# Patient Record
Sex: FEMALE | Race: WHITE | NOT HISPANIC OR LATINO | Employment: FULL TIME | URBAN - METROPOLITAN AREA
[De-identification: names, ages, dates, MRNs, and addresses within clinical notes are randomized per-mention and may not be internally consistent; named-entity substitution may affect disease eponyms.]

---

## 2017-08-10 DIAGNOSIS — Z12.31 VISIT FOR SCREENING MAMMOGRAM: Primary | ICD-10-CM

## 2017-08-16 ENCOUNTER — LAB VISIT (OUTPATIENT)
Dept: LAB | Facility: HOSPITAL | Age: 57
End: 2017-08-16
Attending: FAMILY MEDICINE
Payer: COMMERCIAL

## 2017-08-16 DIAGNOSIS — Z12.11 SPECIAL SCREENING FOR MALIGNANT NEOPLASMS, COLON: ICD-10-CM

## 2017-08-16 DIAGNOSIS — Z79.899 ENCOUNTER FOR LONG-TERM (CURRENT) USE OF OTHER MEDICATIONS: ICD-10-CM

## 2017-08-16 DIAGNOSIS — G47.00 PERSISTENT DISORDER OF INITIATING OR MAINTAINING SLEEP: Primary | ICD-10-CM

## 2017-08-16 LAB
ALBUMIN SERPL BCP-MCNC: 3.3 G/DL
ALP SERPL-CCNC: 64 U/L
ALT SERPL W/O P-5'-P-CCNC: 16 U/L
ANION GAP SERPL CALC-SCNC: 10 MMOL/L
AST SERPL-CCNC: 11 U/L
BASOPHILS # BLD AUTO: 0 K/UL
BASOPHILS NFR BLD: 0.2 %
BILIRUB SERPL-MCNC: 0.4 MG/DL
BILIRUB UR QL STRIP: NEGATIVE
BUN SERPL-MCNC: 15 MG/DL
CALCIUM SERPL-MCNC: 9.4 MG/DL
CHLORIDE SERPL-SCNC: 109 MMOL/L
CHOLEST/HDLC SERPL: 4.3 {RATIO}
CLARITY UR: CLEAR
CO2 SERPL-SCNC: 23 MMOL/L
COLOR UR: YELLOW
CREAT SERPL-MCNC: 0.8 MG/DL
DIFFERENTIAL METHOD: NORMAL
EOSINOPHIL # BLD AUTO: 0.1 K/UL
EOSINOPHIL NFR BLD: 1.6 %
ERYTHROCYTE [DISTWIDTH] IN BLOOD BY AUTOMATED COUNT: 14.5 %
EST. GFR  (AFRICAN AMERICAN): >60 ML/MIN/1.73 M^2
EST. GFR  (NON AFRICAN AMERICAN): >60 ML/MIN/1.73 M^2
GLUCOSE SERPL-MCNC: 105 MG/DL
GLUCOSE UR QL STRIP: NEGATIVE
HCT VFR BLD AUTO: 40.5 %
HDL/CHOLESTEROL RATIO: 23.1 %
HDLC SERPL-MCNC: 195 MG/DL
HDLC SERPL-MCNC: 45 MG/DL
HGB BLD-MCNC: 13.7 G/DL
HGB UR QL STRIP: NEGATIVE
KETONES UR QL STRIP: NEGATIVE
LDLC SERPL CALC-MCNC: 125.6 MG/DL
LEUKOCYTE ESTERASE UR QL STRIP: NEGATIVE
LYMPHOCYTES # BLD AUTO: 1.7 K/UL
LYMPHOCYTES NFR BLD: 29.6 %
MCH RBC QN AUTO: 29.3 PG
MCHC RBC AUTO-ENTMCNC: 33.8 G/DL
MCV RBC AUTO: 87 FL
MONOCYTES # BLD AUTO: 0.4 K/UL
MONOCYTES NFR BLD: 7.5 %
NEUTROPHILS # BLD AUTO: 3.4 K/UL
NEUTROPHILS NFR BLD: 61.1 %
NITRITE UR QL STRIP: NEGATIVE
NONHDLC SERPL-MCNC: 150 MG/DL
PH UR STRIP: 6 [PH] (ref 5–8)
PLATELET # BLD AUTO: 254 K/UL
PMV BLD AUTO: 9.2 FL
POTASSIUM SERPL-SCNC: 4 MMOL/L
PROT SERPL-MCNC: 6.6 G/DL
PROT UR QL STRIP: NEGATIVE
RBC # BLD AUTO: 4.67 M/UL
SODIUM SERPL-SCNC: 142 MMOL/L
SP GR UR STRIP: 1.02 (ref 1–1.03)
TRIGL SERPL-MCNC: 122 MG/DL
TSH SERPL DL<=0.005 MIU/L-ACNC: 1.19 UIU/ML
URN SPEC COLLECT METH UR: NORMAL
UROBILINOGEN UR STRIP-ACNC: NEGATIVE EU/DL
WBC # BLD AUTO: 5.6 K/UL

## 2017-08-16 PROCEDURE — 36415 COLL VENOUS BLD VENIPUNCTURE: CPT

## 2017-08-16 PROCEDURE — 87086 URINE CULTURE/COLONY COUNT: CPT

## 2017-08-16 PROCEDURE — 81003 URINALYSIS AUTO W/O SCOPE: CPT

## 2017-08-16 PROCEDURE — 80061 LIPID PANEL: CPT

## 2017-08-16 PROCEDURE — 80053 COMPREHEN METABOLIC PANEL: CPT

## 2017-08-16 PROCEDURE — 85025 COMPLETE CBC W/AUTO DIFF WBC: CPT

## 2017-08-16 PROCEDURE — 84443 ASSAY THYROID STIM HORMONE: CPT

## 2017-08-17 LAB
BACTERIA UR CULT: NORMAL
BACTERIA UR CULT: NORMAL

## 2017-08-21 ENCOUNTER — HOSPITAL ENCOUNTER (OUTPATIENT)
Dept: RADIOLOGY | Facility: HOSPITAL | Age: 57
Discharge: HOME | End: 2017-08-21
Attending: SPECIALIST
Payer: COMMERCIAL

## 2017-08-21 VITALS — WEIGHT: 293 LBS | HEIGHT: 72 IN | BODY MASS INDEX: 39.68 KG/M2

## 2017-08-21 DIAGNOSIS — Z12.31 VISIT FOR SCREENING MAMMOGRAM: ICD-10-CM

## 2017-08-21 PROCEDURE — 77063 BREAST TOMOSYNTHESIS BI: CPT | Mod: 26,,, | Performed by: RADIOLOGY

## 2017-08-21 PROCEDURE — 77067 SCR MAMMO BI INCL CAD: CPT | Mod: TC

## 2017-08-21 PROCEDURE — 77067 SCR MAMMO BI INCL CAD: CPT | Mod: 26,,, | Performed by: RADIOLOGY

## 2018-08-10 DIAGNOSIS — Z12.31 ENCOUNTER FOR SCREENING MAMMOGRAM FOR BREAST CANCER: Primary | ICD-10-CM

## 2018-08-16 ENCOUNTER — OFFICE VISIT (OUTPATIENT)
Dept: PODIATRY | Facility: CLINIC | Age: 58
End: 2018-08-16
Payer: COMMERCIAL

## 2018-08-16 ENCOUNTER — OFFICE VISIT (OUTPATIENT)
Dept: OPTOMETRY | Facility: CLINIC | Age: 58
End: 2018-08-16
Payer: COMMERCIAL

## 2018-08-16 VITALS — BODY MASS INDEX: 39.68 KG/M2 | WEIGHT: 293 LBS | HEIGHT: 72 IN

## 2018-08-16 DIAGNOSIS — Z01.00 EXAMINATION OF EYES AND VISION: Primary | ICD-10-CM

## 2018-08-16 DIAGNOSIS — M79.675 PAIN IN TOES OF BOTH FEET: ICD-10-CM

## 2018-08-16 DIAGNOSIS — H25.13 NUCLEAR SCLEROSIS, BILATERAL: ICD-10-CM

## 2018-08-16 DIAGNOSIS — B35.1 ONYCHOMYCOSIS DUE TO DERMATOPHYTE: Primary | ICD-10-CM

## 2018-08-16 DIAGNOSIS — H52.4 ASTIGMATISM WITH PRESBYOPIA, BILATERAL: ICD-10-CM

## 2018-08-16 DIAGNOSIS — H52.203 ASTIGMATISM WITH PRESBYOPIA, BILATERAL: ICD-10-CM

## 2018-08-16 DIAGNOSIS — M79.674 PAIN IN TOES OF BOTH FEET: ICD-10-CM

## 2018-08-16 PROCEDURE — 11720 DEBRIDE NAIL 1-5: CPT | Mod: S$GLB,,, | Performed by: PODIATRIST

## 2018-08-16 PROCEDURE — 3008F BODY MASS INDEX DOCD: CPT | Mod: CPTII,S$GLB,, | Performed by: PODIATRIST

## 2018-08-16 PROCEDURE — 92014 COMPRE OPH EXAM EST PT 1/>: CPT | Mod: S$GLB,,, | Performed by: OPTOMETRIST

## 2018-08-16 PROCEDURE — 92015 DETERMINE REFRACTIVE STATE: CPT | Mod: S$GLB,,, | Performed by: OPTOMETRIST

## 2018-08-16 PROCEDURE — 99999 PR PBB SHADOW E&M-EST. PATIENT-LVL III: CPT | Mod: PBBFAC,,, | Performed by: OPTOMETRIST

## 2018-08-16 PROCEDURE — 99203 OFFICE O/P NEW LOW 30 MIN: CPT | Mod: 25,S$GLB,, | Performed by: PODIATRIST

## 2018-08-16 PROCEDURE — 99999 PR PBB SHADOW E&M-EST. PATIENT-LVL III: CPT | Mod: PBBFAC,,, | Performed by: PODIATRIST

## 2018-08-16 NOTE — PROGRESS NOTES
HPI     Routine eye exam--dle-2016 [facility]    Pt complains of blurred vision at distance and near-wanting 2 different rx   for distance driving and reading. Has tried bifocals in the past. Denies   any eye pain. No flashes, some floaters.         Last edited by Maude Jackman on 8/16/2018 11:07 AM. (History)        ROS     Positive for: Eyes    Negative for: Constitutional, Gastrointestinal, Neurological, Skin,   Genitourinary, Musculoskeletal, HENT, Endocrine, Cardiovascular,   Respiratory, Psychiatric, Allergic/Imm, Heme/Lymph    Last edited by KATHERINE Islas, OD on 8/16/2018 11:19 AM. (History)        Assessment /Plan     For exam results, see Encounter Report.    Examination of eyes and vision    Astigmatism with presbyopia, bilateral    Nuclear sclerosis, bilateral      1. Ocular health exam OU  2. Updated specs rx for sep pairs dist/ near, gave copy  3. Very early, not vis sig for consult    Message if issues with near power for computer    Discussed and educated patient on current findings /plan.  RTC 1 year, prn if any changes / issues

## 2018-08-18 ENCOUNTER — LAB VISIT (OUTPATIENT)
Dept: LAB | Facility: HOSPITAL | Age: 58
End: 2018-08-18
Attending: NURSE PRACTITIONER
Payer: COMMERCIAL

## 2018-08-18 DIAGNOSIS — Z79.899 NEED FOR PROPHYLACTIC CHEMOTHERAPY: Primary | ICD-10-CM

## 2018-08-18 LAB
ALBUMIN SERPL BCP-MCNC: 3.6 G/DL
ALP SERPL-CCNC: 64 U/L
ALT SERPL W/O P-5'-P-CCNC: 17 U/L
ANION GAP SERPL CALC-SCNC: 8 MMOL/L
AST SERPL-CCNC: 14 U/L
BASOPHILS # BLD AUTO: 0 K/UL
BASOPHILS NFR BLD: 0.3 %
BILIRUB SERPL-MCNC: 0.7 MG/DL
BILIRUB UR QL STRIP: NEGATIVE
BUN SERPL-MCNC: 13 MG/DL
CALCIUM SERPL-MCNC: 9.6 MG/DL
CHLORIDE SERPL-SCNC: 104 MMOL/L
CHOLEST SERPL-MCNC: 201 MG/DL
CHOLEST/HDLC SERPL: 3.9 {RATIO}
CLARITY UR: CLEAR
CO2 SERPL-SCNC: 27 MMOL/L
COLOR UR: YELLOW
CREAT SERPL-MCNC: 0.8 MG/DL
DIFFERENTIAL METHOD: ABNORMAL
EOSINOPHIL # BLD AUTO: 0.1 K/UL
EOSINOPHIL NFR BLD: 1.5 %
ERYTHROCYTE [DISTWIDTH] IN BLOOD BY AUTOMATED COUNT: 14.4 %
EST. GFR  (AFRICAN AMERICAN): >60 ML/MIN/1.73 M^2
EST. GFR  (NON AFRICAN AMERICAN): >60 ML/MIN/1.73 M^2
GLUCOSE SERPL-MCNC: 106 MG/DL
GLUCOSE UR QL STRIP: NEGATIVE
HCT VFR BLD AUTO: 40.6 %
HDLC SERPL-MCNC: 51 MG/DL
HDLC SERPL: 25.4 %
HGB BLD-MCNC: 13.4 G/DL
HGB UR QL STRIP: NEGATIVE
KETONES UR QL STRIP: NEGATIVE
LDLC SERPL CALC-MCNC: 126.6 MG/DL
LEUKOCYTE ESTERASE UR QL STRIP: NEGATIVE
LYMPHOCYTES # BLD AUTO: 1.4 K/UL
LYMPHOCYTES NFR BLD: 24.9 %
MCH RBC QN AUTO: 28.6 PG
MCHC RBC AUTO-ENTMCNC: 33.1 G/DL
MCV RBC AUTO: 87 FL
MONOCYTES # BLD AUTO: 0.4 K/UL
MONOCYTES NFR BLD: 7.3 %
NEUTROPHILS # BLD AUTO: 3.8 K/UL
NEUTROPHILS NFR BLD: 66 %
NITRITE UR QL STRIP: NEGATIVE
NONHDLC SERPL-MCNC: 150 MG/DL
PH UR STRIP: 8 [PH] (ref 5–8)
PLATELET # BLD AUTO: 299 K/UL
PMV BLD AUTO: 9 FL
POTASSIUM SERPL-SCNC: 4.3 MMOL/L
PROT SERPL-MCNC: 6.7 G/DL
PROT UR QL STRIP: NEGATIVE
RBC # BLD AUTO: 4.7 M/UL
SODIUM SERPL-SCNC: 139 MMOL/L
SP GR UR STRIP: 1.02 (ref 1–1.03)
TRIGL SERPL-MCNC: 117 MG/DL
TSH SERPL DL<=0.005 MIU/L-ACNC: 0.94 UIU/ML
URN SPEC COLLECT METH UR: NORMAL
UROBILINOGEN UR STRIP-ACNC: 1 EU/DL
WBC # BLD AUTO: 5.8 K/UL

## 2018-08-18 PROCEDURE — 81003 URINALYSIS AUTO W/O SCOPE: CPT

## 2018-08-18 PROCEDURE — 36415 COLL VENOUS BLD VENIPUNCTURE: CPT

## 2018-08-18 PROCEDURE — 85025 COMPLETE CBC W/AUTO DIFF WBC: CPT

## 2018-08-18 PROCEDURE — 84443 ASSAY THYROID STIM HORMONE: CPT

## 2018-08-18 PROCEDURE — 80061 LIPID PANEL: CPT

## 2018-08-18 PROCEDURE — 80053 COMPREHEN METABOLIC PANEL: CPT

## 2018-08-23 ENCOUNTER — HOSPITAL ENCOUNTER (OUTPATIENT)
Dept: RADIOLOGY | Facility: HOSPITAL | Age: 58
Discharge: HOME | End: 2018-08-23
Attending: SPECIALIST
Payer: COMMERCIAL

## 2018-08-23 DIAGNOSIS — Z12.31 ENCOUNTER FOR SCREENING MAMMOGRAM FOR BREAST CANCER: ICD-10-CM

## 2018-08-23 PROCEDURE — 77067 SCR MAMMO BI INCL CAD: CPT | Mod: TC

## 2018-08-23 PROCEDURE — 77067 SCR MAMMO BI INCL CAD: CPT | Mod: 26,,, | Performed by: RADIOLOGY

## 2018-08-23 PROCEDURE — 77063 BREAST TOMOSYNTHESIS BI: CPT | Mod: 26,,, | Performed by: RADIOLOGY

## 2018-09-06 ENCOUNTER — PATIENT MESSAGE (OUTPATIENT)
Dept: OPTOMETRY | Facility: CLINIC | Age: 58
End: 2018-09-06

## 2018-09-15 ENCOUNTER — PATIENT MESSAGE (OUTPATIENT)
Dept: OPTOMETRY | Facility: CLINIC | Age: 58
End: 2018-09-15

## 2018-09-17 NOTE — PROGRESS NOTES
Subjective:      Patient ID: Domonique Carmona is a 57 y.o. female.    Chief Complaint: Nail Problem (Tani great - discolored, raising from base - right worse than left) and Other Misc (PCP:  Dr Mackay - Aug 2017)    Domonique is a 57 y.o. female who presents to the clinic complaining of thick and discolored toenails on both feet. Domonique is inquiring about treatment options.  Patient reports pain with pressure, especially when wearing shoes, reaching 7/10 on the pain scale and describes the pain as dull and achy.  She denies any known trauma to toes or any previous treatment.  Patient denies any other pedal complaints at this time.      Review of Systems   Constitution: Negative for chills, diaphoresis and fever.   Cardiovascular: Negative for claudication, cyanosis and leg swelling.   Respiratory: Negative for cough, shortness of breath and wheezing.    Endocrine: Negative for cold intolerance, heat intolerance, polydipsia, polyphagia and polyuria.   Hematologic/Lymphatic: Negative for bleeding problem. Does not bruise/bleed easily.   Skin: Positive for color change and nail changes. Negative for dry skin, itching, poor wound healing, rash, skin cancer, suspicious lesions and unusual hair distribution.   Musculoskeletal: Negative for arthritis, back pain, falls, gout, joint pain, joint swelling, muscle cramps, muscle weakness, myalgias and stiffness.   Gastrointestinal: Negative for change in bowel habit, constipation, diarrhea, nausea and vomiting.   Neurological: Positive for paresthesias. Negative for disturbances in coordination, dizziness, focal weakness, loss of balance, numbness and sensory change.           Objective:      Bilateral pedal exam was performed today.  Physical Exam   Constitutional: She is oriented to person, place, and time. She appears well-developed and well-nourished. No distress.   HENT:   Head: Normocephalic and atraumatic.   Eyes: Conjunctivae and EOM are normal. Pupils are equal,  round, and reactive to light.   Neck: Normal range of motion.   Cardiovascular:   Pulses:       Dorsalis pedis pulses are 2+ on the right side, and 2+ on the left side.        Posterior tibial pulses are 2+ on the right side, and 2+ on the left side.   Pedal pulses palpable 2/4 DP & PT Bilateral LE.  CFT is < 3 seconds to Bilateral hallux.  Skin temperature is warm to warm proximal tibia to distal toes Bilateral LE without localized increase in calor noted.  No erythema, edema, or ecchymosis noted Bilateral foot or ankle.  Hair growth present distally Bilateral LE.     Pulmonary/Chest: Effort normal and breath sounds normal. No respiratory distress. She has no wheezes.   Musculoskeletal: She exhibits tenderness. She exhibits no edema or deformity.        Right foot: There is decreased range of motion. There is no deformity.        Left foot: There is decreased range of motion. There is no deformity.   Bilateral ankle joint ROM is decreased.  Bilateral subtalar joint ROM is decreased.  Bilateral midtarsal joint ROM is within normal limits.  Bilateral 1st ray ROM is within normal limits.  Bilateral 1st  MTPJ ROM is decreased.   Bilateral ankle joint dorsiflexion is restricted with the knee extended and flexed per Silfverskiold exam.    Muscle strength is 5/5 for all bilateral muscle groups tested.   Feet:   Right Foot:   Protective Sensation: 10 sites tested. 10 sites sensed.   Skin Integrity: Negative for ulcer, blister, skin breakdown, erythema, warmth, callus or dry skin.   Left Foot:   Protective Sensation: 10 sites tested. 10 sites sensed.   Skin Integrity: Negative for ulcer, blister, skin breakdown, erythema, warmth, callus or dry skin.   Neurological: She is alert and oriented to person, place, and time. She has normal strength. She displays no atrophy and normal reflexes. No sensory deficit. She exhibits normal muscle tone. Coordination and gait normal. She displays no Babinski's sign on the right side. She  displays no Babinski's sign on the left side.   Reflex Scores:       Achilles reflexes are 2+ on the right side and 2+ on the left side.  Protective sensation is intact to 10/10 sites on the right foot and to 10/10 sites on the left foot via [place] monofilament.    Proprioception is Intact to the right foot and Intact to the left foot.  Vibratory sensation is Intact to the right foot and Intact to the left foot.   Light touch sensation is Intact to the right foot and Intact to the left foot.   Achilles DTR and Chaddock STR are Intact to bilateral lower extremities.   Negative Babinski sign bilateral lower extremities.  Negative clonus sign bilateral lower extremities.  Moderate tenderness to palpation of B/L hallux toenail.     Skin: Skin is warm, dry and intact. Capillary refill takes less than 2 seconds. No abrasion, no bruising, no burn, no ecchymosis, no laceration, no lesion, no petechiae and no rash noted. She is not diaphoretic. No cyanosis or erythema. Nails show no clubbing.   B/L hallux toenail is thickened, dystrophic, brittle, discolored, and mycotic with subungal debris present.  Toenails 2-5 B/L are WNL in length and thickness.  Webspaces 1-4 B/L are clean, dry, and intact.  Skin turgor is supple.  No dry, flaky skin noted to B/L LE.  No open wounds or suspicious pigmented lesions appreciable B/L foot or ankle.     Psychiatric: She has a normal mood and affect. Her behavior is normal. Judgment and thought content normal.   Nursing note and vitals reviewed.        Assessment:       Encounter Diagnoses   Name Primary?    Onychomycosis due to dermatophyte Yes    Pain in toes of both feet          Plan:       Domonique was seen today for nail problem and other misc.    Diagnoses and all orders for this visit:    Onychomycosis due to dermatophyte    Pain in toes of both feet      I counseled the patient on her conditions, their implications and medical management.    - Discussed with patient  conservative treatment options including prescription oral and topical antifungal therapies, Vicks, vinegar soaks, and tea tree oil as well as surgical intervention via total nail avulsion along with pertinent risks and benefits and likely complications - patient choose nonprescription topical antifungal therapy and nail debridement.    - Patient was given the option of definitive diagnosis via fungal nail culture, which she deferred today.    - With the patient's permission, B/L hallux toenails were debrided in length and thickness via nail nippers and electric  to patient's tolerance without incident.      Patient was given the following recommendations and instructions:  Patient Instructions   - Apply white vinegar soaked cotton balls to affected toenails for 5 minutes daily or perform 25% white vinegar and 75% lukewarm water foot soaks for 10 minutes daily to reduce fungal debris under toenails.  Apply thin layer of Vicks vaporub on top of and under affected nails to create environment that doesn't support fungal growth.  Don't perform any of these antifungal therapies if open wounds are present.    - Notify clinic if condition worsens or fails to improve.    - Follow up in 6 weeks for nail care.        Sherry Welch DPM

## 2018-09-17 NOTE — PATIENT INSTRUCTIONS
- Apply white vinegar soaked cotton balls to affected toenails for 5 minutes daily or perform 25% white vinegar and 75% lukewarm water foot soaks for 10 minutes daily to reduce fungal debris under toenails.  Apply thin layer of Vicks vaporub on top of and under affected nails to create environment that doesn't support fungal growth.  Don't perform any of these antifungal therapies if open wounds are present.    - Notify clinic if condition worsens or fails to improve.    - Follow up in 6 weeks for nail care.

## 2018-09-27 ENCOUNTER — OFFICE VISIT (OUTPATIENT)
Dept: PODIATRY | Facility: CLINIC | Age: 58
End: 2018-09-27
Payer: COMMERCIAL

## 2018-09-27 ENCOUNTER — HOSPITAL ENCOUNTER (OUTPATIENT)
Dept: RADIOLOGY | Facility: HOSPITAL | Age: 58
Discharge: HOME | End: 2018-09-27
Attending: PODIATRIST
Payer: COMMERCIAL

## 2018-09-27 VITALS
HEIGHT: 72 IN | WEIGHT: 293 LBS | BODY MASS INDEX: 39.68 KG/M2 | HEART RATE: 81 BPM | SYSTOLIC BLOOD PRESSURE: 144 MMHG | DIASTOLIC BLOOD PRESSURE: 97 MMHG

## 2018-09-27 DIAGNOSIS — B35.1 ONYCHOMYCOSIS DUE TO DERMATOPHYTE: ICD-10-CM

## 2018-09-27 DIAGNOSIS — M72.2 PLANTAR FASCIITIS: ICD-10-CM

## 2018-09-27 DIAGNOSIS — M79.671 PAIN IN RIGHT FOOT: ICD-10-CM

## 2018-09-27 DIAGNOSIS — M76.61 ACHILLES TENDINITIS OF RIGHT LOWER EXTREMITY: ICD-10-CM

## 2018-09-27 DIAGNOSIS — M76.61 ACHILLES TENDINITIS OF RIGHT LOWER EXTREMITY: Primary | ICD-10-CM

## 2018-09-27 PROCEDURE — 73630 X-RAY EXAM OF FOOT: CPT | Mod: TC,PO,RT

## 2018-09-27 PROCEDURE — 73630 X-RAY EXAM OF FOOT: CPT | Mod: 26,RT,, | Performed by: RADIOLOGY

## 2018-09-27 PROCEDURE — 99999 PR PBB SHADOW E&M-EST. PATIENT-LVL III: CPT | Mod: PBBFAC,,, | Performed by: PODIATRIST

## 2018-09-27 PROCEDURE — 99213 OFFICE O/P EST LOW 20 MIN: CPT | Mod: S$GLB,,, | Performed by: PODIATRIST

## 2018-09-27 PROCEDURE — 3008F BODY MASS INDEX DOCD: CPT | Mod: CPTII,S$GLB,, | Performed by: PODIATRIST

## 2018-10-15 PROBLEM — B35.1 ONYCHOMYCOSIS DUE TO DERMATOPHYTE: Status: ACTIVE | Noted: 2018-10-15

## 2018-10-15 PROBLEM — M72.2 PLANTAR FASCIITIS: Status: ACTIVE | Noted: 2018-10-15

## 2018-10-15 PROBLEM — M79.671 PAIN IN RIGHT FOOT: Status: ACTIVE | Noted: 2018-10-15

## 2018-10-15 NOTE — PATIENT INSTRUCTIONS
- Apply white vinegar soaked cotton balls to affected toenails for 5 minutes daily or perform 25% white vinegar and 75% lukewarm water foot soaks for 10 minutes daily to reduce fungal debris under toenails.  Apply thin layer of Vicks vaporub on top of and under affected nails to create environment that doesn't support fungal growth.  Don't perform any of these antifungal therapies if open wounds are present.    - Wear supportive shoes such as sneakers with arch supports.    - Look for Superfeet or Powerstep arch supports.    - Rest/reduce ambulation to necessary activities of daily living.    - Ice heel for no more than 20 minutes/per hour.    - Elevate foot as much as possible throughout the day.    - Perform Achilles/plantar fascia stretches as much as possible throughout the day.    - Apply OTC topical arnica to affected area for pain relief and to reduce bruising/swelling.    - Keep scheduled appointment for xrays.    - Notify clinic if pain worsens or fails to improve.    - Follow up in 1 month for heel pain.

## 2018-10-15 NOTE — PROGRESS NOTES
Subjective:      Patient ID: Domonique Carmona is a 58 y.o. female.    Chief Complaint: Nail Problem (Tani fungus   Ziggy Mackay  8/2018)    Domonique is a 58 y.o. female who presents to the clinic complaining of thick and discolored toenails on both feet.  She reports decreased pain since last visit after nails were thinned out.  She relates occasionally performing home remedies to reduce fungal burden on toenails.  Patient is also complaining of right heel pain at both the back and bottom, which has been ongoing for a couple of weeks.  She reports it is occasionally sharp, especially with 1st step in the morning.  She denies trying to treat her heel pain other than with rest.  She rates her pain today as 2/10 on pain scale for all complaints.  Patient denies any other pedal complaints at this time.      Review of Systems   Cardiovascular: Negative for claudication, cyanosis and leg swelling.   Skin: Positive for color change and nail changes. Negative for dry skin, itching, poor wound healing, rash, skin cancer, suspicious lesions and unusual hair distribution.   Musculoskeletal: Positive for myalgias and stiffness. Negative for arthritis, back pain, falls, gout, joint pain, joint swelling, muscle cramps and muscle weakness.   Neurological: Positive for paresthesias. Negative for disturbances in coordination, dizziness, focal weakness, loss of balance, numbness and sensory change.           Objective:      Bilateral pedal exam was performed today.  Physical Exam   Constitutional: She is oriented to person, place, and time. She appears well-developed and well-nourished. No distress.   Cardiovascular:   Pulses:       Dorsalis pedis pulses are 2+ on the right side, and 2+ on the left side.        Posterior tibial pulses are 2+ on the right side, and 2+ on the left side.   Pedal pulses palpable 2/4 DP & PT Bilateral LE.  CFT is < 3 seconds to Bilateral hallux.  Skin temperature is warm to warm proximal tibia to  distal toes Bilateral LE without localized increase in calor noted.  Mild, nonpitting edema noted to the posterior and plantar aspect of the right heel at the insertion of the Achilles tendon and the plantar fascia on the calcaneus, respectively.  No erythema or ecchymosis noted Bilateral foot or ankle.  Hair growth present distally Bilateral LE.     Musculoskeletal: She exhibits edema and tenderness.        Right foot: There is decreased range of motion and deformity.        Left foot: There is decreased range of motion. There is no deformity.   Bilateral ankle joint ROM is decreased.  Bilateral subtalar joint ROM is decreased.  Bilateral midtarsal joint ROM is within normal limits.  Bilateral 1st ray ROM is within normal limits.  Bilateral 1st  MTPJ ROM is decreased.   Bilateral ankle joint dorsiflexion is restricted with the knee extended and flexed per Silfverskiold exam.    Muscle strength is 5/5 for all bilateral muscle groups tested.   Feet:   Right Foot:   Protective Sensation: 10 sites tested. 10 sites sensed.   Skin Integrity: Negative for ulcer, blister, skin breakdown, erythema, warmth, callus or dry skin.   Left Foot:   Protective Sensation: 10 sites tested. 10 sites sensed.   Skin Integrity: Negative for ulcer, blister, skin breakdown, erythema, warmth, callus or dry skin.   Neurological: She is alert and oriented to person, place, and time. She has normal strength. She displays no atrophy and normal reflexes. No sensory deficit. She exhibits normal muscle tone. Coordination and gait normal. She displays no Babinski's sign on the right side. She displays no Babinski's sign on the left side.   Reflex Scores:       Achilles reflexes are 2+ on the right side and 2+ on the left side.  Epicritic sensation is grossly intact to B/L LE distally.   Achilles DTR and Chaddock STR are Intact to bilateral lower extremities.   Mild tenderness to palpation of B/L hallux toenail.  Mild tenderness to palpation of the  posterior and plantar aspect of the right heel at the insertion of the Achilles tendon and the plantar fascia on the calcaneus, respectively.   Skin: Skin is warm, dry and intact. Capillary refill takes less than 2 seconds. No abrasion, no bruising, no burn, no ecchymosis, no laceration, no lesion, no petechiae and no rash noted. She is not diaphoretic. No cyanosis or erythema. Nails show no clubbing.   B/L hallux toenail is thickened, dystrophic, brittle, discolored, and mycotic with subungal debris present.  Toenails 2-5 B/L are WNL in length and thickness.  Webspaces 1-4 B/L are clean, dry, and intact.  Skin turgor is supple.  No dry, flaky skin noted to B/L LE.  No open wounds or suspicious pigmented lesions appreciable B/L foot or ankle.     Psychiatric: She has a normal mood and affect. Her behavior is normal. Judgment and thought content normal.   Nursing note and vitals reviewed.        Assessment:       Encounter Diagnoses   Name Primary?    Achilles tendinitis of right lower extremity - Right Foot Yes    Plantar fasciitis - Right Foot     Pain in right foot - Right Foot     Onychomycosis due to dermatophyte          Plan:       Domonique was seen today for nail problem.    Diagnoses and all orders for this visit:    Achilles tendinitis of right lower extremity - Right Foot  -     X-Ray Foot Complete Right; Future    Plantar fasciitis - Right Foot  -     X-Ray Foot Complete Right; Future    Pain in right foot - Right Foot  -     X-Ray Foot Complete Right; Future    Onychomycosis due to dermatophyte      I counseled the patient on her conditions, their implications and medical management.    - Reviewed with patient antifungal treatment options - she opted to continue with home remedies at this time.    - Discussed with patient PRICE therapy and Achilles tendon stretching techniques.  Patient was given stretching instruction handout.     - Advised patient on proper, accommodative/supportive shoe  gear.    - Ordered and scheduled x-rays for patient. Will notify patient of results.    Patient was given the following recommendations and instructions:  Patient Instructions   - Apply white vinegar soaked cotton balls to affected toenails for 5 minutes daily or perform 25% white vinegar and 75% lukewarm water foot soaks for 10 minutes daily to reduce fungal debris under toenails.  Apply thin layer of Vicks vaporub on top of and under affected nails to create environment that doesn't support fungal growth.  Don't perform any of these antifungal therapies if open wounds are present.    - Wear supportive shoes such as sneakers with arch supports.    - Look for Superfeet or Powerstep arch supports.    - Rest/reduce ambulation to necessary activities of daily living.    - Ice heel for no more than 20 minutes/per hour.    - Elevate foot as much as possible throughout the day.    - Perform Achilles/plantar fascia stretches as much as possible throughout the day.    - Apply OTC topical arnica to affected area for pain relief and to reduce bruising/swelling.    - Keep scheduled appointment for xrays.    - Notify clinic if pain worsens or fails to improve.    - Follow up in 1 month for heel pain.          Sherry Welch DPM

## 2018-12-20 ENCOUNTER — OFFICE VISIT (OUTPATIENT)
Dept: PODIATRY | Facility: CLINIC | Age: 58
End: 2018-12-20
Payer: COMMERCIAL

## 2018-12-20 VITALS
HEIGHT: 72 IN | BODY MASS INDEX: 39.68 KG/M2 | WEIGHT: 293 LBS | DIASTOLIC BLOOD PRESSURE: 87 MMHG | HEART RATE: 77 BPM | SYSTOLIC BLOOD PRESSURE: 136 MMHG

## 2018-12-20 DIAGNOSIS — I87.2 VENOUS STASIS DERMATITIS OF LEFT LOWER EXTREMITY: ICD-10-CM

## 2018-12-20 DIAGNOSIS — M79.675 PAIN IN TOES OF BOTH FEET: ICD-10-CM

## 2018-12-20 DIAGNOSIS — M76.61 ACHILLES TENDINITIS OF RIGHT LOWER EXTREMITY: ICD-10-CM

## 2018-12-20 DIAGNOSIS — M79.662 PAIN IN LEFT LOWER LEG: ICD-10-CM

## 2018-12-20 DIAGNOSIS — M79.674 PAIN IN TOES OF BOTH FEET: ICD-10-CM

## 2018-12-20 DIAGNOSIS — L08.9 SOFT TISSUE INFECTION OF FOOT: Primary | ICD-10-CM

## 2018-12-20 DIAGNOSIS — B35.1 ONYCHOMYCOSIS DUE TO DERMATOPHYTE: ICD-10-CM

## 2018-12-20 PROCEDURE — 99214 OFFICE O/P EST MOD 30 MIN: CPT | Mod: S$GLB,,, | Performed by: PODIATRIST

## 2018-12-20 PROCEDURE — 3008F BODY MASS INDEX DOCD: CPT | Mod: CPTII,S$GLB,, | Performed by: PODIATRIST

## 2018-12-20 PROCEDURE — 99999 PR PBB SHADOW E&M-EST. PATIENT-LVL III: CPT | Mod: PBBFAC,,, | Performed by: PODIATRIST

## 2018-12-20 RX ORDER — MUPIROCIN CALCIUM 20 MG/G
CREAM TOPICAL 3 TIMES DAILY
Qty: 30 G | Refills: 2 | Status: SHIPPED | OUTPATIENT
Start: 2018-12-20 | End: 2019-01-19

## 2018-12-20 RX ORDER — CLOTRIMAZOLE 1 %
CREAM (GRAM) TOPICAL 2 TIMES DAILY
Qty: 60 G | Refills: 2 | Status: SHIPPED | OUTPATIENT
Start: 2018-12-20 | End: 2019-09-13

## 2018-12-20 RX ORDER — METHYLPREDNISOLONE 4 MG/1
TABLET ORAL
Qty: 2 PACKAGE | Refills: 0 | Status: SHIPPED | OUTPATIENT
Start: 2018-12-20 | End: 2019-01-10 | Stop reason: ALTCHOICE

## 2018-12-20 NOTE — PATIENT INSTRUCTIONS
- Apply white vinegar soaked cotton balls to affected toenails for 5 minutes daily or perform 25% white vinegar and 75% lukewarm water foot soaks for 10 minutes daily to reduce fungal debris under toenails.  Apply thin layer of Vicks vaporub on top of and under affected nails to create environment that doesn't support fungal growth.  Don't perform any of these antifungal therapies if open wounds are present.    - Apply compound antifungal to affected toenails twice daily.    - Wear supportive shoes such as sneakers with arch supports.    - Look for Superfeet or Powerstep arch supports.    - Rest/reduce ambulation to necessary activities of daily living.    - Ice heel for no more than 20 minutes/per hour.    - Elevate foot as much as possible throughout the day.    - Perform Achilles/plantar fascia stretches as much as possible throughout the day.    - Apply OTC topical arnica to affected area for pain relief and to reduce bruising/swelling.    - Apply antibiotic and antifungal creams to ingrown toenails and cover with bandaids daily.    - Apply warm compress with [place]'s 1-2x daily to left leg rash.    - Notify clinic if any new or worsening condition arises.    - Follow up in 2 weeks for dermatitis and heel pain.

## 2018-12-21 ENCOUNTER — PATIENT MESSAGE (OUTPATIENT)
Dept: PODIATRY | Facility: CLINIC | Age: 58
End: 2018-12-21

## 2018-12-21 NOTE — TELEPHONE ENCOUNTER
Called patient and pharmacy they messed up the rx for the medrol pack they only gave patient one does they are now putting in the other rx for the 2nd for patient to . Let patient know about this as well. Patient voiced all understanding .

## 2018-12-31 PROBLEM — M79.674 PAIN IN TOES OF BOTH FEET: Status: ACTIVE | Noted: 2018-12-31

## 2018-12-31 PROBLEM — L08.9 SOFT TISSUE INFECTION OF FOOT: Status: ACTIVE | Noted: 2018-12-31

## 2018-12-31 PROBLEM — M79.662 PAIN IN LEFT LOWER LEG: Status: ACTIVE | Noted: 2018-12-31

## 2018-12-31 PROBLEM — M79.675 PAIN IN TOES OF BOTH FEET: Status: ACTIVE | Noted: 2018-12-31

## 2018-12-31 PROBLEM — I87.2 VENOUS STASIS DERMATITIS OF LEFT LOWER EXTREMITY: Status: ACTIVE | Noted: 2018-12-31

## 2018-12-31 NOTE — PROGRESS NOTES
Subjective:      Patient ID: Domonique Carmona is a 58 y.o. female.    Chief Complaint: Foot Pain (left ); Nail Problem (f/u - 3 mo - fungus); and Other Misc (PCP:  Dr Mackay  8/2018)    Domonique is a 58 y.o. female who presents to the clinic complaining of thick and discolored toenails on both feet, left heel pain, and the left lower leg rash.  She reports decreased pain since last visit after nails were thinned out but pain is still present, and she would like to review antifungal treatment options today.  She relates occasionally performing home remedies to reduce fungal burden on toenails.  Patient states right heel pain has resolved is now occurring in her left heel, which she rates as 6/10 on pain scale.  She reports it is occasionally stabbing pain and is irritated by shoe gear.  She informs of new onset of rash on her left leg, which has been present for about a week.  She states she has been applying Aquaphor to rash with mild temporary relief.  Patient denies any other pedal complaints at this time.    Review of Systems   Cardiovascular: Positive for leg swelling. Negative for claudication and cyanosis.   Skin: Positive for color change, nail changes, poor wound healing, rash and unusual hair distribution. Negative for dry skin, itching, skin cancer and suspicious lesions.   Musculoskeletal: Positive for myalgias and stiffness. Negative for arthritis, back pain, falls, gout, joint pain, joint swelling, muscle cramps and muscle weakness.   Neurological: Negative for disturbances in coordination, dizziness, focal weakness, loss of balance, numbness, paresthesias and sensory change.         Objective:      Bilateral pedal exam was performed today.  Physical Exam   Constitutional: She is oriented to person, place, and time. She appears well-developed and well-nourished. No distress.   Cardiovascular: Intact distal pulses.   Pulses:       Dorsalis pedis pulses are 2+ on the right side, and 2+ on the left side.         Posterior tibial pulses are 2+ on the right side, and 2+ on the left side.   Pedal pulses palpable 2/4 DP & PT Bilateral LE.  CFT is = 3 seconds to Bilateral hallux.  Skin temperature is warm to warm proximal tibia to distal toes Bilateral LE with localized increase in calor and erythema noted to left anterior shin.  +2/4 pitting edema noted to B/L LE.  Mild, nonpitting edema noted to the posterior spect of the right heel at the insertion of the Achilles tendon and the plantar fascia on the calcaneus, respectively.  No ecchymosis noted Bilateral foot or ankle.  Hair growth present distally Bilateral LE.  Telangectasias and reticular veins present B/L LE.   Musculoskeletal: She exhibits edema, tenderness and deformity.        Right foot: There is decreased range of motion and deformity.        Left foot: There is decreased range of motion and deformity.   Bilateral ankle joint ROM is decreased.  Bilateral subtalar joint ROM is decreased.  Bilateral midtarsal joint ROM is within normal limits.  Bilateral 1st ray ROM is within normal limits.  Bilateral 1st  MTPJ ROM is decreased.   Bilateral ankle joint dorsiflexion is restricted with the knee extended and flexed per Silfverskiold exam.    Muscle strength is 5/5 for all bilateral LE muscle groups tested.   Feet:   Right Foot:   Protective Sensation: 10 sites tested. 10 sites sensed.   Skin Integrity: Negative for ulcer, blister, skin breakdown, erythema, warmth, callus or dry skin.   Left Foot:   Protective Sensation: 10 sites tested. 10 sites sensed.   Skin Integrity: Positive for skin breakdown, erythema and warmth. Negative for ulcer, blister, callus or dry skin.   Neurological: She is alert and oriented to person, place, and time. She has normal strength. She displays no atrophy and normal reflexes. No sensory deficit. She exhibits normal muscle tone. Coordination and gait normal. She displays no Babinski's sign on the right side. She displays no Babinski's  sign on the left side.   Reflex Scores:       Achilles reflexes are 2+ on the right side and 2+ on the left side.  Epicritic sensation is grossly intact to B/L LE distally.   Oppenheim STR is negative bilateral lower extremities.   Mild tenderness to palpation of B/L hallux toenail medial and lateral borders.  Moderate tenderness to palpation of the posterior and plantar aspect of the right heel at the insertion of the Achilles tendon and the plantar fascia on the calcaneus, respectively.  Mild pain upon palpation to left anterior shin dermatitis.   Skin: Skin is warm. Capillary refill takes 2 to 3 seconds. Lesion and rash noted. No abrasion, no bruising, no burn, no ecchymosis, no laceration and no petechiae noted. She is not diaphoretic. There is erythema. No cyanosis. Nails show no clubbing.   B/L hallux toenail is thickened, dystrophic, brittle, discolored, and mycotic with subungal debris present with mild incurvation without wound and.  Toenails 2-5 B/L are WNL in length and thickness.  Webspaces 1-4 B/L are clean, dry, and intact.  Skin turgor is increased skin texture is shiny and atrophic.  Erythematous and edematous patch noted to the left anterior shin with superficial skin breakdown and serous weeping without purulence, malodor, proximal streaking, or other dylan sign of infection appreciable.     Psychiatric: She has a normal mood and affect. Her behavior is normal. Judgment and thought content normal.   Nursing note and vitals reviewed.        Assessment:       Encounter Diagnoses   Name Primary?    Soft tissue infection of foot Yes    Venous stasis dermatitis of left lower extremity     Pain in left lower leg     Achilles tendinitis of right lower extremity - Right Foot     Onychomycosis due to dermatophyte     Pain in toes of both feet          Plan:       Domonique was seen today for foot pain, nail problem and other misc.    Diagnoses and all orders for this visit:    Soft tissue infection of  foot  -     mupirocin calcium 2% (BACTROBAN) 2 % cream; Apply topically 3 (three) times daily.  -     clotrimazole (LOTRIMIN) 1 % cream; Apply topically 2 (two) times daily.  -     methylPREDNISolone (MEDROL DOSEPACK) 4 mg tablet; use as directed    Venous stasis dermatitis of left lower extremity  -     mupirocin calcium 2% (BACTROBAN) 2 % cream; Apply topically 3 (three) times daily.  -     clotrimazole (LOTRIMIN) 1 % cream; Apply topically 2 (two) times daily.  -     methylPREDNISolone (MEDROL DOSEPACK) 4 mg tablet; use as directed    Pain in left lower leg  -     mupirocin calcium 2% (BACTROBAN) 2 % cream; Apply topically 3 (three) times daily.  -     clotrimazole (LOTRIMIN) 1 % cream; Apply topically 2 (two) times daily.  -     methylPREDNISolone (MEDROL DOSEPACK) 4 mg tablet; use as directed    Achilles tendinitis of right lower extremity - Right Foot    Onychomycosis due to dermatophyte    Pain in toes of both feet      I counseled the patient on her conditions, their implications and medical management.    - Reviewed with patient antifungal treatment options.  Prescribed topical compound antifungal nail solution through [facility].    - Revieweded with patient accommodative/supportive shoe gear, PRICE therapy, and Achilles tendon stretching techniques.    - Advised patient on wound care for venous stasis dermatitis with Domeboro's compresses and applying antibiotic and antifungal creams to wound twice daily.    - Recommended the patient apply antibiotic and antifungal creams to ingrown toenails and cover with Band-Aids daily and allow the nails to grow out to avoid nail procedures.  Patient opted for this route of conservative therapy at this time.    Patient was given the following recommendations and instructions:  Patient Instructions   - Apply white vinegar soaked cotton balls to affected toenails for 5 minutes daily or perform 25% white vinegar and 75% lukewarm water foot soaks for 10  minutes daily to reduce fungal debris under toenails.  Apply thin layer of Vicks vaporub on top of and under affected nails to create environment that doesn't support fungal growth.  Don't perform any of these antifungal therapies if open wounds are present.    - Apply compound antifungal to affected toenails twice daily.    - Wear supportive shoes such as sneakers with arch supports.    - Look for Superfeet or Powerstep arch supports.    - Rest/reduce ambulation to necessary activities of daily living.    - Ice heel for no more than 20 minutes/per hour.    - Elevate foot as much as possible throughout the day.    - Perform Achilles/plantar fascia stretches as much as possible throughout the day.    - Apply OTC topical arnica to affected area for pain relief and to reduce bruising/swelling.    - Apply antibiotic and antifungal creams to ingrown toenails and cover with bandaids daily.    - Apply warm compress with Domeboro's 1-2x daily to left leg rash.    - Notify clinic if any new or worsening condition arises.    - Follow up in 2 weeks for dermatitis and heel pain.          Sherry Welch DPM

## 2019-01-10 ENCOUNTER — OFFICE VISIT (OUTPATIENT)
Dept: PODIATRY | Facility: CLINIC | Age: 59
End: 2019-01-10
Payer: COMMERCIAL

## 2019-01-10 VITALS
HEART RATE: 81 BPM | HEIGHT: 72 IN | SYSTOLIC BLOOD PRESSURE: 128 MMHG | WEIGHT: 293 LBS | BODY MASS INDEX: 39.68 KG/M2 | DIASTOLIC BLOOD PRESSURE: 88 MMHG

## 2019-01-10 DIAGNOSIS — I87.2 VENOUS STASIS DERMATITIS OF LEFT LOWER EXTREMITY: Primary | ICD-10-CM

## 2019-01-10 DIAGNOSIS — L60.0 ONYCHOCRYPTOSIS: ICD-10-CM

## 2019-01-10 DIAGNOSIS — M76.62 ACHILLES TENDINITIS OF BOTH LOWER EXTREMITIES: ICD-10-CM

## 2019-01-10 DIAGNOSIS — M72.2 PLANTAR FASCIITIS: ICD-10-CM

## 2019-01-10 DIAGNOSIS — M79.605 PAIN IN BOTH LOWER EXTREMITIES: ICD-10-CM

## 2019-01-10 DIAGNOSIS — M79.604 PAIN IN BOTH LOWER EXTREMITIES: ICD-10-CM

## 2019-01-10 DIAGNOSIS — M76.61 ACHILLES TENDINITIS OF BOTH LOWER EXTREMITIES: ICD-10-CM

## 2019-01-10 PROCEDURE — 99999 PR PBB SHADOW E&M-EST. PATIENT-LVL III: CPT | Mod: PBBFAC,,, | Performed by: PODIATRIST

## 2019-01-10 PROCEDURE — 3008F PR BODY MASS INDEX (BMI) DOCUMENTED: ICD-10-PCS | Mod: CPTII,S$GLB,, | Performed by: PODIATRIST

## 2019-01-10 PROCEDURE — 99212 PR OFFICE/OUTPT VISIT, EST, LEVL II, 10-19 MIN: ICD-10-PCS | Mod: S$GLB,,, | Performed by: PODIATRIST

## 2019-01-10 PROCEDURE — 99999 PR PBB SHADOW E&M-EST. PATIENT-LVL III: ICD-10-PCS | Mod: PBBFAC,,, | Performed by: PODIATRIST

## 2019-01-10 PROCEDURE — 99212 OFFICE O/P EST SF 10 MIN: CPT | Mod: S$GLB,,, | Performed by: PODIATRIST

## 2019-01-10 PROCEDURE — 3008F BODY MASS INDEX DOCD: CPT | Mod: CPTII,S$GLB,, | Performed by: PODIATRIST

## 2019-01-10 NOTE — PATIENT INSTRUCTIONS
- Apply white vinegar soaked cotton balls to affected toenails for 5 minutes daily or perform 25% white vinegar and 75% lukewarm water foot soaks for 10 minutes daily to reduce fungal debris under toenails.  Apply thin layer of Vicks vaporub on top of and under affected nails to create environment that doesn't support fungal growth.  Don't perform any of these antifungal therapies if open wounds are present.    - Apply compound antifungal to affected toenails twice daily.    - Wear supportive shoes such as sneakers with arch supports.    - Look for Superfeet or Powerstep arch supports.    - Rest/reduce ambulation to necessary activities of daily living.    - Ice heel for no more than 20 minutes/per hour.    - Elevate foot as much as possible throughout the day.    - Perform Achilles/plantar fascia stretches as much as possible throughout the day.    - Apply OTC topical arnica to affected area for pain relief and to reduce bruising/swelling.    - Apply antibiotic and antifungal creams to ingrown toenails and left leg redness and cover ingrowns with bandaids daily.    - Apply warm compress with Domeboro's 1-2x daily to left leg rash.    - Notify clinic if any new or worsening condition arises.    - Follow up in 4 weeks or sooner as needed for dermatitis, ingrown toenails, and heel pain.

## 2019-01-11 PROBLEM — L60.0 ONYCHOCRYPTOSIS: Status: ACTIVE | Noted: 2019-01-11

## 2019-01-21 NOTE — PROGRESS NOTES
Subjective:      Patient ID: Domonique Carmona is a 58 y.o. female.    Chief Complaint: Follow-up (2 wk re-ck left foot) and Other Misc (PCP:  Dr Mackay Aug 2018)    Domonique is a 58 y.o. female who presents to the clinic complaining of thick and discolored toenails on both feet, left heel pain, and the left lower leg rash.  She reports decreased pain since last visit after nails were thinned out but pain is still present, and she would like to review antifungal treatment options today.  She relates occasionally performing home remedies to reduce fungal burden on toenails.  Patient states right heel pain has resolved is now occurring in her left heel, which she rates as 6/10 on pain scale.  She reports it is occasionally stabbing pain and is irritated by shoe gear.  She informs of new onset of rash on her left leg, which has been present for about a week.  She states she has been applying Aquaphor to rash with mild temporary relief.  Patient denies any other pedal complaints at this time.    Review of Systems   Cardiovascular: Positive for leg swelling. Negative for claudication and cyanosis.   Skin: Positive for color change, nail changes, poor wound healing, rash and unusual hair distribution. Negative for dry skin, itching, skin cancer and suspicious lesions.   Musculoskeletal: Positive for myalgias and stiffness. Negative for arthritis, back pain, falls, gout, joint pain, joint swelling, muscle cramps and muscle weakness.   Neurological: Negative for disturbances in coordination, dizziness, focal weakness, loss of balance, numbness, paresthesias and sensory change.         Objective:      Bilateral pedal exam was performed today.  Physical Exam   Constitutional: She is oriented to person, place, and time. She appears well-developed and well-nourished. No distress.   Cardiovascular: Intact distal pulses.   Pulses:       Dorsalis pedis pulses are 2+ on the right side, and 2+ on the left side.        Posterior  tibial pulses are 2+ on the right side, and 2+ on the left side.   Pedal pulses palpable 2/4 DP & PT Bilateral LE.  CFT is = 3 seconds to Bilateral hallux.  Skin temperature is warm to warm proximal tibia to distal toes Bilateral LE with localized increase in calor and erythema noted to left anterior shin.  +2/4 pitting edema noted to B/L LE.  Mild, nonpitting edema noted to the posterior spect of the right heel at the insertion of the Achilles tendon and the plantar fascia on the calcaneus, respectively.  No ecchymosis noted Bilateral foot or ankle.  Hair growth present distally Bilateral LE.  Telangectasias and reticular veins present B/L LE.   Musculoskeletal: She exhibits edema, tenderness and deformity.        Right foot: There is decreased range of motion and deformity.        Left foot: There is decreased range of motion and deformity.   Bilateral ankle joint ROM is decreased.  Bilateral subtalar joint ROM is decreased.  Bilateral midtarsal joint ROM is within normal limits.  Bilateral 1st ray ROM is within normal limits.  Bilateral 1st  MTPJ ROM is decreased.   Bilateral ankle joint dorsiflexion is restricted with the knee extended and flexed per Silfverskiold exam.    Muscle strength is 5/5 for all bilateral LE muscle groups tested.   Feet:   Right Foot:   Protective Sensation: 10 sites tested. 10 sites sensed.   Skin Integrity: Negative for ulcer, blister, skin breakdown, erythema, warmth, callus or dry skin.   Left Foot:   Protective Sensation: 10 sites tested. 10 sites sensed.   Skin Integrity: Positive for skin breakdown, erythema and warmth. Negative for ulcer, blister, callus or dry skin.   Neurological: She is alert and oriented to person, place, and time. She has normal strength. She displays no atrophy and normal reflexes. No sensory deficit. She exhibits normal muscle tone. Coordination and gait normal. She displays no Babinski's sign on the right side. She displays no Babinski's sign on the left  side.   Reflex Scores:       Achilles reflexes are 2+ on the right side and 2+ on the left side.  Epicritic sensation is grossly intact to B/L LE distally.   Oppenheim STR is negative bilateral lower extremities.   Mild tenderness to palpation of B/L hallux toenail medial and lateral borders.  Moderate tenderness to palpation of the posterior and plantar aspect of the right heel at the insertion of the Achilles tendon and the plantar fascia on the calcaneus, respectively.  Mild pain upon palpation to left anterior shin dermatitis.   Skin: Skin is warm. Capillary refill takes 2 to 3 seconds. Lesion and rash noted. No abrasion, no bruising, no burn, no ecchymosis, no laceration and no petechiae noted. She is not diaphoretic. There is erythema. No cyanosis. Nails show no clubbing.   B/L hallux toenail is thickened, dystrophic, brittle, discolored, and mycotic with subungal debris present with mild incurvation without wound and.  Toenails 2-5 B/L are WNL in length and thickness.  Webspaces 1-4 B/L are clean, dry, and intact.  Skin turgor is increased skin texture is shiny and atrophic.  Erythematous and edematous patch noted to the left anterior shin with superficial skin breakdown and serous weeping without purulence, malodor, proximal streaking, or other dylan sign of infection appreciable.     Psychiatric: She has a normal mood and affect. Her behavior is normal. Judgment and thought content normal.   Nursing note and vitals reviewed.        Assessment:       Encounter Diagnoses   Name Primary?    Venous stasis dermatitis of left lower extremity Yes    Onychocryptosis     Achilles tendinitis of both lower extremities     Plantar fasciitis     Pain in both lower extremities          Plan:       Domonique was seen today for follow-up and other misc.    Diagnoses and all orders for this visit:    Venous stasis dermatitis of left lower extremity    Onychocryptosis    Achilles tendinitis of both lower  extremities    Plantar fasciitis    Pain in both lower extremities      I counseled the patient on her conditions, their implications and medical management.    - Revieweded with patient accommodative/supportive shoe gear, PRICE therapy, and Achilles tendon stretching techniques.    - Advised patient on wound care for venous stasis dermatitis with Domeboro's compresses and applying antibiotic and antifungal creams to wound twice daily.    - Recommended the patient apply antibiotic and antifungal creams to ingrown toenails and cover with Band-Aids daily and allow the nails to grow out to avoid nail procedures.  Patient opted for this route of conservative therapy at this time.    Patient was given the following recommendations and instructions:  Patient Instructions   - Apply white vinegar soaked cotton balls to affected toenails for 5 minutes daily or perform 25% white vinegar and 75% lukewarm water foot soaks for 10 minutes daily to reduce fungal debris under toenails.  Apply thin layer of Vicks vaporub on top of and under affected nails to create environment that doesn't support fungal growth.  Don't perform any of these antifungal therapies if open wounds are present.    - Apply compound antifungal to affected toenails twice daily.    - Wear supportive shoes such as sneakers with arch supports.    - Look for Superfeet or Powerstep arch supports.    - Rest/reduce ambulation to necessary activities of daily living.    - Ice heel for no more than 20 minutes/per hour.    - Elevate foot as much as possible throughout the day.    - Perform Achilles/plantar fascia stretches as much as possible throughout the day.    - Apply OTC topical arnica to affected area for pain relief and to reduce bruising/swelling.    - Apply antibiotic and antifungal creams to ingrown toenails and left leg redness and cover ingrowns with bandaids daily.    - Apply warm compress with Domeboro's 1-2x daily to left leg rash.    - Notify clinic if  any new or worsening condition arises.    - Follow up in 4 weeks or sooner as needed for dermatitis, ingrown toenails, and heel pain.          Sherry Welch DPM

## 2019-08-14 DIAGNOSIS — R10.31 ABDOMINAL PAIN, RIGHT LOWER QUADRANT: ICD-10-CM

## 2019-08-14 DIAGNOSIS — Z12.39 SCREENING BREAST EXAMINATION: Primary | ICD-10-CM

## 2019-08-21 ENCOUNTER — HOSPITAL ENCOUNTER (OUTPATIENT)
Dept: RADIOLOGY | Facility: HOSPITAL | Age: 59
Discharge: HOME | End: 2019-08-21
Attending: SPECIALIST
Payer: COMMERCIAL

## 2019-08-21 DIAGNOSIS — R10.31 ABDOMINAL PAIN, RIGHT LOWER QUADRANT: ICD-10-CM

## 2019-08-21 PROCEDURE — 76830 TRANSVAGINAL US NON-OB: CPT | Mod: 26,,, | Performed by: RADIOLOGY

## 2019-08-21 PROCEDURE — 76856 US EXAM PELVIC COMPLETE: CPT | Mod: 26,,, | Performed by: RADIOLOGY

## 2019-08-21 PROCEDURE — 76856 US PELVIS COMP WITH TRANSVAG NON-OB (XPD): ICD-10-PCS | Mod: 26,,, | Performed by: RADIOLOGY

## 2019-08-21 PROCEDURE — 76830 TRANSVAGINAL US NON-OB: CPT | Mod: TC

## 2019-08-21 PROCEDURE — 76830 US PELVIS COMP WITH TRANSVAG NON-OB (XPD): ICD-10-PCS | Mod: 26,,, | Performed by: RADIOLOGY

## 2019-08-22 DIAGNOSIS — M79.89 NODULE OF SOFT TISSUE: ICD-10-CM

## 2019-08-22 DIAGNOSIS — M79.662 PAIN IN LEFT LOWER LEG: Primary | ICD-10-CM

## 2019-08-22 DIAGNOSIS — R60.9 SWELLING: ICD-10-CM

## 2019-08-22 DIAGNOSIS — M79.89 OTHER SPECIFIED SOFT TISSUE DISORDERS: ICD-10-CM

## 2019-08-23 ENCOUNTER — HOSPITAL ENCOUNTER (OUTPATIENT)
Dept: RADIOLOGY | Facility: HOSPITAL | Age: 59
Discharge: HOME | End: 2019-08-23
Attending: NURSE PRACTITIONER
Payer: COMMERCIAL

## 2019-08-23 DIAGNOSIS — M79.89 NODULE OF SOFT TISSUE: ICD-10-CM

## 2019-08-23 DIAGNOSIS — R60.9 SWELLING: ICD-10-CM

## 2019-08-23 DIAGNOSIS — M79.662 PAIN IN LEFT LOWER LEG: ICD-10-CM

## 2019-08-23 DIAGNOSIS — M79.89 OTHER SPECIFIED SOFT TISSUE DISORDERS: ICD-10-CM

## 2019-08-23 PROCEDURE — 93971 EXTREMITY STUDY: CPT | Mod: TC,LT

## 2019-08-23 PROCEDURE — 93971 US LOWER EXTREMITY VEINS LEFT: ICD-10-PCS | Mod: 26,LT,, | Performed by: RADIOLOGY

## 2019-08-23 PROCEDURE — 93971 EXTREMITY STUDY: CPT | Mod: 26,LT,, | Performed by: RADIOLOGY

## 2019-09-09 ENCOUNTER — HOSPITAL ENCOUNTER (OUTPATIENT)
Dept: RADIOLOGY | Facility: HOSPITAL | Age: 59
Discharge: HOME | End: 2019-09-09
Attending: SPECIALIST
Payer: COMMERCIAL

## 2019-09-09 DIAGNOSIS — Z12.39 SCREENING BREAST EXAMINATION: ICD-10-CM

## 2019-09-09 PROCEDURE — 77067 MAMMO DIGITAL SCREENING BILAT WITH TOMOSYNTHESIS_CAD: ICD-10-PCS | Mod: 26,,, | Performed by: RADIOLOGY

## 2019-09-09 PROCEDURE — 77067 SCR MAMMO BI INCL CAD: CPT | Mod: TC

## 2019-09-09 PROCEDURE — 77063 BREAST TOMOSYNTHESIS BI: CPT | Mod: 26,,, | Performed by: RADIOLOGY

## 2019-09-09 PROCEDURE — 77067 SCR MAMMO BI INCL CAD: CPT | Mod: 26,,, | Performed by: RADIOLOGY

## 2019-09-09 PROCEDURE — 77063 MAMMO DIGITAL SCREENING BILAT WITH TOMOSYNTHESIS_CAD: ICD-10-PCS | Mod: 26,,, | Performed by: RADIOLOGY

## 2019-09-12 ENCOUNTER — HOSPITAL ENCOUNTER (OUTPATIENT)
Dept: RADIOLOGY | Facility: HOSPITAL | Age: 59
Discharge: HOME | End: 2019-09-12
Attending: SPECIALIST
Payer: COMMERCIAL

## 2019-09-12 DIAGNOSIS — N63.10 BREAST MASS, RIGHT: ICD-10-CM

## 2019-09-12 PROCEDURE — 77065 DX MAMMO INCL CAD UNI: CPT | Mod: 26,,, | Performed by: RADIOLOGY

## 2019-09-12 PROCEDURE — G0279 MAMMO DIGITAL DIAGNOSTIC RIGHT WITH TOMOSYNTHESIS_CAD: ICD-10-PCS | Mod: 26,,, | Performed by: RADIOLOGY

## 2019-09-12 PROCEDURE — 77065 DX MAMMO INCL CAD UNI: CPT | Mod: TC

## 2019-09-12 PROCEDURE — G0279 TOMOSYNTHESIS, MAMMO: HCPCS | Mod: 26,,, | Performed by: RADIOLOGY

## 2019-09-12 PROCEDURE — 77061 BREAST TOMOSYNTHESIS UNI: CPT | Mod: TC

## 2019-09-12 PROCEDURE — 77065 MAMMO DIGITAL DIAGNOSTIC RIGHT WITH TOMOSYNTHESIS_CAD: ICD-10-PCS | Mod: 26,,, | Performed by: RADIOLOGY

## 2019-09-13 ENCOUNTER — OFFICE VISIT (OUTPATIENT)
Dept: DERMATOLOGY | Facility: CLINIC | Age: 59
End: 2019-09-13
Payer: COMMERCIAL

## 2019-09-13 VITALS — HEIGHT: 72 IN | WEIGHT: 293 LBS | BODY MASS INDEX: 39.68 KG/M2

## 2019-09-13 DIAGNOSIS — I87.2 STASIS DERMATITIS OF BOTH LEGS: ICD-10-CM

## 2019-09-13 DIAGNOSIS — M79.3 LIPODERMATOSCLEROSIS: Primary | ICD-10-CM

## 2019-09-13 PROCEDURE — 3008F BODY MASS INDEX DOCD: CPT | Mod: CPTII,S$GLB,, | Performed by: DERMATOLOGY

## 2019-09-13 PROCEDURE — 99201 PR OFFICE/OUTPT VISIT,NEW,LEVL I: CPT | Mod: S$GLB,,, | Performed by: DERMATOLOGY

## 2019-09-13 PROCEDURE — 99201 PR OFFICE/OUTPT VISIT,NEW,LEVL I: ICD-10-PCS | Mod: S$GLB,,, | Performed by: DERMATOLOGY

## 2019-09-13 PROCEDURE — 99999 PR PBB SHADOW E&M-EST. PATIENT-LVL III: CPT | Mod: PBBFAC,,, | Performed by: DERMATOLOGY

## 2019-09-13 PROCEDURE — 99999 PR PBB SHADOW E&M-EST. PATIENT-LVL III: ICD-10-PCS | Mod: PBBFAC,,, | Performed by: DERMATOLOGY

## 2019-09-13 PROCEDURE — 3008F PR BODY MASS INDEX (BMI) DOCUMENTED: ICD-10-PCS | Mod: CPTII,S$GLB,, | Performed by: DERMATOLOGY

## 2019-09-13 RX ORDER — TRIAMCINOLONE ACETONIDE 1 MG/G
CREAM TOPICAL
Qty: 80 G | Refills: 3 | Status: SHIPPED | OUTPATIENT
Start: 2019-09-13 | End: 2020-08-25

## 2019-09-13 RX ORDER — TIZANIDINE 4 MG/1
TABLET ORAL
Refills: 0 | COMMUNITY
Start: 2019-08-22 | End: 2020-08-25 | Stop reason: SDUPTHER

## 2019-09-13 RX ORDER — PENTOXIFYLLINE 400 MG/1
TABLET, EXTENDED RELEASE ORAL
Qty: 90 TABLET | Refills: 2 | Status: SHIPPED | OUTPATIENT
Start: 2019-09-13 | End: 2019-12-05 | Stop reason: SDUPTHER

## 2019-09-13 NOTE — PROGRESS NOTES
Subjective:       Patient ID:  Domonique Carmona is a 58 y.o. female who presents for   Chief Complaint   Patient presents with    Spot     L holland, x 1 yr, no tx     New Pt.    No Phx or Fhx of NMSC, no prior biospy    Pt is here today for spot on her lower L leg, that has been present about a year. Pt stated that it did appear on her R shin and then it went away. She did not treat it. It is painful to touch but not warm or spreading. She would like her exposed areas of skin checked.       Review of Systems   Constitutional: Negative for fever, chills and fatigue.   Skin: Positive for activity-related sunscreen use. Negative for daily sunscreen use and wears hat.        Objective:    Physical Exam   Constitutional: She appears well-developed and well-nourished.   Neurological: She is alert and oriented to person, place, and time.   Psychiatric: She has a normal mood and affect.   Skin:   Areas Examined (abnormalities noted in diagram):   RLE Inspected  LLE Inspection Performed              Diagram Legend     Erythematous scaling macule/papule c/w actinic keratosis       Vascular papule c/w angioma      Pigmented verrucoid papule/plaque c/w seborrheic keratosis      Yellow umbilicated papule c/w sebaceous hyperplasia      Irregularly shaped tan macule c/w lentigo     1-2 mm smooth white papules consistent with Milia      Movable subcutaneous cyst with punctum c/w epidermal inclusion cyst      Subcutaneous movable cyst c/w pilar cyst      Firm pink to brown papule c/w dermatofibroma      Pedunculated fleshy papule(s) c/w skin tag(s)      Evenly pigmented macule c/w junctional nevus     Mildly variegated pigmented, slightly irregular-bordered macule c/w mildly atypical nevus      Flesh colored to evenly pigmented papule c/w intradermal nevus       Pink pearly papule/plaque c/w basal cell carcinoma      Erythematous hyperkeratotic cursted plaque c/w SCC      Surgical scar with no sign of skin cancer recurrence       Open and closed comedones      Inflammatory papules and pustules      Verrucoid papule consistent consistent with wart     Erythematous eczematous patches and plaques     Dystrophic onycholytic nail with subungual debris c/w onychomycosis     Umbilicated papule    Erythematous-base heme-crusted tan verrucoid plaque consistent with inflamed seborrheic keratosis     Erythematous Silvery Scaling Plaque c/w Psoriasis     See annotation          Assessment / Plan:        Lipodermatosclerosis  -     pentoxifylline (TRENTAL) 400 mg TbSR; 1 po tid  Dispense: 90 tablet; Refill: 2  -     triamcinolone acetonide 0.1% (KENALOG) 0.1 % cream; AAA left lower bid PRN  Dispense: 80 g; Refill: 3    Stasis dermatitis of both legs  -     pentoxifylline (TRENTAL) 400 mg TbSR; 1 po tid  Dispense: 90 tablet; Refill: 2  -     triamcinolone acetonide 0.1% (KENALOG) 0.1 % cream; AAA left lower bid PRN  Dispense: 80 g; Refill: 3    no h/o DM2  + lymphedema, discussed compression and [facility] lymphedema clinic referral for compression device fitting           No follow-ups on file.

## 2019-09-18 ENCOUNTER — HOSPITAL ENCOUNTER (OUTPATIENT)
Dept: RADIOLOGY | Facility: HOSPITAL | Age: 59
Discharge: HOME | End: 2019-09-18
Attending: SPECIALIST
Payer: COMMERCIAL

## 2019-09-18 DIAGNOSIS — R92.8 ABNORMAL MAMMOGRAM: ICD-10-CM

## 2019-09-18 PROCEDURE — 88305 TISSUE SPECIMEN TO PATHOLOGY, RADIOLOGY: ICD-10-PCS | Mod: 26,,, | Performed by: PATHOLOGY

## 2019-09-18 PROCEDURE — 19081 BX BREAST 1ST LESION STRTCTC: CPT | Mod: RT

## 2019-09-18 PROCEDURE — 76098 X-RAY EXAM SURGICAL SPECIMEN: CPT | Mod: TC

## 2019-09-18 PROCEDURE — 19081 MAMMO BREAST STEREOTACTIC BREAST BIOPSY RIGHT: ICD-10-PCS | Mod: RT,,, | Performed by: SURGERY

## 2019-09-18 PROCEDURE — 19081 BX BREAST 1ST LESION STRTCTC: CPT | Mod: RT,,, | Performed by: SURGERY

## 2019-09-18 PROCEDURE — 88305 TISSUE EXAM BY PATHOLOGIST: CPT | Performed by: PATHOLOGY

## 2019-09-18 PROCEDURE — 25000003 PHARM REV CODE 250: Performed by: SURGERY

## 2019-09-18 RX ADMIN — LIDOCAINE HYDROCHLORIDE 30 ML: 10; .005 INJECTION, SOLUTION EPIDURAL; INFILTRATION; INTRACAUDAL; PERINEURAL at 01:09

## 2019-09-20 DIAGNOSIS — I89.0 LYMPHEDEMA: Primary | ICD-10-CM

## 2019-10-21 ENCOUNTER — CLINICAL SUPPORT (OUTPATIENT)
Dept: REHABILITATION | Facility: HOSPITAL | Age: 59
End: 2019-10-21
Payer: COMMERCIAL

## 2019-10-21 DIAGNOSIS — I89.0 LYMPHEDEMA OF BOTH LOWER EXTREMITIES: Primary | ICD-10-CM

## 2019-10-21 PROCEDURE — 97530 THERAPEUTIC ACTIVITIES: CPT

## 2019-10-21 PROCEDURE — 97165 OT EVAL LOW COMPLEX 30 MIN: CPT

## 2019-10-21 NOTE — PLAN OF CARE
[facility] Outpatient Occupational Therapy  Initial Evaluation for Lymphedema     Name: Domonique Carmona  Clinic Number: [id number]    Therapy Diagnosis:   Encounter Diagnosis   Name Primary?    Lymphedema of both lower extremities Yes     Physician: Joy Padilla MD    Physician Orders: Eval and treat  Medical Diagnosis: Lymphedema not elsewhere specified  Evaluation Date: 10/21/2019  Insurance Authorization period Expiration: 09/20/2019-09/19/2020  Plan of Care Certification Period: 10/21/2019-01/20/2020    Visit # / Visits Authortized: 1/30  Time In:04:00  Time Out: 04:40  Total Billable Time:40 minutes    Precautions: Standard    Subjective     Domonique rates pain at a 3/10 where 0 = no pain and 10 = maximal pain. Worst 8/10, best 0/10  Stabbing pain in the heel and taut pain.  Patient's goals are as follows: Better circulation. The area on the left leg will fade.    History of Present Illness: Patient reports having swelling in the lower extremities began.  She reports that there is an area of discoloration on the left lower extremity that is painful.  She reports pain in the lower legs due to the presence of chronic swelling.  She is employed full time at a desk job.  She reports that she tries to elevate her legs as much as possible at work and at home.  She has not had any wounds or weeping from the legs.  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  Her duties are mainly performed on the computer.  She sits for the majority of the day.    Domonique Carmona  has a past medical history of Abnormal Pap smear, Anxiety, Arthritis, and Venous stasis dermatitis of left lower extremity.    Domonique Carmona  has a past surgical history that includes ulner nerve transposition; Breast surgery; Dilation and  curettage of uterus; Gynecologic cryosurgery (); and Breast biopsy.    Domonique has a current medication list which includes the following prescription(s): alprazolam, ibuprofen, pentoxifylline, tizanidine, and triamcinolone acetonide 0.1%.    Review of patient's allergies indicates:   Allergen Reactions    Demerol [meperidine] Hives          Objective     Amount of Swelling: Moderate  Location of Swelling: Both lower extremities  Skin Integrity: Visible skin intact, Dry and fungal infection around the ankles.  There is an adhered and discolored area on the surface of the left lower leg.  Palpation/Texture: normal, rubbery and hard  Circulation: warm, well perfused  Posture: Fair    Range of Motion: WNL's  Strength:WNL's    Lower extremity Girth Measurements (in centimeters)  LANDMARK RLE 10/21/2019 LLE 10/21/2019   SBP +10 68.5 69.5   Knee- SPB 57 55   40 cm  53 53   30 cm 52.5 53.5   20 cm  43 42   10 cm  35 38   Malleolus 36.5 38   Ankle- heel to dorsum of foot 37.5 37   Arch 26 25.5   Total Girth Measurement 409 411.5   Total Girth Difference 2.5    Total Girth Reduction     TGD- Total Girth Difference  GGD - Greatest Girth Difference SBP- Superior Border of Patella  Bilateral Limb Involvement  Moderate- 10-cm-15 cm TGD or < 5 cm GGD  Moderate/Severe- 15.1-20 cm TGD or 5.1 cm -10 cm GGD  Severe => 20 cm TGD or >10 cm GGD    Sensation: WFL's    Treatment     Treatment Time In (separate from total time) : 04:20  Treatment Time Out (separate from total time : 04:40  Domonique participated in dynamic functional therapeutic activities to improve functional performance for 20 minutes, including:  - -MULTILAYERED BANDAGINcm, 8cm,10 cm rosidal rolls - bandaged from toes to the popliteal fold. Compression bandages were applied as follows: stockinette, cotton cast padding, short stretch compression bandages in a figure 8 pattern.  To leave intact 12 -24 hours, dc with any problems,  Return rolled bandages next  session.     Patient/Family Education: role of OT, goals for OT, scheduling/cancellations - pt verbalized understanding. Discussed insurance limitations with patient. Pt was educated in lymphedema etiology and management plans.  Pt was provided with written  risk reductions and precautions for managing lymphedema.  She was educated on compression bandage wearing schedule and to remove if she has ill effects.  She verbalized understanding of the above education.    Assessment     This patient presents s/p chronic swelling in the lower legs resulting in: lymphedema of the LE's, increased pain, increased stiffness in the ankles, as well as difficulty performing lower body reach, and placing the pt at higher risk of infection. Following medical record review it is determined that pt will benefit from occupational therapy services in order to maximize pain free and/or functional use of bilateral LE's. The following goals were discussed with the patient and patient is in agreement with them as to be addressed in the treatment plan. The patient's rehab potential is Good.     Anticipated barriers to occupational therapy: works a full time job which requires her to sit most of the day.  Pt has no cultural, educational or language barriers to learning provided.    Profile and History Assessment of Occupational Performance Level of Clinical Decision Making Complexity Score   Occupational Profile:   Domonique Carmona is a 59 y.o. female who lives with their spouse and is currently employed as nurse. Domonique Carmona has difficulty with  dressing  housework/household chores  affecting his/her daily functional abilities. His/her main goal for therapy is to reduce pain and increase mobility.     Comorbidities:   obesity    Medical and Therapy History Review:   Brief               Performance Deficits    Physical:  Edema    Cognitive:  No Deficits    Psychosocial:    No Deficits     Clinical Decision Making:  high    Assessment  Process:  Problem-Focused Assessments    Modification/Need for Assistance:  Not Necessary    Intervention Selection:  Several Treatment Options       low  Based on PMHX, co morbidities , data from assessments and functional level of assistance required with task and clinical presentation directly impacting function.         Short Term Goals: (3 weeks)  Pt to be I and compliant with HEP.  Decrease girth in BLE's by 8 cm for improved circulation and mobility.  Patient will demonstrate 100% knowledge of lymphedema precautions and signs of infection.   Patient will perform self-bandaging techniques.  Patient will perform self lymph drainage techniques.  Patient will tolerate daily activities with multilayered bandaging or compression garments.    Long Term Goals: (6 weeks)  Decrease girth in BLE's by 12 cm for improved circulation and mobility of the LE's.  Patient will show reduction in girth to mild or less with improved contour of limb.  Patient to sindy/doff compression garment with daily compliance.         Plan     Patient to be seen 2 x per week for 6 weeks for the medically necessary treatments to include: decongestive massage- Manual lymphatic drainage, kinesio taping, multi layered bandaging, education in lymphedema precautions, self massage, self bandaging, and assistance in obtaining appropriate compression garment.  Therex, postural correction, and progression of HEP.

## 2019-10-23 ENCOUNTER — HOSPITAL ENCOUNTER (OUTPATIENT)
Dept: RADIOLOGY | Facility: HOSPITAL | Age: 59
Discharge: HOME | End: 2019-10-23
Attending: SURGERY
Payer: COMMERCIAL

## 2019-10-23 ENCOUNTER — HOSPITAL ENCOUNTER (OUTPATIENT)
Dept: RADIOLOGY | Facility: HOSPITAL | Age: 59
Discharge: HOME | End: 2019-10-23
Attending: FAMILY MEDICINE
Payer: COMMERCIAL

## 2019-10-23 DIAGNOSIS — R92.8 ABNORMAL MAMMOGRAM OF RIGHT BREAST: ICD-10-CM

## 2019-10-23 PROCEDURE — 77061 BREAST TOMOSYNTHESIS UNI: CPT | Mod: TC

## 2019-10-23 PROCEDURE — 77065 MAMMO DIGITAL DIAGNOSTIC RIGHT WITH TOMOSYNTHESIS_CAD: ICD-10-PCS | Mod: 26,,, | Performed by: RADIOLOGY

## 2019-10-23 PROCEDURE — G0279 TOMOSYNTHESIS, MAMMO: HCPCS | Mod: 26,,, | Performed by: RADIOLOGY

## 2019-10-23 PROCEDURE — 77065 DX MAMMO INCL CAD UNI: CPT | Mod: TC

## 2019-10-23 PROCEDURE — G0279 MAMMO DIGITAL DIAGNOSTIC RIGHT WITH TOMOSYNTHESIS_CAD: ICD-10-PCS | Mod: 26,,, | Performed by: RADIOLOGY

## 2019-10-23 PROCEDURE — 77065 DX MAMMO INCL CAD UNI: CPT | Mod: 26,,, | Performed by: RADIOLOGY

## 2019-11-18 ENCOUNTER — PATIENT MESSAGE (OUTPATIENT)
Dept: FAMILY MEDICINE | Facility: CLINIC | Age: 59
End: 2019-11-18

## 2019-12-05 DIAGNOSIS — I87.2 STASIS DERMATITIS OF BOTH LEGS: ICD-10-CM

## 2019-12-05 DIAGNOSIS — M79.3 LIPODERMATOSCLEROSIS: ICD-10-CM

## 2019-12-05 RX ORDER — PENTOXIFYLLINE 400 MG/1
TABLET, EXTENDED RELEASE ORAL
Qty: 270 TABLET | Refills: 0 | Status: SHIPPED | OUTPATIENT
Start: 2019-12-05 | End: 2020-08-25

## 2020-01-18 PROCEDURE — A4648 IMPLANTABLE TISSUE MARKER: HCPCS

## 2020-01-18 PROCEDURE — 27201044 HC MAMMOTOME PROBE

## 2020-04-09 ENCOUNTER — DOCUMENTATION ONLY (OUTPATIENT)
Dept: REHABILITATION | Facility: HOSPITAL | Age: 60
End: 2020-04-09

## 2020-04-09 PROBLEM — I89.0 LYMPHEDEMA OF BOTH LOWER EXTREMITIES: Status: RESOLVED | Noted: 2019-10-21 | Resolved: 2020-04-09

## 2020-04-09 NOTE — PROGRESS NOTES
Patient was seen for an evaluation only.  She did not return for treatment.  She is being discharged from Lymphedema therapy with goals not met.

## 2020-04-21 DIAGNOSIS — Z01.84 ANTIBODY RESPONSE EXAMINATION: ICD-10-CM

## 2020-05-21 DIAGNOSIS — Z01.84 ANTIBODY RESPONSE EXAMINATION: ICD-10-CM

## 2020-05-30 ENCOUNTER — OFFICE VISIT (OUTPATIENT)
Dept: PRIMARY CARE CLINIC | Facility: CLINIC | Age: 60
End: 2020-05-30
Payer: COMMERCIAL

## 2020-05-30 VITALS
SYSTOLIC BLOOD PRESSURE: 179 MMHG | HEART RATE: 83 BPM | RESPIRATION RATE: 16 BRPM | OXYGEN SATURATION: 100 % | TEMPERATURE: 98 F | DIASTOLIC BLOOD PRESSURE: 90 MMHG

## 2020-05-30 DIAGNOSIS — U07.1 COVID-19: Primary | ICD-10-CM

## 2020-05-30 DIAGNOSIS — I10 HYPERTENSION, UNSPECIFIED TYPE: ICD-10-CM

## 2020-05-30 PROCEDURE — 99203 OFFICE O/P NEW LOW 30 MIN: CPT | Mod: S$GLB,,, | Performed by: EMERGENCY MEDICINE

## 2020-05-30 PROCEDURE — 3077F SYST BP >= 140 MM HG: CPT | Mod: CPTII,S$GLB,, | Performed by: EMERGENCY MEDICINE

## 2020-05-30 PROCEDURE — 99203 PR OFFICE/OUTPT VISIT, NEW, LEVL III, 30-44 MIN: ICD-10-PCS | Mod: S$GLB,,, | Performed by: EMERGENCY MEDICINE

## 2020-05-30 PROCEDURE — 3080F DIAST BP >= 90 MM HG: CPT | Mod: CPTII,S$GLB,, | Performed by: EMERGENCY MEDICINE

## 2020-05-30 PROCEDURE — 3080F PR MOST RECENT DIASTOLIC BLOOD PRESSURE >= 90 MM HG: ICD-10-PCS | Mod: CPTII,S$GLB,, | Performed by: EMERGENCY MEDICINE

## 2020-05-30 PROCEDURE — U0003 INFECTIOUS AGENT DETECTION BY NUCLEIC ACID (DNA OR RNA); SEVERE ACUTE RESPIRATORY SYNDROME CORONAVIRUS 2 (SARS-COV-2) (CORONAVIRUS DISEASE [COVID-19]), AMPLIFIED PROBE TECHNIQUE, MAKING USE OF HIGH THROUGHPUT TECHNOLOGIES AS DESCRIBED BY CMS-2020-01-R: HCPCS

## 2020-05-30 PROCEDURE — 3077F PR MOST RECENT SYSTOLIC BLOOD PRESSURE >= 140 MM HG: ICD-10-PCS | Mod: CPTII,S$GLB,, | Performed by: EMERGENCY MEDICINE

## 2020-05-30 NOTE — PROGRESS NOTES
Subjective:        Time seen by provider: 11:56 PM on 05/30/2020    Domonique Carmona is a 59 y.o. female who presents for an evaluation of possible COVID-19. The patient reports possible passive exposure to COVID-19 patient. Her son was exposed to a coworker, 2 days ago, who tested positive for COVID-19 yesterday. The coworker was experiencing abdominal pain prior to testing; however, her son has been asymptomatic. The patient also currently denies any symptoms at this time. No pertinent PMHx or PSHx. She is a former smoker.    Review of Systems   Constitutional: Negative for activity change, appetite change, fatigue and fever.   HENT: Negative for congestion, rhinorrhea and sore throat.    Respiratory: Negative for cough, chest tightness, shortness of breath and wheezing.    Cardiovascular: Negative for chest pain and palpitations.   Gastrointestinal: Negative for diarrhea, nausea and vomiting.   Musculoskeletal: Negative for arthralgias and myalgias.   Skin: Negative for rash.   Neurological: Negative for weakness, light-headedness, numbness and headaches.       Objective:      Physical Exam   Constitutional: She is oriented to person, place, and time. She appears well-developed and well-nourished. No distress.   HENT:   Head: Normocephalic and atraumatic.   Nose: Nose normal.   Mouth/Throat: Oropharynx is clear and moist.   Eyes: Conjunctivae are normal.   Neck: Normal range of motion.   Cardiovascular: Normal rate, regular rhythm, normal heart sounds and intact distal pulses.   No murmur heard.  Pulmonary/Chest: Breath sounds normal. No respiratory distress. She has no wheezes.   Musculoskeletal: Normal range of motion.   Neurological: She is alert and oriented to person, place, and time.   Skin: Skin is warm and dry. She is not diaphoretic.   Nursing note and vitals reviewed.      Assessment and Plan:      Diagnoses and all orders for this visit:    COVID-19  -     COVID-19 Routine Screening  - Discharge home  and await results.   - Return to clinic or ED for new or worsening symptoms.   - Follow-up with PCP as needed.     Scribe Attestation:   I, Mya Delatorre, am scribing for, and in the presence of, Helen Barker PA-C. I performed the above scribed service and the documentation accurately describes the services I performed. I attest to the accuracy of the note.    I, Helen Barker PA-C, personally performed the services described in this documentation. All medical record entries made by the scribe were at my direction and in my presence.  I have reviewed the chart and agree that the record reflects my personal performance and is accurate and complete. Helen Barker PA-C.  4:33 PM 05/30/2020

## 2020-05-30 NOTE — PATIENT INSTRUCTIONS
Instructions for Patients with Confirmed or Suspected COVID-19    If you are awaiting your test result, you will either be called or it will be released to the patient portal.  If you have any questions about your test, please visit [email or web address] or call our COVID-19 information line at [phone].       Stay home and stay away from family members and friends.  You can leave home after these three things have happened: 1) You have had no fever for at least 72 hours (that is three full days of no fever without the use of medicine that reduces fevers) 2) AND other symptoms have improved (for example, when your cough or shortness of breath have improved) 3) AND at least 10 days have passed since your symptoms first appeared.   Separate yourself from other people and animals in your home.   Call ahead before visiting your doctor.   Wear a facemask.   Cover your coughs and sneezes.   Wash your hands often with soap and water; hand  can be used, too.   Avoid sharing personal household items.   Wipe down surfaces used daily.   Monitor your symptoms. Seek prompt medical attention if your illness is worsening (e.g., difficulty breathing).    Before seeking care, call your healthcare provider.   If you have a medical emergency and need to call 911, notify the dispatch personnel that you have, or are being evaluated for COVID-19. If possible, put on a facemask before emergency medical services arrive.        Recommended precautions for household members, intimate partners, and caregivers in a home setting of a patient with symptomatic laboratory-confirmed COVID-19 or a patient under investigation.  Household members, intimate partners, and caregivers in the home setting awaiting tests results have close contact with a person with symptomatic, laboratory-confirmed COVID-19 or a person under investigation. Close contacts should monitor their health; they should call their provider right away  if they develop symptoms suggestive of COVID-19 (e.g., fever, cough, shortness of breath).    Close contacts should also follow these recommendations:   Make sure that you understand and can help the patient follow their provider's instructions for medication(s) and care. You should help the patient with basic needs in the home and provide support for getting groceries, prescriptions, and other personal needs.   Monitor the patient's symptoms. If the patient is getting sicker, call his or her healthcare provider and tell them that the patient has laboratory-confirmed COVID-19. If the patient has a medical emergency and you need to call 911, notify the dispatch personnel that the patient has, or is being evaluated for COVID-19.   Household members should stay in another room or be  from the patient. Household members should use a separate bedroom and bathroom, if available.   Prohibit visitors.   Household members should care for any pets in the home.   Make sure that shared spaces in the home have good air flow, such as by an air conditioner or an opened window, weather permitting.   Perform hand hygiene frequently. Wash your hands often with soap and water for at least 20 seconds or use an alcohol-based hand  (that contains > 60% alcohol) covering all surfaces of your hands and rubbing them together until they feel dry. Soap and water should be used preferentially.   Avoid touching your eyes, nose, and mouth.   The patient should wear a facemask. If the patient is not able to wear a facemask (for example, because it causes trouble breathing), caregivers should wear a mask when they are in the same room as the patient.   Wear a disposable facemask and gloves when you touch or have contact with the patient's blood, stool, or body fluids, such as saliva, sputum, nasal mucus, vomit, urine.  o Throw out disposable facemasks and gloves after using them. Do not reuse.  o When removing personal  protective equipment, first remove and dispose of gloves. Then, immediately clean your hands with soap and water or alcohol-based hand . Next, remove and dispose of facemask, and immediately clean your hands again with soap and water or alcohol-based hand .   You should not share dishes, drinking glasses, cups, eating utensils, towels, bedding, or other items with the patient. After the patient uses these items, you should wash them thoroughly (see below Wash laundry thoroughly).   Clean all high-touch surfaces, such as counters, tabletops, doorknobs, bathroom fixtures, toilets, phones, keyboards, tablets, and bedside tables, every day. Also, clean any surfaces that may have blood, stool, or body fluids on them.   Use a household cleaning spray or wipe, according to the label instructions. Labels contain instructions for safe and effective use of the cleaning product including precautions you should take when applying the product, such as wearing gloves and making sure you have good ventilation during use of the product.   Wash laundry thoroughly.  o Immediately remove and wash clothes or bedding that have blood, stool, or body fluids on them.  o Wear disposable gloves while handling soiled items and keep soiled items away from your body. Clean your hands (with soap and water or an alcohol-based hand ) immediately after removing your gloves.  o Read and follow directions on labels of laundry or clothing items and detergent. In general, using a normal laundry detergent according to washing machine instructions and dry thoroughly using the warmest temperatures recommended on the clothing label.   Place all used disposable gloves, facemasks, and other contaminated items in a lined container before disposing of them with other household waste. Clean your hands (with soap and water or an alcohol-based hand ) immediately after handling these items. Soap and water should be used  preferentially if hands are visibly dirty.   Discuss any additional questions with your state or local health department or healthcare provider. Check available hours when contacting your local health department.    For more information see CDC link below.      https://www.cdc.gov/coronavirus/2019-ncov/hcp/guidance-prevent-spread.html#precautions        Sources:  [facility], [place] [facility] and [facility]

## 2020-05-31 LAB — SARS-COV-2 RNA RESP QL NAA+PROBE: NOT DETECTED

## 2020-06-15 ENCOUNTER — TELEPHONE (OUTPATIENT)
Dept: BARIATRICS | Facility: CLINIC | Age: 60
End: 2020-06-15

## 2020-06-15 NOTE — TELEPHONE ENCOUNTER
----- Message from Pham Raymond sent at 6/15/2020  1:48 PM CDT -----  Contact: pt  Pt calling wanting to setup an appointment with the Dr [phone] best after or tomorrow

## 2020-06-20 DIAGNOSIS — Z01.84 ANTIBODY RESPONSE EXAMINATION: ICD-10-CM

## 2020-07-01 ENCOUNTER — OFFICE VISIT (OUTPATIENT)
Dept: BARIATRICS | Facility: CLINIC | Age: 60
End: 2020-07-01
Payer: COMMERCIAL

## 2020-07-01 VITALS
DIASTOLIC BLOOD PRESSURE: 102 MMHG | WEIGHT: 293 LBS | BODY MASS INDEX: 39.68 KG/M2 | SYSTOLIC BLOOD PRESSURE: 153 MMHG | HEART RATE: 96 BPM | HEIGHT: 72 IN | RESPIRATION RATE: 16 BRPM | TEMPERATURE: 98 F

## 2020-07-01 DIAGNOSIS — E66.01 MORBID OBESITY: Primary | ICD-10-CM

## 2020-07-01 DIAGNOSIS — E66.01 MORBID OBESITY WITH BMI OF 50.0-59.9, ADULT: ICD-10-CM

## 2020-07-01 DIAGNOSIS — M19.90 ARTHRITIS: ICD-10-CM

## 2020-07-01 PROCEDURE — 3008F PR BODY MASS INDEX (BMI) DOCUMENTED: ICD-10-PCS | Mod: CPTII,S$GLB,, | Performed by: SURGERY

## 2020-07-01 PROCEDURE — 3080F DIAST BP >= 90 MM HG: CPT | Mod: CPTII,S$GLB,, | Performed by: SURGERY

## 2020-07-01 PROCEDURE — 3008F BODY MASS INDEX DOCD: CPT | Mod: CPTII,S$GLB,, | Performed by: SURGERY

## 2020-07-01 PROCEDURE — 99205 PR OFFICE/OUTPT VISIT, NEW, LEVL V, 60-74 MIN: ICD-10-PCS | Mod: S$GLB,,, | Performed by: SURGERY

## 2020-07-01 PROCEDURE — 3080F PR MOST RECENT DIASTOLIC BLOOD PRESSURE >= 90 MM HG: ICD-10-PCS | Mod: CPTII,S$GLB,, | Performed by: SURGERY

## 2020-07-01 PROCEDURE — 3077F SYST BP >= 140 MM HG: CPT | Mod: CPTII,S$GLB,, | Performed by: SURGERY

## 2020-07-01 PROCEDURE — 3077F PR MOST RECENT SYSTOLIC BLOOD PRESSURE >= 140 MM HG: ICD-10-PCS | Mod: CPTII,S$GLB,, | Performed by: SURGERY

## 2020-07-01 PROCEDURE — 99999 PR PBB SHADOW E&M-EST. PATIENT-LVL V: ICD-10-PCS | Mod: PBBFAC,,, | Performed by: SURGERY

## 2020-07-01 PROCEDURE — 99999 PR PBB SHADOW E&M-EST. PATIENT-LVL V: CPT | Mod: PBBFAC,,, | Performed by: SURGERY

## 2020-07-01 PROCEDURE — 99205 OFFICE O/P NEW HI 60 MIN: CPT | Mod: S$GLB,,, | Performed by: SURGERY

## 2020-07-01 NOTE — PROGRESS NOTES
Initial Consult    Chief Complaint   Patient presents with    Obesity       History of Present Illness:  Patient is a 59 y.o. female who is referred for evaluation of surgical treatment of morbid obesity. Her Body mass index is 58.38 kg/m². She has known comorbidities of osteoarthritis. She has attended the bariatric seminar and is most interested in gastric sleeve surgery.      Past attempts at weight loss include: Unsupervised: calorie counting, gym membership, high protein, low carbohydrate;  Supervised:  none;  Diet pills: dexatrim, fen-phen;  Exercise attempts: elliptical     Weight history:   At current weight:  6 years  Obese for 25 years.  More than 35 pounds overweight for 10 years.  More than 100 pounds overweight for 10 years.  Started dieting at 18 years old.  Maximum weight reached: around 400  Most weight lost was down to size 16 through diet and exercise for 3 years.  She describes Her eating habits as volume eater, emotional eater.    SHAKEEL screening: snores, no witnessed apnea, daytime fatigue, wakes frequently at night    Reflux screening: controlled with rolaids    Review of patient's allergies indicates:   Allergen Reactions    Demerol [meperidine] Hives       Current Outpatient Medications   Medication Sig Dispense Refill    alprazolam (XANAX) 0.25 MG tablet Take 1 tablet (0.25 mg total) by mouth daily as needed for Anxiety. 15 tablet 0    ibuprofen (ADVIL,MOTRIN) 800 MG tablet Take 1 tablet (800 mg total) by mouth 3 (three) times daily. 60 tablet 6    pentoxifylline (TRENTAL) 400 mg TbSR TAKE 1 TABLET BY MOUTH THREE TIMES A DAY (Patient not taking: Reported on 7/1/2020) 270 tablet 0    tiZANidine (ZANAFLEX) 4 MG tablet TAKE 1 TABLET BY MOUTH THREE TIMES A DAY AS NEEDED FOR 10 DAYS  0    triamcinolone acetonide 0.1% (KENALOG) 0.1 % cream AAA left lower bid PRN (Patient not taking: Reported on 7/1/2020) 80 g 3     No current facility-administered medications for this visit.        Past  Medical History:   Diagnosis Date    Abnormal Pap smear 1986    Cryosurgery    Anxiety     Arthritis     Venous stasis dermatitis of left lower extremity 12/31/2018     Past Surgical History:   Procedure Laterality Date    BREAST BIOPSY      BREAST SURGERY      biopsy right side    DILATION AND CURETTAGE OF UTERUS      GYNECOLOGIC CRYOSURGERY  1986    ulner nerve transposition      left     Family History   Problem Relation Age of Onset    Ovarian cancer Mother     Breast cancer Neg Hx     Colon cancer Neg Hx     Collagen disease Neg Hx     Glaucoma Neg Hx     Melanoma Neg Hx     Psoriasis Neg Hx     Lupus Neg Hx     Eczema Neg Hx      Social History     Tobacco Use    Smoking status: Former Smoker     Packs/day: 0.50     Years: 20.00     Pack years: 10.00     Quit date: 6/18/2005     Years since quitting: 15.0    Smokeless tobacco: Never Used   Substance Use Topics    Alcohol use: No    Drug use: No        Chart review:    08/23/2019:  Dr. Mackay:  PCP:  Treated for morbid obesity, osteoarthritis, long-term medication usage    Lab review:    Lab Results   Component Value Date    TSH 0.940 08/18/2018     No results found for: LABA1C, HGBA1C  Lab Results   Component Value Date    CHOL 201 (H) 08/18/2018    CHOL 195 08/16/2017    CHOL 199 04/26/2016     Lab Results   Component Value Date    HDL 51 08/18/2018    HDL 45 08/16/2017    HDL 42 04/26/2016     Lab Results   Component Value Date    LDLCALC 126.6 08/18/2018    LDLCALC 125.6 08/16/2017    LDLCALC 127.8 04/26/2016     Lab Results   Component Value Date    TRIG 117 08/18/2018    TRIG 122 08/16/2017    TRIG 146 04/26/2016     Lab Results   Component Value Date    CHOLHDL 25.4 08/18/2018    CHOLHDL 23.1 08/16/2017    CHOLHDL 21.1 04/26/2016         Radiology review:    None relevant    Review of Systems:  Review of Systems   Constitutional: Negative for chills, diaphoresis and fever.   HENT: Negative for congestion, sinus pressure, sneezing,  "sore throat, tinnitus and voice change.    Eyes: Negative for pain, redness and itching.   Respiratory: Positive for shortness of breath. Negative for apnea, cough, choking, chest tightness, wheezing and stridor.    Cardiovascular: Negative for chest pain, palpitations and leg swelling.   Gastrointestinal: Negative for abdominal pain, anal bleeding, constipation, diarrhea, nausea and vomiting.   Endocrine: Negative for cold intolerance and heat intolerance.   Genitourinary: Negative for difficulty urinating and dysuria.   Musculoskeletal: Positive for arthralgias. Negative for back pain and gait problem.   Skin: Negative for rash and wound.   Allergic/Immunologic: Negative for environmental allergies and food allergies.   Neurological: Negative for dizziness, light-headedness and headaches.   Hematological: Negative for adenopathy. Does not bruise/bleed easily.   Psychiatric/Behavioral: Positive for sleep disturbance. Negative for agitation and confusion. The patient is nervous/anxious.        Physical:     Vital Signs (Most Recent)  Temp: 97.8 °F (36.6 °C) (07/01/20 1414)  Pulse: 96 (07/01/20 1414)  Resp: 16 (07/01/20 1414)  BP: (!) 153/102 (07/01/20 1414)  5' 11.5" (1.816 m)  (!) 192.5 kg (424 lb 7.9 oz)   Neck 18.5 in     Body comp:  Fat Percent:  54.6 %  Fat Mass:  229.8 lb  FFM:  191 lb  TBW: 145 lb  TBW %:  34.5 %  BMR: 2866 kcal      Physical Exam  Vitals signs and nursing note reviewed.   Constitutional:       General: She is not in acute distress.     Appearance: She is well-developed. She is not diaphoretic.   HENT:      Head: Normocephalic and atraumatic.   Eyes:      General: No scleral icterus.     Extraocular Movements: Extraocular movements intact.      Conjunctiva/sclera: Conjunctivae normal.   Neck:      Musculoskeletal: Normal range of motion and neck supple. No muscular tenderness.      Thyroid: No thyromegaly.      Vascular: No JVD.      Trachea: No tracheal deviation.   Cardiovascular:      Rate " and Rhythm: Normal rate and regular rhythm.      Heart sounds: No murmur. No friction rub. No gallop.    Pulmonary:      Effort: Pulmonary effort is normal. No respiratory distress.      Breath sounds: Normal breath sounds. No stridor. No wheezing or rales.   Chest:      Chest wall: No tenderness.   Abdominal:      General: Bowel sounds are normal. There is no distension.      Palpations: Abdomen is soft. There is no mass.      Tenderness: There is no abdominal tenderness. There is no guarding or rebound.   Musculoskeletal: Normal range of motion.         General: Swelling present. No tenderness.   Lymphadenopathy:      Cervical: No cervical adenopathy.   Skin:     General: Skin is warm and dry.      Findings: No erythema or rash.   Neurological:      Mental Status: She is alert and oriented to person, place, and time.      Cranial Nerves: No cranial nerve deficit.   Psychiatric:         Behavior: Behavior normal.         ASSESSMENT/PLAN:        1. Morbid obesity  EKG 12-lead    Ambulatory referral/consult to Nutrition Services    Ambulatory referral/consult to Psychiatry    [place] Upper GI    Ambulatory referral/consult to Cardiology   2. Morbid obesity with BMI of 50.0-59.9, adult     3. Arthritis         Plan:  Domonique Carmona has morbid obesity as their Body mass index is 58.38 kg/m². She would benefit from weight loss surgery and has chosen gastric sleeve surgery as the preferred procedure. She understands that this is a tool and lifestyle change will be necessary to keep weight off. I went over possible complications of all surgeries with the patient and she is agreeable to surgery.    She will need:    JADA   MSD 0/6    Labs- pending-   EKG  UGI   dietary consult  psych consult   Seminar    I will obtain the following clearances prior to surgery: Cardiology -         Diet plan: high protein low carb- mainly meats and vegetables  Exercise plan: Cardiovascular exercise, get HR over 100 for 20 minutes 3 times per  week.  Start multivitamin

## 2020-07-07 ENCOUNTER — CLINICAL SUPPORT (OUTPATIENT)
Dept: BARIATRICS | Facility: CLINIC | Age: 60
End: 2020-07-07
Payer: COMMERCIAL

## 2020-07-07 DIAGNOSIS — E66.01 MORBID OBESITY: ICD-10-CM

## 2020-07-07 DIAGNOSIS — Z71.3 DIETARY COUNSELING AND SURVEILLANCE: Primary | ICD-10-CM

## 2020-07-07 DIAGNOSIS — E66.01 MORBID OBESITY WITH BMI OF 50.0-59.9, ADULT: ICD-10-CM

## 2020-07-07 PROCEDURE — 99999 PR PBB SHADOW E&M-EST. PATIENT-LVL III: CPT | Mod: PBBFAC,,,

## 2020-07-07 PROCEDURE — 97802 PR MED NUTR THER, 1ST, INDIV, EA 15 MIN: ICD-10-PCS | Mod: S$GLB,,, | Performed by: SURGERY

## 2020-07-07 PROCEDURE — 97802 MEDICAL NUTRITION INDIV IN: CPT | Mod: S$GLB,,, | Performed by: SURGERY

## 2020-07-07 PROCEDURE — 99999 PR PBB SHADOW E&M-EST. PATIENT-LVL III: ICD-10-PCS | Mod: PBBFAC,,,

## 2020-07-08 NOTE — PROGRESS NOTES
"NUTRITIONAL CONSULT    Referring Physician: Dr. Victor  Reason for MNT Referral: Initial assessment for sleeve gastrectomy work-up    PAST MEDICAL HISTORY:   59 y.o. female  BMI 58.4    Past attempts at weight loss include: Unsupervised: calorie counting, gym membership, high protein, low carbohydrate;  Supervised:  none;  Diet pills: dexatrim, fen-phen;  Exercise attempts: elliptical      Weight history:   At current weight:  6 years  Obese for 25 years.  More than 35 pounds overweight for 10 years.  More than 100 pounds overweight for 10 years.  Started dieting at 18 years old.  Maximum weight reached: around 400  Most weight lost was down to size 16 through diet and exercise for 3 years.  She describes Her eating habits as volume eater, emotional eater.       Past Medical History:   Diagnosis Date    Abnormal Pap smear 1986    Cryosurgery    Anxiety     Arthritis     Venous stasis dermatitis of left lower extremity 12/31/2018       CLINICAL DATA:  59 y.o.-year-old White female.  Height: 5'11  Weight: 418 lbs  IBW: 159 lbs  BMI: 58.4  The patient's goal weight (50% EBW): 289 lbs  Personal goal weight: the weight "to get my life back"    Goal for Bariatric Surgery: to improve health, to improve quality of life and to lose weight    NUTRITIONAL NEEDS:  2080 Calories for weight loss (using [place]. Jeor Equation)  60-80 Grams Protein ASMBS guidelines    NUTRITION & HEALTH HISTORY:  Greatest challenge: starchy CHO like chips, crackers, and donuts people bring to work.    Current diet recall: Breakfast: sausage patties from hospital cafeteria and cheese sticks  Lunch: chicken salad from hospital cafeteria  Dinner: protein & vegetable  Snack: ham slices & swiss, 1/4 cup cottage cheese, beef jerkey, 12 pecans, 12 walnuts    Fluids: 6 Diet Cokes a day either bottles or cans, 1 bottle of water.  Dislikes: coffee, tea, and creamy drinks    Current Diet:  Meal pattern:   Protein supplements: 0.  She has yet to find a " shake she enjoys.  Snackin-2 / day   Vegetables: Likes a variety. Eats daily.  Fruits: Likes a variety. Eats rarely.  Beverages: water and diet soda. Pt does not drink coffee or tea.  Goal to find something calorie free and not carbonated.  Dining out: breakfast and lunch at the hospital cafeteria.  Once a week at a restaurant.  Cooking at home: Daily. Mostly baked, grilled and smothered meat, fish and vegetables.    Exercise:  Past exercise: None    Current exercise: None  Restrictions to exercise: pain.    Vitamins / Minerals / Herbs:   M/V and 2 Rolaids: 1000 mg calcium carbonate      Labs:   reviewed    Food Allergies:   NKFA    Social:  Works regular daytime shifts.  Lives with son  Grocery shopping and food prep done by patient.  Patient believes the household will be supportive after surgery.  Alcohol: rarely.  Smoking: None.    ASSESSMENT:  · Patient reports attempts at weight loss, only to regain lost weight.  · Patient demonstrated knowledge of healthy eating behaviors and exercise patterns; admits to not eating healthy and not exercising at this point.  · Patient demonstrates willingness to change lifestyle and make behavior modifications as evidenced by weight loss, daily food logs, increased vegetables, more food preparation at [employer], healthier cooking at home, healthier snacking at work and healthier snacking at home.        Barriers to Education: none    Stage of change: action    NUTRITION DIAGNOSIS:     Morbid Obesity related to Food and nutrition related knowledge deficit and Physical inactivity as evidence by BMI.    BARIATRIC DIET DISCUSSION/PLAN:  Discussed diet after surgery and related to patient's food record.  Reviewed nutrition guidelines for before and after surgery.  Answered all questions.  Begin trying various protein supplements to determine preference.  Reduce frequency of dining out.  More grocery shopping and meal preparation at home.  Return to  clinic.    RECOMMENDATIONS:  Patient is a good candidate for bariatric surgery.    Needs additional visit(s) with RD.    Patient verbalized understanding.    Expect good  compliance after surgery at this time.    Communicated nutrition plan with bariatric team.    SESSION TIME:  60 minutes

## 2020-07-20 DIAGNOSIS — Z01.84 ANTIBODY RESPONSE EXAMINATION: ICD-10-CM

## 2020-07-30 NOTE — PROGRESS NOTES
NUTRITION NOTE  MSD 1  Referring Physician: Dr. Victor  Reason for MNT Referral: Medically Supervised Diet pending Gastric Sleeve    PAST MEDICAL HISTORY:    Patient presents for 2nd visit for MSD with weight loss over the past month; total weight loss by making numerous dietary and lifestyle changes.    Past Medical History:   Diagnosis Date    Abnormal Pap smear 1986    Cryosurgery    Anxiety     Arthritis     Venous stasis dermatitis of left lower extremity 12/31/2018       CLINICAL DATA:  59 y.o. female.  Current Weight: 416 lbs  Weight Change Since Initial Visit: -8  lbs  Ideal Body Weight: 159 lbs  BMI: 58.02    CURRENT DIET:  Reduced-calorie diet.  Diet Recall: Food records are present.  Breakfast: Eggs and sausage david microwaved at work. She skips if she is not working and eats one combined meal of leftovers, cold cups rolled up with cheese and vegetables.  Lunch: at work salads, cucumber and tomato salads all with vinaigrettes, leftovers from dinner the evening before.  Dinner: lean protein & vegetables  Snacks at work: beef jerkey, nuts, sharp cheese, Babybel cheese, sliced carrots.  Pt packs individual portion sizes.  Fluids: 1 bottle of water, diet tea.  Pt dislikes coffee and creamy drinks    Diet Includes:   Meal Pattern: Improved.  Protein Supplements: 0 per day.  Snacking: Adequate. Snacks include healthy choices.    Vegetables: Likes a variety. Eats daily.  Fruits: Likes a variety. Eats once a week.  Beverages: water, diet soda and diet tea  Dining Out: Dining out: Reduced lately. Mostly was hospital cafeteria food..  Cooking at home: Daily. Mostly baked and grilled meat, fish and vegetables.    CURRENT EXERCISE:  None; restricted by pain    Vitamins / Minerals / Herbs:   Multivitamin, Rolaids (calcium carbonate)    Food Allergies: KNFA    Social:  Works regular daytime shifts.  Alcohol: rarely.  Smoking: None.    ASSESSMENT:  Patient demonstrates all willingness to change lifestyle habits as  "evidenced by weight loss, reduced dining out, more food preparation at [employer], healthier cooking at home and healthier snacking at work.    Doing well with working on greatest challenges of starchy carbohydrates, snacks and junk food, and eating out often.  Pt reported she "cheated" twice by having starchy carbohydrates in the last month so she has made significant improvement with her starchy carbohydrate intake.  She has swapped to healthier snacks, and prepares food at home.  She reports she is overwhelmed by the amount of planning that is involved with eating healthy.    Adequate calorie intake.  Adequate protein intake.    PLAN:    Diet: Maintain diet plan.    Exercise: Increase as tolerated.    Behavior Modification: Continue to document food.    Weight loss prior to surgery (5-10% TBW): -8 lbs, 1.9% decrease.    Return to clinic in one month.    SESSION TIME:  30 minutes    "

## 2020-08-03 ENCOUNTER — CLINICAL SUPPORT (OUTPATIENT)
Dept: BARIATRICS | Facility: CLINIC | Age: 60
End: 2020-08-03
Payer: COMMERCIAL

## 2020-08-03 VITALS — WEIGHT: 293 LBS | HEIGHT: 71 IN | BODY MASS INDEX: 41.02 KG/M2

## 2020-08-03 DIAGNOSIS — E66.01 MORBID OBESITY WITH BMI OF 50.0-59.9, ADULT: ICD-10-CM

## 2020-08-03 DIAGNOSIS — Z71.3 DIETARY COUNSELING AND SURVEILLANCE: Primary | ICD-10-CM

## 2020-08-03 PROCEDURE — 97803 MED NUTRITION INDIV SUBSEQ: CPT | Mod: S$GLB,,, | Performed by: DIETITIAN, REGISTERED

## 2020-08-03 PROCEDURE — 97803 PR MED NUTR THER, SUBSQ, INDIV, EA 15 MIN: ICD-10-PCS | Mod: S$GLB,,, | Performed by: DIETITIAN, REGISTERED

## 2020-08-03 PROCEDURE — 99999 PR PBB SHADOW E&M-EST. PATIENT-LVL II: ICD-10-PCS | Mod: PBBFAC,,, | Performed by: DIETITIAN, REGISTERED

## 2020-08-03 PROCEDURE — 99999 PR PBB SHADOW E&M-EST. PATIENT-LVL II: CPT | Mod: PBBFAC,,, | Performed by: DIETITIAN, REGISTERED

## 2020-08-13 ENCOUNTER — TELEPHONE (OUTPATIENT)
Dept: BARIATRICS | Facility: CLINIC | Age: 60
End: 2020-08-13

## 2020-08-13 NOTE — TELEPHONE ENCOUNTER
Incoming phone call to discuss bill with RD.  RD explained she does not know anything about billing, but confirmed she does in fact have cash pay amount.  RD recommended Pt call [facility] billing office to determine if these are all separate bills or just bills for the dietitian appointments.  RD had previously provided CPT codes and Dx codes and confirmed this is the same way she has coded since she began her job at [facility] [employer].  Pt to call billing department to determine more information about bills.

## 2020-08-14 PROBLEM — I87.2 VENOUS STASIS DERMATITIS OF LEFT LOWER EXTREMITY: Status: RESOLVED | Noted: 2018-12-31 | Resolved: 2020-08-14

## 2020-08-14 NOTE — PROGRESS NOTES
Subjective:    Patient ID:  Domonique Carmona is a 59 y.o. female who presents for evaluation of Consult (Ref by Dr. Victor; gastric sleeve ), Shortness of Breath, Edema (bilat ankle/feet edema and also edema on left side of neck.), and Numbness (bilat hands )      Problem List Items Addressed This Visit     None      Visit Diagnoses     Pre-operative cardiovascular examination    -  Primary    Morbid obesity              HPI    Referred by Dr. Victor for preoperative cardiovascular assessment prior to gastric sleeve surgery.    The patient states that she is here for pre-op evaluation and for some of her own concerns including elevated BP, LE edema, and left sided neck pain.    States that she feels tired all the time in general.    Noncardiac chest pain.  Shortness of breath with activity  Occasional left sided arm numbness  Not very active secondary to her shortness of breath    METs - Unable to be assessed    Home BP - Does not routinely take, but has been elevated at recent clinic visits    Personal history of heart attack or stroke - None that she is aware of  Family history of heart disease - None that she is aware    Past Medical History:   Diagnosis Date    Abnormal Pap smear 1986    Cryosurgery    Anxiety     Arthritis     Venous stasis dermatitis of left lower extremity 12/31/2018       Past Surgical History:   Procedure Laterality Date    BREAST BIOPSY      BREAST SURGERY      biopsy right side    DILATION AND CURETTAGE OF UTERUS      GYNECOLOGIC CRYOSURGERY  1986    ulner nerve transposition      left       Family History   Problem Relation Age of Onset    Ovarian cancer Mother     Breast cancer Neg Hx     Colon cancer Neg Hx     Collagen disease Neg Hx     Glaucoma Neg Hx     Melanoma Neg Hx     Psoriasis Neg Hx     Lupus Neg Hx     Eczema Neg Hx        Social History     Socioeconomic History    Marital status: Single     Spouse name: Not on file    Number of children: Not on  file    Years of education: Not on file    Highest education level: Not on file   Occupational History    Not on file   Social Needs    Financial resource strain: Not on file    Food insecurity     Worry: Not on file     Inability: Not on file    Transportation needs     Medical: Not on file     Non-medical: Not on file   Tobacco Use    Smoking status: Former Smoker     Packs/day: 0.50     Years: 20.00     Pack years: 10.00     Quit date: 6/18/2005     Years since quitting: 15.1    Smokeless tobacco: Never Used   Substance and Sexual Activity    Alcohol use: No    Drug use: No    Sexual activity: Not on file   Lifestyle    Physical activity     Days per week: Not on file     Minutes per session: Not on file    Stress: Not on file   Relationships    Social connections     Talks on phone: Not on file     Gets together: Not on file     Attends Yarsani service: Not on file     Active member of club or organization: Not on file     Attends meetings of clubs or organizations: Not on file     Relationship status: Not on file   Other Topics Concern    Are you pregnant or think you may be? 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   Eyes: Negative for blurred vision.   Cardiovascular: Negative for chest pain, dyspnea on exertion, irregular heartbeat and leg swelling.   Respiratory: Negative for cough, hemoptysis, shortness of breath and wheezing.    Endocrine: Negative for cold intolerance and heat intolerance.   Hematologic/Lymphatic: Negative for bleeding problem.   Musculoskeletal: Negative for muscle weakness and myalgias.   Gastrointestinal: Negative for abdominal pain, constipation and diarrhea.   Genitourinary:  "Negative for bladder incontinence.   Neurological: Negative for dizziness and weakness.   Psychiatric/Behavioral: Negative for depression.        Objective:     Vitals:    08/17/20 1303   BP: (!) 147/91   BP Location: Left arm   Patient Position: Sitting   BP Method: Large (Automatic)   Pulse: 94   SpO2: 96%   Weight: (!) 187.4 kg (413 lb 2.3 oz)   Height: 5' 11" (1.803 m)        Physical Exam   Constitutional: She is oriented to person, place, and time. She appears well-developed and well-nourished. No distress.   HENT:   Head: Normocephalic and atraumatic.   Neck: Neck supple. No JVD present.   Cardiovascular: Normal rate, regular rhythm and normal heart sounds. Exam reveals no gallop and no friction rub.   No murmur heard.  Pulmonary/Chest: Effort normal and breath sounds normal. No respiratory distress. She has no wheezes. She has no rales.   Abdominal: Soft. Bowel sounds are normal. There is no abdominal tenderness. There is no rebound and no guarding.   Musculoskeletal:         General: No tenderness or edema.   Neurological: She is alert and oriented to person, place, and time.   Skin: Skin is warm and dry.   Psychiatric: Her behavior is normal.   Nursing note and vitals reviewed.          Current Outpatient Medications on File Prior to Visit   Medication Sig    alprazolam (XANAX) 0.25 MG tablet Take 1 tablet (0.25 mg total) by mouth daily as needed for Anxiety.    ibuprofen (ADVIL,MOTRIN) 800 MG tablet Take 1 tablet (800 mg total) by mouth 3 (three) times daily.    tiZANidine (ZANAFLEX) 4 MG tablet TAKE 1 TABLET BY MOUTH THREE TIMES A DAY AS NEEDED FOR 10 DAYS    pentoxifylline (TRENTAL) 400 mg TbSR TAKE 1 TABLET BY MOUTH THREE TIMES A DAY (Patient not taking: Reported on 7/1/2020)    triamcinolone acetonide 0.1% (KENALOG) 0.1 % cream AAA left lower bid PRN (Patient not taking: Reported on 7/1/2020)     No current facility-administered medications on file prior to visit.        Lipid Panel:   Lab " Results   Component Value Date    CHOL 201 (H) 08/18/2018    HDL 51 08/18/2018    LDLCALC 126.6 08/18/2018    TRIG 117 08/18/2018    CHOLHDL 25.4 08/18/2018         The 10-year ASCVD risk score (Harvellibrado LEMUS JrSonu, et al., 2013) is: 3.4%    Values used to calculate the score:      Age: 59 years      Sex: Female      Is Non- : No      Diabetic: No      Tobacco smoker: No      Systolic Blood Pressure: 147 mmHg      Is BP treated: No      HDL Cholesterol: 51 mg/dL      Total Cholesterol: 201 mg/dL    All pertinent labs, imaging, and EKGs reviewed.  Patient's most recent EKG tracing was personally interpreted by this provider.    Assessment:       1. Pre-operative cardiovascular examination    2. Morbid obesity         Plan:     Symptoms of shortness of breath  BP elevated today  METs unable to be assessed    Echocardiogram   Nuclear stress test   Start amlodipine 2.5mg PO Daily - Educated on risks/benefits of medication   Home BP log    Continue other cardiac medications  Mediterranean Diet/Cardiovascular Exercise Program    As long as the above studies are without acute issues, with the above recommendations, the patient is optimized for the above mentioned procedure.    Patient queried and all questions were answered.    F/u in 3 months to reassess      Signed:    Christiano Xiao MD  8/17/2020 2:06 PM

## 2020-08-17 ENCOUNTER — OFFICE VISIT (OUTPATIENT)
Dept: CARDIOLOGY | Facility: CLINIC | Age: 60
End: 2020-08-17
Payer: COMMERCIAL

## 2020-08-17 VITALS
OXYGEN SATURATION: 96 % | HEART RATE: 94 BPM | HEIGHT: 71 IN | BODY MASS INDEX: 41.02 KG/M2 | WEIGHT: 293 LBS | DIASTOLIC BLOOD PRESSURE: 91 MMHG | SYSTOLIC BLOOD PRESSURE: 147 MMHG

## 2020-08-17 DIAGNOSIS — E66.01 MORBID OBESITY: ICD-10-CM

## 2020-08-17 DIAGNOSIS — Z01.810 PRE-OPERATIVE CARDIOVASCULAR EXAMINATION: Primary | ICD-10-CM

## 2020-08-17 DIAGNOSIS — R07.89 ATYPICAL CHEST PAIN: ICD-10-CM

## 2020-08-17 DIAGNOSIS — Z01.31 ENCOUNTER FOR EXAMINATION OF BLOOD PRESSURE WITH ABNORMAL FINDINGS: ICD-10-CM

## 2020-08-17 PROCEDURE — 99204 OFFICE O/P NEW MOD 45 MIN: CPT | Mod: S$GLB,,, | Performed by: INTERNAL MEDICINE

## 2020-08-17 PROCEDURE — 3077F SYST BP >= 140 MM HG: CPT | Mod: CPTII,S$GLB,, | Performed by: INTERNAL MEDICINE

## 2020-08-17 PROCEDURE — 99204 PR OFFICE/OUTPT VISIT, NEW, LEVL IV, 45-59 MIN: ICD-10-PCS | Mod: S$GLB,,, | Performed by: INTERNAL MEDICINE

## 2020-08-17 PROCEDURE — 93000 EKG 12-LEAD: ICD-10-PCS | Mod: S$GLB,,, | Performed by: INTERNAL MEDICINE

## 2020-08-17 PROCEDURE — 99999 PR PBB SHADOW E&M-EST. PATIENT-LVL III: CPT | Mod: PBBFAC,,, | Performed by: INTERNAL MEDICINE

## 2020-08-17 PROCEDURE — 3077F PR MOST RECENT SYSTOLIC BLOOD PRESSURE >= 140 MM HG: ICD-10-PCS | Mod: CPTII,S$GLB,, | Performed by: INTERNAL MEDICINE

## 2020-08-17 PROCEDURE — 3080F DIAST BP >= 90 MM HG: CPT | Mod: CPTII,S$GLB,, | Performed by: INTERNAL MEDICINE

## 2020-08-17 PROCEDURE — 3008F BODY MASS INDEX DOCD: CPT | Mod: CPTII,S$GLB,, | Performed by: INTERNAL MEDICINE

## 2020-08-17 PROCEDURE — 3008F PR BODY MASS INDEX (BMI) DOCUMENTED: ICD-10-PCS | Mod: CPTII,S$GLB,, | Performed by: INTERNAL MEDICINE

## 2020-08-17 PROCEDURE — 99999 PR PBB SHADOW E&M-EST. PATIENT-LVL III: ICD-10-PCS | Mod: PBBFAC,,, | Performed by: INTERNAL MEDICINE

## 2020-08-17 PROCEDURE — 93000 ELECTROCARDIOGRAM COMPLETE: CPT | Mod: S$GLB,,, | Performed by: INTERNAL MEDICINE

## 2020-08-17 PROCEDURE — 3080F PR MOST RECENT DIASTOLIC BLOOD PRESSURE >= 90 MM HG: ICD-10-PCS | Mod: CPTII,S$GLB,, | Performed by: INTERNAL MEDICINE

## 2020-08-17 RX ORDER — AMLODIPINE BESYLATE 2.5 MG/1
2.5 TABLET ORAL DAILY
Qty: 90 TABLET | Refills: 3 | Status: SHIPPED | OUTPATIENT
Start: 2020-08-17 | End: 2021-08-04

## 2020-08-17 NOTE — LETTER
August 17, 2020      Thierry Victor MD  [address]  [place] [address]           [facility]  [address] [place] [address]  Phone: [phone]          Patient: Domonique Carmona   MR Number: [id number]   YOB: 1960   Date of Visit: 8/17/2020       Dear Dr. Thierry Victor:    Thank you for referring Domonique Carmona to me for evaluation. Attached you will find relevant portions of my assessment and plan of care.    If you have questions, please do not hesitate to call me. I look forward to following Domonique Carmona along with you.    Sincerely,    Christiano Xiao MD    Enclosure  CC:  No Recipients    If you would like to receive this communication electronically, please contact [email or web address] or [phone] to request more information on [employer] Link access.    For providers and/or their staff who would like to refer a patient to [facility], please contact us through our one-stop-shop provider referral line, [facility] [place], at [phone].    If you feel you have received this communication in error or would no longer like to receive these types of communications, please e-mail [email or web address]

## 2020-08-19 DIAGNOSIS — Z01.84 ANTIBODY RESPONSE EXAMINATION: ICD-10-CM

## 2020-08-20 ENCOUNTER — TELEPHONE (OUTPATIENT)
Dept: FAMILY MEDICINE | Facility: CLINIC | Age: 60
End: 2020-08-20

## 2020-08-25 ENCOUNTER — OFFICE VISIT (OUTPATIENT)
Dept: FAMILY MEDICINE | Facility: CLINIC | Age: 60
End: 2020-08-25
Payer: COMMERCIAL

## 2020-08-25 ENCOUNTER — TELEPHONE (OUTPATIENT)
Dept: FAMILY MEDICINE | Facility: CLINIC | Age: 60
End: 2020-08-25

## 2020-08-25 VITALS
HEART RATE: 80 BPM | WEIGHT: 293 LBS | DIASTOLIC BLOOD PRESSURE: 78 MMHG | HEIGHT: 71 IN | BODY MASS INDEX: 41.02 KG/M2 | SYSTOLIC BLOOD PRESSURE: 124 MMHG | TEMPERATURE: 98 F

## 2020-08-25 DIAGNOSIS — F41.1 GAD (GENERALIZED ANXIETY DISORDER): ICD-10-CM

## 2020-08-25 DIAGNOSIS — F41.9 ANXIETY: Primary | ICD-10-CM

## 2020-08-25 DIAGNOSIS — M76.61 ACHILLES TENDINITIS OF RIGHT LOWER EXTREMITY: ICD-10-CM

## 2020-08-25 DIAGNOSIS — Z00.00 PHYSICAL EXAM: ICD-10-CM

## 2020-08-25 DIAGNOSIS — I10 HYPERTENSION, UNSPECIFIED TYPE: ICD-10-CM

## 2020-08-25 DIAGNOSIS — M17.12 PRIMARY OSTEOARTHRITIS OF LEFT KNEE: ICD-10-CM

## 2020-08-25 DIAGNOSIS — Z79.899 HIGH RISK MEDICATION USE: ICD-10-CM

## 2020-08-25 PROCEDURE — 3074F SYST BP LT 130 MM HG: CPT | Mod: S$GLB,,, | Performed by: NURSE PRACTITIONER

## 2020-08-25 PROCEDURE — 99396 PREV VISIT EST AGE 40-64: CPT | Mod: S$GLB,,, | Performed by: NURSE PRACTITIONER

## 2020-08-25 PROCEDURE — 3008F BODY MASS INDEX DOCD: CPT | Mod: S$GLB,,, | Performed by: NURSE PRACTITIONER

## 2020-08-25 PROCEDURE — 3074F PR MOST RECENT SYSTOLIC BLOOD PRESSURE < 130 MM HG: ICD-10-PCS | Mod: S$GLB,,, | Performed by: NURSE PRACTITIONER

## 2020-08-25 PROCEDURE — 99396 PR PREVENTIVE VISIT,EST,40-64: ICD-10-PCS | Mod: S$GLB,,, | Performed by: NURSE PRACTITIONER

## 2020-08-25 PROCEDURE — 3008F PR BODY MASS INDEX (BMI) DOCUMENTED: ICD-10-PCS | Mod: S$GLB,,, | Performed by: NURSE PRACTITIONER

## 2020-08-25 PROCEDURE — 3078F PR MOST RECENT DIASTOLIC BLOOD PRESSURE < 80 MM HG: ICD-10-PCS | Mod: S$GLB,,, | Performed by: NURSE PRACTITIONER

## 2020-08-25 PROCEDURE — 3078F DIAST BP <80 MM HG: CPT | Mod: S$GLB,,, | Performed by: NURSE PRACTITIONER

## 2020-08-25 RX ORDER — TIZANIDINE 4 MG/1
TABLET ORAL
Qty: 90 TABLET | Refills: 1 | Status: SHIPPED | OUTPATIENT
Start: 2020-08-25 | End: 2021-09-29 | Stop reason: SDUPTHER

## 2020-08-25 RX ORDER — IBUPROFEN 800 MG/1
800 TABLET ORAL 3 TIMES DAILY
Qty: 60 TABLET | Refills: 6 | Status: SHIPPED | OUTPATIENT
Start: 2020-08-25 | End: 2021-09-29 | Stop reason: SDUPTHER

## 2020-08-25 RX ORDER — ALPRAZOLAM 0.25 MG/1
0.25 TABLET ORAL DAILY PRN
Qty: 60 TABLET | Refills: 1 | Status: SHIPPED | OUTPATIENT
Start: 2020-08-25 | End: 2021-09-29 | Stop reason: SDUPTHER

## 2020-08-25 NOTE — TELEPHONE ENCOUNTER
----- Message from Emilee Arce sent at 8/25/2020  1:27 PM CDT -----  The patient saw Lianet today. She is asking if her visit can be an Annual phy. You can call her or send a message through her portal. Pt's # [phone]

## 2020-08-25 NOTE — PROGRESS NOTES
SUBJECTIVE:    Patient ID: Domonique Carmona is a 59 y.o. female.    Chief Complaint: Follow-up (no bottles, went over meds verbally, C-scope declined, [employer] INS does not cover// SW)     59 y.o. female presents for check up . Currently considering weight loss surgery. Recently saw DR. Victor. She has been referred since to cardiology to begin clearance process. Has started diet. 16lb weight loss. Tired of not feeling well. Feels tired all the time. Unable to move without pain. Tried multiple diets in the past with only minimal loss. Saw cardio. Started on norvasc for hypertension. Plans to have stress test.             Office Visit on 05/30/2020   Component Date Value Ref Range Status    SARS-CoV2 (COVID-19) Qualitative P* 05/30/2020 Not Detected  Not Detected Final       Past Medical History:   Diagnosis Date    Abnormal Pap smear 1986    Cryosurgery    Anxiety     Arthritis     Venous stasis dermatitis of left lower extremity 12/31/2018     Social History     Socioeconomic History    Marital status: Single     Spouse name: Not on file    Number of children: Not on file    Years of education: Not on file    Highest education level: Not on file   Occupational History    Not on file   Social Needs    Financial resource strain: Not on file    Food insecurity     Worry: Not on file     Inability: Not on file    Transportation needs     Medical: Not on file     Non-medical: Not on file   Tobacco Use    Smoking status: Former Smoker     Packs/day: 0.50     Years: 20.00     Pack years: 10.00     Quit date: 6/18/2005     Years since quitting: 15.1    Smokeless tobacco: Never Used   Substance and Sexual Activity    Alcohol use: No    Drug use: No    Sexual activity: Not on file   Lifestyle    Physical activity     Days per week: Not on file     Minutes per session: Not on file    Stress: Not on file   Relationships    Social connections     Talks on phone: Not on file     Gets together: Not on file      Attends [employer] service: Not on file     Active member of club or organization: Not on file     Attends meetings of clubs or organizations: Not on file     Relationship status: Not on file   Other Topics Concern    Are you pregnant or think you may be? 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 [employer]        Lives with youngest son -      Past Surgical History:   Procedure Laterality Date    BREAST BIOPSY      BREAST SURGERY      biopsy right side    DILATION AND CURETTAGE OF UTERUS      GYNECOLOGIC CRYOSURGERY  1986    ulner nerve transposition      left     Family History   Problem Relation Age of Onset    Ovarian cancer Mother     Breast cancer Neg Hx     Colon cancer Neg Hx     Collagen disease Neg Hx     Glaucoma Neg Hx     Melanoma Neg Hx     Psoriasis Neg Hx     Lupus Neg Hx     Eczema Neg Hx        Review of patient's allergies indicates:   Allergen Reactions    Demerol [meperidine] Hives       Current Outpatient Medications:     ALPRAZolam (XANAX) 0.25 MG tablet, Take 1 tablet (0.25 mg total) by mouth daily as needed for Anxiety., Disp: 60 tablet, Rfl: 1    amLODIPine (NORVASC) 2.5 MG tablet, Take 1 tablet (2.5 mg total) by mouth once daily., Disp: 90 tablet, Rfl: 3    ibuprofen (ADVIL,MOTRIN) 800 MG tablet, Take 1 tablet (800 mg total) by mouth 3 (three) times daily., Disp: 60 tablet, Rfl: 6    tiZANidine (ZANAFLEX) 4 MG tablet, TAKE 1 TABLET BY MOUTH THREE TIMES A DAY AS NEEDED FOR 10 DAYS, Disp: 90 tablet, Rfl: 1    Review of Systems   Constitutional: Negative for chills, fever and unexpected weight change.   HENT: Negative for ear pain, rhinorrhea and sore throat.    Eyes: Negative for pain and visual disturbance.   Respiratory: Negative for cough and shortness of breath.    Cardiovascular: Negative for chest pain, palpitations and leg swelling.   Gastrointestinal: Negative for abdominal pain, diarrhea, nausea and vomiting.  "  Genitourinary: Negative for difficulty urinating, hematuria and vaginal bleeding.   Musculoskeletal: Negative for arthralgias.   Skin: Negative for rash.   Neurological: Negative for dizziness, weakness and headaches.   Psychiatric/Behavioral: Negative for agitation and sleep disturbance. The patient is not nervous/anxious.           Objective:      Vitals:    08/25/20 1248   BP: 124/78   Pulse: 80   Temp: 97.8 °F (36.6 °C)   Weight: (!) 183.7 kg (405 lb)   Height: 5' 11" (1.803 m)     Physical Exam  Constitutional:       Appearance: She is well-developed. She is obese.   HENT:      Head: Normocephalic.      Right Ear: External ear normal.      Left Ear: External ear normal.   Eyes:      Conjunctiva/sclera: Conjunctivae normal.      Pupils: Pupils are equal, round, and reactive to light.   Neck:      Musculoskeletal: Normal range of motion and neck supple.      Vascular: No JVD.   Cardiovascular:      Rate and Rhythm: Normal rate and regular rhythm.      Heart sounds: No murmur.   Pulmonary:      Effort: Pulmonary effort is normal.      Breath sounds: Normal breath sounds.   Abdominal:      General: Bowel sounds are normal.      Palpations: Abdomen is soft.   Musculoskeletal: Normal range of motion.         General: No deformity.      Right lower leg: Edema present.      Left lower leg: Edema present.   Lymphadenopathy:      Cervical: No cervical adenopathy.   Skin:     General: Skin is warm and dry.      Findings: No rash.   Neurological:      Mental Status: She is alert and oriented to person, place, and time.      Gait: Gait normal.   Psychiatric:         Speech: Speech normal.         Behavior: Behavior normal.           Assessment:       1. Anxiety    2. Achilles tendinitis of right lower extremity    3. Primary osteoarthritis of left knee    4. ISAÍAS (generalized anxiety disorder)    5. High risk medication use    6. Hypertension, unspecified type    7. Physical exam         Plan:       Anxiety  -     " ALPRAZolam (XANAX) 0.25 MG tablet; Take 1 tablet (0.25 mg total) by mouth daily as needed for Anxiety.  Dispense: 60 tablet; Refill: 1  -     CBC auto differential; Future; Expected date: 08/25/2020  -     Comprehensive metabolic panel; Future; Expected date: 08/25/2020    Achilles tendinitis of right lower extremity  -     ibuprofen (ADVIL,MOTRIN) 800 MG tablet; Take 1 tablet (800 mg total) by mouth 3 (three) times daily.  Dispense: 60 tablet; Refill: 6  -     tiZANidine (ZANAFLEX) 4 MG tablet; TAKE 1 TABLET BY MOUTH THREE TIMES A DAY AS NEEDED FOR 10 DAYS  Dispense: 90 tablet; Refill: 1    Primary osteoarthritis of left knee  -     tiZANidine (ZANAFLEX) 4 MG tablet; TAKE 1 TABLET BY MOUTH THREE TIMES A DAY AS NEEDED FOR 10 DAYS  Dispense: 90 tablet; Refill: 1  -     CBC auto differential; Future; Expected date: 08/25/2020  -     Comprehensive metabolic panel; Future; Expected date: 08/25/2020  -     Microalbumin/creatinine urine ratio; Future; Expected date: 08/25/2020  -     Urinalysis, Reflex to Urine Culture Urine, Clean Catch; Future; Expected date: 08/25/2020  -     TSH; Future; Expected date: 08/25/2020    ISAÍAS (generalized anxiety disorder)    High risk medication use  -     CBC auto differential; Future; Expected date: 08/25/2020  -     Comprehensive metabolic panel; Future; Expected date: 08/25/2020  -     Lipid Panel; Future; Expected date: 08/25/2020  -     Microalbumin/creatinine urine ratio; Future; Expected date: 08/25/2020  -     Urinalysis, Reflex to Urine Culture Urine, Clean Catch; Future; Expected date: 08/25/2020  -     TSH; Future; Expected date: 08/25/2020    Hypertension, unspecified type  -     CBC auto differential; Future; Expected date: 08/25/2020  -     Comprehensive metabolic panel; Future; Expected date: 08/25/2020  -     Lipid Panel; Future; Expected date: 08/25/2020  -     Microalbumin/creatinine urine ratio; Future; Expected date: 08/25/2020  -     Urinalysis, Reflex to Urine  Culture Urine, Clean Catch; Future; Expected date: 08/25/2020  -     TSH; Future; Expected date: 08/25/2020    Physical exam  Comments:  agree with plan to have surgical intervention for weight loss. labs. meds refilled.       Follow up in about 6 months (around 2/25/2021) for medication management.        8/25/2020 Lianet Hansen

## 2020-09-01 ENCOUNTER — CLINICAL SUPPORT (OUTPATIENT)
Dept: BARIATRICS | Facility: CLINIC | Age: 60
End: 2020-09-01
Payer: COMMERCIAL

## 2020-09-01 DIAGNOSIS — E66.01 MORBID OBESITY WITH BMI OF 50.0-59.9, ADULT: ICD-10-CM

## 2020-09-01 DIAGNOSIS — Z71.3 DIETARY COUNSELING AND SURVEILLANCE: ICD-10-CM

## 2020-09-01 PROCEDURE — 99999 PR PBB SHADOW E&M-EST. PATIENT-LVL I: ICD-10-PCS | Mod: PBBFAC,,, | Performed by: DIETITIAN, REGISTERED

## 2020-09-01 PROCEDURE — 99999 PR PBB SHADOW E&M-EST. PATIENT-LVL I: CPT | Mod: PBBFAC,,, | Performed by: DIETITIAN, REGISTERED

## 2020-09-01 PROCEDURE — 97803 MED NUTRITION INDIV SUBSEQ: CPT | Mod: S$GLB,,, | Performed by: DIETITIAN, REGISTERED

## 2020-09-01 PROCEDURE — 97803 PR MED NUTR THER, SUBSQ, INDIV, EA 15 MIN: ICD-10-PCS | Mod: S$GLB,,, | Performed by: DIETITIAN, REGISTERED

## 2020-09-03 NOTE — PROGRESS NOTES
NUTRITION NOTE    Referring Physician: Dr. Victor  Reason for MNT Referral: Medically Supervised Diet pending Gastric Sleeve    PAST MEDICAL HISTORY:    Patient presents for 2nd visit for MSD with weight loss over the past month; total weight loss by making numerous dietary and lifestyle changes.    Past Medical History:   Diagnosis Date    Abnormal Pap smear 1986    Cryosurgery    Anxiety     Arthritis     Venous stasis dermatitis of left lower extremity 12/31/2018       CLINICAL DATA:  59 y.o. female.    There were no vitals filed for this visit.    Current Weight: 413 lbs  Weight Change Since Initial Visit: -11 lbs   Ideal Body Weight:159 lbs  BMI: 57.6    CURRENT DIET:  Reduced-calorie diet.  Diet Recall: Food records are present.  Breakfast: backberries with cottage cheese, crustless quiche, turkey chen with quiche every once in a while  Lunch: varies: 1/2 tomato and tuna salad, salad with sugar free honey mustard dressing, turkey breast and vegetables, left overs, ham and cheese roll ups  Dinner: lean protein (pork tenderloin, turkey breast, chicken) + vegetables (mostly green vegetables), hamurger soup  Fluids: 45 ounces water, one diet coke    Diet Includes:   Meal Pattern: Improved.  Protein Supplements: 0 per day. Still yet to find one she enjoys.  She does not like creamy options.  RD discussed fruity options available.  Snacking: Adequate. Snacks include healthy choices. Yogurt or pecans if hungry in between meals    Vegetables: Likes a variety. Eats daily.  Fruits: Likes a variety. Eats 2-3 times per week.  Beverages: water and diet soda  Dining Out: Dining out: Weekly. Mostly hospital cafeteria.  Cooking at home: Daily. Mostly baked and grilled meat, fish and vegetables.    CURRENT EXERCISE:  None    Vitamins / Minerals / Herbs:   M/V, Rolaids    Food Allergies:   NKFA    Social:  Works regular daytime shifts.  Alcohol: None.  Smoking: None.    ASSESSMENT:  Patient demonstrates all willingness to  "change lifestyle habits as evidenced by weight loss, dietary changes, increased vegetables, reduced dining out, more food preparation at [employer], healthier cooking at home, bringing snacks to work, healthier snacking at work and healthier snacking at home.    Doing well with working on greatest challenges dining out, healthier cooking, and snacking at work.  Pt has significantly decreased the amount of times she eats in the hospital cafeteria to ~ 1 time a week.  She has improved her quality of meals cooked at home.  She brings healthy snacks with her to work.  She admits a few "slip ups," but overall there are vast improvements .    Adequate calorie intake.  Adequate protein intake.    PLAN:    Diet: Maintain diet plan.    Exercise: Increase as tolerated.    Behavior Modification: Continue to document food & activity logs daily.    Weight loss prior to surgery (5-10% TBW): -9 lbs    Return to clinic in one month.    SESSION TIME:  60 minutes    "

## 2020-09-08 ENCOUNTER — TELEPHONE (OUTPATIENT)
Dept: CARDIOLOGY | Facility: CLINIC | Age: 60
End: 2020-09-08

## 2020-09-08 ENCOUNTER — PATIENT MESSAGE (OUTPATIENT)
Dept: CARDIOLOGY | Facility: CLINIC | Age: 60
End: 2020-09-08

## 2020-09-08 DIAGNOSIS — E66.01 MORBID OBESITY WITH BMI OF 50.0-59.9, ADULT: ICD-10-CM

## 2020-09-08 DIAGNOSIS — R07.89 ATYPICAL CHEST PAIN: Primary | ICD-10-CM

## 2020-09-08 NOTE — TELEPHONE ENCOUNTER
Patient notified that we will not be able to do her nuclear stress test that was ordered per Lynette in nuclear med due the weight limit of the table. Patient want to cancel all tests since she had to take the day off. I explained to her we will contact her once we discuss with  Dr. Alvarado- she hung up on me.

## 2020-09-09 NOTE — TELEPHONE ENCOUNTER
Please advise: She is over the weight limit per the nuclear medicine tech. The patient cancelled the echo as well, she would like to coordinate her tests together. Is there another test you want to order?

## 2020-09-10 NOTE — TELEPHONE ENCOUNTER
The actual weight limit for the table is 420 pounds, but if pt is wide they may not fit (usually if they are short)

## 2020-09-17 ENCOUNTER — PATIENT MESSAGE (OUTPATIENT)
Dept: CARDIOLOGY | Facility: CLINIC | Age: 60
End: 2020-09-17

## 2020-09-18 DIAGNOSIS — Z01.84 ANTIBODY RESPONSE EXAMINATION: ICD-10-CM

## 2020-10-07 ENCOUNTER — OFFICE VISIT (OUTPATIENT)
Dept: BARIATRICS | Facility: CLINIC | Age: 60
End: 2020-10-07

## 2020-10-07 VITALS
TEMPERATURE: 98 F | HEART RATE: 101 BPM | DIASTOLIC BLOOD PRESSURE: 82 MMHG | WEIGHT: 293 LBS | HEIGHT: 71 IN | SYSTOLIC BLOOD PRESSURE: 151 MMHG | BODY MASS INDEX: 41.02 KG/M2 | RESPIRATION RATE: 17 BRPM

## 2020-10-07 DIAGNOSIS — M19.90 ARTHRITIS: ICD-10-CM

## 2020-10-07 DIAGNOSIS — E66.01 MORBID OBESITY: Primary | ICD-10-CM

## 2020-10-07 DIAGNOSIS — M17.12 PRIMARY OSTEOARTHRITIS OF LEFT KNEE: ICD-10-CM

## 2020-10-07 PROCEDURE — 99999 PR PBB SHADOW E&M-EST. PATIENT-LVL IV: CPT | Mod: PBBFAC,,, | Performed by: SURGERY

## 2020-10-07 PROCEDURE — 99213 OFFICE O/P EST LOW 20 MIN: CPT | Mod: S$GLB,,, | Performed by: SURGERY

## 2020-10-07 PROCEDURE — 99999 PR PBB SHADOW E&M-EST. PATIENT-LVL IV: ICD-10-PCS | Mod: PBBFAC,,, | Performed by: SURGERY

## 2020-10-07 PROCEDURE — 99213 PR OFFICE/OUTPT VISIT, EST, LEVL III, 20-29 MIN: ICD-10-PCS | Mod: S$GLB,,, | Performed by: SURGERY

## 2020-10-07 NOTE — PROGRESS NOTES
Medically Supervised Weight Loss Documentation    Date of Visit: 10/07/2020    Patient: Domonique Carmona    Current Weight: 404  Current BMI: Body mass index is 56.37 kg/m².  Weight Change: -9    Last Weight: 413    Beginning Weight: 413      Vitals:   Vitals:    10/07/20 1637   BP: (!) 151/82   Pulse: 101   Resp: 17   Temp: 98.2 °F (36.8 °C)       Comorbidities:   Past Medical History:   Diagnosis Date    Abnormal Pap smear 1986    Cryosurgery    Anxiety     Arthritis     Venous stasis dermatitis of left lower extremity 12/31/2018       Medications:  Current Outpatient Medications on File Prior to Visit   Medication Sig Dispense Refill    ALPRAZolam (XANAX) 0.25 MG tablet Take 1 tablet (0.25 mg total) by mouth daily as needed for Anxiety. 60 tablet 1    amLODIPine (NORVASC) 2.5 MG tablet Take 1 tablet (2.5 mg total) by mouth once daily. 90 tablet 3    ibuprofen (ADVIL,MOTRIN) 800 MG tablet Take 1 tablet (800 mg total) by mouth 3 (three) times daily. 60 tablet 6    tiZANidine (ZANAFLEX) 4 MG tablet TAKE 1 TABLET BY MOUTH THREE TIMES A DAY AS NEEDED FOR 10 DAYS 90 tablet 1     No current facility-administered medications on file prior to visit.          Diet Education Discussed:    Doing well dietary choices.    Few cheeks and sticking mainly to meat and vegetables    Exercise/Activity Discussed:    Walking some    Behavior or Diet Goals for this patient:    Continue low carb low calorie dieting.  Continue dietary visits.  She is having an issue with the billing with the dietitian and we will have her financial people look into this.  She is otherwise doing well plan to see chosen 3 months to possibly schedule surgery.  Should continue her exercise regimen and slowly increase her intensity.    JADA   MSD 3/6     Labs- pending-   EKG  UGI   dietary consult- done  psych consult   Seminar     I will obtain the following clearances prior to surgery: Cardiology - pending    : I met with the patient for 15 minutes  and counseled her for over 50% of that time

## 2020-10-08 ENCOUNTER — PATIENT MESSAGE (OUTPATIENT)
Dept: BARIATRICS | Facility: CLINIC | Age: 60
End: 2020-10-08

## 2020-10-08 ENCOUNTER — TELEPHONE (OUTPATIENT)
Dept: CARDIOLOGY | Facility: CLINIC | Age: 60
End: 2020-10-08

## 2020-10-12 ENCOUNTER — DOCUMENTATION ONLY (OUTPATIENT)
Dept: CARDIOLOGY | Facility: HOSPITAL | Age: 60
End: 2020-10-12

## 2020-10-12 ENCOUNTER — PATIENT MESSAGE (OUTPATIENT)
Dept: CARDIOLOGY | Facility: CLINIC | Age: 60
End: 2020-10-12

## 2020-10-12 ENCOUNTER — HOSPITAL ENCOUNTER (OUTPATIENT)
Dept: CARDIOLOGY | Facility: HOSPITAL | Age: 60
Discharge: HOME | End: 2020-10-12
Attending: INTERNAL MEDICINE
Payer: COMMERCIAL

## 2020-10-12 VITALS
BODY MASS INDEX: 41.02 KG/M2 | HEIGHT: 71 IN | DIASTOLIC BLOOD PRESSURE: 82 MMHG | WEIGHT: 293 LBS | SYSTOLIC BLOOD PRESSURE: 144 MMHG | HEART RATE: 80 BPM

## 2020-10-12 DIAGNOSIS — E66.01 MORBID OBESITY WITH BMI OF 50.0-59.9, ADULT: ICD-10-CM

## 2020-10-12 DIAGNOSIS — Z01.31 ENCOUNTER FOR EXAMINATION OF BLOOD PRESSURE WITH ABNORMAL FINDINGS: ICD-10-CM

## 2020-10-12 DIAGNOSIS — R07.89 ATYPICAL CHEST PAIN: ICD-10-CM

## 2020-10-12 LAB
ASCENDING AORTA: 4.06 CM
AV INDEX (PROSTH): 0.81
AV MEAN GRADIENT: 4 MMHG
AV PEAK GRADIENT: 8 MMHG
AV VALVE AREA: 3.79 CM2
AV VELOCITY RATIO: 0.71
BSA FOR ECHO PROCEDURE: 3.03 M2
CV ECHO LV RWT: 0.29 CM
DOP CALC AO PEAK VEL: 1.44 M/S
DOP CALC AO VTI: 26.17 CM
DOP CALC LVOT AREA: 4.7 CM2
DOP CALC LVOT DIAMETER: 2.44 CM
DOP CALC LVOT PEAK VEL: 1.02 M/S
DOP CALC LVOT STROKE VOLUME: 99.17 CM3
DOP CALCLVOT PEAK VEL VTI: 21.22 CM
E WAVE DECELERATION TIME: 249.97 MSEC
E/A RATIO: 0.83
E/E' RATIO: 9.43 M/S
ECHO LV POSTERIOR WALL: 0.83 CM (ref 0.6–1.1)
FRACTIONAL SHORTENING: 31 % (ref 28–44)
INTERVENTRICULAR SEPTUM: 1 CM (ref 0.6–1.1)
IVRT: 88.49 MSEC
LA MAJOR: 5.51 CM
LA MINOR: 5.62 CM
LA WIDTH: 4.15 CM
LEFT ATRIUM SIZE: 4.69 CM
LEFT ATRIUM VOLUME INDEX: 32.4 ML/M2
LEFT ATRIUM VOLUME: 92.06 CM3
LEFT INTERNAL DIMENSION IN SYSTOLE: 3.97 CM (ref 2.1–4)
LEFT VENTRICLE DIASTOLIC VOLUME INDEX: 57.81 ML/M2
LEFT VENTRICLE DIASTOLIC VOLUME: 164.38 ML
LEFT VENTRICLE MASS INDEX: 72 G/M2
LEFT VENTRICLE SYSTOLIC VOLUME INDEX: 24.2 ML/M2
LEFT VENTRICLE SYSTOLIC VOLUME: 68.85 ML
LEFT VENTRICULAR INTERNAL DIMENSION IN DIASTOLE: 5.77 CM (ref 3.5–6)
LEFT VENTRICULAR MASS: 206 G
LV LATERAL E/E' RATIO: 8.25 M/S
LV SEPTAL E/E' RATIO: 11 M/S
MV PEAK A VEL: 0.8 M/S
MV PEAK E VEL: 0.66 M/S
MV STENOSIS PRESSURE HALF TIME: 72.49 MS
MV VALVE AREA P 1/2 METHOD: 3.03 CM2
PULM VEIN S/D RATIO: 1.34
PV PEAK D VEL: 0.38 M/S
PV PEAK S VEL: 0.51 M/S
RA MAJOR: 4.79 CM
RA PRESSURE: 3 MMHG
RA WIDTH: 3.91 CM
RIGHT VENTRICULAR END-DIASTOLIC DIMENSION: 4.61 CM
RV TISSUE DOPPLER FREE WALL SYSTOLIC VELOCITY 1 (APICAL 4 CHAMBER VIEW): 15.63 CM/S
SINUS: 3.84 CM
STJ: 3.32 CM
TDI LATERAL: 0.08 M/S
TDI SEPTAL: 0.06 M/S
TDI: 0.07 M/S
TRICUSPID ANNULAR PLANE SYSTOLIC EXCURSION: 2.54 CM

## 2020-10-12 PROCEDURE — 93306 TTE W/DOPPLER COMPLETE: CPT

## 2020-10-12 PROCEDURE — 93306 ECHO (CUPID ONLY): ICD-10-PCS | Mod: 26,,, | Performed by: INTERNAL MEDICINE

## 2020-10-12 PROCEDURE — 93306 TTE W/DOPPLER COMPLETE: CPT | Mod: 26,,, | Performed by: INTERNAL MEDICINE

## 2020-10-12 NOTE — PROGRESS NOTES
"Pt unable to be positioned into PET scanner d/t body habitus. Pt complains of pain in abdomen and states " I can't do this for an hour".    Will contact Dr Xiao's office.  "

## 2020-10-14 ENCOUNTER — PATIENT MESSAGE (OUTPATIENT)
Dept: CARDIOLOGY | Facility: CLINIC | Age: 60
End: 2020-10-14

## 2020-10-18 DIAGNOSIS — Z01.84 ANTIBODY RESPONSE EXAMINATION: ICD-10-CM

## 2020-10-29 ENCOUNTER — HOSPITAL ENCOUNTER (OUTPATIENT)
Dept: RADIOLOGY | Facility: HOSPITAL | Age: 60
Discharge: HOME | End: 2020-10-29
Attending: SPECIALIST
Payer: COMMERCIAL

## 2020-10-29 DIAGNOSIS — Z12.31 VISIT FOR SCREENING MAMMOGRAM: ICD-10-CM

## 2020-10-29 PROCEDURE — 77067 SCR MAMMO BI INCL CAD: CPT | Mod: 26,,, | Performed by: RADIOLOGY

## 2020-10-29 PROCEDURE — 77067 SCR MAMMO BI INCL CAD: CPT | Mod: TC

## 2020-10-29 PROCEDURE — 77063 BREAST TOMOSYNTHESIS BI: CPT | Mod: 26,,, | Performed by: RADIOLOGY

## 2020-10-29 PROCEDURE — 77063 MAMMO DIGITAL SCREENING BILAT WITH TOMO: ICD-10-PCS | Mod: 26,,, | Performed by: RADIOLOGY

## 2020-10-29 PROCEDURE — 77067 MAMMO DIGITAL SCREENING BILAT WITH TOMO: ICD-10-PCS | Mod: 26,,, | Performed by: RADIOLOGY

## 2020-11-02 ENCOUNTER — PATIENT MESSAGE (OUTPATIENT)
Dept: BARIATRICS | Facility: CLINIC | Age: 60
End: 2020-11-02

## 2020-11-04 ENCOUNTER — CLINICAL SUPPORT (OUTPATIENT)
Dept: BARIATRICS | Facility: CLINIC | Age: 60
End: 2020-11-04

## 2020-11-04 DIAGNOSIS — E66.01 MORBID OBESITY WITH BMI OF 50.0-59.9, ADULT: ICD-10-CM

## 2020-11-04 DIAGNOSIS — Z71.3 DIETARY COUNSELING AND SURVEILLANCE: ICD-10-CM

## 2020-11-04 PROCEDURE — 97803 MED NUTRITION INDIV SUBSEQ: CPT | Mod: S$GLB,,, | Performed by: DIETITIAN, REGISTERED

## 2020-11-04 PROCEDURE — 97803 PR MED NUTR THER, SUBSQ, INDIV, EA 15 MIN: ICD-10-PCS | Mod: S$GLB,,, | Performed by: DIETITIAN, REGISTERED

## 2020-11-04 PROCEDURE — 99999 PR PBB SHADOW E&M-EST. PATIENT-LVL I: ICD-10-PCS | Mod: PBBFAC,,, | Performed by: DIETITIAN, REGISTERED

## 2020-11-04 PROCEDURE — 99999 PR PBB SHADOW E&M-EST. PATIENT-LVL I: CPT | Mod: PBBFAC,,, | Performed by: DIETITIAN, REGISTERED

## 2020-11-05 VITALS — WEIGHT: 293 LBS | BODY MASS INDEX: 56.16 KG/M2

## 2020-11-17 DIAGNOSIS — Z01.84 ANTIBODY RESPONSE EXAMINATION: ICD-10-CM

## 2020-11-26 PROBLEM — I10 ESSENTIAL HYPERTENSION: Status: ACTIVE | Noted: 2020-11-26

## 2020-11-26 NOTE — PROGRESS NOTES
Subjective:    Patient ID:  Domonique Carmona is a 60 y.o. female who presents for follow-up of No chief complaint on file.      TELEMEDICINE VISIT      Problem List Items Addressed This Visit        Cardiac/Vascular    Essential hypertension          HPI    Patient was last seen on 08/17/2020 at which time she was evaluated prior to gastric sleeve surgery with Dr. Victor.  Echocardiogram and nuclear stress test were ordered in addition to initiating amlodipine 2.5 mg PO Daily for blood pressure control.  Home blood pressure log was recommended.  Echocardiogram showed preserved ejection fraction without acute abnormalities and nuclear stress test showed no evidence of reversible ischemia.    Patient presents for telemedicine visit.    On assessment today, the patient states that she feels OK in general.    No chest pain.    Has lost 14 lbs  Timing of surgery - Possibly February  Home BP - 120s to 130s systolic    Review of Systems   Constitution: Negative for decreased appetite, fever and malaise/fatigue.   Eyes: Negative for blurred vision.   Cardiovascular: Negative for chest pain, dyspnea on exertion, irregular heartbeat and leg swelling.   Respiratory: Negative for cough, hemoptysis, shortness of breath and wheezing.    Endocrine: Negative for cold intolerance and heat intolerance.   Hematologic/Lymphatic: Negative for bleeding problem.   Musculoskeletal: Negative for muscle weakness and myalgias.   Gastrointestinal: Negative for abdominal pain, constipation and diarrhea.   Genitourinary: Negative for bladder incontinence.   Neurological: Negative for dizziness and weakness.   Psychiatric/Behavioral: Negative for depression.        Objective:   There were no vitals filed for this visit.     Physical Exam   Constitutional: She appears well-developed and well-nourished. No distress.   HENT:   Head: Normocephalic and atraumatic.   Eyes: Conjunctivae are normal. No scleral icterus.   Neck: Normal range of motion.    Pulmonary/Chest: No stridor. No respiratory distress.   Musculoskeletal:         General: No edema.   Neurological: She is alert.   Skin: She is not diaphoretic.   Psychiatric: She has a normal mood and affect. Her behavior is normal.           Current Outpatient Medications on File Prior to Visit   Medication Sig    ALPRAZolam (XANAX) 0.25 MG tablet Take 1 tablet (0.25 mg total) by mouth daily as needed for Anxiety.    amLODIPine (NORVASC) 2.5 MG tablet Take 1 tablet (2.5 mg total) by mouth once daily.    ibuprofen (ADVIL,MOTRIN) 800 MG tablet Take 1 tablet (800 mg total) by mouth 3 (three) times daily.    tiZANidine (ZANAFLEX) 4 MG tablet TAKE 1 TABLET BY MOUTH THREE TIMES A DAY AS NEEDED FOR 10 DAYS     Current Facility-Administered Medications on File Prior to Visit   Medication    aminophylline injection 75 mg       Lipid Panel:   Lab Results   Component Value Date    CHOL 201 (H) 08/18/2018    HDL 51 08/18/2018    LDLCALC 126.6 08/18/2018    TRIG 117 08/18/2018    CHOLHDL 25.4 08/18/2018       The 10-year ASCVD risk score (Englishtownlibrado LEMUS Jr., et al., 2013) is: 4.9%    Values used to calculate the score:      Age: 60 years      Sex: Female      Is Non- : No      Diabetic: No      Tobacco smoker: No      Systolic Blood Pressure: 144 mmHg      Is BP treated: Yes      HDL Cholesterol: 51 mg/dL      Total Cholesterol: 201 mg/dL    All pertinent labs, imaging, and EKGs reviewed.  Patient's most recent EKG tracing was personally interpreted by this provider.    Assessment:       1. Essential hypertension         Plan:     Symptoms OK  BP/Pulse unable to be assessed on telemedicine visit  Reviewed echo/nuke, both OK    Continue amlodipine 2.5 mg PO Daily   Home BP log     With the above recommendations, the patient is optimized for gastric sleeve surgery.    Continue other cardiac medications  Mediterranean Diet/Cardiovascular Exercise Program    Patient queried and all questions were  answered.    F/u in 9 months    The patient location is: Home   The chief complaint leading to consultation is:  Essential hypertension  Visit type: Virtual visit with synchronous audio and video  Total time spent with patient: 15 minutes   Each patient to whom he or she provides medical services by telemedicine is:  (1) informed of the relationship between the physician and patient and the respective role of any other health care provider with respect to management of the patient; and (2) notified that he or she may decline to receive medical services by telemedicine and may withdraw from such care at any time.    Notes: See above       Signed:    Christiano Xiao MD  11/27/2020 4:31 PM

## 2020-11-27 ENCOUNTER — PATIENT MESSAGE (OUTPATIENT)
Dept: CARDIOLOGY | Facility: CLINIC | Age: 60
End: 2020-11-27

## 2020-11-27 ENCOUNTER — OFFICE VISIT (OUTPATIENT)
Dept: CARDIOLOGY | Facility: CLINIC | Age: 60
End: 2020-11-27
Payer: COMMERCIAL

## 2020-11-27 DIAGNOSIS — I10 ESSENTIAL HYPERTENSION: ICD-10-CM

## 2020-11-27 PROCEDURE — 99214 PR OFFICE/OUTPT VISIT, EST, LEVL IV, 30-39 MIN: ICD-10-PCS | Mod: 95,,, | Performed by: INTERNAL MEDICINE

## 2020-11-27 PROCEDURE — 99214 OFFICE O/P EST MOD 30 MIN: CPT | Mod: 95,,, | Performed by: INTERNAL MEDICINE

## 2020-12-01 ENCOUNTER — PATIENT MESSAGE (OUTPATIENT)
Dept: BARIATRICS | Facility: CLINIC | Age: 60
End: 2020-12-01

## 2020-12-02 ENCOUNTER — PATIENT MESSAGE (OUTPATIENT)
Dept: BARIATRICS | Facility: CLINIC | Age: 60
End: 2020-12-02

## 2020-12-02 ENCOUNTER — CLINICAL SUPPORT (OUTPATIENT)
Dept: BARIATRICS | Facility: CLINIC | Age: 60
End: 2020-12-02

## 2020-12-02 VITALS — BODY MASS INDEX: 41.02 KG/M2 | HEIGHT: 71 IN | WEIGHT: 293 LBS

## 2020-12-02 DIAGNOSIS — Z71.3 DIETARY COUNSELING AND SURVEILLANCE: ICD-10-CM

## 2020-12-02 DIAGNOSIS — E66.01 MORBID OBESITY WITH BMI OF 50.0-59.9, ADULT: ICD-10-CM

## 2020-12-02 PROCEDURE — 99999 PR PBB SHADOW E&M-EST. PATIENT-LVL II: ICD-10-PCS | Mod: PBBFAC,,, | Performed by: DIETITIAN, REGISTERED

## 2020-12-02 PROCEDURE — 99999 PR PBB SHADOW E&M-EST. PATIENT-LVL II: CPT | Mod: PBBFAC,,, | Performed by: DIETITIAN, REGISTERED

## 2020-12-02 PROCEDURE — 97803 PR MED NUTR THER, SUBSQ, INDIV, EA 15 MIN: ICD-10-PCS | Mod: S$GLB,,, | Performed by: DIETITIAN, REGISTERED

## 2020-12-02 PROCEDURE — 97803 MED NUTRITION INDIV SUBSEQ: CPT | Mod: S$GLB,,, | Performed by: DIETITIAN, REGISTERED

## 2020-12-03 ENCOUNTER — PATIENT MESSAGE (OUTPATIENT)
Dept: BARIATRICS | Facility: CLINIC | Age: 60
End: 2020-12-03

## 2020-12-03 NOTE — PROGRESS NOTES
"NUTRITION NOTE    Referring Physician: Dr. Victor  Reason for MNT Referral: Medically Supervised Diet pending Gastric Sleeve    PAST MEDICAL HISTORY:    Patient presents for 5th visit for MSD with weight gain over the past month of 1 pound.    Past Medical History:   Diagnosis Date    Abnormal Pap smear 1986    Cryosurgery    Anxiety     Arthritis     Essential hypertension 11/26/2020    Venous stasis dermatitis of left lower extremity 12/31/2018       CLINICAL DATA:  60 y.o. female.  Current Weight: 403 lbs  Weight Change Since Initial Visit: -10 lbs  Ideal Body Weight: 159 lbs  BMI: 56.3    CURRENT DIET:  Reduced-calorie diet.  Breakfast: cottage cheese and "a handful of grapes." On weekends she eats a late morning meal to count for breakfast and lunch.  Midmorning: if hungry pecans or pork rinds.  Lunch: leftovers of protein + vegetable meal.  She eats the best choice available at most once a week in the hospital cafeteria.  Dinner: protein + vegetable  Evening: rarely a greek yogurt.  Will have a handful of grapes if needed.    Meal Pattern: Same.  Protein Supplements: 0 per day. Pt still has not been able to find a protein supplement she enjoys or tolerates.  She does not like creamy options, but was not satisfied with protein water options so far.  Snacking: Adequate. Snacks include healthy choices.    Vegetables: Likes a variety. Eats daily.  Fruits: Likes a variety. Eats daily.  Beverages: water and diet soda  Dining Out: Dining out:0-1 Weekly. At hospital cafeteria.  Cooking at home: Daily. Mostly baked and grilled meat and vegetables.    CURRENT EXERCISE:  None    Vitamins / Minerals / Herbs:   None.    Food Allergies:   NKFA    Social:  Works regular daytime shifts.  Alcohol: None.  Smoking: None.    ASSESSMENT:  Patient demonstrates all willingness to change lifestyle habits as evidenced by increased fruits, increased vegetables, better choices when dining out, healthier cooking at home, healthier " snacking at work and healthier snacking at home.    Doing well with working on greatest challenges dining out, snacking at work, and eating at cafeteria at work.  Pt does not report any instances of eating at restaurants or fast food. She chooses healthy options for snacks and only eats snacks in between meals when hungry.  She at most goes to the hospital cafeteria once a week and she chooses the healthiest option available.    Adequate calorie intake. Pt had only one pound of weight gain in last month.  Adequate protein intake.    Patient has yet to find a protein supplement that is acceptable.  RD has discussed since initial assessment different brands and flavors of creamy protein shakes, protein powders both creamy and fruity flavored, unflavored protein powder, [id number], Bioprotein, & high protein broths. Today we discussed plain or flavored (without fruit added) greek yogurt & Liquacel.     PLAN:    Diet: Maintain diet plan.    Exercise: Increase.    Behavior Modification: Find a high protein supplement for post-op.    Weight loss prior to surgery (5-10% TBW): -10 lbs    Return to clinic in one month.    SESSION TIME:  30 minutes

## 2020-12-23 ENCOUNTER — IMMUNIZATION (OUTPATIENT)
Dept: PRIMARY CARE CLINIC | Facility: CLINIC | Age: 60
End: 2020-12-23
Payer: COMMERCIAL

## 2020-12-23 DIAGNOSIS — Z23 NEED FOR VACCINATION: ICD-10-CM

## 2020-12-23 PROCEDURE — 91300 COVID-19, MRNA, LNP-S, PF, 30 MCG/0.3 ML DOSE VACCINE: ICD-10-PCS | Mod: S$GLB,,, | Performed by: FAMILY MEDICINE

## 2020-12-23 PROCEDURE — 0001A COVID-19, MRNA, LNP-S, PF, 30 MCG/0.3 ML DOSE VACCINE: CPT | Mod: CV19,S$GLB,, | Performed by: FAMILY MEDICINE

## 2020-12-23 PROCEDURE — 0001A COVID-19, MRNA, LNP-S, PF, 30 MCG/0.3 ML DOSE VACCINE: ICD-10-PCS | Mod: CV19,S$GLB,, | Performed by: FAMILY MEDICINE

## 2020-12-23 PROCEDURE — 91300 COVID-19, MRNA, LNP-S, PF, 30 MCG/0.3 ML DOSE VACCINE: CPT | Mod: S$GLB,,, | Performed by: FAMILY MEDICINE

## 2021-01-04 ENCOUNTER — HOSPITAL ENCOUNTER (OUTPATIENT)
Dept: RADIOLOGY | Facility: HOSPITAL | Age: 61
Discharge: HOME | End: 2021-01-04
Attending: SURGERY
Payer: COMMERCIAL

## 2021-01-04 DIAGNOSIS — E66.01 MORBID OBESITY: ICD-10-CM

## 2021-01-04 PROCEDURE — A9698 NON-RAD CONTRAST MATERIALNOC: HCPCS | Performed by: SURGERY

## 2021-01-04 PROCEDURE — 25500020 PHARM REV CODE 255: Performed by: SURGERY

## 2021-01-04 PROCEDURE — 74240 X-RAY XM UPR GI TRC 1CNTRST: CPT | Mod: TC,FY

## 2021-01-04 PROCEDURE — 74240 X-RAY XM UPR GI TRC 1CNTRST: CPT | Mod: 26,,, | Performed by: RADIOLOGY

## 2021-01-04 PROCEDURE — 74240 FL UPPER GI: ICD-10-PCS | Mod: 26,,, | Performed by: RADIOLOGY

## 2021-01-04 RX ADMIN — BARIUM SULFATE 300 ML: 0.6 SUSPENSION ORAL at 10:01

## 2021-01-13 ENCOUNTER — OFFICE VISIT (OUTPATIENT)
Dept: BARIATRICS | Facility: CLINIC | Age: 61
End: 2021-01-13
Payer: COMMERCIAL

## 2021-01-13 VITALS
HEART RATE: 95 BPM | HEIGHT: 71 IN | TEMPERATURE: 98 F | SYSTOLIC BLOOD PRESSURE: 156 MMHG | RESPIRATION RATE: 16 BRPM | BODY MASS INDEX: 41.02 KG/M2 | WEIGHT: 293 LBS | DIASTOLIC BLOOD PRESSURE: 87 MMHG

## 2021-01-13 DIAGNOSIS — E66.01 MORBID OBESITY: ICD-10-CM

## 2021-01-13 DIAGNOSIS — M19.90 ARTHRITIS: ICD-10-CM

## 2021-01-13 DIAGNOSIS — M17.12 PRIMARY OSTEOARTHRITIS OF LEFT KNEE: ICD-10-CM

## 2021-01-13 DIAGNOSIS — E66.01 MORBID OBESITY WITH BMI OF 50.0-59.9, ADULT: Primary | ICD-10-CM

## 2021-01-13 PROCEDURE — 99213 PR OFFICE/OUTPT VISIT, EST, LEVL III, 20-29 MIN: ICD-10-PCS | Mod: S$PBB,,, | Performed by: SURGERY

## 2021-01-13 PROCEDURE — 99213 OFFICE O/P EST LOW 20 MIN: CPT | Mod: S$PBB,,, | Performed by: SURGERY

## 2021-01-13 PROCEDURE — 99214 OFFICE O/P EST MOD 30 MIN: CPT | Mod: PBBFAC,PN | Performed by: SURGERY

## 2021-01-13 PROCEDURE — 99999 PR PBB SHADOW E&M-EST. PATIENT-LVL IV: CPT | Mod: PBBFAC,,, | Performed by: SURGERY

## 2021-01-13 PROCEDURE — 99999 PR PBB SHADOW E&M-EST. PATIENT-LVL IV: ICD-10-PCS | Mod: PBBFAC,,, | Performed by: SURGERY

## 2021-01-14 ENCOUNTER — IMMUNIZATION (OUTPATIENT)
Dept: PRIMARY CARE CLINIC | Facility: CLINIC | Age: 61
End: 2021-01-14
Payer: COMMERCIAL

## 2021-01-14 DIAGNOSIS — Z23 NEED FOR VACCINATION: ICD-10-CM

## 2021-01-14 PROCEDURE — 0002A COVID-19, MRNA, LNP-S, PF, 30 MCG/0.3 ML DOSE VACCINE: ICD-10-PCS | Mod: S$GLB,,, | Performed by: FAMILY MEDICINE

## 2021-01-14 PROCEDURE — 91300 COVID-19, MRNA, LNP-S, PF, 30 MCG/0.3 ML DOSE VACCINE: CPT | Mod: S$GLB,,, | Performed by: FAMILY MEDICINE

## 2021-01-14 PROCEDURE — 0002A COVID-19, MRNA, LNP-S, PF, 30 MCG/0.3 ML DOSE VACCINE: CPT | Mod: S$GLB,,, | Performed by: FAMILY MEDICINE

## 2021-01-14 PROCEDURE — 91300 COVID-19, MRNA, LNP-S, PF, 30 MCG/0.3 ML DOSE VACCINE: ICD-10-PCS | Mod: S$GLB,,, | Performed by: FAMILY MEDICINE

## 2021-01-25 ENCOUNTER — OFFICE VISIT (OUTPATIENT)
Dept: PSYCHIATRY | Facility: CLINIC | Age: 61
End: 2021-01-25
Payer: COMMERCIAL

## 2021-01-25 VITALS
SYSTOLIC BLOOD PRESSURE: 144 MMHG | HEART RATE: 80 BPM | BODY MASS INDEX: 41.02 KG/M2 | DIASTOLIC BLOOD PRESSURE: 95 MMHG | WEIGHT: 293 LBS | HEIGHT: 71 IN

## 2021-01-25 DIAGNOSIS — E66.01 MORBID OBESITY: ICD-10-CM

## 2021-01-25 DIAGNOSIS — Z71.89 ENCOUNTER FOR PSYCHOLOGICAL ASSESSMENT PRIOR TO BARIATRIC SURGERY: Primary | ICD-10-CM

## 2021-01-25 DIAGNOSIS — F41.1 GAD (GENERALIZED ANXIETY DISORDER): ICD-10-CM

## 2021-01-25 DIAGNOSIS — E66.01 MORBID OBESITY WITH BMI OF 50.0-59.9, ADULT: ICD-10-CM

## 2021-01-25 PROCEDURE — 3008F BODY MASS INDEX DOCD: CPT | Mod: CPTII,S$GLB,, | Performed by: PSYCHIATRY & NEUROLOGY

## 2021-01-25 PROCEDURE — 90792 PR PSYCHIATRIC DIAGNOSTIC EVALUATION W/MEDICAL SERVICES: ICD-10-PCS | Mod: S$GLB,,, | Performed by: PSYCHIATRY & NEUROLOGY

## 2021-01-25 PROCEDURE — 1126F AMNT PAIN NOTED NONE PRSNT: CPT | Mod: S$GLB,,, | Performed by: PSYCHIATRY & NEUROLOGY

## 2021-01-25 PROCEDURE — 90792 PSYCH DIAG EVAL W/MED SRVCS: CPT | Mod: S$GLB,,, | Performed by: PSYCHIATRY & NEUROLOGY

## 2021-01-25 PROCEDURE — 3008F PR BODY MASS INDEX (BMI) DOCUMENTED: ICD-10-PCS | Mod: CPTII,S$GLB,, | Performed by: PSYCHIATRY & NEUROLOGY

## 2021-01-25 PROCEDURE — 99999 PR PBB SHADOW E&M-EST. PATIENT-LVL III: CPT | Mod: PBBFAC,,, | Performed by: PSYCHIATRY & NEUROLOGY

## 2021-01-25 PROCEDURE — 99999 PR PBB SHADOW E&M-EST. PATIENT-LVL III: ICD-10-PCS | Mod: PBBFAC,,, | Performed by: PSYCHIATRY & NEUROLOGY

## 2021-01-25 PROCEDURE — 1126F PR PAIN SEVERITY QUANTIFIED, NO PAIN PRESENT: ICD-10-PCS | Mod: S$GLB,,, | Performed by: PSYCHIATRY & NEUROLOGY

## 2021-01-28 ENCOUNTER — OFFICE VISIT (OUTPATIENT)
Dept: PODIATRY | Facility: CLINIC | Age: 61
End: 2021-01-28
Payer: COMMERCIAL

## 2021-01-28 VITALS
BODY MASS INDEX: 41.02 KG/M2 | HEART RATE: 95 BPM | HEIGHT: 71 IN | SYSTOLIC BLOOD PRESSURE: 142 MMHG | WEIGHT: 293 LBS | DIASTOLIC BLOOD PRESSURE: 95 MMHG

## 2021-01-28 DIAGNOSIS — M79.674 PAIN IN TOES OF BOTH FEET: ICD-10-CM

## 2021-01-28 DIAGNOSIS — M79.675 PAIN IN TOES OF BOTH FEET: ICD-10-CM

## 2021-01-28 DIAGNOSIS — L60.0 ONYCHOCRYPTOSIS: Primary | ICD-10-CM

## 2021-01-28 DIAGNOSIS — E66.01 MORBID OBESITY WITH BMI OF 50.0-59.9, ADULT: ICD-10-CM

## 2021-01-28 DIAGNOSIS — G60.9 IDIOPATHIC PERIPHERAL NEUROPATHY: ICD-10-CM

## 2021-01-28 DIAGNOSIS — B35.1 ONYCHOMYCOSIS DUE TO DERMATOPHYTE: ICD-10-CM

## 2021-01-28 DIAGNOSIS — I73.9 PERIPHERAL VASCULAR DISEASE: ICD-10-CM

## 2021-01-28 PROCEDURE — 99999 PR PBB SHADOW E&M-EST. PATIENT-LVL IV: CPT | Mod: PBBFAC,,, | Performed by: PODIATRIST

## 2021-01-28 PROCEDURE — 3008F PR BODY MASS INDEX (BMI) DOCUMENTED: ICD-10-PCS | Mod: CPTII,S$GLB,, | Performed by: PODIATRIST

## 2021-01-28 PROCEDURE — 1125F PR PAIN SEVERITY QUANTIFIED, PAIN PRESENT: ICD-10-PCS | Mod: S$GLB,,, | Performed by: PODIATRIST

## 2021-01-28 PROCEDURE — 3077F PR MOST RECENT SYSTOLIC BLOOD PRESSURE >= 140 MM HG: ICD-10-PCS | Mod: CPTII,S$GLB,, | Performed by: PODIATRIST

## 2021-01-28 PROCEDURE — 3080F PR MOST RECENT DIASTOLIC BLOOD PRESSURE >= 90 MM HG: ICD-10-PCS | Mod: CPTII,S$GLB,, | Performed by: PODIATRIST

## 2021-01-28 PROCEDURE — 99999 PR PBB SHADOW E&M-EST. PATIENT-LVL IV: ICD-10-PCS | Mod: PBBFAC,,, | Performed by: PODIATRIST

## 2021-01-28 PROCEDURE — 99213 PR OFFICE/OUTPT VISIT, EST, LEVL III, 20-29 MIN: ICD-10-PCS | Mod: 25,S$GLB,, | Performed by: PODIATRIST

## 2021-01-28 PROCEDURE — 11721 DEBRIDE NAIL 6 OR MORE: CPT | Mod: S$GLB,,, | Performed by: PODIATRIST

## 2021-01-28 PROCEDURE — 3008F BODY MASS INDEX DOCD: CPT | Mod: CPTII,S$GLB,, | Performed by: PODIATRIST

## 2021-01-28 PROCEDURE — 3080F DIAST BP >= 90 MM HG: CPT | Mod: CPTII,S$GLB,, | Performed by: PODIATRIST

## 2021-01-28 PROCEDURE — 11721 PR DEBRIDEMENT OF NAILS, 6 OR MORE: ICD-10-PCS | Mod: S$GLB,,, | Performed by: PODIATRIST

## 2021-01-28 PROCEDURE — 99213 OFFICE O/P EST LOW 20 MIN: CPT | Mod: 25,S$GLB,, | Performed by: PODIATRIST

## 2021-01-28 PROCEDURE — 1125F AMNT PAIN NOTED PAIN PRSNT: CPT | Mod: S$GLB,,, | Performed by: PODIATRIST

## 2021-01-28 PROCEDURE — 3077F SYST BP >= 140 MM HG: CPT | Mod: CPTII,S$GLB,, | Performed by: PODIATRIST

## 2021-01-29 PROBLEM — I73.9 PERIPHERAL VASCULAR DISEASE: Status: ACTIVE | Noted: 2021-01-29

## 2021-01-29 PROBLEM — G60.9 IDIOPATHIC PERIPHERAL NEUROPATHY: Status: ACTIVE | Noted: 2021-01-29

## 2021-02-02 ENCOUNTER — OFFICE VISIT (OUTPATIENT)
Dept: SURGERY | Facility: CLINIC | Age: 61
End: 2021-02-02
Payer: COMMERCIAL

## 2021-02-02 VITALS — SYSTOLIC BLOOD PRESSURE: 167 MMHG | HEART RATE: 89 BPM | DIASTOLIC BLOOD PRESSURE: 84 MMHG

## 2021-02-02 DIAGNOSIS — N60.02 BREAST CYST, LEFT: Primary | ICD-10-CM

## 2021-02-02 PROCEDURE — 1125F PR PAIN SEVERITY QUANTIFIED, PAIN PRESENT: ICD-10-PCS | Mod: S$GLB,,, | Performed by: SURGERY

## 2021-02-02 PROCEDURE — 99999 PR PBB SHADOW E&M-EST. PATIENT-LVL III: ICD-10-PCS | Mod: PBBFAC,,, | Performed by: SURGERY

## 2021-02-02 PROCEDURE — 3077F PR MOST RECENT SYSTOLIC BLOOD PRESSURE >= 140 MM HG: ICD-10-PCS | Mod: CPTII,S$GLB,, | Performed by: SURGERY

## 2021-02-02 PROCEDURE — 99213 OFFICE O/P EST LOW 20 MIN: CPT | Mod: S$GLB,,, | Performed by: SURGERY

## 2021-02-02 PROCEDURE — 99213 PR OFFICE/OUTPT VISIT, EST, LEVL III, 20-29 MIN: ICD-10-PCS | Mod: S$GLB,,, | Performed by: SURGERY

## 2021-02-02 PROCEDURE — 1125F AMNT PAIN NOTED PAIN PRSNT: CPT | Mod: S$GLB,,, | Performed by: SURGERY

## 2021-02-02 PROCEDURE — 3077F SYST BP >= 140 MM HG: CPT | Mod: CPTII,S$GLB,, | Performed by: SURGERY

## 2021-02-02 PROCEDURE — 99999 PR PBB SHADOW E&M-EST. PATIENT-LVL III: CPT | Mod: PBBFAC,,, | Performed by: SURGERY

## 2021-02-02 PROCEDURE — 3079F PR MOST RECENT DIASTOLIC BLOOD PRESSURE 80-89 MM HG: ICD-10-PCS | Mod: CPTII,S$GLB,, | Performed by: SURGERY

## 2021-02-02 PROCEDURE — 3079F DIAST BP 80-89 MM HG: CPT | Mod: CPTII,S$GLB,, | Performed by: SURGERY

## 2021-02-04 ENCOUNTER — HOSPITAL ENCOUNTER (OUTPATIENT)
Dept: RADIOLOGY | Facility: HOSPITAL | Age: 61
Discharge: HOME | End: 2021-02-04
Attending: SURGERY
Payer: COMMERCIAL

## 2021-02-04 DIAGNOSIS — N60.02 BREAST CYST, LEFT: ICD-10-CM

## 2021-02-04 DIAGNOSIS — N60.02 BREAST CYST, LEFT: Primary | ICD-10-CM

## 2021-02-04 PROCEDURE — 76642 US BREAST LEFT LIMITED: ICD-10-PCS | Mod: 26,LT,, | Performed by: RADIOLOGY

## 2021-02-04 PROCEDURE — 77061 BREAST TOMOSYNTHESIS UNI: CPT | Mod: TC,LT

## 2021-02-04 PROCEDURE — 76642 ULTRASOUND BREAST LIMITED: CPT | Mod: TC,LT

## 2021-02-04 PROCEDURE — 77065 MAMMO DIGITAL DIAGNOSTIC LEFT WITH TOMO: ICD-10-PCS | Mod: 26,LT,, | Performed by: RADIOLOGY

## 2021-02-04 PROCEDURE — 76642 ULTRASOUND BREAST LIMITED: CPT | Mod: 26,LT,, | Performed by: RADIOLOGY

## 2021-02-04 PROCEDURE — 77065 DX MAMMO INCL CAD UNI: CPT | Mod: 26,LT,, | Performed by: RADIOLOGY

## 2021-02-04 PROCEDURE — G0279 TOMOSYNTHESIS, MAMMO: HCPCS | Mod: 26,LT,, | Performed by: RADIOLOGY

## 2021-02-04 PROCEDURE — G0279 MAMMO DIGITAL DIAGNOSTIC LEFT WITH TOMO: ICD-10-PCS | Mod: 26,LT,, | Performed by: RADIOLOGY

## 2021-02-08 ENCOUNTER — PATIENT MESSAGE (OUTPATIENT)
Dept: SURGERY | Facility: CLINIC | Age: 61
End: 2021-02-08

## 2021-02-08 DIAGNOSIS — N60.02 BREAST CYST, LEFT: Primary | ICD-10-CM

## 2021-02-09 ENCOUNTER — TELEPHONE (OUTPATIENT)
Dept: FAMILY MEDICINE | Facility: CLINIC | Age: 61
End: 2021-02-09

## 2021-02-25 ENCOUNTER — OFFICE VISIT (OUTPATIENT)
Dept: BARIATRICS | Facility: CLINIC | Age: 61
End: 2021-02-25
Payer: COMMERCIAL

## 2021-02-25 VITALS
RESPIRATION RATE: 16 BRPM | SYSTOLIC BLOOD PRESSURE: 145 MMHG | TEMPERATURE: 98 F | WEIGHT: 293 LBS | DIASTOLIC BLOOD PRESSURE: 78 MMHG | BODY MASS INDEX: 41.02 KG/M2 | HEIGHT: 71 IN | HEART RATE: 76 BPM

## 2021-02-25 DIAGNOSIS — E66.01 MORBID OBESITY WITH BMI OF 50.0-59.9, ADULT: ICD-10-CM

## 2021-02-25 DIAGNOSIS — E66.01 MORBID OBESITY: Primary | ICD-10-CM

## 2021-02-25 PROCEDURE — 99999 PR PBB SHADOW E&M-EST. PATIENT-LVL IV: CPT | Mod: PBBFAC,,, | Performed by: SURGERY

## 2021-02-25 PROCEDURE — 3008F BODY MASS INDEX DOCD: CPT | Mod: CPTII,S$GLB,, | Performed by: SURGERY

## 2021-02-25 PROCEDURE — 3077F SYST BP >= 140 MM HG: CPT | Mod: CPTII,S$GLB,, | Performed by: SURGERY

## 2021-02-25 PROCEDURE — 1125F AMNT PAIN NOTED PAIN PRSNT: CPT | Mod: S$GLB,,, | Performed by: SURGERY

## 2021-02-25 PROCEDURE — 99999 PR PBB SHADOW E&M-EST. PATIENT-LVL IV: ICD-10-PCS | Mod: PBBFAC,,, | Performed by: SURGERY

## 2021-02-25 PROCEDURE — 3077F PR MOST RECENT SYSTOLIC BLOOD PRESSURE >= 140 MM HG: ICD-10-PCS | Mod: CPTII,S$GLB,, | Performed by: SURGERY

## 2021-02-25 PROCEDURE — 99213 PR OFFICE/OUTPT VISIT, EST, LEVL III, 20-29 MIN: ICD-10-PCS | Mod: S$GLB,,, | Performed by: SURGERY

## 2021-02-25 PROCEDURE — 3078F DIAST BP <80 MM HG: CPT | Mod: CPTII,S$GLB,, | Performed by: SURGERY

## 2021-02-25 PROCEDURE — 3078F PR MOST RECENT DIASTOLIC BLOOD PRESSURE < 80 MM HG: ICD-10-PCS | Mod: CPTII,S$GLB,, | Performed by: SURGERY

## 2021-02-25 PROCEDURE — 1125F PR PAIN SEVERITY QUANTIFIED, PAIN PRESENT: ICD-10-PCS | Mod: S$GLB,,, | Performed by: SURGERY

## 2021-02-25 PROCEDURE — 99213 OFFICE O/P EST LOW 20 MIN: CPT | Mod: S$GLB,,, | Performed by: SURGERY

## 2021-02-25 PROCEDURE — 3008F PR BODY MASS INDEX (BMI) DOCUMENTED: ICD-10-PCS | Mod: CPTII,S$GLB,, | Performed by: SURGERY

## 2021-03-08 ENCOUNTER — PATIENT MESSAGE (OUTPATIENT)
Dept: ADMINISTRATIVE | Facility: OTHER | Age: 61
End: 2021-03-08

## 2021-03-11 ENCOUNTER — OFFICE VISIT (OUTPATIENT)
Dept: DERMATOLOGY | Facility: CLINIC | Age: 61
End: 2021-03-11
Payer: COMMERCIAL

## 2021-03-11 DIAGNOSIS — D18.01 CHERRY ANGIOMA: Primary | ICD-10-CM

## 2021-03-11 DIAGNOSIS — L91.8 SKIN TAG: ICD-10-CM

## 2021-03-11 DIAGNOSIS — L82.1 SEBORRHEIC KERATOSIS: ICD-10-CM

## 2021-03-11 PROCEDURE — 99999 PR PBB SHADOW E&M-EST. PATIENT-LVL III: CPT | Mod: PBBFAC,,, | Performed by: STUDENT IN AN ORGANIZED HEALTH CARE EDUCATION/TRAINING PROGRAM

## 2021-03-11 PROCEDURE — 99213 PR OFFICE/OUTPT VISIT, EST, LEVL III, 20-29 MIN: ICD-10-PCS | Mod: S$GLB,,, | Performed by: STUDENT IN AN ORGANIZED HEALTH CARE EDUCATION/TRAINING PROGRAM

## 2021-03-11 PROCEDURE — 99213 OFFICE O/P EST LOW 20 MIN: CPT | Mod: S$GLB,,, | Performed by: STUDENT IN AN ORGANIZED HEALTH CARE EDUCATION/TRAINING PROGRAM

## 2021-03-11 PROCEDURE — 99999 PR PBB SHADOW E&M-EST. PATIENT-LVL III: ICD-10-PCS | Mod: PBBFAC,,, | Performed by: STUDENT IN AN ORGANIZED HEALTH CARE EDUCATION/TRAINING PROGRAM

## 2021-03-19 ENCOUNTER — LAB VISIT (OUTPATIENT)
Dept: LAB | Facility: HOSPITAL | Age: 61
End: 2021-03-19
Attending: NURSE PRACTITIONER
Payer: COMMERCIAL

## 2021-03-19 DIAGNOSIS — M17.12 PRIMARY OSTEOARTHRITIS OF LEFT KNEE: ICD-10-CM

## 2021-03-19 DIAGNOSIS — Z79.899 HIGH RISK MEDICATION USE: ICD-10-CM

## 2021-03-19 DIAGNOSIS — I10 HYPERTENSION, UNSPECIFIED TYPE: ICD-10-CM

## 2021-03-19 LAB
BILIRUB UR QL STRIP: NEGATIVE
CLARITY UR: ABNORMAL
COLOR UR: YELLOW
GLUCOSE UR QL STRIP: NEGATIVE
HGB UR QL STRIP: NEGATIVE
KETONES UR QL STRIP: NEGATIVE
LEUKOCYTE ESTERASE UR QL STRIP: NEGATIVE
NITRITE UR QL STRIP: NEGATIVE
PH UR STRIP: 8 [PH] (ref 5–8)
PROT UR QL STRIP: NEGATIVE
SP GR UR STRIP: 1.01 (ref 1–1.03)
URN SPEC COLLECT METH UR: ABNORMAL
UROBILINOGEN UR STRIP-ACNC: 1 EU/DL

## 2021-03-19 PROCEDURE — 81003 URINALYSIS AUTO W/O SCOPE: CPT | Performed by: NURSE PRACTITIONER

## 2021-03-19 PROCEDURE — 82043 UR ALBUMIN QUANTITATIVE: CPT | Performed by: NURSE PRACTITIONER

## 2021-03-19 PROCEDURE — 82570 ASSAY OF URINE CREATININE: CPT | Performed by: NURSE PRACTITIONER

## 2021-03-20 LAB
ALBUMIN/CREAT UR: 3.6 UG/MG (ref 0–30)
CREAT UR-MCNC: 83 MG/DL (ref 15–325)
MICROALBUMIN UR DL<=1MG/L-MCNC: 3 UG/ML

## 2021-03-25 ENCOUNTER — OFFICE VISIT (OUTPATIENT)
Dept: BARIATRICS | Facility: CLINIC | Age: 61
End: 2021-03-25
Payer: COMMERCIAL

## 2021-03-25 VITALS
HEART RATE: 84 BPM | TEMPERATURE: 98 F | DIASTOLIC BLOOD PRESSURE: 82 MMHG | BODY MASS INDEX: 41.02 KG/M2 | WEIGHT: 293 LBS | SYSTOLIC BLOOD PRESSURE: 139 MMHG | HEIGHT: 71 IN | RESPIRATION RATE: 16 BRPM

## 2021-03-25 DIAGNOSIS — E66.01 MORBID OBESITY WITH BMI OF 50.0-59.9, ADULT: Primary | ICD-10-CM

## 2021-03-25 DIAGNOSIS — M17.12 PRIMARY OSTEOARTHRITIS OF LEFT KNEE: ICD-10-CM

## 2021-03-25 DIAGNOSIS — M19.90 ARTHRITIS: ICD-10-CM

## 2021-03-25 DIAGNOSIS — E66.01 MORBID OBESITY: ICD-10-CM

## 2021-03-25 PROCEDURE — 3079F DIAST BP 80-89 MM HG: CPT | Mod: CPTII,S$GLB,, | Performed by: SURGERY

## 2021-03-25 PROCEDURE — 3075F SYST BP GE 130 - 139MM HG: CPT | Mod: CPTII,S$GLB,, | Performed by: SURGERY

## 2021-03-25 PROCEDURE — 99999 PR PBB SHADOW E&M-EST. PATIENT-LVL IV: CPT | Mod: PBBFAC,,, | Performed by: SURGERY

## 2021-03-25 PROCEDURE — 3075F PR MOST RECENT SYSTOLIC BLOOD PRESS GE 130-139MM HG: ICD-10-PCS | Mod: CPTII,S$GLB,, | Performed by: SURGERY

## 2021-03-25 PROCEDURE — 3079F PR MOST RECENT DIASTOLIC BLOOD PRESSURE 80-89 MM HG: ICD-10-PCS | Mod: CPTII,S$GLB,, | Performed by: SURGERY

## 2021-03-25 PROCEDURE — 3008F PR BODY MASS INDEX (BMI) DOCUMENTED: ICD-10-PCS | Mod: CPTII,S$GLB,, | Performed by: SURGERY

## 2021-03-25 PROCEDURE — 99999 PR PBB SHADOW E&M-EST. PATIENT-LVL IV: ICD-10-PCS | Mod: PBBFAC,,, | Performed by: SURGERY

## 2021-03-25 PROCEDURE — 3008F BODY MASS INDEX DOCD: CPT | Mod: CPTII,S$GLB,, | Performed by: SURGERY

## 2021-03-25 PROCEDURE — 99213 OFFICE O/P EST LOW 20 MIN: CPT | Mod: S$GLB,,, | Performed by: SURGERY

## 2021-03-25 PROCEDURE — 1125F AMNT PAIN NOTED PAIN PRSNT: CPT | Mod: S$GLB,,, | Performed by: SURGERY

## 2021-03-25 PROCEDURE — 1125F PR PAIN SEVERITY QUANTIFIED, PAIN PRESENT: ICD-10-PCS | Mod: S$GLB,,, | Performed by: SURGERY

## 2021-03-25 PROCEDURE — 99213 PR OFFICE/OUTPT VISIT, EST, LEVL III, 20-29 MIN: ICD-10-PCS | Mod: S$GLB,,, | Performed by: SURGERY

## 2021-04-07 ENCOUNTER — TELEPHONE (OUTPATIENT)
Dept: FAMILY MEDICINE | Facility: CLINIC | Age: 61
End: 2021-04-07

## 2021-04-13 ENCOUNTER — PATIENT MESSAGE (OUTPATIENT)
Dept: SURGERY | Facility: HOSPITAL | Age: 61
End: 2021-04-13

## 2021-04-19 ENCOUNTER — PATIENT MESSAGE (OUTPATIENT)
Dept: SURGERY | Facility: HOSPITAL | Age: 61
End: 2021-04-19

## 2021-04-26 ENCOUNTER — OFFICE VISIT (OUTPATIENT)
Dept: OPTOMETRY | Facility: CLINIC | Age: 61
End: 2021-04-26
Payer: COMMERCIAL

## 2021-04-26 ENCOUNTER — OFFICE VISIT (OUTPATIENT)
Dept: PODIATRY | Facility: CLINIC | Age: 61
End: 2021-04-26
Payer: COMMERCIAL

## 2021-04-26 VITALS — WEIGHT: 293 LBS | BODY MASS INDEX: 41.02 KG/M2 | HEIGHT: 71 IN

## 2021-04-26 DIAGNOSIS — M79.674 PAIN IN TOES OF BOTH FEET: ICD-10-CM

## 2021-04-26 DIAGNOSIS — H52.13 MYOPIA WITH ASTIGMATISM AND PRESBYOPIA, BILATERAL: ICD-10-CM

## 2021-04-26 DIAGNOSIS — H52.4 MYOPIA WITH ASTIGMATISM AND PRESBYOPIA, BILATERAL: ICD-10-CM

## 2021-04-26 DIAGNOSIS — B35.1 ONYCHOMYCOSIS DUE TO DERMATOPHYTE: ICD-10-CM

## 2021-04-26 DIAGNOSIS — M79.675 PAIN IN TOES OF BOTH FEET: ICD-10-CM

## 2021-04-26 DIAGNOSIS — Z01.00 EXAMINATION OF EYES AND VISION: Primary | ICD-10-CM

## 2021-04-26 DIAGNOSIS — H52.203 MYOPIA WITH ASTIGMATISM AND PRESBYOPIA, BILATERAL: ICD-10-CM

## 2021-04-26 DIAGNOSIS — G60.9 IDIOPATHIC PERIPHERAL NEUROPATHY: ICD-10-CM

## 2021-04-26 DIAGNOSIS — L60.0 ONYCHOCRYPTOSIS: Primary | ICD-10-CM

## 2021-04-26 DIAGNOSIS — I73.9 PERIPHERAL VASCULAR DISEASE: ICD-10-CM

## 2021-04-26 PROCEDURE — 99999 PR PBB SHADOW E&M-EST. PATIENT-LVL III: CPT | Mod: PBBFAC,,, | Performed by: OPTOMETRIST

## 2021-04-26 PROCEDURE — 99213 PR OFFICE/OUTPT VISIT, EST, LEVL III, 20-29 MIN: ICD-10-PCS | Mod: 25,S$GLB,, | Performed by: PODIATRIST

## 2021-04-26 PROCEDURE — 99213 OFFICE O/P EST LOW 20 MIN: CPT | Mod: 25,S$GLB,, | Performed by: PODIATRIST

## 2021-04-26 PROCEDURE — 1126F PR PAIN SEVERITY QUANTIFIED, NO PAIN PRESENT: ICD-10-PCS | Mod: S$GLB,,, | Performed by: OPTOMETRIST

## 2021-04-26 PROCEDURE — 92014 PR EYE EXAM, EST PATIENT,COMPREHESV: ICD-10-PCS | Mod: S$GLB,,, | Performed by: OPTOMETRIST

## 2021-04-26 PROCEDURE — 3008F BODY MASS INDEX DOCD: CPT | Mod: CPTII,S$GLB,, | Performed by: PODIATRIST

## 2021-04-26 PROCEDURE — 11721 DEBRIDE NAIL 6 OR MORE: CPT | Mod: S$GLB,,, | Performed by: PODIATRIST

## 2021-04-26 PROCEDURE — 99999 PR PBB SHADOW E&M-EST. PATIENT-LVL III: ICD-10-PCS | Mod: PBBFAC,,, | Performed by: PODIATRIST

## 2021-04-26 PROCEDURE — 11721 PR DEBRIDEMENT OF NAILS, 6 OR MORE: ICD-10-PCS | Mod: S$GLB,,, | Performed by: PODIATRIST

## 2021-04-26 PROCEDURE — 92014 COMPRE OPH EXAM EST PT 1/>: CPT | Mod: S$GLB,,, | Performed by: OPTOMETRIST

## 2021-04-26 PROCEDURE — 99999 PR PBB SHADOW E&M-EST. PATIENT-LVL III: ICD-10-PCS | Mod: PBBFAC,,, | Performed by: OPTOMETRIST

## 2021-04-26 PROCEDURE — 92015 DETERMINE REFRACTIVE STATE: CPT | Mod: S$GLB,,, | Performed by: OPTOMETRIST

## 2021-04-26 PROCEDURE — 3008F PR BODY MASS INDEX (BMI) DOCUMENTED: ICD-10-PCS | Mod: CPTII,S$GLB,, | Performed by: PODIATRIST

## 2021-04-26 PROCEDURE — 99999 PR PBB SHADOW E&M-EST. PATIENT-LVL III: CPT | Mod: PBBFAC,,, | Performed by: PODIATRIST

## 2021-04-26 PROCEDURE — 92015 PR REFRACTION: ICD-10-PCS | Mod: S$GLB,,, | Performed by: OPTOMETRIST

## 2021-04-26 PROCEDURE — 1126F AMNT PAIN NOTED NONE PRSNT: CPT | Mod: S$GLB,,, | Performed by: OPTOMETRIST

## 2021-05-05 ENCOUNTER — CLINICAL SUPPORT (OUTPATIENT)
Dept: BARIATRICS | Facility: CLINIC | Age: 61
End: 2021-05-05
Payer: COMMERCIAL

## 2021-05-05 VITALS — HEIGHT: 71 IN | WEIGHT: 293 LBS | BODY MASS INDEX: 41.02 KG/M2

## 2021-05-05 DIAGNOSIS — E66.01 MORBID OBESITY: Primary | ICD-10-CM

## 2021-05-05 PROCEDURE — 99999 PR PBB SHADOW E&M-EST. PATIENT-LVL II: CPT | Mod: PBBFAC,,,

## 2021-05-05 PROCEDURE — 99999 PR PBB SHADOW E&M-EST. PATIENT-LVL II: ICD-10-PCS | Mod: PBBFAC,,,

## 2021-05-06 ENCOUNTER — TELEPHONE (OUTPATIENT)
Dept: BARIATRICS | Facility: CLINIC | Age: 61
End: 2021-05-06

## 2021-05-06 ENCOUNTER — PATIENT MESSAGE (OUTPATIENT)
Dept: BARIATRICS | Facility: CLINIC | Age: 61
End: 2021-05-06

## 2021-05-07 ENCOUNTER — TELEPHONE (OUTPATIENT)
Dept: BARIATRICS | Facility: CLINIC | Age: 61
End: 2021-05-07

## 2021-05-07 ENCOUNTER — PATIENT MESSAGE (OUTPATIENT)
Dept: SURGERY | Facility: HOSPITAL | Age: 61
End: 2021-05-07

## 2021-05-08 ENCOUNTER — LAB VISIT (OUTPATIENT)
Dept: PRIMARY CARE CLINIC | Facility: CLINIC | Age: 61
End: 2021-05-08
Payer: COMMERCIAL

## 2021-05-08 DIAGNOSIS — Z01.818 PREOP TESTING: ICD-10-CM

## 2021-05-08 PROCEDURE — U0005 INFEC AGEN DETEC AMPLI PROBE: HCPCS | Performed by: SURGERY

## 2021-05-08 PROCEDURE — U0003 INFECTIOUS AGENT DETECTION BY NUCLEIC ACID (DNA OR RNA); SEVERE ACUTE RESPIRATORY SYNDROME CORONAVIRUS 2 (SARS-COV-2) (CORONAVIRUS DISEASE [COVID-19]), AMPLIFIED PROBE TECHNIQUE, MAKING USE OF HIGH THROUGHPUT TECHNOLOGIES AS DESCRIBED BY CMS-2020-01-R: HCPCS | Performed by: SURGERY

## 2021-05-09 ENCOUNTER — ANESTHESIA EVENT (OUTPATIENT)
Dept: SURGERY | Facility: HOSPITAL | Age: 61
DRG: 621 | End: 2021-05-09
Payer: COMMERCIAL

## 2021-05-09 LAB — SARS-COV-2 RNA RESP QL NAA+PROBE: NOT DETECTED

## 2021-05-10 ENCOUNTER — PATIENT MESSAGE (OUTPATIENT)
Dept: SURGERY | Facility: HOSPITAL | Age: 61
End: 2021-05-10

## 2021-05-10 ENCOUNTER — TELEPHONE (OUTPATIENT)
Dept: BARIATRICS | Facility: CLINIC | Age: 61
End: 2021-05-10

## 2021-05-11 ENCOUNTER — ANESTHESIA (OUTPATIENT)
Dept: SURGERY | Facility: HOSPITAL | Age: 61
DRG: 621 | End: 2021-05-11
Payer: COMMERCIAL

## 2021-05-11 ENCOUNTER — HOSPITAL ENCOUNTER (INPATIENT)
Facility: HOSPITAL | Age: 61
LOS: 1 days | Discharge: HOME | DRG: 621 | End: 2021-05-12
Attending: SURGERY | Admitting: SURGERY
Payer: COMMERCIAL

## 2021-05-11 DIAGNOSIS — E66.9 OBESITY: ICD-10-CM

## 2021-05-11 DIAGNOSIS — E66.01 MORBID OBESITY WITH BMI OF 50.0-59.9, ADULT: Primary | ICD-10-CM

## 2021-05-11 PROCEDURE — 25000003 PHARM REV CODE 250: Performed by: NURSE ANESTHETIST, CERTIFIED REGISTERED

## 2021-05-11 PROCEDURE — 88307 TISSUE EXAM BY PATHOLOGIST: CPT | Performed by: PATHOLOGY

## 2021-05-11 PROCEDURE — 94761 N-INVAS EAR/PLS OXIMETRY MLT: CPT

## 2021-05-11 PROCEDURE — 11000001 HC ACUTE MED/SURG PRIVATE ROOM

## 2021-05-11 PROCEDURE — 71000039 HC RECOVERY, EACH ADD'L HOUR: Performed by: SURGERY

## 2021-05-11 PROCEDURE — 43775 PR LAP, GAST RESTRICT PROC, LONGITUDINAL GASTRECTOMY: ICD-10-PCS | Mod: ,,, | Performed by: SURGERY

## 2021-05-11 PROCEDURE — 63600175 PHARM REV CODE 636 W HCPCS: Performed by: STUDENT IN AN ORGANIZED HEALTH CARE EDUCATION/TRAINING PROGRAM

## 2021-05-11 PROCEDURE — 25000003 PHARM REV CODE 250: Performed by: SURGERY

## 2021-05-11 PROCEDURE — 63600175 PHARM REV CODE 636 W HCPCS: Performed by: NURSE ANESTHETIST, CERTIFIED REGISTERED

## 2021-05-11 PROCEDURE — 88307 TISSUE EXAM BY PATHOLOGIST: CPT | Mod: 26,,, | Performed by: PATHOLOGY

## 2021-05-11 PROCEDURE — 63600175 PHARM REV CODE 636 W HCPCS: Performed by: SURGERY

## 2021-05-11 PROCEDURE — 37000009 HC ANESTHESIA EA ADD 15 MINS: Performed by: SURGERY

## 2021-05-11 PROCEDURE — C9113 INJ PANTOPRAZOLE SODIUM, VIA: HCPCS | Performed by: SURGERY

## 2021-05-11 PROCEDURE — D9220A PRA ANESTHESIA: Mod: ANES,,, | Performed by: ANESTHESIOLOGY

## 2021-05-11 PROCEDURE — 63600175 PHARM REV CODE 636 W HCPCS: Performed by: ANESTHESIOLOGY

## 2021-05-11 PROCEDURE — 71000015 HC POSTOP RECOV 1ST HR: Performed by: SURGERY

## 2021-05-11 PROCEDURE — 25000003 PHARM REV CODE 250: Performed by: STUDENT IN AN ORGANIZED HEALTH CARE EDUCATION/TRAINING PROGRAM

## 2021-05-11 PROCEDURE — 36000711: Performed by: SURGERY

## 2021-05-11 PROCEDURE — 88307 PR  SURG PATH,LEVEL V: ICD-10-PCS | Mod: 26,,, | Performed by: PATHOLOGY

## 2021-05-11 PROCEDURE — 27201423 OPTIME MED/SURG SUP & DEVICES STERILE SUPPLY: Performed by: SURGERY

## 2021-05-11 PROCEDURE — 36000710: Performed by: SURGERY

## 2021-05-11 PROCEDURE — 71000033 HC RECOVERY, INTIAL HOUR: Performed by: SURGERY

## 2021-05-11 PROCEDURE — D9220A PRA ANESTHESIA: Mod: CRNA,,, | Performed by: NURSE ANESTHETIST, CERTIFIED REGISTERED

## 2021-05-11 PROCEDURE — 37000008 HC ANESTHESIA 1ST 15 MINUTES: Performed by: SURGERY

## 2021-05-11 PROCEDURE — 63600175 PHARM REV CODE 636 W HCPCS

## 2021-05-11 PROCEDURE — 43775 LAP SLEEVE GASTRECTOMY: CPT | Mod: ,,, | Performed by: SURGERY

## 2021-05-11 PROCEDURE — D9220A PRA ANESTHESIA: ICD-10-PCS | Mod: CRNA,,, | Performed by: NURSE ANESTHETIST, CERTIFIED REGISTERED

## 2021-05-11 PROCEDURE — D9220A PRA ANESTHESIA: ICD-10-PCS | Mod: ANES,,, | Performed by: ANESTHESIOLOGY

## 2021-05-11 RX ORDER — FAMOTIDINE 40 MG/5ML
20 POWDER, FOR SUSPENSION ORAL ONCE
Status: DISCONTINUED | OUTPATIENT
Start: 2021-05-11 | End: 2021-05-11

## 2021-05-11 RX ORDER — DEXMEDETOMIDINE HYDROCHLORIDE 100 UG/ML
INJECTION, SOLUTION INTRAVENOUS
Status: DISCONTINUED | OUTPATIENT
Start: 2021-05-11 | End: 2021-05-11

## 2021-05-11 RX ORDER — HYDROMORPHONE HYDROCHLORIDE 1 MG/ML
INJECTION, SOLUTION INTRAMUSCULAR; INTRAVENOUS; SUBCUTANEOUS
Status: COMPLETED
Start: 2021-05-11 | End: 2021-05-11

## 2021-05-11 RX ORDER — LIDOCAINE HYDROCHLORIDE 20 MG/ML
INJECTION INTRAVENOUS
Status: DISCONTINUED | OUTPATIENT
Start: 2021-05-11 | End: 2021-05-11

## 2021-05-11 RX ORDER — SODIUM CHLORIDE 0.9 % (FLUSH) 0.9 %
10 SYRINGE (ML) INJECTION
Status: DISCONTINUED | OUTPATIENT
Start: 2021-05-11 | End: 2021-05-11 | Stop reason: HOSPADM

## 2021-05-11 RX ORDER — HYDROMORPHONE HYDROCHLORIDE 1 MG/ML
0.2 INJECTION, SOLUTION INTRAMUSCULAR; INTRAVENOUS; SUBCUTANEOUS EVERY 5 MIN PRN
Status: DISCONTINUED | OUTPATIENT
Start: 2021-05-11 | End: 2021-05-11 | Stop reason: HOSPADM

## 2021-05-11 RX ORDER — ONDANSETRON 2 MG/ML
8 INJECTION INTRAMUSCULAR; INTRAVENOUS EVERY 6 HOURS PRN
Status: DISCONTINUED | OUTPATIENT
Start: 2021-05-11 | End: 2021-05-12 | Stop reason: HOSPADM

## 2021-05-11 RX ORDER — DEXAMETHASONE SODIUM PHOSPHATE 4 MG/ML
INJECTION, SOLUTION INTRA-ARTICULAR; INTRALESIONAL; INTRAMUSCULAR; INTRAVENOUS; SOFT TISSUE
Status: DISCONTINUED | OUTPATIENT
Start: 2021-05-11 | End: 2021-05-11

## 2021-05-11 RX ORDER — PROPOFOL 10 MG/ML
VIAL (ML) INTRAVENOUS
Status: DISCONTINUED | OUTPATIENT
Start: 2021-05-11 | End: 2021-05-11

## 2021-05-11 RX ORDER — FENTANYL CITRATE 50 UG/ML
25 INJECTION, SOLUTION INTRAMUSCULAR; INTRAVENOUS EVERY 5 MIN PRN
Status: COMPLETED | OUTPATIENT
Start: 2021-05-11 | End: 2021-05-11

## 2021-05-11 RX ORDER — HYDROMORPHONE HYDROCHLORIDE 1 MG/ML
0.5 INJECTION, SOLUTION INTRAMUSCULAR; INTRAVENOUS; SUBCUTANEOUS
Status: DISCONTINUED | OUTPATIENT
Start: 2021-05-11 | End: 2021-05-11

## 2021-05-11 RX ORDER — ONDANSETRON 2 MG/ML
INJECTION INTRAMUSCULAR; INTRAVENOUS
Status: COMPLETED
Start: 2021-05-11 | End: 2021-05-11

## 2021-05-11 RX ORDER — PROCHLORPERAZINE EDISYLATE 5 MG/ML
5 INJECTION INTRAMUSCULAR; INTRAVENOUS EVERY 6 HOURS PRN
Status: DISCONTINUED | OUTPATIENT
Start: 2021-05-11 | End: 2021-05-12 | Stop reason: HOSPADM

## 2021-05-11 RX ORDER — ROCURONIUM BROMIDE 10 MG/ML
INJECTION, SOLUTION INTRAVENOUS
Status: DISCONTINUED | OUTPATIENT
Start: 2021-05-11 | End: 2021-05-11

## 2021-05-11 RX ORDER — ACETAMINOPHEN 10 MG/ML
INJECTION, SOLUTION INTRAVENOUS
Status: DISCONTINUED | OUTPATIENT
Start: 2021-05-11 | End: 2021-05-11

## 2021-05-11 RX ORDER — HEPARIN SODIUM 5000 [USP'U]/ML
5000 INJECTION, SOLUTION INTRAVENOUS; SUBCUTANEOUS ONCE
Status: COMPLETED | OUTPATIENT
Start: 2021-05-11 | End: 2021-05-11

## 2021-05-11 RX ORDER — AMLODIPINE BESYLATE 2.5 MG/1
2.5 TABLET ORAL NIGHTLY
Status: DISCONTINUED | OUTPATIENT
Start: 2021-05-11 | End: 2021-05-12 | Stop reason: HOSPADM

## 2021-05-11 RX ORDER — MORPHINE SULFATE 2 MG/ML
2 INJECTION, SOLUTION INTRAMUSCULAR; INTRAVENOUS
Status: DISCONTINUED | OUTPATIENT
Start: 2021-05-11 | End: 2021-05-12 | Stop reason: HOSPADM

## 2021-05-11 RX ORDER — MIDAZOLAM HYDROCHLORIDE 1 MG/ML
INJECTION INTRAMUSCULAR; INTRAVENOUS
Status: DISCONTINUED | OUTPATIENT
Start: 2021-05-11 | End: 2021-05-11

## 2021-05-11 RX ORDER — FENTANYL CITRATE 50 UG/ML
INJECTION, SOLUTION INTRAMUSCULAR; INTRAVENOUS
Status: DISCONTINUED | OUTPATIENT
Start: 2021-05-11 | End: 2021-05-11

## 2021-05-11 RX ORDER — PHENYLEPHRINE HYDROCHLORIDE 10 MG/ML
INJECTION INTRAVENOUS
Status: DISCONTINUED | OUTPATIENT
Start: 2021-05-11 | End: 2021-05-11

## 2021-05-11 RX ORDER — EPHEDRINE SULFATE 50 MG/ML
INJECTION, SOLUTION INTRAVENOUS
Status: DISCONTINUED | OUTPATIENT
Start: 2021-05-11 | End: 2021-05-11

## 2021-05-11 RX ORDER — SODIUM CHLORIDE 9 MG/ML
INJECTION, SOLUTION INTRAVENOUS CONTINUOUS
Status: DISCONTINUED | OUTPATIENT
Start: 2021-05-11 | End: 2021-05-11

## 2021-05-11 RX ORDER — SUCCINYLCHOLINE CHLORIDE 20 MG/ML
INJECTION INTRAMUSCULAR; INTRAVENOUS
Status: DISCONTINUED | OUTPATIENT
Start: 2021-05-11 | End: 2021-05-11

## 2021-05-11 RX ORDER — SODIUM CHLORIDE, SODIUM LACTATE, POTASSIUM CHLORIDE, CALCIUM CHLORIDE 600; 310; 30; 20 MG/100ML; MG/100ML; MG/100ML; MG/100ML
INJECTION, SOLUTION INTRAVENOUS CONTINUOUS
Status: DISCONTINUED | OUTPATIENT
Start: 2021-05-11 | End: 2021-05-12 | Stop reason: HOSPADM

## 2021-05-11 RX ORDER — PANTOPRAZOLE SODIUM 40 MG/10ML
40 INJECTION, POWDER, LYOPHILIZED, FOR SOLUTION INTRAVENOUS 2 TIMES DAILY
Status: DISCONTINUED | OUTPATIENT
Start: 2021-05-11 | End: 2021-05-12 | Stop reason: HOSPADM

## 2021-05-11 RX ORDER — KETAMINE HCL IN 0.9 % NACL 50 MG/5 ML
SYRINGE (ML) INTRAVENOUS
Status: DISCONTINUED | OUTPATIENT
Start: 2021-05-11 | End: 2021-05-11

## 2021-05-11 RX ORDER — PROCHLORPERAZINE EDISYLATE 5 MG/ML
INJECTION INTRAMUSCULAR; INTRAVENOUS
Status: DISCONTINUED | OUTPATIENT
Start: 2021-05-11 | End: 2021-05-11

## 2021-05-11 RX ORDER — FAMOTIDINE 10 MG/ML
INJECTION INTRAVENOUS
Status: DISCONTINUED | OUTPATIENT
Start: 2021-05-11 | End: 2021-05-11

## 2021-05-11 RX ORDER — ONDANSETRON 2 MG/ML
4 INJECTION INTRAMUSCULAR; INTRAVENOUS DAILY PRN
Status: COMPLETED | OUTPATIENT
Start: 2021-05-11 | End: 2021-05-11

## 2021-05-11 RX ORDER — BUPIVACAINE HYDROCHLORIDE 2.5 MG/ML
INJECTION, SOLUTION EPIDURAL; INFILTRATION; INTRACAUDAL
Status: DISCONTINUED | OUTPATIENT
Start: 2021-05-11 | End: 2021-05-11 | Stop reason: HOSPADM

## 2021-05-11 RX ORDER — HYDROCODONE BITARTRATE AND ACETAMINOPHEN 7.5; 325 MG/15ML; MG/15ML
15 SOLUTION ORAL EVERY 4 HOURS PRN
Status: DISCONTINUED | OUTPATIENT
Start: 2021-05-12 | End: 2021-05-12 | Stop reason: HOSPADM

## 2021-05-11 RX ORDER — ONDANSETRON 2 MG/ML
INJECTION INTRAMUSCULAR; INTRAVENOUS
Status: DISCONTINUED | OUTPATIENT
Start: 2021-05-11 | End: 2021-05-11

## 2021-05-11 RX ORDER — FAMOTIDINE 40 MG/5ML
20 POWDER, FOR SUSPENSION ORAL 2 TIMES DAILY
Status: DISCONTINUED | OUTPATIENT
Start: 2021-05-11 | End: 2021-05-11

## 2021-05-11 RX ADMIN — ONDANSETRON 4 MG: 2 INJECTION INTRAMUSCULAR; INTRAVENOUS at 09:05

## 2021-05-11 RX ADMIN — FAMOTIDINE 20 MG: 10 INJECTION, SOLUTION INTRAVENOUS at 07:05

## 2021-05-11 RX ADMIN — Medication 20 MG: at 07:05

## 2021-05-11 RX ADMIN — PANTOPRAZOLE SODIUM 40 MG: 40 INJECTION, POWDER, FOR SOLUTION INTRAVENOUS at 09:05

## 2021-05-11 RX ADMIN — PROCHLORPERAZINE EDISYLATE 2.5 MG: 5 INJECTION INTRAMUSCULAR; INTRAVENOUS at 07:05

## 2021-05-11 RX ADMIN — DEXMEDETOMIDINE HYDROCHLORIDE 8 MCG: 100 INJECTION, SOLUTION, CONCENTRATE INTRAVENOUS at 08:05

## 2021-05-11 RX ADMIN — SODIUM CHLORIDE, SODIUM LACTATE, POTASSIUM CHLORIDE, AND CALCIUM CHLORIDE: .6; .31; .03; .02 INJECTION, SOLUTION INTRAVENOUS at 09:05

## 2021-05-11 RX ADMIN — ROCURONIUM BROMIDE 50 MG: 10 INJECTION, SOLUTION INTRAVENOUS at 07:05

## 2021-05-11 RX ADMIN — HYDROMORPHONE HYDROCHLORIDE 0.2 MG: 1 INJECTION, SOLUTION INTRAMUSCULAR; INTRAVENOUS; SUBCUTANEOUS at 09:05

## 2021-05-11 RX ADMIN — CEFAZOLIN 3 ML: 1 INJECTION, POWDER, FOR SOLUTION INTRAVENOUS at 07:05

## 2021-05-11 RX ADMIN — LIDOCAINE HYDROCHLORIDE 60 MG: 20 INJECTION, SOLUTION INTRAVENOUS at 07:05

## 2021-05-11 RX ADMIN — FENTANYL CITRATE 25 MCG: 50 INJECTION INTRAMUSCULAR; INTRAVENOUS at 09:05

## 2021-05-11 RX ADMIN — EPHEDRINE SULFATE 10 MG: 50 INJECTION INTRAVENOUS at 07:05

## 2021-05-11 RX ADMIN — HEPARIN SODIUM 5000 UNITS: 5000 INJECTION INTRAVENOUS; SUBCUTANEOUS at 06:05

## 2021-05-11 RX ADMIN — PHENYLEPHRINE HYDROCHLORIDE 100 MCG: 10 INJECTION INTRAVENOUS at 07:05

## 2021-05-11 RX ADMIN — HYDROMORPHONE HYDROCHLORIDE 0.2 MG: 1 INJECTION, SOLUTION INTRAMUSCULAR; INTRAVENOUS; SUBCUTANEOUS at 10:05

## 2021-05-11 RX ADMIN — ONDANSETRON 8 MG: 2 INJECTION INTRAMUSCULAR; INTRAVENOUS at 07:05

## 2021-05-11 RX ADMIN — ACETAMINOPHEN 1000 MG: 10 INJECTION, SOLUTION INTRAVENOUS at 07:05

## 2021-05-11 RX ADMIN — DEXAMETHASONE SODIUM PHOSPHATE 4 MG: 4 INJECTION, SOLUTION INTRAMUSCULAR; INTRAVENOUS at 07:05

## 2021-05-11 RX ADMIN — MIDAZOLAM HYDROCHLORIDE 2 MG: 1 INJECTION, SOLUTION INTRAMUSCULAR; INTRAVENOUS at 07:05

## 2021-05-11 RX ADMIN — ONDANSETRON 4 MG: 2 INJECTION, SOLUTION INTRAMUSCULAR; INTRAVENOUS at 07:05

## 2021-05-11 RX ADMIN — MORPHINE SULFATE 2 MG: 2 INJECTION, SOLUTION INTRAMUSCULAR; INTRAVENOUS at 09:05

## 2021-05-11 RX ADMIN — HYDROMORPHONE HYDROCHLORIDE 0.5 MG: 1 INJECTION, SOLUTION INTRAMUSCULAR; INTRAVENOUS; SUBCUTANEOUS at 05:05

## 2021-05-11 RX ADMIN — PROPOFOL 200 MG: 10 INJECTION, EMULSION INTRAVENOUS at 07:05

## 2021-05-11 RX ADMIN — SUGAMMADEX 200 MG: 100 INJECTION, SOLUTION INTRAVENOUS at 08:05

## 2021-05-11 RX ADMIN — PHENYLEPHRINE HYDROCHLORIDE 200 MCG: 10 INJECTION INTRAVENOUS at 08:05

## 2021-05-11 RX ADMIN — HYDROMORPHONE HYDROCHLORIDE 0.5 MG: 1 INJECTION, SOLUTION INTRAMUSCULAR; INTRAVENOUS; SUBCUTANEOUS at 02:05

## 2021-05-11 RX ADMIN — SODIUM CHLORIDE: 0.9 INJECTION, SOLUTION INTRAVENOUS at 07:05

## 2021-05-11 RX ADMIN — SODIUM CHLORIDE, SODIUM GLUCONATE, SODIUM ACETATE, POTASSIUM CHLORIDE, MAGNESIUM CHLORIDE, SODIUM PHOSPHATE, DIBASIC, AND POTASSIUM PHOSPHATE: .53; .5; .37; .037; .03; .012; .00082 INJECTION, SOLUTION INTRAVENOUS at 07:05

## 2021-05-11 RX ADMIN — DEXMEDETOMIDINE HYDROCHLORIDE 4 MCG: 100 INJECTION, SOLUTION, CONCENTRATE INTRAVENOUS at 08:05

## 2021-05-11 RX ADMIN — GLYCOPYRROLATE 0.2 MG: 0.2 INJECTION, SOLUTION INTRAMUSCULAR; INTRAVITREAL at 07:05

## 2021-05-11 RX ADMIN — SUCCINYLCHOLINE CHLORIDE 200 MG: 20 INJECTION, SOLUTION INTRAMUSCULAR; INTRAVENOUS at 07:05

## 2021-05-11 RX ADMIN — FENTANYL CITRATE 50 MCG: 50 INJECTION, SOLUTION INTRAMUSCULAR; INTRAVENOUS at 07:05

## 2021-05-12 VITALS
HEART RATE: 109 BPM | WEIGHT: 293 LBS | BODY MASS INDEX: 54.36 KG/M2 | OXYGEN SATURATION: 98 % | TEMPERATURE: 96 F | RESPIRATION RATE: 18 BRPM | DIASTOLIC BLOOD PRESSURE: 75 MMHG | SYSTOLIC BLOOD PRESSURE: 116 MMHG

## 2021-05-12 LAB
ANION GAP SERPL CALC-SCNC: 9 MMOL/L (ref 8–16)
BASOPHILS # BLD AUTO: 0.01 K/UL (ref 0–0.2)
BASOPHILS NFR BLD: 0.1 % (ref 0–1.9)
BUN SERPL-MCNC: 11 MG/DL (ref 6–20)
CALCIUM SERPL-MCNC: 8.9 MG/DL (ref 8.7–10.5)
CHLORIDE SERPL-SCNC: 103 MMOL/L (ref 95–110)
CO2 SERPL-SCNC: 26 MMOL/L (ref 23–29)
CREAT SERPL-MCNC: 0.8 MG/DL (ref 0.5–1.4)
DIFFERENTIAL METHOD: ABNORMAL
EOSINOPHIL # BLD AUTO: 0 K/UL (ref 0–0.5)
EOSINOPHIL NFR BLD: 0.2 % (ref 0–8)
ERYTHROCYTE [DISTWIDTH] IN BLOOD BY AUTOMATED COUNT: 14.9 % (ref 11.5–14.5)
EST. GFR  (AFRICAN AMERICAN): >60 ML/MIN/1.73 M^2
EST. GFR  (NON AFRICAN AMERICAN): >60 ML/MIN/1.73 M^2
GLUCOSE SERPL-MCNC: 90 MG/DL (ref 70–110)
HCT VFR BLD AUTO: 40.3 % (ref 37–48.5)
HGB BLD-MCNC: 13 G/DL (ref 12–16)
IMM GRANULOCYTES # BLD AUTO: 0.04 K/UL (ref 0–0.04)
IMM GRANULOCYTES NFR BLD AUTO: 0.5 % (ref 0–0.5)
LYMPHOCYTES # BLD AUTO: 1.3 K/UL (ref 1–4.8)
LYMPHOCYTES NFR BLD: 15.5 % (ref 18–48)
MAGNESIUM SERPL-MCNC: 2.3 MG/DL (ref 1.6–2.6)
MCH RBC QN AUTO: 28.3 PG (ref 27–31)
MCHC RBC AUTO-ENTMCNC: 32.3 G/DL (ref 32–36)
MCV RBC AUTO: 88 FL (ref 82–98)
MONOCYTES # BLD AUTO: 1 K/UL (ref 0.3–1)
MONOCYTES NFR BLD: 12.6 % (ref 4–15)
NEUTROPHILS # BLD AUTO: 5.9 K/UL (ref 1.8–7.7)
NEUTROPHILS NFR BLD: 71.1 % (ref 38–73)
NRBC BLD-RTO: 0 /100 WBC
PHOSPHATE SERPL-MCNC: 3.2 MG/DL (ref 2.7–4.5)
PLATELET # BLD AUTO: 245 K/UL (ref 150–450)
PMV BLD AUTO: 11.4 FL (ref 9.2–12.9)
POTASSIUM SERPL-SCNC: 4 MMOL/L (ref 3.5–5.1)
RBC # BLD AUTO: 4.6 M/UL (ref 4–5.4)
SODIUM SERPL-SCNC: 138 MMOL/L (ref 136–145)
WBC # BLD AUTO: 8.25 K/UL (ref 3.9–12.7)

## 2021-05-12 PROCEDURE — 80048 BASIC METABOLIC PNL TOTAL CA: CPT | Performed by: SURGERY

## 2021-05-12 PROCEDURE — 85025 COMPLETE CBC W/AUTO DIFF WBC: CPT | Performed by: SURGERY

## 2021-05-12 PROCEDURE — 25000003 PHARM REV CODE 250: Performed by: SURGERY

## 2021-05-12 PROCEDURE — 63600175 PHARM REV CODE 636 W HCPCS: Performed by: STUDENT IN AN ORGANIZED HEALTH CARE EDUCATION/TRAINING PROGRAM

## 2021-05-12 PROCEDURE — C9113 INJ PANTOPRAZOLE SODIUM, VIA: HCPCS | Performed by: SURGERY

## 2021-05-12 PROCEDURE — 36415 COLL VENOUS BLD VENIPUNCTURE: CPT | Performed by: SURGERY

## 2021-05-12 PROCEDURE — 83735 ASSAY OF MAGNESIUM: CPT | Performed by: SURGERY

## 2021-05-12 PROCEDURE — 63600175 PHARM REV CODE 636 W HCPCS: Performed by: SURGERY

## 2021-05-12 PROCEDURE — 84100 ASSAY OF PHOSPHORUS: CPT | Performed by: SURGERY

## 2021-05-12 RX ORDER — ACETAMINOPHEN 650 MG/20.3ML
500 LIQUID ORAL EVERY 8 HOURS
Status: DISCONTINUED | OUTPATIENT
Start: 2021-05-12 | End: 2021-05-12 | Stop reason: HOSPADM

## 2021-05-12 RX ORDER — ONDANSETRON 8 MG/1
8 TABLET, ORALLY DISINTEGRATING ORAL EVERY 6 HOURS PRN
Qty: 30 TABLET | Refills: 0 | Status: SHIPPED | OUTPATIENT
Start: 2021-05-12 | End: 2021-06-22 | Stop reason: SDUPTHER

## 2021-05-12 RX ORDER — HYDROCODONE BITARTRATE AND ACETAMINOPHEN 7.5; 325 MG/15ML; MG/15ML
15 SOLUTION ORAL 4 TIMES DAILY PRN
Qty: 200 ML | Refills: 0 | Status: SHIPPED | OUTPATIENT
Start: 2021-05-12 | End: 2021-09-29

## 2021-05-12 RX ORDER — OMEPRAZOLE 40 MG/1
40 CAPSULE, DELAYED RELEASE ORAL EVERY MORNING
Qty: 60 CAPSULE | Refills: 0 | Status: CANCELLED | OUTPATIENT
Start: 2021-05-12 | End: 2021-07-11

## 2021-05-12 RX ORDER — DEXTROMETHORPHAN/PSEUDOEPHED 2.5-7.5/.8
40 DROPS ORAL 4 TIMES DAILY PRN
Status: DISCONTINUED | OUTPATIENT
Start: 2021-05-12 | End: 2021-05-12 | Stop reason: HOSPADM

## 2021-05-12 RX ORDER — URSODIOL 500 MG/1
TABLET, FILM COATED ORAL
Qty: 90 TABLET | Refills: 1 | Status: CANCELLED | OUTPATIENT
Start: 2021-05-12 | End: 2022-05-12

## 2021-05-12 RX ORDER — ENOXAPARIN SODIUM 100 MG/ML
40 INJECTION SUBCUTANEOUS EVERY 12 HOURS
Status: DISCONTINUED | OUTPATIENT
Start: 2021-05-12 | End: 2021-05-12 | Stop reason: HOSPADM

## 2021-05-12 RX ORDER — GABAPENTIN 250 MG/5ML
300 SOLUTION ORAL 3 TIMES DAILY
Status: DISCONTINUED | OUTPATIENT
Start: 2021-05-12 | End: 2021-05-12 | Stop reason: HOSPADM

## 2021-05-12 RX ADMIN — MORPHINE SULFATE 2 MG: 2 INJECTION, SOLUTION INTRAMUSCULAR; INTRAVENOUS at 01:05

## 2021-05-12 RX ADMIN — PANTOPRAZOLE SODIUM 40 MG: 40 INJECTION, POWDER, FOR SOLUTION INTRAVENOUS at 09:05

## 2021-05-12 RX ADMIN — SODIUM CHLORIDE, SODIUM LACTATE, POTASSIUM CHLORIDE, AND CALCIUM CHLORIDE: .6; .31; .03; .02 INJECTION, SOLUTION INTRAVENOUS at 08:05

## 2021-05-12 RX ADMIN — ONDANSETRON 8 MG: 2 INJECTION INTRAMUSCULAR; INTRAVENOUS at 08:05

## 2021-05-12 RX ADMIN — PANTOPRAZOLE SODIUM 40 MG: 40 INJECTION, POWDER, FOR SOLUTION INTRAVENOUS at 07:05

## 2021-05-12 RX ADMIN — ONDANSETRON 8 MG: 2 INJECTION INTRAMUSCULAR; INTRAVENOUS at 01:05

## 2021-05-12 RX ADMIN — HYDROCODONE BITARTRATE AND ACETAMINOPHEN 15 ML: 7.5; 325 SOLUTION ORAL at 12:05

## 2021-05-12 RX ADMIN — ENOXAPARIN SODIUM 40 MG: 40 INJECTION SUBCUTANEOUS at 11:05

## 2021-05-12 RX ADMIN — SODIUM CHLORIDE, SODIUM LACTATE, POTASSIUM CHLORIDE, AND CALCIUM CHLORIDE: .6; .31; .03; .02 INJECTION, SOLUTION INTRAVENOUS at 01:05

## 2021-05-13 ENCOUNTER — TELEPHONE (OUTPATIENT)
Dept: FAMILY MEDICINE | Facility: CLINIC | Age: 61
End: 2021-05-13

## 2021-05-14 ENCOUNTER — PATIENT MESSAGE (OUTPATIENT)
Dept: BARIATRICS | Facility: CLINIC | Age: 61
End: 2021-05-14

## 2021-05-17 LAB
FINAL PATHOLOGIC DIAGNOSIS: NORMAL
Lab: NORMAL

## 2021-05-25 ENCOUNTER — CLINICAL SUPPORT (OUTPATIENT)
Dept: BARIATRICS | Facility: CLINIC | Age: 61
End: 2021-05-25
Payer: COMMERCIAL

## 2021-05-25 ENCOUNTER — OFFICE VISIT (OUTPATIENT)
Dept: BARIATRICS | Facility: CLINIC | Age: 61
End: 2021-05-25
Payer: COMMERCIAL

## 2021-05-25 VITALS
SYSTOLIC BLOOD PRESSURE: 119 MMHG | RESPIRATION RATE: 16 BRPM | DIASTOLIC BLOOD PRESSURE: 70 MMHG | BODY MASS INDEX: 41.02 KG/M2 | HEART RATE: 77 BPM | HEIGHT: 71 IN | WEIGHT: 293 LBS | TEMPERATURE: 98 F

## 2021-05-25 DIAGNOSIS — E66.01 MORBID OBESITY WITH BMI OF 50.0-59.9, ADULT: ICD-10-CM

## 2021-05-25 DIAGNOSIS — E66.01 MORBID OBESITY: Primary | ICD-10-CM

## 2021-05-25 DIAGNOSIS — M19.90 ARTHRITIS: ICD-10-CM

## 2021-05-25 PROCEDURE — 99024 POSTOP FOLLOW-UP VISIT: CPT | Mod: S$GLB,,, | Performed by: SURGERY

## 2021-05-25 PROCEDURE — 99999 PR PBB SHADOW E&M-EST. PATIENT-LVL III: ICD-10-PCS | Mod: PBBFAC,,, | Performed by: SURGERY

## 2021-05-25 PROCEDURE — 99999 PR PBB SHADOW E&M-EST. PATIENT-LVL III: CPT | Mod: PBBFAC,,, | Performed by: SURGERY

## 2021-05-25 PROCEDURE — 3008F BODY MASS INDEX DOCD: CPT | Mod: CPTII,S$GLB,, | Performed by: SURGERY

## 2021-05-25 PROCEDURE — 99999 PR PBB SHADOW E&M-EST. PATIENT-LVL I: CPT | Mod: PBBFAC,,, | Performed by: DIETITIAN, REGISTERED

## 2021-05-25 PROCEDURE — 3008F PR BODY MASS INDEX (BMI) DOCUMENTED: ICD-10-PCS | Mod: CPTII,S$GLB,, | Performed by: SURGERY

## 2021-05-25 PROCEDURE — 99999 PR PBB SHADOW E&M-EST. PATIENT-LVL I: ICD-10-PCS | Mod: PBBFAC,,, | Performed by: DIETITIAN, REGISTERED

## 2021-05-25 PROCEDURE — 1126F PR PAIN SEVERITY QUANTIFIED, NO PAIN PRESENT: ICD-10-PCS | Mod: S$GLB,,, | Performed by: SURGERY

## 2021-05-25 PROCEDURE — 99024 PR POST-OP FOLLOW-UP VISIT: ICD-10-PCS | Mod: S$GLB,,, | Performed by: SURGERY

## 2021-05-25 PROCEDURE — 1126F AMNT PAIN NOTED NONE PRSNT: CPT | Mod: S$GLB,,, | Performed by: SURGERY

## 2021-05-26 ENCOUNTER — PATIENT MESSAGE (OUTPATIENT)
Dept: BARIATRICS | Facility: CLINIC | Age: 61
End: 2021-05-26

## 2021-05-28 ENCOUNTER — PATIENT MESSAGE (OUTPATIENT)
Dept: BARIATRICS | Facility: CLINIC | Age: 61
End: 2021-05-28

## 2021-05-29 ENCOUNTER — PATIENT MESSAGE (OUTPATIENT)
Dept: BARIATRICS | Facility: CLINIC | Age: 61
End: 2021-05-29

## 2021-05-31 ENCOUNTER — TELEPHONE (OUTPATIENT)
Dept: BARIATRICS | Facility: CLINIC | Age: 61
End: 2021-05-31

## 2021-06-22 ENCOUNTER — OFFICE VISIT (OUTPATIENT)
Dept: BARIATRICS | Facility: CLINIC | Age: 61
End: 2021-06-22
Payer: COMMERCIAL

## 2021-06-22 VITALS
BODY MASS INDEX: 41.02 KG/M2 | SYSTOLIC BLOOD PRESSURE: 134 MMHG | TEMPERATURE: 98 F | HEIGHT: 71 IN | HEART RATE: 78 BPM | WEIGHT: 293 LBS | DIASTOLIC BLOOD PRESSURE: 87 MMHG | RESPIRATION RATE: 16 BRPM

## 2021-06-22 DIAGNOSIS — M17.12 PRIMARY OSTEOARTHRITIS OF LEFT KNEE: ICD-10-CM

## 2021-06-22 DIAGNOSIS — E66.01 MORBID OBESITY WITH BMI OF 50.0-59.9, ADULT: ICD-10-CM

## 2021-06-22 DIAGNOSIS — E66.01 MORBID OBESITY: Primary | ICD-10-CM

## 2021-06-22 DIAGNOSIS — M19.90 ARTHRITIS: ICD-10-CM

## 2021-06-22 PROCEDURE — 1126F AMNT PAIN NOTED NONE PRSNT: CPT | Mod: S$GLB,,, | Performed by: SURGERY

## 2021-06-22 PROCEDURE — 3008F BODY MASS INDEX DOCD: CPT | Mod: CPTII,S$GLB,, | Performed by: SURGERY

## 2021-06-22 PROCEDURE — 99999 PR PBB SHADOW E&M-EST. PATIENT-LVL III: CPT | Mod: PBBFAC,,, | Performed by: SURGERY

## 2021-06-22 PROCEDURE — 3008F PR BODY MASS INDEX (BMI) DOCUMENTED: ICD-10-PCS | Mod: CPTII,S$GLB,, | Performed by: SURGERY

## 2021-06-22 PROCEDURE — 99999 PR PBB SHADOW E&M-EST. PATIENT-LVL III: ICD-10-PCS | Mod: PBBFAC,,, | Performed by: SURGERY

## 2021-06-22 PROCEDURE — 99024 POSTOP FOLLOW-UP VISIT: CPT | Mod: S$GLB,,, | Performed by: SURGERY

## 2021-06-22 PROCEDURE — 1126F PR PAIN SEVERITY QUANTIFIED, NO PAIN PRESENT: ICD-10-PCS | Mod: S$GLB,,, | Performed by: SURGERY

## 2021-06-22 PROCEDURE — 99024 PR POST-OP FOLLOW-UP VISIT: ICD-10-PCS | Mod: S$GLB,,, | Performed by: SURGERY

## 2021-06-22 RX ORDER — ONDANSETRON 8 MG/1
8 TABLET, ORALLY DISINTEGRATING ORAL EVERY 6 HOURS PRN
Qty: 30 TABLET | Refills: 0 | Status: SHIPPED | OUTPATIENT
Start: 2021-06-22 | End: 2021-08-03 | Stop reason: SDUPTHER

## 2021-07-12 ENCOUNTER — PATIENT MESSAGE (OUTPATIENT)
Dept: BARIATRICS | Facility: CLINIC | Age: 61
End: 2021-07-12

## 2021-07-13 ENCOUNTER — PATIENT MESSAGE (OUTPATIENT)
Dept: BARIATRICS | Facility: CLINIC | Age: 61
End: 2021-07-13

## 2021-07-13 RX ORDER — PANTOPRAZOLE SODIUM 20 MG/1
20 TABLET, DELAYED RELEASE ORAL DAILY
Qty: 90 TABLET | Refills: 0 | Status: SHIPPED | OUTPATIENT
Start: 2021-07-13 | End: 2021-10-14 | Stop reason: SDUPTHER

## 2021-07-13 RX ORDER — SUCRALFATE 1 G/10ML
1 SUSPENSION ORAL 4 TIMES DAILY
Qty: 414 ML | Refills: 0 | Status: SHIPPED | OUTPATIENT
Start: 2021-07-13 | End: 2021-08-03

## 2021-07-22 ENCOUNTER — LAB VISIT (OUTPATIENT)
Dept: LAB | Facility: HOSPITAL | Age: 61
End: 2021-07-22
Attending: SURGERY
Payer: COMMERCIAL

## 2021-07-22 DIAGNOSIS — E66.01 MORBID OBESITY: ICD-10-CM

## 2021-07-22 LAB
ALBUMIN SERPL BCP-MCNC: 3.6 G/DL (ref 3.5–5.2)
ALP SERPL-CCNC: 63 U/L (ref 55–135)
ALT SERPL W/O P-5'-P-CCNC: 19 U/L (ref 10–44)
ANION GAP SERPL CALC-SCNC: 8 MMOL/L (ref 8–16)
AST SERPL-CCNC: 16 U/L (ref 10–40)
BASOPHILS # BLD AUTO: 0.02 K/UL (ref 0–0.2)
BASOPHILS NFR BLD: 0.4 % (ref 0–1.9)
BILIRUB SERPL-MCNC: 0.7 MG/DL (ref 0.1–1)
BUN SERPL-MCNC: 13 MG/DL (ref 6–20)
CALCIUM SERPL-MCNC: 9.9 MG/DL (ref 8.7–10.5)
CHLORIDE SERPL-SCNC: 108 MMOL/L (ref 95–110)
CHOLEST SERPL-MCNC: 161 MG/DL (ref 120–199)
CHOLEST/HDLC SERPL: 4.2 {RATIO} (ref 2–5)
CO2 SERPL-SCNC: 27 MMOL/L (ref 23–29)
CREAT SERPL-MCNC: 0.7 MG/DL (ref 0.5–1.4)
DIFFERENTIAL METHOD: ABNORMAL
EOSINOPHIL # BLD AUTO: 0.1 K/UL (ref 0–0.5)
EOSINOPHIL NFR BLD: 1.7 % (ref 0–8)
ERYTHROCYTE [DISTWIDTH] IN BLOOD BY AUTOMATED COUNT: 16 % (ref 11.5–14.5)
EST. GFR  (AFRICAN AMERICAN): >60 ML/MIN/1.73 M^2
EST. GFR  (NON AFRICAN AMERICAN): >60 ML/MIN/1.73 M^2
GLUCOSE SERPL-MCNC: 87 MG/DL (ref 70–110)
HCT VFR BLD AUTO: 43.4 % (ref 37–48.5)
HDLC SERPL-MCNC: 38 MG/DL (ref 40–75)
HDLC SERPL: 23.6 % (ref 20–50)
HGB BLD-MCNC: 13.6 G/DL (ref 12–16)
IMM GRANULOCYTES # BLD AUTO: 0.03 K/UL (ref 0–0.04)
IMM GRANULOCYTES NFR BLD AUTO: 0.6 % (ref 0–0.5)
IRON SERPL-MCNC: 61 UG/DL (ref 30–160)
LDLC SERPL CALC-MCNC: 104.4 MG/DL (ref 63–159)
LYMPHOCYTES # BLD AUTO: 1.3 K/UL (ref 1–4.8)
LYMPHOCYTES NFR BLD: 25.1 % (ref 18–48)
MCH RBC QN AUTO: 28.6 PG (ref 27–31)
MCHC RBC AUTO-ENTMCNC: 31.3 G/DL (ref 32–36)
MCV RBC AUTO: 91 FL (ref 82–98)
MONOCYTES # BLD AUTO: 0.6 K/UL (ref 0.3–1)
MONOCYTES NFR BLD: 10.9 % (ref 4–15)
NEUTROPHILS # BLD AUTO: 3.2 K/UL (ref 1.8–7.7)
NEUTROPHILS NFR BLD: 61.3 % (ref 38–73)
NONHDLC SERPL-MCNC: 123 MG/DL
NRBC BLD-RTO: 0 /100 WBC
PLATELET # BLD AUTO: 251 K/UL (ref 150–450)
PMV BLD AUTO: 11.1 FL (ref 9.2–12.9)
POTASSIUM SERPL-SCNC: 4.2 MMOL/L (ref 3.5–5.1)
PROT SERPL-MCNC: 6.4 G/DL (ref 6–8.4)
RBC # BLD AUTO: 4.76 M/UL (ref 4–5.4)
SATURATED IRON: 22 % (ref 20–50)
SODIUM SERPL-SCNC: 143 MMOL/L (ref 136–145)
TOTAL IRON BINDING CAPACITY: 278 UG/DL (ref 250–450)
TRANSFERRIN SERPL-MCNC: 188 MG/DL (ref 200–375)
TRIGL SERPL-MCNC: 93 MG/DL (ref 30–150)
VIT B12 SERPL-MCNC: 883 PG/ML (ref 210–950)
WBC # BLD AUTO: 5.21 K/UL (ref 3.9–12.7)

## 2021-07-22 PROCEDURE — 80053 COMPREHEN METABOLIC PANEL: CPT | Performed by: SURGERY

## 2021-07-22 PROCEDURE — 85025 COMPLETE CBC W/AUTO DIFF WBC: CPT | Performed by: SURGERY

## 2021-07-22 PROCEDURE — 83540 ASSAY OF IRON: CPT | Performed by: SURGERY

## 2021-07-22 PROCEDURE — 84425 ASSAY OF VITAMIN B-1: CPT | Performed by: SURGERY

## 2021-07-22 PROCEDURE — 36415 COLL VENOUS BLD VENIPUNCTURE: CPT | Performed by: SURGERY

## 2021-07-22 PROCEDURE — 80061 LIPID PANEL: CPT | Performed by: SURGERY

## 2021-07-22 PROCEDURE — 82607 VITAMIN B-12: CPT | Performed by: SURGERY

## 2021-07-27 LAB — VIT B1 BLD-MCNC: 62 UG/L (ref 38–122)

## 2021-08-03 ENCOUNTER — OFFICE VISIT (OUTPATIENT)
Dept: BARIATRICS | Facility: CLINIC | Age: 61
End: 2021-08-03
Payer: COMMERCIAL

## 2021-08-03 VITALS
HEIGHT: 71 IN | TEMPERATURE: 98 F | BODY MASS INDEX: 41.02 KG/M2 | WEIGHT: 293 LBS | SYSTOLIC BLOOD PRESSURE: 125 MMHG | DIASTOLIC BLOOD PRESSURE: 84 MMHG | RESPIRATION RATE: 16 BRPM | HEART RATE: 75 BPM

## 2021-08-03 DIAGNOSIS — E66.01 MORBID OBESITY: Primary | ICD-10-CM

## 2021-08-03 DIAGNOSIS — M19.90 ARTHRITIS: ICD-10-CM

## 2021-08-03 DIAGNOSIS — E66.01 MORBID OBESITY WITH BMI OF 50.0-59.9, ADULT: ICD-10-CM

## 2021-08-03 DIAGNOSIS — M17.12 PRIMARY OSTEOARTHRITIS OF LEFT KNEE: ICD-10-CM

## 2021-08-03 PROCEDURE — 1159F PR MEDICATION LIST DOCUMENTED IN MEDICAL RECORD: ICD-10-PCS | Mod: CPTII,S$GLB,, | Performed by: SURGERY

## 2021-08-03 PROCEDURE — 3008F BODY MASS INDEX DOCD: CPT | Mod: CPTII,S$GLB,, | Performed by: SURGERY

## 2021-08-03 PROCEDURE — 99024 PR POST-OP FOLLOW-UP VISIT: ICD-10-PCS | Mod: S$GLB,,, | Performed by: SURGERY

## 2021-08-03 PROCEDURE — 99024 POSTOP FOLLOW-UP VISIT: CPT | Mod: S$GLB,,, | Performed by: SURGERY

## 2021-08-03 PROCEDURE — 3079F DIAST BP 80-89 MM HG: CPT | Mod: CPTII,S$GLB,, | Performed by: SURGERY

## 2021-08-03 PROCEDURE — 99999 PR PBB SHADOW E&M-EST. PATIENT-LVL III: CPT | Mod: PBBFAC,,, | Performed by: SURGERY

## 2021-08-03 PROCEDURE — 3008F PR BODY MASS INDEX (BMI) DOCUMENTED: ICD-10-PCS | Mod: CPTII,S$GLB,, | Performed by: SURGERY

## 2021-08-03 PROCEDURE — 3079F PR MOST RECENT DIASTOLIC BLOOD PRESSURE 80-89 MM HG: ICD-10-PCS | Mod: CPTII,S$GLB,, | Performed by: SURGERY

## 2021-08-03 PROCEDURE — 1125F AMNT PAIN NOTED PAIN PRSNT: CPT | Mod: CPTII,S$GLB,, | Performed by: SURGERY

## 2021-08-03 PROCEDURE — 1125F PR PAIN SEVERITY QUANTIFIED, PAIN PRESENT: ICD-10-PCS | Mod: CPTII,S$GLB,, | Performed by: SURGERY

## 2021-08-03 PROCEDURE — 99999 PR PBB SHADOW E&M-EST. PATIENT-LVL III: ICD-10-PCS | Mod: PBBFAC,,, | Performed by: SURGERY

## 2021-08-03 PROCEDURE — 1159F MED LIST DOCD IN RCRD: CPT | Mod: CPTII,S$GLB,, | Performed by: SURGERY

## 2021-08-03 PROCEDURE — 3074F PR MOST RECENT SYSTOLIC BLOOD PRESSURE < 130 MM HG: ICD-10-PCS | Mod: CPTII,S$GLB,, | Performed by: SURGERY

## 2021-08-03 PROCEDURE — 3074F SYST BP LT 130 MM HG: CPT | Mod: CPTII,S$GLB,, | Performed by: SURGERY

## 2021-08-03 RX ORDER — ONDANSETRON 8 MG/1
8 TABLET, ORALLY DISINTEGRATING ORAL EVERY 6 HOURS PRN
Qty: 30 TABLET | Refills: 0 | Status: SHIPPED | OUTPATIENT
Start: 2021-08-03 | End: 2022-04-05

## 2021-08-26 ENCOUNTER — PATIENT MESSAGE (OUTPATIENT)
Dept: CARDIOLOGY | Facility: CLINIC | Age: 61
End: 2021-08-26

## 2021-08-27 ENCOUNTER — OFFICE VISIT (OUTPATIENT)
Dept: CARDIOLOGY | Facility: CLINIC | Age: 61
End: 2021-08-27
Payer: COMMERCIAL

## 2021-08-27 DIAGNOSIS — I73.9 PERIPHERAL VASCULAR DISEASE: ICD-10-CM

## 2021-08-27 DIAGNOSIS — I10 ESSENTIAL HYPERTENSION: Primary | ICD-10-CM

## 2021-08-27 PROCEDURE — 1160F PR REVIEW ALL MEDS BY PRESCRIBER/CLIN PHARMACIST DOCUMENTED: ICD-10-PCS | Mod: CPTII,,, | Performed by: INTERNAL MEDICINE

## 2021-08-27 PROCEDURE — 1160F RVW MEDS BY RX/DR IN RCRD: CPT | Mod: CPTII,,, | Performed by: INTERNAL MEDICINE

## 2021-08-27 PROCEDURE — 99214 PR OFFICE/OUTPT VISIT, EST, LEVL IV, 30-39 MIN: ICD-10-PCS | Mod: 95,,, | Performed by: INTERNAL MEDICINE

## 2021-08-27 PROCEDURE — 1159F MED LIST DOCD IN RCRD: CPT | Mod: CPTII,,, | Performed by: INTERNAL MEDICINE

## 2021-08-27 PROCEDURE — 1159F PR MEDICATION LIST DOCUMENTED IN MEDICAL RECORD: ICD-10-PCS | Mod: CPTII,,, | Performed by: INTERNAL MEDICINE

## 2021-08-27 PROCEDURE — 99214 OFFICE O/P EST MOD 30 MIN: CPT | Mod: 95,,, | Performed by: INTERNAL MEDICINE

## 2021-09-14 ENCOUNTER — TELEPHONE (OUTPATIENT)
Dept: PODIATRY | Facility: CLINIC | Age: 61
End: 2021-09-14

## 2021-09-28 DIAGNOSIS — Z12.31 OTHER SCREENING MAMMOGRAM: Primary | ICD-10-CM

## 2021-09-29 ENCOUNTER — OFFICE VISIT (OUTPATIENT)
Dept: FAMILY MEDICINE | Facility: CLINIC | Age: 61
End: 2021-09-29
Payer: COMMERCIAL

## 2021-09-29 VITALS
DIASTOLIC BLOOD PRESSURE: 74 MMHG | SYSTOLIC BLOOD PRESSURE: 116 MMHG | WEIGHT: 293 LBS | HEIGHT: 71 IN | HEART RATE: 76 BPM | BODY MASS INDEX: 41.02 KG/M2

## 2021-09-29 DIAGNOSIS — M76.61 ACHILLES TENDINITIS OF RIGHT LOWER EXTREMITY: ICD-10-CM

## 2021-09-29 DIAGNOSIS — Z12.11 SPECIAL SCREENING FOR MALIGNANT NEOPLASMS, COLON: Primary | ICD-10-CM

## 2021-09-29 DIAGNOSIS — M17.12 PRIMARY OSTEOARTHRITIS OF LEFT KNEE: ICD-10-CM

## 2021-09-29 DIAGNOSIS — Z79.899 HIGH RISK MEDICATION USE: ICD-10-CM

## 2021-09-29 DIAGNOSIS — F41.9 ANXIETY: ICD-10-CM

## 2021-09-29 DIAGNOSIS — Z98.84 HISTORY OF WEIGHT LOSS SURGERY: ICD-10-CM

## 2021-09-29 DIAGNOSIS — F32.A DEPRESSION, UNSPECIFIED DEPRESSION TYPE: ICD-10-CM

## 2021-09-29 PROCEDURE — 3066F NEPHROPATHY DOC TX: CPT | Mod: S$GLB,,, | Performed by: NURSE PRACTITIONER

## 2021-09-29 PROCEDURE — 99214 OFFICE O/P EST MOD 30 MIN: CPT | Mod: S$GLB,,, | Performed by: NURSE PRACTITIONER

## 2021-09-29 PROCEDURE — 3074F PR MOST RECENT SYSTOLIC BLOOD PRESSURE < 130 MM HG: ICD-10-PCS | Mod: S$GLB,,, | Performed by: NURSE PRACTITIONER

## 2021-09-29 PROCEDURE — 3078F PR MOST RECENT DIASTOLIC BLOOD PRESSURE < 80 MM HG: ICD-10-PCS | Mod: S$GLB,,, | Performed by: NURSE PRACTITIONER

## 2021-09-29 PROCEDURE — 3066F PR DOCUMENTATION OF TREATMENT FOR NEPHROPATHY: ICD-10-PCS | Mod: S$GLB,,, | Performed by: NURSE PRACTITIONER

## 2021-09-29 PROCEDURE — 99214 PR OFFICE/OUTPT VISIT, EST, LEVL IV, 30-39 MIN: ICD-10-PCS | Mod: S$GLB,,, | Performed by: NURSE PRACTITIONER

## 2021-09-29 PROCEDURE — 3078F DIAST BP <80 MM HG: CPT | Mod: S$GLB,,, | Performed by: NURSE PRACTITIONER

## 2021-09-29 PROCEDURE — 3061F PR NEG MICROALBUMINURIA RESULT DOCUMENTED/REVIEW: ICD-10-PCS | Mod: S$GLB,,, | Performed by: NURSE PRACTITIONER

## 2021-09-29 PROCEDURE — 3061F NEG MICROALBUMINURIA REV: CPT | Mod: S$GLB,,, | Performed by: NURSE PRACTITIONER

## 2021-09-29 PROCEDURE — 3008F BODY MASS INDEX DOCD: CPT | Mod: S$GLB,,, | Performed by: NURSE PRACTITIONER

## 2021-09-29 PROCEDURE — 3008F PR BODY MASS INDEX (BMI) DOCUMENTED: ICD-10-PCS | Mod: S$GLB,,, | Performed by: NURSE PRACTITIONER

## 2021-09-29 PROCEDURE — 3074F SYST BP LT 130 MM HG: CPT | Mod: S$GLB,,, | Performed by: NURSE PRACTITIONER

## 2021-09-29 PROCEDURE — 1160F PR REVIEW ALL MEDS BY PRESCRIBER/CLIN PHARMACIST DOCUMENTED: ICD-10-PCS | Mod: S$GLB,,, | Performed by: NURSE PRACTITIONER

## 2021-09-29 PROCEDURE — 1160F RVW MEDS BY RX/DR IN RCRD: CPT | Mod: S$GLB,,, | Performed by: NURSE PRACTITIONER

## 2021-09-29 RX ORDER — TIZANIDINE 4 MG/1
TABLET ORAL
Qty: 90 TABLET | Refills: 1 | Status: SHIPPED | OUTPATIENT
Start: 2021-09-29 | End: 2022-03-29 | Stop reason: SDUPTHER

## 2021-09-29 RX ORDER — ALPRAZOLAM 0.25 MG/1
0.25 TABLET ORAL DAILY PRN
Qty: 60 TABLET | Refills: 1 | Status: SHIPPED | OUTPATIENT
Start: 2021-09-29 | End: 2021-12-22 | Stop reason: SDUPTHER

## 2021-09-29 RX ORDER — BUPROPION HYDROCHLORIDE 150 MG/1
150 TABLET ORAL DAILY
Qty: 30 TABLET | Refills: 11 | Status: SHIPPED | OUTPATIENT
Start: 2021-09-29 | End: 2021-11-29

## 2021-09-29 RX ORDER — IBUPROFEN 800 MG/1
800 TABLET ORAL 3 TIMES DAILY
Qty: 60 TABLET | Refills: 6 | Status: SHIPPED | OUTPATIENT
Start: 2021-09-29

## 2021-10-07 ENCOUNTER — PATIENT MESSAGE (OUTPATIENT)
Dept: BARIATRICS | Facility: CLINIC | Age: 61
End: 2021-10-07

## 2021-10-08 ENCOUNTER — PATIENT MESSAGE (OUTPATIENT)
Dept: BARIATRICS | Facility: CLINIC | Age: 61
End: 2021-10-08

## 2021-10-08 DIAGNOSIS — R10.11 RUQ PAIN: Primary | ICD-10-CM

## 2021-10-09 ENCOUNTER — HOSPITAL ENCOUNTER (OUTPATIENT)
Dept: RADIOLOGY | Facility: HOSPITAL | Age: 61
Discharge: HOME | End: 2021-10-09
Attending: SURGERY
Payer: COMMERCIAL

## 2021-10-09 DIAGNOSIS — R10.11 RUQ PAIN: ICD-10-CM

## 2021-10-09 PROCEDURE — 76705 ECHO EXAM OF ABDOMEN: CPT | Mod: TC

## 2021-10-11 ENCOUNTER — PATIENT MESSAGE (OUTPATIENT)
Dept: BARIATRICS | Facility: CLINIC | Age: 61
End: 2021-10-11

## 2021-10-12 ENCOUNTER — PATIENT MESSAGE (OUTPATIENT)
Dept: BARIATRICS | Facility: CLINIC | Age: 61
End: 2021-10-12

## 2021-10-14 ENCOUNTER — PATIENT MESSAGE (OUTPATIENT)
Dept: BARIATRICS | Facility: CLINIC | Age: 61
End: 2021-10-14

## 2021-10-14 ENCOUNTER — PATIENT MESSAGE (OUTPATIENT)
Dept: BARIATRICS | Facility: CLINIC | Age: 61
End: 2021-10-14
Payer: COMMERCIAL

## 2021-10-14 RX ORDER — PANTOPRAZOLE SODIUM 20 MG/1
20 TABLET, DELAYED RELEASE ORAL DAILY
Qty: 90 TABLET | Refills: 0 | Status: SHIPPED | OUTPATIENT
Start: 2021-10-14 | End: 2021-12-30 | Stop reason: SDUPTHER

## 2021-10-15 ENCOUNTER — IMMUNIZATION (OUTPATIENT)
Dept: PRIMARY CARE CLINIC | Facility: CLINIC | Age: 61
End: 2021-10-15
Payer: COMMERCIAL

## 2021-10-15 DIAGNOSIS — Z23 NEED FOR VACCINATION: Primary | ICD-10-CM

## 2021-10-15 PROCEDURE — 91300 COVID-19, MRNA, LNP-S, PF, 30 MCG/0.3 ML DOSE VACCINE: CPT | Mod: S$GLB,,, | Performed by: FAMILY MEDICINE

## 2021-10-15 PROCEDURE — 91300 COVID-19, MRNA, LNP-S, PF, 30 MCG/0.3 ML DOSE VACCINE: ICD-10-PCS | Mod: S$GLB,,, | Performed by: FAMILY MEDICINE

## 2021-10-15 PROCEDURE — 0003A COVID-19, MRNA, LNP-S, PF, 30 MCG/0.3 ML DOSE VACCINE: CPT | Mod: S$GLB,,, | Performed by: FAMILY MEDICINE

## 2021-10-15 PROCEDURE — 0003A COVID-19, MRNA, LNP-S, PF, 30 MCG/0.3 ML DOSE VACCINE: ICD-10-PCS | Mod: S$GLB,,, | Performed by: FAMILY MEDICINE

## 2021-10-20 ENCOUNTER — OFFICE VISIT (OUTPATIENT)
Dept: BARIATRICS | Facility: CLINIC | Age: 61
End: 2021-10-20
Payer: COMMERCIAL

## 2021-10-20 VITALS
DIASTOLIC BLOOD PRESSURE: 85 MMHG | TEMPERATURE: 98 F | HEART RATE: 77 BPM | WEIGHT: 293 LBS | BODY MASS INDEX: 41.02 KG/M2 | RESPIRATION RATE: 16 BRPM | HEIGHT: 71 IN | SYSTOLIC BLOOD PRESSURE: 156 MMHG

## 2021-10-20 DIAGNOSIS — R10.11 RUQ PAIN: Primary | ICD-10-CM

## 2021-10-20 PROCEDURE — 3066F NEPHROPATHY DOC TX: CPT | Mod: CPTII,S$GLB,, | Performed by: SURGERY

## 2021-10-20 PROCEDURE — 3079F DIAST BP 80-89 MM HG: CPT | Mod: CPTII,S$GLB,, | Performed by: SURGERY

## 2021-10-20 PROCEDURE — 3066F PR DOCUMENTATION OF TREATMENT FOR NEPHROPATHY: ICD-10-PCS | Mod: CPTII,S$GLB,, | Performed by: SURGERY

## 2021-10-20 PROCEDURE — 1160F RVW MEDS BY RX/DR IN RCRD: CPT | Mod: CPTII,S$GLB,, | Performed by: SURGERY

## 2021-10-20 PROCEDURE — 3077F PR MOST RECENT SYSTOLIC BLOOD PRESSURE >= 140 MM HG: ICD-10-PCS | Mod: CPTII,S$GLB,, | Performed by: SURGERY

## 2021-10-20 PROCEDURE — 99999 PR PBB SHADOW E&M-EST. PATIENT-LVL V: ICD-10-PCS | Mod: PBBFAC,,, | Performed by: SURGERY

## 2021-10-20 PROCEDURE — 99214 OFFICE O/P EST MOD 30 MIN: CPT | Mod: S$GLB,,, | Performed by: SURGERY

## 2021-10-20 PROCEDURE — 99999 PR PBB SHADOW E&M-EST. PATIENT-LVL V: CPT | Mod: PBBFAC,,, | Performed by: SURGERY

## 2021-10-20 PROCEDURE — 3077F SYST BP >= 140 MM HG: CPT | Mod: CPTII,S$GLB,, | Performed by: SURGERY

## 2021-10-20 PROCEDURE — 3079F PR MOST RECENT DIASTOLIC BLOOD PRESSURE 80-89 MM HG: ICD-10-PCS | Mod: CPTII,S$GLB,, | Performed by: SURGERY

## 2021-10-20 PROCEDURE — 1159F PR MEDICATION LIST DOCUMENTED IN MEDICAL RECORD: ICD-10-PCS | Mod: CPTII,S$GLB,, | Performed by: SURGERY

## 2021-10-20 PROCEDURE — 3008F BODY MASS INDEX DOCD: CPT | Mod: CPTII,S$GLB,, | Performed by: SURGERY

## 2021-10-20 PROCEDURE — 99214 PR OFFICE/OUTPT VISIT, EST, LEVL IV, 30-39 MIN: ICD-10-PCS | Mod: S$GLB,,, | Performed by: SURGERY

## 2021-10-20 PROCEDURE — 1159F MED LIST DOCD IN RCRD: CPT | Mod: CPTII,S$GLB,, | Performed by: SURGERY

## 2021-10-20 PROCEDURE — 3061F NEG MICROALBUMINURIA REV: CPT | Mod: CPTII,S$GLB,, | Performed by: SURGERY

## 2021-10-20 PROCEDURE — 1160F PR REVIEW ALL MEDS BY PRESCRIBER/CLIN PHARMACIST DOCUMENTED: ICD-10-PCS | Mod: CPTII,S$GLB,, | Performed by: SURGERY

## 2021-10-20 PROCEDURE — 3061F PR NEG MICROALBUMINURIA RESULT DOCUMENTED/REVIEW: ICD-10-PCS | Mod: CPTII,S$GLB,, | Performed by: SURGERY

## 2021-10-20 PROCEDURE — 3008F PR BODY MASS INDEX (BMI) DOCUMENTED: ICD-10-PCS | Mod: CPTII,S$GLB,, | Performed by: SURGERY

## 2021-10-21 ENCOUNTER — LAB VISIT (OUTPATIENT)
Dept: LAB | Facility: HOSPITAL | Age: 61
End: 2021-10-21
Attending: SURGERY
Payer: COMMERCIAL

## 2021-10-21 DIAGNOSIS — E66.01 MORBID OBESITY: ICD-10-CM

## 2021-10-21 LAB
25(OH)D3+25(OH)D2 SERPL-MCNC: 36 NG/ML (ref 30–96)
ALBUMIN SERPL BCP-MCNC: 3.6 G/DL (ref 3.5–5.2)
ALP SERPL-CCNC: 61 U/L (ref 55–135)
ALT SERPL W/O P-5'-P-CCNC: 16 U/L (ref 10–44)
ANION GAP SERPL CALC-SCNC: 10 MMOL/L (ref 8–16)
AST SERPL-CCNC: 13 U/L (ref 10–40)
BASOPHILS # BLD AUTO: 0.01 K/UL (ref 0–0.2)
BASOPHILS NFR BLD: 0.2 % (ref 0–1.9)
BILIRUB SERPL-MCNC: 0.7 MG/DL (ref 0.1–1)
BUN SERPL-MCNC: 10 MG/DL (ref 6–20)
CALCIUM SERPL-MCNC: 9.6 MG/DL (ref 8.7–10.5)
CHLORIDE SERPL-SCNC: 108 MMOL/L (ref 95–110)
CHOLEST SERPL-MCNC: 150 MG/DL (ref 120–199)
CHOLEST/HDLC SERPL: 3.8 {RATIO} (ref 2–5)
CO2 SERPL-SCNC: 25 MMOL/L (ref 23–29)
CREAT SERPL-MCNC: 0.8 MG/DL (ref 0.5–1.4)
DIFFERENTIAL METHOD: ABNORMAL
EOSINOPHIL # BLD AUTO: 0.1 K/UL (ref 0–0.5)
EOSINOPHIL NFR BLD: 2.3 % (ref 0–8)
ERYTHROCYTE [DISTWIDTH] IN BLOOD BY AUTOMATED COUNT: 14.6 % (ref 11.5–14.5)
EST. GFR  (AFRICAN AMERICAN): >60 ML/MIN/1.73 M^2
EST. GFR  (NON AFRICAN AMERICAN): >60 ML/MIN/1.73 M^2
GLUCOSE SERPL-MCNC: 84 MG/DL (ref 70–110)
HCT VFR BLD AUTO: 41.6 % (ref 37–48.5)
HDLC SERPL-MCNC: 40 MG/DL (ref 40–75)
HDLC SERPL: 26.7 % (ref 20–50)
HGB BLD-MCNC: 13.4 G/DL (ref 12–16)
IMM GRANULOCYTES # BLD AUTO: 0.02 K/UL (ref 0–0.04)
IMM GRANULOCYTES NFR BLD AUTO: 0.4 % (ref 0–0.5)
IRON SERPL-MCNC: 76 UG/DL (ref 30–160)
LDLC SERPL CALC-MCNC: 94.8 MG/DL (ref 63–159)
LYMPHOCYTES # BLD AUTO: 1.5 K/UL (ref 1–4.8)
LYMPHOCYTES NFR BLD: 27.9 % (ref 18–48)
MCH RBC QN AUTO: 30 PG (ref 27–31)
MCHC RBC AUTO-ENTMCNC: 32.2 G/DL (ref 32–36)
MCV RBC AUTO: 93 FL (ref 82–98)
MONOCYTES # BLD AUTO: 0.5 K/UL (ref 0.3–1)
MONOCYTES NFR BLD: 8.8 % (ref 4–15)
NEUTROPHILS # BLD AUTO: 3.2 K/UL (ref 1.8–7.7)
NEUTROPHILS NFR BLD: 60.4 % (ref 38–73)
NONHDLC SERPL-MCNC: 110 MG/DL
NRBC BLD-RTO: 0 /100 WBC
PLATELET # BLD AUTO: 277 K/UL (ref 150–450)
PMV BLD AUTO: 11.2 FL (ref 9.2–12.9)
POTASSIUM SERPL-SCNC: 3.7 MMOL/L (ref 3.5–5.1)
PROT SERPL-MCNC: 6.6 G/DL (ref 6–8.4)
RBC # BLD AUTO: 4.47 M/UL (ref 4–5.4)
SATURATED IRON: 25 % (ref 20–50)
SODIUM SERPL-SCNC: 143 MMOL/L (ref 136–145)
TOTAL IRON BINDING CAPACITY: 306 UG/DL (ref 250–450)
TRANSFERRIN SERPL-MCNC: 207 MG/DL (ref 200–375)
TRIGL SERPL-MCNC: 76 MG/DL (ref 30–150)
VIT B12 SERPL-MCNC: 668 PG/ML (ref 210–950)
WBC # BLD AUTO: 5.23 K/UL (ref 3.9–12.7)

## 2021-10-21 PROCEDURE — 84425 ASSAY OF VITAMIN B-1: CPT | Performed by: SURGERY

## 2021-10-21 PROCEDURE — 84466 ASSAY OF TRANSFERRIN: CPT | Performed by: SURGERY

## 2021-10-21 PROCEDURE — 82306 VITAMIN D 25 HYDROXY: CPT | Performed by: SURGERY

## 2021-10-21 PROCEDURE — 85025 COMPLETE CBC W/AUTO DIFF WBC: CPT | Performed by: SURGERY

## 2021-10-21 PROCEDURE — 80053 COMPREHEN METABOLIC PANEL: CPT | Performed by: SURGERY

## 2021-10-21 PROCEDURE — 36415 COLL VENOUS BLD VENIPUNCTURE: CPT | Performed by: SURGERY

## 2021-10-21 PROCEDURE — 82607 VITAMIN B-12: CPT | Performed by: SURGERY

## 2021-10-21 PROCEDURE — 80061 LIPID PANEL: CPT | Performed by: SURGERY

## 2021-10-22 ENCOUNTER — ANESTHESIA EVENT (OUTPATIENT)
Dept: SURGERY | Facility: HOSPITAL | Age: 61
End: 2021-10-22
Payer: COMMERCIAL

## 2021-10-22 ENCOUNTER — HOSPITAL ENCOUNTER (OUTPATIENT)
Dept: PREADMISSION TESTING | Facility: HOSPITAL | Age: 61
Discharge: HOME | End: 2021-10-22
Attending: SURGERY
Payer: COMMERCIAL

## 2021-10-22 DIAGNOSIS — R10.11 RIGHT UPPER QUADRANT PAIN: Primary | ICD-10-CM

## 2021-10-22 PROCEDURE — 93005 ELECTROCARDIOGRAM TRACING: CPT

## 2021-10-22 PROCEDURE — 99900103 DSU ONLY-NO CHARGE-INITIAL HR (STAT)

## 2021-10-22 PROCEDURE — 99900104 DSU ONLY-NO CHARGE-EA ADD'L HR (STAT)

## 2021-10-22 RX ORDER — SODIUM CHLORIDE 0.9 % (FLUSH) 0.9 %
10 SYRINGE (ML) INJECTION
Status: CANCELLED | OUTPATIENT
Start: 2021-10-22

## 2021-10-25 ENCOUNTER — HOSPITAL ENCOUNTER (OUTPATIENT)
Facility: HOSPITAL | Age: 61
Discharge: HOME | End: 2021-10-25
Attending: SURGERY | Admitting: SURGERY
Payer: COMMERCIAL

## 2021-10-25 ENCOUNTER — TELEPHONE (OUTPATIENT)
Dept: BARIATRICS | Facility: CLINIC | Age: 61
End: 2021-10-25

## 2021-10-25 ENCOUNTER — ANESTHESIA (OUTPATIENT)
Dept: SURGERY | Facility: HOSPITAL | Age: 61
End: 2021-10-25
Payer: COMMERCIAL

## 2021-10-25 VITALS
OXYGEN SATURATION: 95 % | RESPIRATION RATE: 18 BRPM | SYSTOLIC BLOOD PRESSURE: 128 MMHG | WEIGHT: 293 LBS | BODY MASS INDEX: 43.65 KG/M2 | DIASTOLIC BLOOD PRESSURE: 58 MMHG | HEART RATE: 72 BPM | TEMPERATURE: 98 F

## 2021-10-25 DIAGNOSIS — R10.11 RUQ PAIN: Primary | ICD-10-CM

## 2021-10-25 DIAGNOSIS — Z01.818 PREOP TESTING: ICD-10-CM

## 2021-10-25 PROCEDURE — 36000708 HC OR TIME LEV III 1ST 15 MIN: Performed by: SURGERY

## 2021-10-25 PROCEDURE — 25000003 PHARM REV CODE 250: Performed by: NURSE ANESTHETIST, CERTIFIED REGISTERED

## 2021-10-25 PROCEDURE — D9220A PRA ANESTHESIA: Mod: CRNA,,, | Performed by: NURSE ANESTHETIST, CERTIFIED REGISTERED

## 2021-10-25 PROCEDURE — D9220A PRA ANESTHESIA: ICD-10-PCS | Mod: ANES,,, | Performed by: ANESTHESIOLOGY

## 2021-10-25 PROCEDURE — 37000009 HC ANESTHESIA EA ADD 15 MINS: Performed by: SURGERY

## 2021-10-25 PROCEDURE — 25000003 PHARM REV CODE 250: Performed by: SURGERY

## 2021-10-25 PROCEDURE — 88304 PR  SURG PATH,LEVEL III: ICD-10-PCS | Mod: 26,,, | Performed by: STUDENT IN AN ORGANIZED HEALTH CARE EDUCATION/TRAINING PROGRAM

## 2021-10-25 PROCEDURE — 63600175 PHARM REV CODE 636 W HCPCS: Performed by: ANESTHESIOLOGY

## 2021-10-25 PROCEDURE — 71000015 HC POSTOP RECOV 1ST HR: Performed by: SURGERY

## 2021-10-25 PROCEDURE — 27201423 OPTIME MED/SURG SUP & DEVICES STERILE SUPPLY: Performed by: SURGERY

## 2021-10-25 PROCEDURE — 47379 UNLISTED LAPS PX LIVER: CPT | Mod: ,,, | Performed by: SURGERY

## 2021-10-25 PROCEDURE — 99900104 DSU ONLY-NO CHARGE-EA ADD'L HR (STAT): Performed by: SURGERY

## 2021-10-25 PROCEDURE — 37000008 HC ANESTHESIA 1ST 15 MINUTES: Performed by: SURGERY

## 2021-10-25 PROCEDURE — 47562 LAPAROSCOPIC CHOLECYSTECTOMY: CPT | Mod: ,,, | Performed by: SURGERY

## 2021-10-25 PROCEDURE — 47379 PR LAPARSCOPIC, NEEDLE LIVER BIOPSY: ICD-10-PCS | Mod: ,,, | Performed by: SURGERY

## 2021-10-25 PROCEDURE — 63600175 PHARM REV CODE 636 W HCPCS: Performed by: NURSE ANESTHETIST, CERTIFIED REGISTERED

## 2021-10-25 PROCEDURE — 88304 TISSUE EXAM BY PATHOLOGIST: CPT | Performed by: STUDENT IN AN ORGANIZED HEALTH CARE EDUCATION/TRAINING PROGRAM

## 2021-10-25 PROCEDURE — 99900103 DSU ONLY-NO CHARGE-INITIAL HR (STAT): Performed by: SURGERY

## 2021-10-25 PROCEDURE — 47562 PR LAP,CHOLECYSTECTOMY: ICD-10-PCS | Mod: ,,, | Performed by: SURGERY

## 2021-10-25 PROCEDURE — 25000003 PHARM REV CODE 250: Performed by: ANESTHESIOLOGY

## 2021-10-25 PROCEDURE — D9220A PRA ANESTHESIA: ICD-10-PCS | Mod: CRNA,,, | Performed by: NURSE ANESTHETIST, CERTIFIED REGISTERED

## 2021-10-25 PROCEDURE — 88112 PR  CYTOPATH, CELL ENHANCE TECH: ICD-10-PCS | Mod: 26,,, | Performed by: PATHOLOGY

## 2021-10-25 PROCEDURE — D9220A PRA ANESTHESIA: Mod: ANES,,, | Performed by: ANESTHESIOLOGY

## 2021-10-25 PROCEDURE — 88304 TISSUE EXAM BY PATHOLOGIST: CPT | Mod: 26,,, | Performed by: STUDENT IN AN ORGANIZED HEALTH CARE EDUCATION/TRAINING PROGRAM

## 2021-10-25 PROCEDURE — 88112 CYTOPATH CELL ENHANCE TECH: CPT | Mod: 26,,, | Performed by: PATHOLOGY

## 2021-10-25 PROCEDURE — 71000033 HC RECOVERY, INTIAL HOUR: Performed by: SURGERY

## 2021-10-25 PROCEDURE — 63600175 PHARM REV CODE 636 W HCPCS: Performed by: SURGERY

## 2021-10-25 PROCEDURE — 88112 CYTOPATH CELL ENHANCE TECH: CPT | Performed by: PATHOLOGY

## 2021-10-25 PROCEDURE — 36000709 HC OR TIME LEV III EA ADD 15 MIN: Performed by: SURGERY

## 2021-10-25 PROCEDURE — 71000039 HC RECOVERY, EACH ADD'L HOUR: Performed by: SURGERY

## 2021-10-25 RX ORDER — HYDROMORPHONE HYDROCHLORIDE 2 MG/ML
0.2 INJECTION, SOLUTION INTRAMUSCULAR; INTRAVENOUS; SUBCUTANEOUS EVERY 5 MIN PRN
Status: COMPLETED | OUTPATIENT
Start: 2021-10-25 | End: 2021-10-25

## 2021-10-25 RX ORDER — ONDANSETRON 4 MG/1
4 TABLET, ORALLY DISINTEGRATING ORAL EVERY 6 HOURS PRN
Qty: 30 TABLET | Refills: 0 | Status: SHIPPED | OUTPATIENT
Start: 2021-10-25 | End: 2022-04-05

## 2021-10-25 RX ORDER — LIDOCAINE HCL/PF 100 MG/5ML
SYRINGE (ML) INTRAVENOUS
Status: DISCONTINUED | OUTPATIENT
Start: 2021-10-25 | End: 2021-10-25

## 2021-10-25 RX ORDER — ROCURONIUM BROMIDE 10 MG/ML
INJECTION, SOLUTION INTRAVENOUS
Status: DISCONTINUED | OUTPATIENT
Start: 2021-10-25 | End: 2021-10-25

## 2021-10-25 RX ORDER — FENTANYL CITRATE 50 UG/ML
25 INJECTION, SOLUTION INTRAMUSCULAR; INTRAVENOUS EVERY 5 MIN PRN
Status: COMPLETED | OUTPATIENT
Start: 2021-10-25 | End: 2021-10-25

## 2021-10-25 RX ORDER — KETOROLAC TROMETHAMINE 30 MG/ML
15 INJECTION, SOLUTION INTRAMUSCULAR; INTRAVENOUS EVERY 6 HOURS PRN
Status: DISCONTINUED | OUTPATIENT
Start: 2021-10-25 | End: 2021-10-25 | Stop reason: HOSPADM

## 2021-10-25 RX ORDER — ONDANSETRON 2 MG/ML
INJECTION INTRAMUSCULAR; INTRAVENOUS
Status: DISCONTINUED | OUTPATIENT
Start: 2021-10-25 | End: 2021-10-25

## 2021-10-25 RX ORDER — DEXAMETHASONE SODIUM PHOSPHATE 4 MG/ML
INJECTION, SOLUTION INTRA-ARTICULAR; INTRALESIONAL; INTRAMUSCULAR; INTRAVENOUS; SOFT TISSUE
Status: DISCONTINUED | OUTPATIENT
Start: 2021-10-25 | End: 2021-10-25

## 2021-10-25 RX ORDER — NEOSTIGMINE METHYLSULFATE 1 MG/ML
INJECTION, SOLUTION INTRAVENOUS
Status: DISCONTINUED | OUTPATIENT
Start: 2021-10-25 | End: 2021-10-25

## 2021-10-25 RX ORDER — PROPOFOL 10 MG/ML
VIAL (ML) INTRAVENOUS
Status: DISCONTINUED | OUTPATIENT
Start: 2021-10-25 | End: 2021-10-25

## 2021-10-25 RX ORDER — HYDROCODONE BITARTRATE AND ACETAMINOPHEN 7.5; 325 MG/1; MG/1
1 TABLET ORAL EVERY 4 HOURS PRN
Qty: 20 TABLET | Refills: 0 | Status: SHIPPED | OUTPATIENT
Start: 2021-10-25 | End: 2022-04-05

## 2021-10-25 RX ORDER — SUCCINYLCHOLINE CHLORIDE 20 MG/ML
INJECTION INTRAMUSCULAR; INTRAVENOUS
Status: DISCONTINUED | OUTPATIENT
Start: 2021-10-25 | End: 2021-10-25

## 2021-10-25 RX ORDER — MIDAZOLAM HYDROCHLORIDE 1 MG/ML
2 INJECTION INTRAMUSCULAR; INTRAVENOUS ONCE
Status: COMPLETED | OUTPATIENT
Start: 2021-10-25 | End: 2021-10-25

## 2021-10-25 RX ORDER — EPHEDRINE SULFATE 50 MG/ML
INJECTION, SOLUTION INTRAVENOUS
Status: DISCONTINUED | OUTPATIENT
Start: 2021-10-25 | End: 2021-10-25

## 2021-10-25 RX ORDER — KETOROLAC TROMETHAMINE 30 MG/ML
15 INJECTION, SOLUTION INTRAMUSCULAR; INTRAVENOUS ONCE AS NEEDED
Status: COMPLETED | OUTPATIENT
Start: 2021-10-25 | End: 2021-10-25

## 2021-10-25 RX ORDER — BUPIVACAINE HYDROCHLORIDE AND EPINEPHRINE 2.5; 5 MG/ML; UG/ML
INJECTION, SOLUTION EPIDURAL; INFILTRATION; INTRACAUDAL; PERINEURAL
Status: DISCONTINUED | OUTPATIENT
Start: 2021-10-25 | End: 2021-10-25 | Stop reason: HOSPADM

## 2021-10-25 RX ORDER — PROCHLORPERAZINE EDISYLATE 5 MG/ML
5 INJECTION INTRAMUSCULAR; INTRAVENOUS EVERY 30 MIN PRN
Status: DISCONTINUED | OUTPATIENT
Start: 2021-10-25 | End: 2021-10-25 | Stop reason: HOSPADM

## 2021-10-25 RX ORDER — LIDOCAINE HYDROCHLORIDE 10 MG/ML
1 INJECTION, SOLUTION EPIDURAL; INFILTRATION; INTRACAUDAL; PERINEURAL ONCE
Status: DISCONTINUED | OUTPATIENT
Start: 2021-10-25 | End: 2021-10-25 | Stop reason: HOSPADM

## 2021-10-25 RX ORDER — FENTANYL CITRATE 50 UG/ML
INJECTION, SOLUTION INTRAMUSCULAR; INTRAVENOUS
Status: DISCONTINUED | OUTPATIENT
Start: 2021-10-25 | End: 2021-10-25

## 2021-10-25 RX ADMIN — HYDROMORPHONE HYDROCHLORIDE 0.2 MG: 2 INJECTION, SOLUTION INTRAMUSCULAR; INTRAVENOUS; SUBCUTANEOUS at 01:10

## 2021-10-25 RX ADMIN — PROPOFOL 200 MG: 10 INJECTION, EMULSION INTRAVENOUS at 11:10

## 2021-10-25 RX ADMIN — HYDROMORPHONE HYDROCHLORIDE 0.2 MG: 2 INJECTION, SOLUTION INTRAMUSCULAR; INTRAVENOUS; SUBCUTANEOUS at 02:10

## 2021-10-25 RX ADMIN — MIDAZOLAM HYDROCHLORIDE 2 MG: 1 INJECTION INTRAMUSCULAR; INTRAVENOUS at 11:10

## 2021-10-25 RX ADMIN — ONDANSETRON 4 MG: 2 INJECTION, SOLUTION INTRAMUSCULAR; INTRAVENOUS at 11:10

## 2021-10-25 RX ADMIN — KETOROLAC TROMETHAMINE 15 MG: 30 INJECTION, SOLUTION INTRAMUSCULAR at 02:10

## 2021-10-25 RX ADMIN — FENTANYL CITRATE 100 MCG: 50 INJECTION, SOLUTION INTRAMUSCULAR; INTRAVENOUS at 11:10

## 2021-10-25 RX ADMIN — GLYCOPYRROLATE 0.6 MG: 0.2 INJECTION, SOLUTION INTRAMUSCULAR; INTRAVENOUS at 12:10

## 2021-10-25 RX ADMIN — DEXAMETHASONE SODIUM PHOSPHATE 4 MG: 4 INJECTION, SOLUTION INTRA-ARTICULAR; INTRALESIONAL; INTRAMUSCULAR; INTRAVENOUS; SOFT TISSUE at 11:10

## 2021-10-25 RX ADMIN — LIDOCAINE HYDROCHLORIDE 80 MG: 20 INJECTION INTRAVENOUS at 11:10

## 2021-10-25 RX ADMIN — FENTANYL CITRATE 25 MCG: 50 INJECTION INTRAMUSCULAR; INTRAVENOUS at 01:10

## 2021-10-25 RX ADMIN — GLYCOPYRROLATE 0.2 MG: 0.2 INJECTION, SOLUTION INTRAMUSCULAR; INTRAVENOUS at 12:10

## 2021-10-25 RX ADMIN — NEOSTIGMINE METHYLSULFATE 4 MG: 1 INJECTION INTRAVENOUS at 12:10

## 2021-10-25 RX ADMIN — EPHEDRINE SULFATE 10 MG: 50 INJECTION, SOLUTION INTRAMUSCULAR; INTRAVENOUS; SUBCUTANEOUS at 12:10

## 2021-10-25 RX ADMIN — CEFAZOLIN 3 G: 1 INJECTION, POWDER, FOR SOLUTION INTRAVENOUS at 11:10

## 2021-10-25 RX ADMIN — EPHEDRINE SULFATE 10 MG: 50 INJECTION, SOLUTION INTRAMUSCULAR; INTRAVENOUS; SUBCUTANEOUS at 11:10

## 2021-10-25 RX ADMIN — ROCURONIUM BROMIDE 5 MG: 10 INJECTION, SOLUTION INTRAVENOUS at 11:10

## 2021-10-25 RX ADMIN — SODIUM CHLORIDE, SODIUM GLUCONATE, SODIUM ACETATE, POTASSIUM CHLORIDE, MAGNESIUM CHLORIDE, SODIUM PHOSPHATE, DIBASIC, AND POTASSIUM PHOSPHATE: .53; .5; .37; .037; .03; .012; .00082 INJECTION, SOLUTION INTRAVENOUS at 08:10

## 2021-10-25 RX ADMIN — SUCCINYLCHOLINE CHLORIDE 120 MG: 20 INJECTION, SOLUTION INTRAMUSCULAR; INTRAVENOUS; PARENTERAL at 11:10

## 2021-10-27 LAB — VIT B1 BLD-MCNC: 76 UG/L (ref 38–122)

## 2021-10-28 ENCOUNTER — PATIENT MESSAGE (OUTPATIENT)
Dept: SURGERY | Facility: CLINIC | Age: 61
End: 2021-10-28
Payer: COMMERCIAL

## 2021-10-28 LAB
FINAL PATHOLOGIC DIAGNOSIS: NORMAL
Lab: NORMAL

## 2021-11-01 ENCOUNTER — PATIENT MESSAGE (OUTPATIENT)
Dept: SURGERY | Facility: CLINIC | Age: 61
End: 2021-11-01
Payer: COMMERCIAL

## 2021-11-01 LAB
FINAL PATHOLOGIC DIAGNOSIS: NORMAL
GROSS: NORMAL
Lab: NORMAL
MICROSCOPIC EXAM: NORMAL

## 2021-11-03 ENCOUNTER — OFFICE VISIT (OUTPATIENT)
Dept: SURGERY | Facility: CLINIC | Age: 61
End: 2021-11-03
Payer: COMMERCIAL

## 2021-11-03 VITALS
DIASTOLIC BLOOD PRESSURE: 76 MMHG | BODY MASS INDEX: 41.02 KG/M2 | TEMPERATURE: 98 F | RESPIRATION RATE: 16 BRPM | WEIGHT: 293 LBS | HEIGHT: 71 IN | SYSTOLIC BLOOD PRESSURE: 128 MMHG | HEART RATE: 79 BPM

## 2021-11-03 DIAGNOSIS — R10.11 RUQ PAIN: Primary | ICD-10-CM

## 2021-11-03 PROCEDURE — 3008F PR BODY MASS INDEX (BMI) DOCUMENTED: ICD-10-PCS | Mod: CPTII,S$GLB,, | Performed by: SURGERY

## 2021-11-03 PROCEDURE — 1159F PR MEDICATION LIST DOCUMENTED IN MEDICAL RECORD: ICD-10-PCS | Mod: CPTII,S$GLB,, | Performed by: SURGERY

## 2021-11-03 PROCEDURE — 99999 PR PBB SHADOW E&M-EST. PATIENT-LVL III: CPT | Mod: PBBFAC,,, | Performed by: SURGERY

## 2021-11-03 PROCEDURE — 99024 POSTOP FOLLOW-UP VISIT: CPT | Mod: S$GLB,,, | Performed by: SURGERY

## 2021-11-03 PROCEDURE — 3066F NEPHROPATHY DOC TX: CPT | Mod: CPTII,S$GLB,, | Performed by: SURGERY

## 2021-11-03 PROCEDURE — 3061F NEG MICROALBUMINURIA REV: CPT | Mod: CPTII,S$GLB,, | Performed by: SURGERY

## 2021-11-03 PROCEDURE — 3061F PR NEG MICROALBUMINURIA RESULT DOCUMENTED/REVIEW: ICD-10-PCS | Mod: CPTII,S$GLB,, | Performed by: SURGERY

## 2021-11-03 PROCEDURE — 3066F PR DOCUMENTATION OF TREATMENT FOR NEPHROPATHY: ICD-10-PCS | Mod: CPTII,S$GLB,, | Performed by: SURGERY

## 2021-11-03 PROCEDURE — 1159F MED LIST DOCD IN RCRD: CPT | Mod: CPTII,S$GLB,, | Performed by: SURGERY

## 2021-11-03 PROCEDURE — 3074F SYST BP LT 130 MM HG: CPT | Mod: CPTII,S$GLB,, | Performed by: SURGERY

## 2021-11-03 PROCEDURE — 99999 PR PBB SHADOW E&M-EST. PATIENT-LVL III: ICD-10-PCS | Mod: PBBFAC,,, | Performed by: SURGERY

## 2021-11-03 PROCEDURE — 99024 PR POST-OP FOLLOW-UP VISIT: ICD-10-PCS | Mod: S$GLB,,, | Performed by: SURGERY

## 2021-11-03 PROCEDURE — 3074F PR MOST RECENT SYSTOLIC BLOOD PRESSURE < 130 MM HG: ICD-10-PCS | Mod: CPTII,S$GLB,, | Performed by: SURGERY

## 2021-11-03 PROCEDURE — 3078F DIAST BP <80 MM HG: CPT | Mod: CPTII,S$GLB,, | Performed by: SURGERY

## 2021-11-03 PROCEDURE — 3078F PR MOST RECENT DIASTOLIC BLOOD PRESSURE < 80 MM HG: ICD-10-PCS | Mod: CPTII,S$GLB,, | Performed by: SURGERY

## 2021-11-03 PROCEDURE — 3008F BODY MASS INDEX DOCD: CPT | Mod: CPTII,S$GLB,, | Performed by: SURGERY

## 2021-11-05 ENCOUNTER — OFFICE VISIT (OUTPATIENT)
Dept: PODIATRY | Facility: CLINIC | Age: 61
End: 2021-11-05
Payer: COMMERCIAL

## 2021-11-05 DIAGNOSIS — L60.8 PINCER NAIL DEFORMITY: ICD-10-CM

## 2021-11-05 DIAGNOSIS — L60.0 ONYCHOCRYPTOSIS: Primary | ICD-10-CM

## 2021-11-05 DIAGNOSIS — M79.675 PAIN IN TOES OF BOTH FEET: ICD-10-CM

## 2021-11-05 DIAGNOSIS — I73.9 PERIPHERAL VASCULAR DISEASE: ICD-10-CM

## 2021-11-05 DIAGNOSIS — B35.1 ONYCHOMYCOSIS DUE TO DERMATOPHYTE: ICD-10-CM

## 2021-11-05 DIAGNOSIS — M79.674 PAIN IN TOES OF BOTH FEET: ICD-10-CM

## 2021-11-05 PROCEDURE — 1160F PR REVIEW ALL MEDS BY PRESCRIBER/CLIN PHARMACIST DOCUMENTED: ICD-10-PCS | Mod: CPTII,S$GLB,, | Performed by: STUDENT IN AN ORGANIZED HEALTH CARE EDUCATION/TRAINING PROGRAM

## 2021-11-05 PROCEDURE — 11720 ROUTINE FOOT CARE: ICD-10-PCS | Mod: S$GLB,,, | Performed by: STUDENT IN AN ORGANIZED HEALTH CARE EDUCATION/TRAINING PROGRAM

## 2021-11-05 PROCEDURE — 99213 PR OFFICE/OUTPT VISIT, EST, LEVL III, 20-29 MIN: ICD-10-PCS | Mod: 25,S$GLB,, | Performed by: STUDENT IN AN ORGANIZED HEALTH CARE EDUCATION/TRAINING PROGRAM

## 2021-11-05 PROCEDURE — 3066F NEPHROPATHY DOC TX: CPT | Mod: CPTII,S$GLB,, | Performed by: STUDENT IN AN ORGANIZED HEALTH CARE EDUCATION/TRAINING PROGRAM

## 2021-11-05 PROCEDURE — 11720 DEBRIDE NAIL 1-5: CPT | Mod: S$GLB,,, | Performed by: STUDENT IN AN ORGANIZED HEALTH CARE EDUCATION/TRAINING PROGRAM

## 2021-11-05 PROCEDURE — 3061F PR NEG MICROALBUMINURIA RESULT DOCUMENTED/REVIEW: ICD-10-PCS | Mod: CPTII,S$GLB,, | Performed by: STUDENT IN AN ORGANIZED HEALTH CARE EDUCATION/TRAINING PROGRAM

## 2021-11-05 PROCEDURE — 3066F PR DOCUMENTATION OF TREATMENT FOR NEPHROPATHY: ICD-10-PCS | Mod: CPTII,S$GLB,, | Performed by: STUDENT IN AN ORGANIZED HEALTH CARE EDUCATION/TRAINING PROGRAM

## 2021-11-05 PROCEDURE — 1160F RVW MEDS BY RX/DR IN RCRD: CPT | Mod: CPTII,S$GLB,, | Performed by: STUDENT IN AN ORGANIZED HEALTH CARE EDUCATION/TRAINING PROGRAM

## 2021-11-05 PROCEDURE — 99213 OFFICE O/P EST LOW 20 MIN: CPT | Mod: 25,S$GLB,, | Performed by: STUDENT IN AN ORGANIZED HEALTH CARE EDUCATION/TRAINING PROGRAM

## 2021-11-05 PROCEDURE — 3061F NEG MICROALBUMINURIA REV: CPT | Mod: CPTII,S$GLB,, | Performed by: STUDENT IN AN ORGANIZED HEALTH CARE EDUCATION/TRAINING PROGRAM

## 2021-11-05 PROCEDURE — 1159F MED LIST DOCD IN RCRD: CPT | Mod: CPTII,S$GLB,, | Performed by: STUDENT IN AN ORGANIZED HEALTH CARE EDUCATION/TRAINING PROGRAM

## 2021-11-05 PROCEDURE — 1159F PR MEDICATION LIST DOCUMENTED IN MEDICAL RECORD: ICD-10-PCS | Mod: CPTII,S$GLB,, | Performed by: STUDENT IN AN ORGANIZED HEALTH CARE EDUCATION/TRAINING PROGRAM

## 2021-11-05 PROCEDURE — 99999 PR PBB SHADOW E&M-EST. PATIENT-LVL III: CPT | Mod: PBBFAC,,, | Performed by: STUDENT IN AN ORGANIZED HEALTH CARE EDUCATION/TRAINING PROGRAM

## 2021-11-05 PROCEDURE — 99999 PR PBB SHADOW E&M-EST. PATIENT-LVL III: ICD-10-PCS | Mod: PBBFAC,,, | Performed by: STUDENT IN AN ORGANIZED HEALTH CARE EDUCATION/TRAINING PROGRAM

## 2021-11-10 ENCOUNTER — HOSPITAL ENCOUNTER (OUTPATIENT)
Dept: RADIOLOGY | Facility: HOSPITAL | Age: 61
Discharge: HOME | End: 2021-11-10
Attending: SPECIALIST
Payer: COMMERCIAL

## 2021-11-10 DIAGNOSIS — Z78.0 MENOPAUSE: ICD-10-CM

## 2021-11-10 DIAGNOSIS — Z12.31 OTHER SCREENING MAMMOGRAM: ICD-10-CM

## 2021-11-10 PROCEDURE — 77067 SCR MAMMO BI INCL CAD: CPT | Mod: TC

## 2021-11-10 PROCEDURE — 77080 DXA BONE DENSITY AXIAL: CPT | Mod: TC

## 2021-11-10 PROCEDURE — 77080 DXA BONE DENSITY AXIAL: CPT | Mod: 26,,, | Performed by: RADIOLOGY

## 2021-11-10 PROCEDURE — 77063 BREAST TOMOSYNTHESIS BI: CPT | Mod: 26,,, | Performed by: RADIOLOGY

## 2021-11-10 PROCEDURE — 77067 SCR MAMMO BI INCL CAD: CPT | Mod: 26,,, | Performed by: RADIOLOGY

## 2021-11-10 PROCEDURE — 77080 DEXA BONE DENSITY SPINE HIP: ICD-10-PCS | Mod: 26,,, | Performed by: RADIOLOGY

## 2021-11-10 PROCEDURE — 77067 MAMMO DIGITAL SCREENING BILAT WITH TOMO: ICD-10-PCS | Mod: 26,,, | Performed by: RADIOLOGY

## 2021-11-10 PROCEDURE — 77063 MAMMO DIGITAL SCREENING BILAT WITH TOMO: ICD-10-PCS | Mod: 26,,, | Performed by: RADIOLOGY

## 2021-11-22 ENCOUNTER — PATIENT MESSAGE (OUTPATIENT)
Dept: FAMILY MEDICINE | Facility: CLINIC | Age: 61
End: 2021-11-22
Payer: COMMERCIAL

## 2021-11-29 ENCOUNTER — PATIENT MESSAGE (OUTPATIENT)
Dept: FAMILY MEDICINE | Facility: CLINIC | Age: 61
End: 2021-11-29
Payer: COMMERCIAL

## 2021-11-29 RX ORDER — BUPROPION HYDROCHLORIDE 300 MG/1
300 TABLET ORAL DAILY
Qty: 30 TABLET | Refills: 0 | Status: SHIPPED | OUTPATIENT
Start: 2021-11-29 | End: 2021-12-22 | Stop reason: SDUPTHER

## 2021-12-02 ENCOUNTER — PATIENT MESSAGE (OUTPATIENT)
Dept: FAMILY MEDICINE | Facility: CLINIC | Age: 61
End: 2021-12-02
Payer: COMMERCIAL

## 2021-12-08 ENCOUNTER — HOSPITAL ENCOUNTER (OUTPATIENT)
Dept: RADIOLOGY | Facility: HOSPITAL | Age: 61
Discharge: HOME | End: 2021-12-08
Attending: SURGERY
Payer: COMMERCIAL

## 2021-12-08 DIAGNOSIS — R10.11 RUQ PAIN: ICD-10-CM

## 2021-12-08 PROCEDURE — 74178 CT ABDOMEN PELVIS W WO CONTRAST: ICD-10-PCS | Mod: 26,,, | Performed by: RADIOLOGY

## 2021-12-08 PROCEDURE — 74178 CT ABD&PLV WO CNTR FLWD CNTR: CPT | Mod: TC

## 2021-12-08 PROCEDURE — 74178 CT ABD&PLV WO CNTR FLWD CNTR: CPT | Mod: 26,,, | Performed by: RADIOLOGY

## 2021-12-08 PROCEDURE — 25500020 PHARM REV CODE 255

## 2021-12-08 RX ADMIN — IOHEXOL 100 ML: 350 INJECTION, SOLUTION INTRAVENOUS at 08:12

## 2021-12-14 ENCOUNTER — OFFICE VISIT (OUTPATIENT)
Dept: BARIATRICS | Facility: CLINIC | Age: 61
End: 2021-12-14
Payer: COMMERCIAL

## 2021-12-14 VITALS
BODY MASS INDEX: 40.93 KG/M2 | HEART RATE: 79 BPM | DIASTOLIC BLOOD PRESSURE: 75 MMHG | SYSTOLIC BLOOD PRESSURE: 125 MMHG | TEMPERATURE: 97 F | WEIGHT: 292.38 LBS | HEIGHT: 71 IN | RESPIRATION RATE: 16 BRPM

## 2021-12-14 DIAGNOSIS — R16.0 LIVER MASS: Primary | ICD-10-CM

## 2021-12-14 PROCEDURE — 3066F NEPHROPATHY DOC TX: CPT | Mod: CPTII,S$GLB,, | Performed by: SURGERY

## 2021-12-14 PROCEDURE — 3061F PR NEG MICROALBUMINURIA RESULT DOCUMENTED/REVIEW: ICD-10-PCS | Mod: CPTII,S$GLB,, | Performed by: SURGERY

## 2021-12-14 PROCEDURE — 99999 PR PBB SHADOW E&M-EST. PATIENT-LVL III: ICD-10-PCS | Mod: PBBFAC,,, | Performed by: SURGERY

## 2021-12-14 PROCEDURE — 99999 PR PBB SHADOW E&M-EST. PATIENT-LVL III: CPT | Mod: PBBFAC,,, | Performed by: SURGERY

## 2021-12-14 PROCEDURE — 3066F PR DOCUMENTATION OF TREATMENT FOR NEPHROPATHY: ICD-10-PCS | Mod: CPTII,S$GLB,, | Performed by: SURGERY

## 2021-12-14 PROCEDURE — 99213 PR OFFICE/OUTPT VISIT, EST, LEVL III, 20-29 MIN: ICD-10-PCS | Mod: 24,S$GLB,, | Performed by: SURGERY

## 2021-12-14 PROCEDURE — 99213 OFFICE O/P EST LOW 20 MIN: CPT | Mod: 24,S$GLB,, | Performed by: SURGERY

## 2021-12-14 PROCEDURE — 3061F NEG MICROALBUMINURIA REV: CPT | Mod: CPTII,S$GLB,, | Performed by: SURGERY

## 2021-12-22 ENCOUNTER — OFFICE VISIT (OUTPATIENT)
Dept: FAMILY MEDICINE | Facility: CLINIC | Age: 61
End: 2021-12-22
Payer: COMMERCIAL

## 2021-12-22 VITALS
HEIGHT: 71 IN | HEART RATE: 64 BPM | BODY MASS INDEX: 41.02 KG/M2 | WEIGHT: 293 LBS | SYSTOLIC BLOOD PRESSURE: 130 MMHG | DIASTOLIC BLOOD PRESSURE: 82 MMHG

## 2021-12-22 DIAGNOSIS — F41.9 ANXIETY: ICD-10-CM

## 2021-12-22 DIAGNOSIS — K21.9 GASTROESOPHAGEAL REFLUX DISEASE, UNSPECIFIED WHETHER ESOPHAGITIS PRESENT: ICD-10-CM

## 2021-12-22 DIAGNOSIS — Z79.899 HIGH RISK MEDICATION USE: Primary | ICD-10-CM

## 2021-12-22 DIAGNOSIS — Z00.00 PHYSICAL EXAM: ICD-10-CM

## 2021-12-22 DIAGNOSIS — Z98.84 HISTORY OF BARIATRIC SURGERY: ICD-10-CM

## 2021-12-22 PROCEDURE — 3008F BODY MASS INDEX DOCD: CPT | Mod: S$GLB,,, | Performed by: NURSE PRACTITIONER

## 2021-12-22 PROCEDURE — 99214 PR OFFICE/OUTPT VISIT, EST, LEVL IV, 30-39 MIN: ICD-10-PCS | Mod: S$GLB,,, | Performed by: NURSE PRACTITIONER

## 2021-12-22 PROCEDURE — 3008F PR BODY MASS INDEX (BMI) DOCUMENTED: ICD-10-PCS | Mod: S$GLB,,, | Performed by: NURSE PRACTITIONER

## 2021-12-22 PROCEDURE — 99214 OFFICE O/P EST MOD 30 MIN: CPT | Mod: S$GLB,,, | Performed by: NURSE PRACTITIONER

## 2021-12-22 RX ORDER — ALPRAZOLAM 0.25 MG/1
0.25 TABLET ORAL DAILY PRN
Qty: 60 TABLET | Refills: 1 | Status: SHIPPED | OUTPATIENT
Start: 2021-12-22 | End: 2022-03-29 | Stop reason: SDUPTHER

## 2021-12-22 RX ORDER — BUPROPION HYDROCHLORIDE 300 MG/1
300 TABLET ORAL DAILY
Qty: 90 TABLET | Refills: 2 | Status: SHIPPED | OUTPATIENT
Start: 2021-12-22 | End: 2022-09-29 | Stop reason: SDUPTHER

## 2021-12-22 NOTE — PROGRESS NOTES
"  SUBJECTIVE:    Patient ID: Domonique Carmona is a 61 y.o. female.    Chief Complaint: Follow-up (No bottles// refuses colonoscopy// no complaints-MJ)     61 y.o. female presents for check up . Had gastric sleeve in may with dr. Duenas well. Tolerating meals. Working out with  several days per week. no longer taking norvasc. bp is well controlled. Started wellbutrin at last visit. Happy with dose. Controlling symptoms. Would like to continue.  Last labs in October. Has mri of abdomen/liver scheduled per dr. Victor.       Admission on 10/25/2021, Discharged on 10/25/2021   Component Date Value Ref Range Status    Final Pathologic Diagnosis 10/25/2021    Final                    Value:Gallbladder, laparoscopic cholecystectomy:  - Mild chronic cholecystitis  - Cholelithiasis  - Negative for dysplasia or malignancy      Microscopic Exam 10/25/2021 Microscopic examination performed.   Final    Gross 10/25/2021    Final                    Value:Container Label: Clinic Number/AP Number:  [id number], and "gallbladder"  Received in formalin is a 12.0 x 4.5 x 4.5 cm intact gallbladder.  The cystic  duct is clipped measuring 0.3 x 0.1 cm (inked black).  No lymph nodes are  identified.  The serosa is tan-pink and glistening with minimal amount of  attached fat.  The hepatic bed has a dull and roughened surface.  The the  lumen is filled with viscous dark green fluid.  There is a 1.5 x 0.6 x 0.1 cm  aggregate of minute fragile stones.  The mucosa is yellow-speckled, tan-green  and velvety.  The wall measures 0.1 cm in thickness.  Representative sections  are submitted in GDI--1-ANALIA [facility].      Disclaimer 10/25/2021    Final                    Value:Unless the case is a 'gross only' or additional testing only, the final  diagnosis for each specimen is based on a microscopic examination of  appropriate tissue sections.      Final Pathologic Diagnosis 10/25/2021    Final                    Value:1. " HEPATIC CYST #1, CYST FLUID:  -  Negative; rare small sheets of benign epithelioid cells, scattered  macrophages, leukocytes and cellular debris; consistent with benign cyst  contents.  -  No atypical or malignant cells are present.  2. HEPATIC CYST # 2, CYST FLUID:  -  Negative; hypocellular specimen consisting of rare small sheets of benign  epithelioid cells, rare macrophages, few leukocytes and cellular debris;  consistent with benign cyst contents.  -  No atypical or malignant cells are present.      Disclaimer 10/25/2021    Final                    Value:Screening was performed at [facility], [address] [place] [address].     Lab Visit on 10/21/2021   Component Date Value Ref Range Status    WBC 10/21/2021 5.23  3.90 - 12.70 K/uL Final    RBC 10/21/2021 4.47  4.00 - 5.40 M/uL Final    Hemoglobin 10/21/2021 13.4  12.0 - 16.0 g/dL Final    Hematocrit 10/21/2021 41.6  37.0 - 48.5 % Final    MCV 10/21/2021 93  82 - 98 fL Final    MCH 10/21/2021 30.0  27.0 - 31.0 pg Final    MCHC 10/21/2021 32.2  32.0 - 36.0 g/dL Final    RDW 10/21/2021 14.6* 11.5 - 14.5 % Final    Platelets 10/21/2021 277  150 - 450 K/uL Final    MPV 10/21/2021 11.2  9.2 - 12.9 fL Final    Immature Granulocytes 10/21/2021 0.4  0.0 - 0.5 % Final    Gran # (ANC) 10/21/2021 3.2  1.8 - 7.7 K/uL Final    Immature Grans (Abs) 10/21/2021 0.02  0.00 - 0.04 K/uL Final    Lymph # 10/21/2021 1.5  1.0 - 4.8 K/uL Final    Mono # 10/21/2021 0.5  0.3 - 1.0 K/uL Final    Eos # 10/21/2021 0.1  0.0 - 0.5 K/uL Final    Baso # 10/21/2021 0.01  0.00 - 0.20 K/uL Final    nRBC 10/21/2021 0  0 /100 WBC Final    Gran % 10/21/2021 60.4  38.0 - 73.0 % Final    Lymph % 10/21/2021 27.9  18.0 - 48.0 % Final    Mono % 10/21/2021 8.8  4.0 - 15.0 % Final    Eosinophil % 10/21/2021 2.3  0.0 - 8.0 % Final    Basophil % 10/21/2021 0.2  0.0 - 1.9 % Final    Differential Method 10/21/2021 Automated    Final    Sodium 10/21/2021 143  136 - 145 mmol/L Final    Potassium 10/21/2021 3.7  3.5 - 5.1 mmol/L Final    Chloride 10/21/2021 108  95 - 110 mmol/L Final    CO2 10/21/2021 25  23 - 29 mmol/L Final    Glucose 10/21/2021 84  70 - 110 mg/dL Final    BUN 10/21/2021 10  6 - 20 mg/dL Final    Creatinine 10/21/2021 0.8  0.5 - 1.4 mg/dL Final    Calcium 10/21/2021 9.6  8.7 - 10.5 mg/dL Final    Total Protein 10/21/2021 6.6  6.0 - 8.4 g/dL Final    Albumin 10/21/2021 3.6  3.5 - 5.2 g/dL Final    Total Bilirubin 10/21/2021 0.7  0.1 - 1.0 mg/dL Final    Alkaline Phosphatase 10/21/2021 61  55 - 135 U/L Final    AST 10/21/2021 13  10 - 40 U/L Final    ALT 10/21/2021 16  10 - 44 U/L Final    Anion Gap 10/21/2021 10  8 - 16 mmol/L Final    eGFR if African American 10/21/2021 >60  >60 mL/min/1.73 m^2 Final    eGFR if non African American 10/21/2021 >60  >60 mL/min/1.73 m^2 Final    Vitamin B-12 10/21/2021 668  210 - 950 pg/mL Final    Thiamine 10/21/2021 76  38 - 122 ug/L Final    Cholesterol 10/21/2021 150  120 - 199 mg/dL Final    Triglycerides 10/21/2021 76  30 - 150 mg/dL Final    HDL 10/21/2021 40  40 - 75 mg/dL Final    LDL Cholesterol 10/21/2021 94.8  63.0 - 159.0 mg/dL Final    HDL/Cholesterol Ratio 10/21/2021 26.7  20.0 - 50.0 % Final    Total Cholesterol/HDL Ratio 10/21/2021 3.8  2.0 - 5.0 Final    Non-HDL Cholesterol 10/21/2021 110  mg/dL Final    Iron 10/21/2021 76  30 - 160 ug/dL Final    Transferrin 10/21/2021 207  200 - 375 mg/dL Final    TIBC 10/21/2021 306  250 - 450 ug/dL Final    Saturated Iron 10/21/2021 25  20 - 50 % Final    Vit D, 25-Hydroxy 10/21/2021 36  30 - 96 ng/mL Final   Lab Visit on 07/22/2021   Component Date Value Ref Range Status    WBC 07/22/2021 5.21  3.90 - 12.70 K/uL Final    RBC 07/22/2021 4.76  4.00 - 5.40 M/uL Final    Hemoglobin 07/22/2021 13.6  12.0 - 16.0 g/dL Final    Hematocrit 07/22/2021 43.4  37.0 - 48.5 % Final    MCV 07/22/2021 91  82 - 98 fL  Final    MCH 07/22/2021 28.6  27.0 - 31.0 pg Final    MCHC 07/22/2021 31.3* 32.0 - 36.0 g/dL Final    RDW 07/22/2021 16.0* 11.5 - 14.5 % Final    Platelets 07/22/2021 251  150 - 450 K/uL Final    MPV 07/22/2021 11.1  9.2 - 12.9 fL Final    Immature Granulocytes 07/22/2021 0.6* 0.0 - 0.5 % Final    Gran # (ANC) 07/22/2021 3.2  1.8 - 7.7 K/uL Final    Immature Grans (Abs) 07/22/2021 0.03  0.00 - 0.04 K/uL Final    Lymph # 07/22/2021 1.3  1.0 - 4.8 K/uL Final    Mono # 07/22/2021 0.6  0.3 - 1.0 K/uL Final    Eos # 07/22/2021 0.1  0.0 - 0.5 K/uL Final    Baso # 07/22/2021 0.02  0.00 - 0.20 K/uL Final    nRBC 07/22/2021 0  0 /100 WBC Final    Gran % 07/22/2021 61.3  38.0 - 73.0 % Final    Lymph % 07/22/2021 25.1  18.0 - 48.0 % Final    Mono % 07/22/2021 10.9  4.0 - 15.0 % Final    Eosinophil % 07/22/2021 1.7  0.0 - 8.0 % Final    Basophil % 07/22/2021 0.4  0.0 - 1.9 % Final    Differential Method 07/22/2021 Automated   Final    Sodium 07/22/2021 143  136 - 145 mmol/L Final    Potassium 07/22/2021 4.2  3.5 - 5.1 mmol/L Final    Chloride 07/22/2021 108  95 - 110 mmol/L Final    CO2 07/22/2021 27  23 - 29 mmol/L Final    Glucose 07/22/2021 87  70 - 110 mg/dL Final    BUN 07/22/2021 13  6 - 20 mg/dL Final    Creatinine 07/22/2021 0.7  0.5 - 1.4 mg/dL Final    Calcium 07/22/2021 9.9  8.7 - 10.5 mg/dL Final    Total Protein 07/22/2021 6.4  6.0 - 8.4 g/dL Final    Albumin 07/22/2021 3.6  3.5 - 5.2 g/dL Final    Total Bilirubin 07/22/2021 0.7  0.1 - 1.0 mg/dL Final    Alkaline Phosphatase 07/22/2021 63  55 - 135 U/L Final    AST 07/22/2021 16  10 - 40 U/L Final    ALT 07/22/2021 19  10 - 44 U/L Final    Anion Gap 07/22/2021 8  8 - 16 mmol/L Final    eGFR if African American 07/22/2021 >60  >60 mL/min/1.73 m^2 Final    eGFR if non African American 07/22/2021 >60  >60 mL/min/1.73 m^2 Final    Vitamin B-12 07/22/2021 883  210 - 950 pg/mL Final    Thiamine 07/22/2021 62  38 - 122 ug/L Final     Cholesterol 2021 161  120 - 199 mg/dL Final    Triglycerides 2021 93  30 - 150 mg/dL Final    HDL 2021 38* 40 - 75 mg/dL Final    LDL Cholesterol 2021 104.4  63.0 - 159.0 mg/dL Final    HDL/Cholesterol Ratio 2021 23.6  20.0 - 50.0 % Final    Total Cholesterol/HDL Ratio 2021 4.2  2.0 - 5.0 Final    Non-HDL Cholesterol 2021 123  mg/dL Final    Iron 2021 61  30 - 160 ug/dL Final    Transferrin 2021 188* 200 - 375 mg/dL Final    TIBC 2021 278  250 - 450 ug/dL Final    Saturated Iron 2021 22  20 - 50 % Final       Past Medical History:   Diagnosis Date    Abnormal Pap smear     Cryosurgery    Anxiety     Arthritis     Essential hypertension 2020    GERD (gastroesophageal reflux disease)     Venous stasis dermatitis of left lower extremity 2018     Social History     Socioeconomic History    Marital status: Single   Tobacco Use    Smoking status: Former Smoker     Packs/day: 0.50     Years: 20.00     Pack years: 10.00     Quit date: 2005     Years since quittin.5    Smokeless tobacco: Never Used   Substance and Sexual Activity    Alcohol use: No    Drug use: No   Social History Narrative    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 -      Past Surgical History:   Procedure Laterality Date    BREAST BIOPSY      BREAST SURGERY      biopsy right side    DILATION AND CURETTAGE OF UTERUS      GYNECOLOGIC CRYOSURGERY      LAPAROSCOPIC BIOPSY OF LIVER N/A 10/25/2021    Procedure: BIOPSY, LIVER, LAPAROSCOPIC;  Surgeon: Thierry Victor MD;  Location: [facility] OR;  Service: General;  Laterality: N/A;    LAPAROSCOPIC CHOLECYSTECTOMY N/A 10/25/2021    Procedure: CHOLECYSTECTOMY, LAPAROSCOPIC- diagnostic lap - hepatic cysts;  Surgeon: Thierry Victor MD;  Location: [facility] OR;  Service: General;  Laterality: N/A;    LAPAROSCOPIC SLEEVE GASTRECTOMY N/A 2021    Procedure: GASTRECTOMY,  SLEEVE, LAPAROSCOPIC;  Surgeon: Thierry Victor MD;  Location: [facility] OR [address] [place] [address];  Service: General;  Laterality: N/A;    LASIK Bilateral     ulner nerve transposition      left     Family History   Problem Relation Age of Onset    Ovarian cancer Mother     Breast cancer Neg Hx     Colon cancer Neg Hx     Collagen disease Neg Hx     Glaucoma Neg Hx     Melanoma Neg Hx     Psoriasis Neg Hx     Lupus Neg Hx     Eczema Neg Hx        Review of patient's allergies indicates:   Allergen Reactions    Demerol [meperidine] Hives       Current Outpatient Medications:     ALPRAZolam (XANAX) 0.25 MG tablet, Take 1 tablet (0.25 mg total) by mouth daily as needed for Anxiety., Disp: 60 tablet, Rfl: 1    buPROPion (WELLBUTRIN XL) 300 MG 24 hr tablet, Take 1 tablet (300 mg total) by mouth once daily., Disp: 90 tablet, Rfl: 2    calcium carbonate-magnesium hydroxide (ROLAIDS) 550-110 mg Chew, Take 1 tablet by mouth every evening., Disp: , Rfl:     HYDROcodone-acetaminophen (NORCO) 7.5-325 mg per tablet, Take 1 tablet by mouth every 4 (four) hours as needed for Pain., Disp: 20 tablet, Rfl: 0    ibuprofen (ADVIL,MOTRIN) 800 MG tablet, Take 1 tablet (800 mg total) by mouth 3 (three) times daily., Disp: 60 tablet, Rfl: 6    ondansetron (ZOFRAN-ODT) 4 MG TbDL, Take 1 tablet (4 mg total) by mouth every 6 (six) hours as needed (nv)., Disp: 30 tablet, Rfl: 0    ondansetron (ZOFRAN-ODT) 8 MG TbDL, Dissolve 1 tablet (8 mg total) by mouth every 6 (six) hours as needed (nv)., Disp: 30 tablet, Rfl: 0    pantoprazole (PROTONIX) 20 MG tablet, Take 1 tablet (20 mg total) by mouth once daily., Disp: 90 tablet, Rfl: 1    tiZANidine (ZANAFLEX) 4 MG tablet, TAKE 1 TABLET BY MOUTH THREE TIMES A DAY AS NEEDED FOR 10 DAYS, Disp: 90 tablet, Rfl: 1    Current Facility-Administered Medications:     aminophylline injection 75 mg, 75 mg, Intravenous, Once PRN, Christiano Xiao MD    Review of Systems   Constitutional: Negative for  "chills, fever and unexpected weight change.   HENT: Negative for ear pain, rhinorrhea and sore throat.    Eyes: Negative for pain and visual disturbance.   Respiratory: Negative for cough and shortness of breath.    Cardiovascular: Negative for chest pain, palpitations and leg swelling.   Gastrointestinal: Negative for abdominal pain, diarrhea, nausea and vomiting.   Genitourinary: Negative for difficulty urinating, hematuria and vaginal bleeding.   Musculoskeletal: Negative for arthralgias.   Skin: Negative for rash.   Neurological: Negative for dizziness, weakness and headaches.   Psychiatric/Behavioral: Negative for agitation and sleep disturbance. The patient is not nervous/anxious.           Objective:      Vitals:    12/22/21 1302   BP: 130/82   Pulse: 64   Weight: 133.4 kg (294 lb)   Height: 5' 11" (1.803 m)     Physical Exam  Constitutional:       Appearance: She is well-developed and well-nourished.   HENT:      Head: Normocephalic.      Right Ear: External ear normal.      Left Ear: External ear normal.      Mouth/Throat:      Mouth: Oropharynx is clear and moist.   Eyes:      Conjunctiva/sclera: Conjunctivae normal.      Pupils: Pupils are equal, round, and reactive to light.   Neck:      Vascular: No JVD.   Cardiovascular:      Rate and Rhythm: Normal rate and regular rhythm.      Heart sounds: No murmur heard.      Pulmonary:      Effort: Pulmonary effort is normal.      Breath sounds: Normal breath sounds.   Abdominal:      General: Bowel sounds are normal.      Palpations: Abdomen is soft.   Musculoskeletal:         General: No deformity or edema. Normal range of motion.      Cervical back: Normal range of motion and neck supple.   Lymphadenopathy:      Cervical: No cervical adenopathy.   Skin:     General: Skin is warm, dry and intact.      Findings: No rash.   Neurological:      Mental Status: She is alert and oriented to person, place, and time.      Gait: Gait normal.   Psychiatric:         Mood " and Affect: Mood and affect normal.         Speech: Speech normal.         Behavior: Behavior normal.           Assessment:       1. High risk medication use    2. Anxiety    3. Physical exam    4. Gastroesophageal reflux disease, unspecified whether esophagitis present    5. History of bariatric surgery         Plan:       High risk medication use    Anxiety  -     ALPRAZolam (XANAX) 0.25 MG tablet; Take 1 tablet (0.25 mg total) by mouth daily as needed for Anxiety.  Dispense: 60 tablet; Refill: 1    Physical exam    Gastroesophageal reflux disease, unspecified whether esophagitis present    History of bariatric surgery    Other orders  -     buPROPion (WELLBUTRIN XL) 300 MG 24 hr tablet; Take 1 tablet (300 mg total) by mouth once daily.  Dispense: 90 tablet; Refill: 2      Follow up in about 6 months (around 6/22/2022), or if symptoms worsen or fail to improve, for medication management.        1/1/2022 Lianet Hansen

## 2021-12-28 ENCOUNTER — HOSPITAL ENCOUNTER (OUTPATIENT)
Dept: RADIOLOGY | Facility: HOSPITAL | Age: 61
Discharge: HOME | End: 2021-12-28
Attending: SURGERY
Payer: COMMERCIAL

## 2021-12-28 DIAGNOSIS — R16.0 LIVER MASS: ICD-10-CM

## 2021-12-28 PROCEDURE — 74181 MRI ABDOMEN W/O CONTRAST: CPT | Mod: TC,PO

## 2021-12-30 ENCOUNTER — PATIENT MESSAGE (OUTPATIENT)
Dept: BARIATRICS | Facility: CLINIC | Age: 61
End: 2021-12-30
Payer: COMMERCIAL

## 2021-12-30 ENCOUNTER — HOSPITAL ENCOUNTER (OUTPATIENT)
Dept: RADIOLOGY | Facility: HOSPITAL | Age: 61
Discharge: HOME | End: 2021-12-30
Attending: SURGERY
Payer: COMMERCIAL

## 2021-12-30 DIAGNOSIS — R19.00 INTRA-ABDOMINAL AND PELVIC SWELLING, MASS AND LUMP, UNSPECIFIED SITE: Primary | ICD-10-CM

## 2021-12-30 DIAGNOSIS — R19.00 INTRA-ABDOMINAL AND PELVIC SWELLING, MASS AND LUMP, UNSPECIFIED SITE: ICD-10-CM

## 2021-12-30 PROCEDURE — 25500020 PHARM REV CODE 255: Performed by: SURGERY

## 2021-12-30 PROCEDURE — A9585 GADOBUTROL INJECTION: HCPCS | Performed by: SURGERY

## 2021-12-30 PROCEDURE — 74183 MRI ABD W/O CNTR FLWD CNTR: CPT | Mod: 26,,, | Performed by: RADIOLOGY

## 2021-12-30 PROCEDURE — 74183 MRI ABDOMEN W WO CONTRAST: ICD-10-PCS | Mod: 26,,, | Performed by: RADIOLOGY

## 2021-12-30 PROCEDURE — 74183 MRI ABD W/O CNTR FLWD CNTR: CPT | Mod: TC

## 2021-12-30 RX ORDER — GADOBUTROL 604.72 MG/ML
10 INJECTION INTRAVENOUS
Status: COMPLETED | OUTPATIENT
Start: 2021-12-30 | End: 2021-12-30

## 2021-12-30 RX ORDER — PANTOPRAZOLE SODIUM 20 MG/1
20 TABLET, DELAYED RELEASE ORAL DAILY
Qty: 90 TABLET | Refills: 1 | Status: SHIPPED | OUTPATIENT
Start: 2021-12-30 | End: 2022-08-10 | Stop reason: SDUPTHER

## 2021-12-30 RX ADMIN — GADOBUTROL 10 ML: 604.72 INJECTION INTRAVENOUS at 05:12

## 2022-01-05 ENCOUNTER — OFFICE VISIT (OUTPATIENT)
Dept: BARIATRICS | Facility: CLINIC | Age: 62
End: 2022-01-05
Payer: COMMERCIAL

## 2022-01-05 VITALS
DIASTOLIC BLOOD PRESSURE: 77 MMHG | SYSTOLIC BLOOD PRESSURE: 116 MMHG | RESPIRATION RATE: 16 BRPM | TEMPERATURE: 98 F | BODY MASS INDEX: 39.23 KG/M2 | WEIGHT: 280.19 LBS | HEART RATE: 82 BPM | HEIGHT: 71 IN

## 2022-01-05 DIAGNOSIS — E66.01 MORBID OBESITY WITH BMI OF 50.0-59.9, ADULT: ICD-10-CM

## 2022-01-05 DIAGNOSIS — M19.90 ARTHRITIS: ICD-10-CM

## 2022-01-05 DIAGNOSIS — R16.0 LIVER MASS: ICD-10-CM

## 2022-01-05 DIAGNOSIS — R19.00 INTRA-ABDOMINAL AND PELVIC SWELLING, MASS AND LUMP, UNSPECIFIED SITE: ICD-10-CM

## 2022-01-05 DIAGNOSIS — E66.01 MORBID OBESITY: Primary | ICD-10-CM

## 2022-01-05 PROCEDURE — 99214 PR OFFICE/OUTPT VISIT, EST, LEVL IV, 30-39 MIN: ICD-10-PCS | Mod: 24,S$GLB,, | Performed by: SURGERY

## 2022-01-05 PROCEDURE — 3008F BODY MASS INDEX DOCD: CPT | Mod: CPTII,S$GLB,, | Performed by: SURGERY

## 2022-01-05 PROCEDURE — 3078F DIAST BP <80 MM HG: CPT | Mod: CPTII,S$GLB,, | Performed by: SURGERY

## 2022-01-05 PROCEDURE — 3074F PR MOST RECENT SYSTOLIC BLOOD PRESSURE < 130 MM HG: ICD-10-PCS | Mod: CPTII,S$GLB,, | Performed by: SURGERY

## 2022-01-05 PROCEDURE — 99999 PR PBB SHADOW E&M-EST. PATIENT-LVL IV: ICD-10-PCS | Mod: PBBFAC,,, | Performed by: SURGERY

## 2022-01-05 PROCEDURE — 99999 PR PBB SHADOW E&M-EST. PATIENT-LVL IV: CPT | Mod: PBBFAC,,, | Performed by: SURGERY

## 2022-01-05 PROCEDURE — 99214 OFFICE O/P EST MOD 30 MIN: CPT | Mod: 24,S$GLB,, | Performed by: SURGERY

## 2022-01-05 PROCEDURE — 1159F PR MEDICATION LIST DOCUMENTED IN MEDICAL RECORD: ICD-10-PCS | Mod: CPTII,S$GLB,, | Performed by: SURGERY

## 2022-01-05 PROCEDURE — 1159F MED LIST DOCD IN RCRD: CPT | Mod: CPTII,S$GLB,, | Performed by: SURGERY

## 2022-01-05 PROCEDURE — 3008F PR BODY MASS INDEX (BMI) DOCUMENTED: ICD-10-PCS | Mod: CPTII,S$GLB,, | Performed by: SURGERY

## 2022-01-05 PROCEDURE — 3074F SYST BP LT 130 MM HG: CPT | Mod: CPTII,S$GLB,, | Performed by: SURGERY

## 2022-01-05 PROCEDURE — 3078F PR MOST RECENT DIASTOLIC BLOOD PRESSURE < 80 MM HG: ICD-10-PCS | Mod: CPTII,S$GLB,, | Performed by: SURGERY

## 2022-01-05 NOTE — PROGRESS NOTES
Post Op Note    Surgery: gastric sleeve surgery  Date: 5/11/21  Initial weight: 398  Last weight: 292  Current weight: 280    Constipation: none  Reflux: none  Vomiting: none    Diet:  No changes- 800 calories per day just about     Exercise:   twice per week     MVI: sweta mvi     Vitals:    01/05/22 1350   BP: 116/77   Pulse: 82   Resp: 16   Temp: 97.9 °F (36.6 °C)       Body comp:  Fat Percent:  43.2 %  Fat Mass:  121 lb  FFM:  159.2 lb  TBW: 116.6 lb  TBW %:  41.6 %  BMR: 2226 kcal    PE:  NAD  RRR  Soft/nt/nd    Labs: none     A/P: s/p    Follow up mri for multiple cyst in about 1 year     Counseling for patient:    Diet: continue low carb dieting  Exercise: as much as possible  Vitamins: continue regimen    Co morbidities:     1. Morbid obesity     2. Morbid obesity with BMI of 50.0-59.9, adult     3. Liver mass     4. Arthritis     5. Intra-abdominal and pelvic swelling, mass and lump, unspecified site         -All above: Evaluated, monitored, and treated with diet and exercise program.

## 2022-02-03 ENCOUNTER — DOCUMENTATION ONLY (OUTPATIENT)
Dept: ADMINISTRATIVE | Facility: HOSPITAL | Age: 62
End: 2022-02-03
Payer: COMMERCIAL

## 2022-02-03 LAB
CTP QC/QA: YES
SARS-COV-2 AG RESP QL IA.RAPID: NEGATIVE

## 2022-02-07 ENCOUNTER — DOCUMENTATION ONLY (OUTPATIENT)
Dept: ADMINISTRATIVE | Facility: HOSPITAL | Age: 62
End: 2022-02-07
Payer: COMMERCIAL

## 2022-02-07 LAB
CTP QC/QA: YES
SARS-COV-2 AG RESP QL IA.RAPID: POSITIVE

## 2022-03-28 ENCOUNTER — PATIENT MESSAGE (OUTPATIENT)
Dept: CARDIOLOGY | Facility: CLINIC | Age: 62
End: 2022-03-28
Payer: COMMERCIAL

## 2022-03-29 DIAGNOSIS — F41.9 ANXIETY: ICD-10-CM

## 2022-03-29 DIAGNOSIS — M17.12 PRIMARY OSTEOARTHRITIS OF LEFT KNEE: ICD-10-CM

## 2022-03-29 DIAGNOSIS — M76.61 ACHILLES TENDINITIS OF RIGHT LOWER EXTREMITY: ICD-10-CM

## 2022-03-29 RX ORDER — ALPRAZOLAM 0.25 MG/1
0.25 TABLET ORAL DAILY PRN
Qty: 60 TABLET | Refills: 1 | Status: SHIPPED | OUTPATIENT
Start: 2022-03-29 | End: 2022-09-29 | Stop reason: SDUPTHER

## 2022-03-29 RX ORDER — TIZANIDINE 4 MG/1
TABLET ORAL
Qty: 90 TABLET | Refills: 1 | Status: SHIPPED | OUTPATIENT
Start: 2022-03-29

## 2022-04-05 ENCOUNTER — OFFICE VISIT (OUTPATIENT)
Dept: BARIATRICS | Facility: CLINIC | Age: 62
End: 2022-04-05
Payer: COMMERCIAL

## 2022-04-05 VITALS
SYSTOLIC BLOOD PRESSURE: 131 MMHG | HEIGHT: 71 IN | WEIGHT: 256.19 LBS | HEART RATE: 66 BPM | BODY MASS INDEX: 35.87 KG/M2 | RESPIRATION RATE: 16 BRPM | TEMPERATURE: 98 F | DIASTOLIC BLOOD PRESSURE: 73 MMHG

## 2022-04-05 DIAGNOSIS — E66.01 MORBID OBESITY: Primary | ICD-10-CM

## 2022-04-05 DIAGNOSIS — E66.01 MORBID OBESITY WITH BMI OF 50.0-59.9, ADULT: ICD-10-CM

## 2022-04-05 DIAGNOSIS — R19.00 INTRA-ABDOMINAL AND PELVIC SWELLING, MASS AND LUMP, UNSPECIFIED SITE: ICD-10-CM

## 2022-04-05 DIAGNOSIS — M17.12 PRIMARY OSTEOARTHRITIS OF LEFT KNEE: ICD-10-CM

## 2022-04-05 PROCEDURE — 1160F RVW MEDS BY RX/DR IN RCRD: CPT | Mod: CPTII,S$GLB,, | Performed by: SURGERY

## 2022-04-05 PROCEDURE — 3008F BODY MASS INDEX DOCD: CPT | Mod: CPTII,S$GLB,, | Performed by: SURGERY

## 2022-04-05 PROCEDURE — 3075F SYST BP GE 130 - 139MM HG: CPT | Mod: CPTII,S$GLB,, | Performed by: SURGERY

## 2022-04-05 PROCEDURE — 3008F PR BODY MASS INDEX (BMI) DOCUMENTED: ICD-10-PCS | Mod: CPTII,S$GLB,, | Performed by: SURGERY

## 2022-04-05 PROCEDURE — 1160F PR REVIEW ALL MEDS BY PRESCRIBER/CLIN PHARMACIST DOCUMENTED: ICD-10-PCS | Mod: CPTII,S$GLB,, | Performed by: SURGERY

## 2022-04-05 PROCEDURE — 3078F DIAST BP <80 MM HG: CPT | Mod: CPTII,S$GLB,, | Performed by: SURGERY

## 2022-04-05 PROCEDURE — 99999 PR PBB SHADOW E&M-EST. PATIENT-LVL III: ICD-10-PCS | Mod: PBBFAC,,, | Performed by: SURGERY

## 2022-04-05 PROCEDURE — 99999 PR PBB SHADOW E&M-EST. PATIENT-LVL III: CPT | Mod: PBBFAC,,, | Performed by: SURGERY

## 2022-04-05 PROCEDURE — 1159F PR MEDICATION LIST DOCUMENTED IN MEDICAL RECORD: ICD-10-PCS | Mod: CPTII,S$GLB,, | Performed by: SURGERY

## 2022-04-05 PROCEDURE — 99213 OFFICE O/P EST LOW 20 MIN: CPT | Mod: S$GLB,,, | Performed by: SURGERY

## 2022-04-05 PROCEDURE — 3075F PR MOST RECENT SYSTOLIC BLOOD PRESS GE 130-139MM HG: ICD-10-PCS | Mod: CPTII,S$GLB,, | Performed by: SURGERY

## 2022-04-05 PROCEDURE — 99213 PR OFFICE/OUTPT VISIT, EST, LEVL III, 20-29 MIN: ICD-10-PCS | Mod: S$GLB,,, | Performed by: SURGERY

## 2022-04-05 PROCEDURE — 3078F PR MOST RECENT DIASTOLIC BLOOD PRESSURE < 80 MM HG: ICD-10-PCS | Mod: CPTII,S$GLB,, | Performed by: SURGERY

## 2022-04-05 PROCEDURE — 1159F MED LIST DOCD IN RCRD: CPT | Mod: CPTII,S$GLB,, | Performed by: SURGERY

## 2022-04-05 NOTE — PROGRESS NOTES
Post Op Note    Surgery: gastric sleeve surgery  Date: 5/11/21  Initial weight: 398  Last weight: 280  Current weight: 256    Constipation: none  Reflux: none  Vomiting: none    Diet:  -b-Cottage cheese  -l-Left overs- meat   -d-Meat and vegetables    -sticks to around 850 calories per day     Exercise:  Still doing  twice per week    MVI: sweta mvi with extra iron    Vitals:    04/05/22 0836   BP: 131/73   Pulse: 66   Resp: 16   Temp: 98.1 °F (36.7 °C)       Body comp:  Fat Percent:  39.6 %  Fat Mass:  101.4 lb  FFM:  154.8 lb  TBW: 112.4 lb  TBW %:  43.9 %  BMR: 2132 kcal    PE:  NAD  RRR  Soft/nt/nd    Labs: none- ordered    A/P: s/p sleeve     Counseling for patient:    Diet: doing well with dieting; ok for healthy eating  Exercise:continue at least 20 min 3 times per week  Vitamins: daily mvi    Co morbidities:     1. Morbid obesity  CBC w/ Auto Differential    CMP    B12    B1    Lipid Panel    Iron Studies    Vitamin D 25 Hydroxy   2. Morbid obesity with BMI of 50.0-59.9, adult     3. Intra-abdominal and pelvic swelling, mass and lump, unspecified site     4. Primary osteoarthritis of left knee         -All above: Evaluated, monitored, and treated with diet and exercise program.

## 2022-04-27 RX ORDER — PANTOPRAZOLE SODIUM 20 MG/1
20 TABLET, DELAYED RELEASE ORAL DAILY
Qty: 90 TABLET | Refills: 1 | Status: CANCELLED | OUTPATIENT
Start: 2022-04-27 | End: 2022-07-26

## 2022-05-11 ENCOUNTER — LAB VISIT (OUTPATIENT)
Dept: LAB | Facility: HOSPITAL | Age: 62
End: 2022-05-11
Attending: SURGERY
Payer: COMMERCIAL

## 2022-05-11 DIAGNOSIS — E66.01 MORBID OBESITY: ICD-10-CM

## 2022-05-11 LAB
25(OH)D3+25(OH)D2 SERPL-MCNC: 29 NG/ML (ref 30–96)
ALBUMIN SERPL BCP-MCNC: 3.3 G/DL (ref 3.5–5.2)
ALP SERPL-CCNC: 76 U/L (ref 55–135)
ALT SERPL W/O P-5'-P-CCNC: 12 U/L (ref 10–44)
ANION GAP SERPL CALC-SCNC: 8 MMOL/L (ref 8–16)
AST SERPL-CCNC: 12 U/L (ref 10–40)
BASOPHILS # BLD AUTO: 0.02 K/UL (ref 0–0.2)
BASOPHILS NFR BLD: 0.4 % (ref 0–1.9)
BILIRUB SERPL-MCNC: 0.7 MG/DL (ref 0.1–1)
BUN SERPL-MCNC: 16 MG/DL (ref 6–20)
CALCIUM SERPL-MCNC: 9.1 MG/DL (ref 8.7–10.5)
CHLORIDE SERPL-SCNC: 108 MMOL/L (ref 95–110)
CHOLEST SERPL-MCNC: 142 MG/DL (ref 120–199)
CHOLEST/HDLC SERPL: 3 {RATIO} (ref 2–5)
CO2 SERPL-SCNC: 27 MMOL/L (ref 23–29)
CREAT SERPL-MCNC: 0.7 MG/DL (ref 0.5–1.4)
DIFFERENTIAL METHOD: NORMAL
EOSINOPHIL # BLD AUTO: 0.1 K/UL (ref 0–0.5)
EOSINOPHIL NFR BLD: 1.8 % (ref 0–8)
ERYTHROCYTE [DISTWIDTH] IN BLOOD BY AUTOMATED COUNT: 14.2 % (ref 11.5–14.5)
EST. GFR  (AFRICAN AMERICAN): >60 ML/MIN/1.73 M^2
EST. GFR  (NON AFRICAN AMERICAN): >60 ML/MIN/1.73 M^2
GLUCOSE SERPL-MCNC: 85 MG/DL (ref 70–110)
HCT VFR BLD AUTO: 38.1 % (ref 37–48.5)
HDLC SERPL-MCNC: 47 MG/DL (ref 40–75)
HDLC SERPL: 33.1 % (ref 20–50)
HGB BLD-MCNC: 12.8 G/DL (ref 12–16)
IMM GRANULOCYTES # BLD AUTO: 0.01 K/UL (ref 0–0.04)
IMM GRANULOCYTES NFR BLD AUTO: 0.2 % (ref 0–0.5)
IRON SERPL-MCNC: 84 UG/DL (ref 30–160)
LDLC SERPL CALC-MCNC: 84.2 MG/DL (ref 63–159)
LYMPHOCYTES # BLD AUTO: 1.4 K/UL (ref 1–4.8)
LYMPHOCYTES NFR BLD: 26.9 % (ref 18–48)
MCH RBC QN AUTO: 30.7 PG (ref 27–31)
MCHC RBC AUTO-ENTMCNC: 33.6 G/DL (ref 32–36)
MCV RBC AUTO: 91 FL (ref 82–98)
MONOCYTES # BLD AUTO: 0.6 K/UL (ref 0.3–1)
MONOCYTES NFR BLD: 10.7 % (ref 4–15)
NEUTROPHILS # BLD AUTO: 3.1 K/UL (ref 1.8–7.7)
NEUTROPHILS NFR BLD: 60 % (ref 38–73)
NONHDLC SERPL-MCNC: 95 MG/DL
NRBC BLD-RTO: 0 /100 WBC
PLATELET # BLD AUTO: 224 K/UL (ref 150–450)
PMV BLD AUTO: 10.8 FL (ref 9.2–12.9)
POTASSIUM SERPL-SCNC: 4.2 MMOL/L (ref 3.5–5.1)
PROT SERPL-MCNC: 6.1 G/DL (ref 6–8.4)
RBC # BLD AUTO: 4.17 M/UL (ref 4–5.4)
SATURATED IRON: 28 % (ref 20–50)
SODIUM SERPL-SCNC: 143 MMOL/L (ref 136–145)
TOTAL IRON BINDING CAPACITY: 295 UG/DL (ref 250–450)
TRANSFERRIN SERPL-MCNC: 199 MG/DL (ref 200–375)
TRIGL SERPL-MCNC: 54 MG/DL (ref 30–150)
VIT B12 SERPL-MCNC: 787 PG/ML (ref 210–950)
WBC # BLD AUTO: 5.13 K/UL (ref 3.9–12.7)

## 2022-05-11 PROCEDURE — 82607 VITAMIN B-12: CPT | Performed by: SURGERY

## 2022-05-11 PROCEDURE — 85025 COMPLETE CBC W/AUTO DIFF WBC: CPT | Performed by: SURGERY

## 2022-05-11 PROCEDURE — 82306 VITAMIN D 25 HYDROXY: CPT | Performed by: SURGERY

## 2022-05-11 PROCEDURE — 84466 ASSAY OF TRANSFERRIN: CPT | Performed by: SURGERY

## 2022-05-11 PROCEDURE — 80053 COMPREHEN METABOLIC PANEL: CPT | Performed by: SURGERY

## 2022-05-11 PROCEDURE — 84425 ASSAY OF VITAMIN B-1: CPT | Performed by: SURGERY

## 2022-05-11 PROCEDURE — 80061 LIPID PANEL: CPT | Performed by: SURGERY

## 2022-05-12 ENCOUNTER — PATIENT MESSAGE (OUTPATIENT)
Dept: CARDIOLOGY | Facility: CLINIC | Age: 62
End: 2022-05-12
Payer: COMMERCIAL

## 2022-05-16 LAB — VIT B1 BLD-MCNC: 85 UG/L (ref 38–122)

## 2022-05-24 ENCOUNTER — TELEPHONE (OUTPATIENT)
Dept: PODIATRY | Facility: CLINIC | Age: 62
End: 2022-05-24
Payer: COMMERCIAL

## 2022-05-26 ENCOUNTER — OFFICE VISIT (OUTPATIENT)
Dept: OPTOMETRY | Facility: CLINIC | Age: 62
End: 2022-05-26
Payer: COMMERCIAL

## 2022-05-26 DIAGNOSIS — Z01.00 EXAMINATION OF EYES AND VISION: Primary | ICD-10-CM

## 2022-05-26 DIAGNOSIS — H43.393 VITREOUS FLOATERS, BILATERAL: ICD-10-CM

## 2022-05-26 DIAGNOSIS — H52.203 MYOPIA WITH ASTIGMATISM AND PRESBYOPIA, BILATERAL: ICD-10-CM

## 2022-05-26 DIAGNOSIS — H52.13 MYOPIA WITH ASTIGMATISM AND PRESBYOPIA, BILATERAL: ICD-10-CM

## 2022-05-26 DIAGNOSIS — H52.4 MYOPIA WITH ASTIGMATISM AND PRESBYOPIA, BILATERAL: ICD-10-CM

## 2022-05-26 PROCEDURE — 92015 PR REFRACTION: ICD-10-PCS | Mod: S$GLB,,, | Performed by: OPTOMETRIST

## 2022-05-26 PROCEDURE — 92014 PR EYE EXAM, EST PATIENT,COMPREHESV: ICD-10-PCS | Mod: S$GLB,,, | Performed by: OPTOMETRIST

## 2022-05-26 PROCEDURE — 99999 PR PBB SHADOW E&M-EST. PATIENT-LVL III: CPT | Mod: PBBFAC,,, | Performed by: OPTOMETRIST

## 2022-05-26 PROCEDURE — 99999 PR PBB SHADOW E&M-EST. PATIENT-LVL III: ICD-10-PCS | Mod: PBBFAC,,, | Performed by: OPTOMETRIST

## 2022-05-26 PROCEDURE — 92014 COMPRE OPH EXAM EST PT 1/>: CPT | Mod: S$GLB,,, | Performed by: OPTOMETRIST

## 2022-05-26 PROCEDURE — 92015 DETERMINE REFRACTIVE STATE: CPT | Mod: S$GLB,,, | Performed by: OPTOMETRIST

## 2022-05-26 NOTE — PATIENT INSTRUCTIONS
Earache, No Infection (Adult)  Earaches can happen without an infection. This occurs when air and fluid build up behind the eardrum causing a feeling of fullness and discomfort and reduced hearing. This is called otitis media with effusion (OME) or serous otitis media. It means there is fluid in the middle ear. It is not the same as acute otitis media, which is typically from infection.  OME can happen when you have a cold if congestion blocks the passage that drains the middle ear. This passage is called the eustachian tube. OME may also occur with nasal allergies or after a bacterial middle ear infection.    The pain or discomfort may come and go. You may hear clicking or popping sounds when you chew or swallow. You may feel that your balance is off. Or you may hear ringing in the ear.  It often takes from several weeks up to 3 months for the fluid to clear on its own. Oral pain relievers and ear drops help if there is pain. Decongestants and antihistamines sometimes help. Antibiotics don't help since there is no infection. Your doctor may prescribe a nasal spray to help reduce swelling in the nose and eustachian tube. This can allow the ear to drain.  If your OME doesn't improve after 3 months, surgery may be used to drain the fluid and insert a small tube in the eardrum to allow continued drainage.  Because the middle ear fluid can become infected, it is important to watch for signs of an ear infection which may develop later. These signs include increased ear pain, fever, or drainage from the ear.  Home care  The following guidelines will help you care for yourself at home:  · You may use over-the-counter medicine as directed to control pain, unless another medicine was prescribed. If you have chronic liver or kidney disease or ever had a stomach ulcer or GI bleeding, talk with your doctor before using these medicines. Aspirin should never be used in anyone under 18 years of age who is ill with a fever. It  "DRY EYES -- BURNING OR VERONIQUE SYMPTOMS:  Use Over The Counter artificial tears as needed for dry eye symptoms.   Some common brands include:  Systane, Optive, Refresh, and Thera-Tears.  These drops can be used as frequently as desired, but may be most helpful use during long periods of concentrated work.  For example, reading / working at the computer. Start with 3-4x per day.     Nighttime Ophthalmic gel or ointments are available: Refresh PM, Genteal, and Lacrilube.    Avoid drops that "get redness out" (Visine, Murine, Clear Eyes), as these may contain medication that could further irritate the eyes, especially with chronic use.    ALLERGY EYES -- ITCHING SYMPTOMS:  Over the counter medications include--Pataday, Zaditor, and Alaway.  Use as directed 1-2 drops daily for symptoms of itching / watering eyes.  These drops will not help for dry eye or exposure symptoms.    REDNESS RELIEF:  Lumify---is a good redness reliever that will not cause irritation if used chronically.        FLASHES / FLOATERS / POSTERIOR VITREOUS DETACHMENT    Call the clinic if you have any further changes in symptoms.  Including:  Increased numbers of floaters or flashing lights, dimness or darkness that moves through or stays constant in your vision, or any pain in the eye (s).    You may sometimes see small specks or clouds moving in your field of vision.  They are called FLOATERS.  You can often see them when looking at a plain background, like a blank wall or blue mirta.  Floaters are actually tiny clumps of gel or cells inside the VITREOUS, the clear jelly-like fluid that fills the inside of your eye.    While these objects look like they are in front of your eye, they are actually floating inside.  What you see are the shadows they cast on the RETINA, the nerve layer at the back of the eye that senses light and allows you to see.      POSTERIOR VITREOUS DETACHMENT    The appearance of new floaters may be alarming.  If you suddenly " develop new floaters, you should contact your eye care professional  right away.    The retina can tear if the shrinking vitreous pulls away from the wall of the eye.  This sometimes causes a small amount of bleeding in the eye that may appear as new floaters.    A torn retina is always a serious problem, since it can lead to a retinal detachment.  You should see your eye care professional as soon as possible if:    even one new floater appears suddenly;  you see sudden flashes of light;  you notice other symptoms, like the loss of side vision, or a curtain closes down in your vision        POSTERIOR VITREOUS DETACHMENT is more common for people who:    are nearsighted;  have had cataract surgery;  have had YAG laser surgery of the eye;  have had inflammation inside the eye;  are over age 60.      While some floaters may remain visible, many of them will fade over time and become less noticeable.  Even if you've had some floaters for years, you should have your eyes checked as soon as possible if you notice new ones.    FLASHING LIGHTS    When the vitreous gel rubs or pulls on the retina, you may see what look like flashing lights or lightning streaks.  These flashes can appear off and on for several weeks or months.      Some people experience flashes of light that appear as jagged lines or heat waves in both eyes, lasting 10-20 minutes.  These flashes are caused by a spasm of blood vessels in the brain, which is called a migraine.    If a headache follows these flashes, it's called a migraine headache.  If   no headache occurs, these flashes are called Ophthalmic or Ocular Migraine.              may cause severe liver damage.  · You may use over-the-counter decongestants such as phenylephrine or pseudoephedrine. But they are not always helpful. Don't use nasal spray decongestants more than 3 days. Longer use can make congestion worse. Prescription nasal sprays from your doctor don't typically have those restrictions.  · Antihistamines may help if you are also having allergy symptoms.  · You may use medicines such as guaifenesin to thin mucus and promote drainage.  Follow-up care  Follow up with your healthcare provider or as advised if you are not feeling better after 3 days.  When to seek medical advice  Call your healthcare provider right away if any of the following occur:  · Your ear pain gets worse or does not start to improve   · Fever of 100.4°F (38°C) or higher, or as directed by your healthcare provider  · Fluid or blood draining from the ear  · Headache or sinus pain  · Stiff neck  · Unusual drowsiness or confusion  Date Last Reviewed: 10/1/2016  © 0855-9760 [employer], [employer] ([employer]). [address] [place] [address], [place] [address]. All rights reserved. This information is not intended as a substitute for professional medical care. Always follow your healthcare professional's instructions.

## 2022-05-26 NOTE — PROGRESS NOTES
HPI     Routine eye exam-dle-4/26/21    Pt denies any changes since last visit. Some blurred vision at distance   and near, needing updated glasses rx. Complains of eye strain. Trouble   driving at night. Complains of more floaters, no flashes.     Last edited by Maude Jackman on 5/26/2022  8:41 AM. (History)        ROS     Positive for: Eyes    Negative for: Constitutional, Gastrointestinal, Neurological, Skin,   Genitourinary, Musculoskeletal, HENT, Endocrine, Cardiovascular,   Respiratory, Psychiatric, Allergic/Imm, Heme/Lymph    Last edited by KATHERINE Islas, OD on 5/26/2022  9:03 AM. (History)        Assessment /Plan     For exam results, see Encounter Report.    Examination of eyes and vision    Myopia with astigmatism and presbyopia, bilateral    Vitreous floaters, bilateral      1. Ocular health exam OU  2. Updated specs w/ 3 pair  Distance / intermediate/ near   Fill and wear prn     Had tried PAL in past and did not adjust well    3. Moderate density syneresis / floaters OU  No gross PVD noted   RD precautions given and reviewed. Patient knows to call/ message if any further changes in symptoms occur.      Discussed and educated patient on current findings /plan.  RTC 1 year, prn if any changes / issues

## 2022-06-14 ENCOUNTER — OFFICE VISIT (OUTPATIENT)
Dept: PODIATRY | Facility: CLINIC | Age: 62
End: 2022-06-14
Payer: COMMERCIAL

## 2022-06-14 DIAGNOSIS — L60.0 ONYCHOCRYPTOSIS: Primary | ICD-10-CM

## 2022-06-14 DIAGNOSIS — I73.9 PERIPHERAL VASCULAR DISEASE: ICD-10-CM

## 2022-06-14 DIAGNOSIS — L60.8 PINCER NAIL DEFORMITY: ICD-10-CM

## 2022-06-14 DIAGNOSIS — B35.1 ONYCHOMYCOSIS DUE TO DERMATOPHYTE: ICD-10-CM

## 2022-06-14 DIAGNOSIS — G60.9 IDIOPATHIC PERIPHERAL NEUROPATHY: ICD-10-CM

## 2022-06-14 PROCEDURE — 99213 PR OFFICE/OUTPT VISIT, EST, LEVL III, 20-29 MIN: ICD-10-PCS | Mod: S$GLB,,, | Performed by: PODIATRIST

## 2022-06-14 PROCEDURE — 1159F PR MEDICATION LIST DOCUMENTED IN MEDICAL RECORD: ICD-10-PCS | Mod: CPTII,S$GLB,, | Performed by: PODIATRIST

## 2022-06-14 PROCEDURE — 1160F PR REVIEW ALL MEDS BY PRESCRIBER/CLIN PHARMACIST DOCUMENTED: ICD-10-PCS | Mod: CPTII,S$GLB,, | Performed by: PODIATRIST

## 2022-06-14 PROCEDURE — 99999 PR PBB SHADOW E&M-EST. PATIENT-LVL III: CPT | Mod: PBBFAC,,, | Performed by: PODIATRIST

## 2022-06-14 PROCEDURE — 1159F MED LIST DOCD IN RCRD: CPT | Mod: CPTII,S$GLB,, | Performed by: PODIATRIST

## 2022-06-14 PROCEDURE — 1160F RVW MEDS BY RX/DR IN RCRD: CPT | Mod: CPTII,S$GLB,, | Performed by: PODIATRIST

## 2022-06-14 PROCEDURE — 99999 PR PBB SHADOW E&M-EST. PATIENT-LVL III: ICD-10-PCS | Mod: PBBFAC,,, | Performed by: PODIATRIST

## 2022-06-14 PROCEDURE — 99213 OFFICE O/P EST LOW 20 MIN: CPT | Mod: S$GLB,,, | Performed by: PODIATRIST

## 2022-06-14 NOTE — PROGRESS NOTES
Subjective:      Patient ID: Domonique Carmona is a 61 y.o. female.    Chief Complaint: Foot Pain (pvd)      HPI:  Domonique is a 61 y.o. female who presents to the clinic for evaluation and treatment of high risk feet. Domonique has a past medical history of Abnormal Pap smear (1986), Anxiety, Arthritis, Essential hypertension (11/26/2020), GERD (gastroesophageal reflux disease), and Venous stasis dermatitis of left lower extremity (12/31/2018). The patient's chief complaint is long, thick toenails and painful ingrown toenails on both great toes. This patient has documented high risk feet requiring routine maintenance secondary to peripheral vascular disease and peripheral neuropathy.  Patient denies any other pedal complaints at this time.    PCP: Ziggy Mackay MD        Current shoe gear:  Affected Foot: Casual shoes     Unaffected Foot: Casual shoes    Review of Systems   Constitutional: Negative for appetite change, fever, chills, fatigue and unexpected weight change.   Respiratory: Negative for cough, wheezing, and shortness of breath.   Cardiovascular: Negative for chest pain, claudication, cyanosis.  Positive for leg swelling.  Musculoskeletal: Negative for back pain, arthritis, joint pain, joint swelling, myalgias, and stiffness.   Skin: Negative for rash, itching, poor wound healing, suspicious lesion.  Positive for nail bed changes, discoloration,  unusual hair distribution.   Neurological: Negative for loss of balance, paresthesias, and numbness.  Positive for sensory change, pain.  Hematological: Negative for adenopathy, bleeding, and bruising easily.   Psychiatric/Behavioral: The patient is not nervous/anxious.  Negative for altered mental status.    No results found for: HGBA1C    Past Medical History:   Diagnosis Date    Abnormal Pap smear 1986    Cryosurgery    Anxiety     Arthritis     Essential hypertension 11/26/2020    GERD (gastroesophageal reflux disease)     Venous stasis dermatitis  of left lower extremity 2018     Past Surgical History:   Procedure Laterality Date    BREAST BIOPSY      BREAST SURGERY      biopsy right side    DILATION AND CURETTAGE OF UTERUS      GYNECOLOGIC CRYOSURGERY      LAPAROSCOPIC BIOPSY OF LIVER N/A 10/25/2021    Procedure: BIOPSY, LIVER, LAPAROSCOPIC;  Surgeon: Thierry Victor MD;  Location: [facility] OR;  Service: General;  Laterality: N/A;    LAPAROSCOPIC CHOLECYSTECTOMY N/A 10/25/2021    Procedure: CHOLECYSTECTOMY, LAPAROSCOPIC- diagnostic lap - hepatic cysts;  Surgeon: Thierry Victor MD;  Location: [facility] OR;  Service: General;  Laterality: N/A;    LAPAROSCOPIC SLEEVE GASTRECTOMY N/A 2021    Procedure: GASTRECTOMY, SLEEVE, LAPAROSCOPIC;  Surgeon: Thierry Victor MD;  Location: [facility] OR [address] [place] [address];  Service: General;  Laterality: N/A;    LASIK Bilateral     ulner nerve transposition      left     Family History   Problem Relation Age of Onset    Ovarian cancer Mother     Breast cancer Neg Hx     Colon cancer Neg Hx     Collagen disease Neg Hx     Glaucoma Neg Hx     Melanoma Neg Hx     Psoriasis Neg Hx     Lupus Neg Hx     Eczema Neg Hx      Social History     Socioeconomic History    Marital status: Single   Tobacco Use    Smoking status: Former Smoker     Packs/day: 0.50     Years: 20.00     Pack years: 10.00     Quit date: 2005     Years since quittin.0    Smokeless tobacco: Never Used   Substance and Sexual Activity    Alcohol use: No    Drug use: No   Social History Narrative    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 Patient appears well-developed and well-nourished. No acute distress.     Psychiatric: Patient has a normal mood and affect. Patient's speech is normal and behavior is normal. Judgment is normal. Cognition and memory are normal.     Bilateral  pedal exam was performed today.  Vascular: Pedal pulses palpable 1/4 DP & PT.  CFT is > 3 seconds to the hallux.  Skin temperature is warm to cool proximal tibia to distal toes without localized increase in calor noted.  No erythema or ecchymosis noted to the foot or ankle.  Hair growth decreased distally to the LE.  +1/4 pitting edema noted to the LE.     Musculoskeletal: Ankle joint ROM is decreased. Subtalar joint ROM is decreased.  Midtarsal joint ROM is decreased.  1st ray ROM is decreased.  1st  MTPJ ROM is decreased.  Ankle joint dorsiflexion is restricted with the knee extended and flexed per Silfverskiold exam.    Muscle strength is 5/5 for all LE muscle groups tested.    Neurological: Epicritic sensation is Decreased to the foot.   Chaddock STR is Intact to the LE.  Tenderness to palpation noted to B/L hallux toenail distally.    Dermatological: Toenails 1-5 bilateral are elongated and distally thickened, dystrophic, brittle, discolored, and mycotic with subungal debris present.  Incurvation noted to the medial lateral borders of B/L hallux toenail without adjacent wound present.  Webspaces 1-4 bilateral are clean, dry, and intact.  Skin turgor is increased.  Skin texture shiny and atrophic.  No suspicious pigmented lesions appreciable to the foot or ankle.    Nursing note and vitals reviewed.        Assessment:       Encounter Diagnoses   Name Primary?    Onychocryptosis Yes    Onychomycosis due to dermatophyte     Pincer nail deformity     Peripheral vascular disease     Idiopathic peripheral neuropathy          Plan:       Domonique was seen today for foot pain.    Diagnoses and all orders for this visit:    Onychocryptosis    Onychomycosis due to dermatophyte    Pincer nail deformity    Peripheral vascular disease    Idiopathic peripheral neuropathy      I counseled the patient on her conditions, their implications and medical management.    - Reviewed with patient proper foot hygiene, wearing  proper shoe gear, daily foot inspections, never walking without protective shoe gear, never putting sharp instruments to feet, routine podiatric nail visits every 2-3 months.   .    Patient's main concern is the ingrowing painful hallux nails, we discussed permanent removal of one as it is too incurvated and thick to salvage with a PNA, we discussed permanent PNA with phenol to both borders of the other great toenail    She would like to return for procedures of both great toenails but needs to schedule this at a later time, will call to set it up    Return PRN for procedures    Warren Rosas DPM

## 2022-06-16 NOTE — PROGRESS NOTES
Subjective:    Patient ID:  Domonique Carmona is a 61 y.o. female who presents for follow-up of No chief complaint on file.      TELEMEDICINE VISIT      Problem List Items Addressed This Visit        Cardiac/Vascular    Essential hypertension - Primary    Peripheral vascular disease          HPI    Patient was last seen on 08/27/2021 at which time she was doing well from cardiac standpoint status post weight loss surgery.    Patient presents for telemedicine visit.    On assessment today, the patient states that she feels well today.  Feels pretty good.  Still gets some swelling in the legs    Feeling better in general.       Objective:   There were no vitals filed for this visit.     Physical Exam  Constitutional:       General: She is not in acute distress.     Appearance: She is well-developed. She is not diaphoretic.   HENT:      Head: Normocephalic and atraumatic.   Eyes:      General: No scleral icterus.     Conjunctiva/sclera: Conjunctivae normal.   Pulmonary:      Effort: No respiratory distress.      Breath sounds: No stridor.   Musculoskeletal:      Cervical back: Normal range of motion.   Neurological:      Mental Status: She is alert.   Psychiatric:         Behavior: Behavior normal.             Current Outpatient Medications on File Prior to Visit   Medication Sig    ALPRAZolam (XANAX) 0.25 MG tablet Take 1 tablet (0.25 mg total) by mouth daily as needed for Anxiety.    buPROPion (WELLBUTRIN XL) 300 MG 24 hr tablet Take 1 tablet (300 mg total) by mouth once daily.    buPROPion (WELLBUTRIN XL) 300 MG 24 hr tablet Take 1 tablet by mouth daily.    calcium carbonate-magnesium hydroxide (ROLAIDS) 550-110 mg Chew Take 1 tablet by mouth every evening.    ibuprofen (ADVIL,MOTRIN) 800 MG tablet Take 1 tablet (800 mg total) by mouth 3 (three) times daily.    pantoprazole (PROTONIX) 20 MG tablet Take 1 tablet (20 mg total) by mouth once daily.    tiZANidine (ZANAFLEX) 4 MG tablet TAKE 1 TABLET BY MOUTH  THREE TIMES A DAY AS NEEDED FOR 10 DAYS     Current Facility-Administered Medications on File Prior to Visit   Medication    aminophylline injection 75 mg       Lipid Panel:   Lab Results   Component Value Date    CHOL 142 05/11/2022    HDL 47 05/11/2022    LDLCALC 84.2 05/11/2022    TRIG 54 05/11/2022    CHOLHDL 33.1 05/11/2022       The 10-year ASCVD risk score (Nile LEMUS Jr., et al., 2013) is: 2.6%    Values used to calculate the score:      Age: 61 years      Sex: Female      Is Non- : No      Diabetic: No      Tobacco smoker: No      Systolic Blood Pressure: 131 mmHg      Is BP treated: No      HDL Cholesterol: 47 mg/dL      Total Cholesterol: 142 mg/dL    All pertinent labs, imaging, and EKGs reviewed.  Patient's most recent EKG tracing was personally interpreted by this provider.    Assessment:       1. Essential hypertension    2. Peripheral vascular disease         Plan:     Symptoms OK today  BP/Pulse unable to be assessed on telemedicine visit  Most recent echocardiogram reviewed personally      BLE venous doppler  Continue home BP log   Continue routine cardiovascular exercise     Continue other cardiac medications  Mediterranean Diet/Cardiovascular Exercise Program    Patient queried and all questions were answered.    F/u in 1 year    The patient location is: Home   The chief complaint leading to consultation is:  Please see problem list above  Visit type: Virtual visit with synchronous audio and video  Total time spent with patient: 15 minutes   Each patient to whom he or she provides medical services by telemedicine is:  (1) informed of the relationship between the physician and patient and the respective role of any other health care provider with respect to management of the patient; and (2) notified that he or she may decline to receive medical services by telemedicine and may withdraw from such care at any time.    Notes: See above       Signed:    Christiano Xiao  MD  6/17/2022 9:07 AM

## 2022-06-17 ENCOUNTER — OFFICE VISIT (OUTPATIENT)
Dept: CARDIOLOGY | Facility: CLINIC | Age: 62
End: 2022-06-17
Payer: COMMERCIAL

## 2022-06-17 DIAGNOSIS — I73.9 PERIPHERAL VASCULAR DISEASE: ICD-10-CM

## 2022-06-17 DIAGNOSIS — I10 ESSENTIAL HYPERTENSION: Primary | ICD-10-CM

## 2022-06-17 DIAGNOSIS — R60.0 LOWER EXTREMITY EDEMA: ICD-10-CM

## 2022-06-17 PROCEDURE — 1159F PR MEDICATION LIST DOCUMENTED IN MEDICAL RECORD: ICD-10-PCS | Mod: CPTII,95,, | Performed by: INTERNAL MEDICINE

## 2022-06-17 PROCEDURE — 99214 OFFICE O/P EST MOD 30 MIN: CPT | Mod: 95,,, | Performed by: INTERNAL MEDICINE

## 2022-06-17 PROCEDURE — 99214 PR OFFICE/OUTPT VISIT, EST, LEVL IV, 30-39 MIN: ICD-10-PCS | Mod: 95,,, | Performed by: INTERNAL MEDICINE

## 2022-06-17 PROCEDURE — 1159F MED LIST DOCD IN RCRD: CPT | Mod: CPTII,95,, | Performed by: INTERNAL MEDICINE

## 2022-06-17 PROCEDURE — 1160F RVW MEDS BY RX/DR IN RCRD: CPT | Mod: CPTII,95,, | Performed by: INTERNAL MEDICINE

## 2022-06-17 PROCEDURE — 1160F PR REVIEW ALL MEDS BY PRESCRIBER/CLIN PHARMACIST DOCUMENTED: ICD-10-PCS | Mod: CPTII,95,, | Performed by: INTERNAL MEDICINE

## 2022-07-25 ENCOUNTER — IMMUNIZATION (OUTPATIENT)
Dept: PRIMARY CARE CLINIC | Facility: CLINIC | Age: 62
End: 2022-07-25
Payer: COMMERCIAL

## 2022-07-25 DIAGNOSIS — Z23 NEED FOR VACCINATION: Primary | ICD-10-CM

## 2022-07-25 PROCEDURE — 0054A COVID-19, MRNA, LNP-S, PF, 30 MCG/0.3 ML DOSE VACCINE (PFIZER): CPT | Mod: S$GLB,,, | Performed by: FAMILY MEDICINE

## 2022-07-25 PROCEDURE — 91305 COVID-19, MRNA, LNP-S, PF, 30 MCG/0.3 ML DOSE VACCINE (PFIZER): ICD-10-PCS | Mod: S$GLB,,, | Performed by: FAMILY MEDICINE

## 2022-07-25 PROCEDURE — 91305 COVID-19, MRNA, LNP-S, PF, 30 MCG/0.3 ML DOSE VACCINE (PFIZER): CPT | Mod: S$GLB,,, | Performed by: FAMILY MEDICINE

## 2022-07-25 PROCEDURE — 0054A COVID-19, MRNA, LNP-S, PF, 30 MCG/0.3 ML DOSE VACCINE (PFIZER): ICD-10-PCS | Mod: S$GLB,,, | Performed by: FAMILY MEDICINE

## 2022-08-10 ENCOUNTER — PATIENT MESSAGE (OUTPATIENT)
Dept: BARIATRICS | Facility: CLINIC | Age: 62
End: 2022-08-10
Payer: COMMERCIAL

## 2022-08-10 RX ORDER — PANTOPRAZOLE SODIUM 20 MG/1
20 TABLET, DELAYED RELEASE ORAL DAILY
Qty: 90 TABLET | Refills: 1 | Status: SHIPPED | OUTPATIENT
Start: 2022-08-10 | End: 2023-02-20 | Stop reason: SDUPTHER

## 2022-09-26 ENCOUNTER — TELEPHONE (OUTPATIENT)
Dept: FAMILY MEDICINE | Facility: CLINIC | Age: 62
End: 2022-09-26

## 2022-09-26 RX ORDER — BUPROPION HYDROCHLORIDE 300 MG/1
TABLET ORAL
Qty: 90 TABLET | Refills: 1 | Status: CANCELLED | OUTPATIENT
Start: 2022-09-26

## 2022-09-26 NOTE — TELEPHONE ENCOUNTER
Needs ov. Was told to follow up last December in 6 months.   Patient is requesting wellbutrin from [facility] pharmacy

## 2022-09-28 RX ORDER — BUPROPION HYDROCHLORIDE 300 MG/1
300 TABLET ORAL DAILY
Qty: 90 TABLET | Refills: 2 | Status: CANCELLED | OUTPATIENT
Start: 2022-09-28 | End: 2023-09-28

## 2022-09-29 ENCOUNTER — OFFICE VISIT (OUTPATIENT)
Dept: FAMILY MEDICINE | Facility: CLINIC | Age: 62
End: 2022-09-29
Payer: COMMERCIAL

## 2022-09-29 VITALS
SYSTOLIC BLOOD PRESSURE: 120 MMHG | WEIGHT: 254 LBS | HEART RATE: 76 BPM | DIASTOLIC BLOOD PRESSURE: 70 MMHG | HEIGHT: 71 IN | BODY MASS INDEX: 35.56 KG/M2

## 2022-09-29 DIAGNOSIS — F41.9 ANXIETY: ICD-10-CM

## 2022-09-29 DIAGNOSIS — Z98.84 HISTORY OF BARIATRIC SURGERY: ICD-10-CM

## 2022-09-29 DIAGNOSIS — K21.9 GASTROESOPHAGEAL REFLUX DISEASE, UNSPECIFIED WHETHER ESOPHAGITIS PRESENT: Primary | ICD-10-CM

## 2022-09-29 DIAGNOSIS — F33.0 MDD (MAJOR DEPRESSIVE DISORDER), RECURRENT EPISODE, MILD: ICD-10-CM

## 2022-09-29 DIAGNOSIS — M17.12 PRIMARY OSTEOARTHRITIS OF LEFT KNEE: ICD-10-CM

## 2022-09-29 PROCEDURE — 3074F PR MOST RECENT SYSTOLIC BLOOD PRESSURE < 130 MM HG: ICD-10-PCS | Mod: CPTII,S$GLB,, | Performed by: NURSE PRACTITIONER

## 2022-09-29 PROCEDURE — 99214 OFFICE O/P EST MOD 30 MIN: CPT | Mod: S$GLB,,, | Performed by: NURSE PRACTITIONER

## 2022-09-29 PROCEDURE — 3074F SYST BP LT 130 MM HG: CPT | Mod: CPTII,S$GLB,, | Performed by: NURSE PRACTITIONER

## 2022-09-29 PROCEDURE — 3078F DIAST BP <80 MM HG: CPT | Mod: CPTII,S$GLB,, | Performed by: NURSE PRACTITIONER

## 2022-09-29 PROCEDURE — 3078F PR MOST RECENT DIASTOLIC BLOOD PRESSURE < 80 MM HG: ICD-10-PCS | Mod: CPTII,S$GLB,, | Performed by: NURSE PRACTITIONER

## 2022-09-29 PROCEDURE — 99214 PR OFFICE/OUTPT VISIT, EST, LEVL IV, 30-39 MIN: ICD-10-PCS | Mod: S$GLB,,, | Performed by: NURSE PRACTITIONER

## 2022-09-29 PROCEDURE — 3008F PR BODY MASS INDEX (BMI) DOCUMENTED: ICD-10-PCS | Mod: CPTII,S$GLB,, | Performed by: NURSE PRACTITIONER

## 2022-09-29 PROCEDURE — 3008F BODY MASS INDEX DOCD: CPT | Mod: CPTII,S$GLB,, | Performed by: NURSE PRACTITIONER

## 2022-09-29 RX ORDER — BUPROPION HYDROCHLORIDE 300 MG/1
300 TABLET ORAL DAILY
Qty: 90 TABLET | Refills: 2 | Status: SHIPPED | OUTPATIENT
Start: 2022-09-29 | End: 2023-06-26 | Stop reason: SDUPTHER

## 2022-09-29 RX ORDER — ALPRAZOLAM 0.25 MG/1
0.25 TABLET ORAL DAILY PRN
Qty: 60 TABLET | Refills: 1 | Status: SHIPPED | OUTPATIENT
Start: 2022-09-29

## 2022-09-29 NOTE — PROGRESS NOTES
SUBJECTIVE:    Patient ID: Domonique Carmona is a 62 y.o. female.    Chief Complaint: Follow-up (Went over meds verbally, C-scope declined// SW)    62y.o. female presents for check up .treated for anxiety and depression, gerd,  Had gastric sleeve in may with dr. Victor. Since surgery about 150lb weight loss. Doing well. Tolerating meals. Working out with  several days per week. Bp is well controlled. No longer taking htn medications.  Taking wellbutrin for depression. Controlling symptoms. Takes xanax as needed.  Controlling symptoms. Last labs were in may      Lab Visit on 05/11/2022   Component Date Value Ref Range Status    WBC 05/11/2022 5.13  3.90 - 12.70 K/uL Final    RBC 05/11/2022 4.17  4.00 - 5.40 M/uL Final    Hemoglobin 05/11/2022 12.8  12.0 - 16.0 g/dL Final    Hematocrit 05/11/2022 38.1  37.0 - 48.5 % Final    MCV 05/11/2022 91  82 - 98 fL Final    MCH 05/11/2022 30.7  27.0 - 31.0 pg Final    MCHC 05/11/2022 33.6  32.0 - 36.0 g/dL Final    RDW 05/11/2022 14.2  11.5 - 14.5 % Final    Platelets 05/11/2022 224  150 - 450 K/uL Final    MPV 05/11/2022 10.8  9.2 - 12.9 fL Final    Immature Granulocytes 05/11/2022 0.2  0.0 - 0.5 % Final    Gran # (ANC) 05/11/2022 3.1  1.8 - 7.7 K/uL Final    Immature Grans (Abs) 05/11/2022 0.01  0.00 - 0.04 K/uL Final    Lymph # 05/11/2022 1.4  1.0 - 4.8 K/uL Final    Mono # 05/11/2022 0.6  0.3 - 1.0 K/uL Final    Eos # 05/11/2022 0.1  0.0 - 0.5 K/uL Final    Baso # 05/11/2022 0.02  0.00 - 0.20 K/uL Final    nRBC 05/11/2022 0  0 /100 WBC Final    Gran % 05/11/2022 60.0  38.0 - 73.0 % Final    Lymph % 05/11/2022 26.9  18.0 - 48.0 % Final    Mono % 05/11/2022 10.7  4.0 - 15.0 % Final    Eosinophil % 05/11/2022 1.8  0.0 - 8.0 % Final    Basophil % 05/11/2022 0.4  0.0 - 1.9 % Final    Differential Method 05/11/2022 Automated   Final    Sodium 05/11/2022 143  136 - 145 mmol/L Final    Potassium 05/11/2022 4.2  3.5 - 5.1 mmol/L Final    Chloride 05/11/2022 108  95 - 110  mmol/L Final    CO2 05/11/2022 27  23 - 29 mmol/L Final    Glucose 05/11/2022 85  70 - 110 mg/dL Final    BUN 05/11/2022 16  6 - 20 mg/dL Final    Creatinine 05/11/2022 0.7  0.5 - 1.4 mg/dL Final    Calcium 05/11/2022 9.1  8.7 - 10.5 mg/dL Final    Total Protein 05/11/2022 6.1  6.0 - 8.4 g/dL Final    Albumin 05/11/2022 3.3 (L)  3.5 - 5.2 g/dL Final    Total Bilirubin 05/11/2022 0.7  0.1 - 1.0 mg/dL Final    Alkaline Phosphatase 05/11/2022 76  55 - 135 U/L Final    AST 05/11/2022 12  10 - 40 U/L Final    ALT 05/11/2022 12  10 - 44 U/L Final    Anion Gap 05/11/2022 8  8 - 16 mmol/L Final    eGFR if African American 05/11/2022 >60  >60 mL/min/1.73 m^2 Final    eGFR if non African American 05/11/2022 >60  >60 mL/min/1.73 m^2 Final    Vitamin B-12 05/11/2022 787  210 - 950 pg/mL Final    Thiamine 05/11/2022 85  38 - 122 ug/L Final    Cholesterol 05/11/2022 142  120 - 199 mg/dL Final    Triglycerides 05/11/2022 54  30 - 150 mg/dL Final    HDL 05/11/2022 47  40 - 75 mg/dL Final    LDL Cholesterol 05/11/2022 84.2  63.0 - 159.0 mg/dL Final    HDL/Cholesterol Ratio 05/11/2022 33.1  20.0 - 50.0 % Final    Total Cholesterol/HDL Ratio 05/11/2022 3.0  2.0 - 5.0 Final    Non-HDL Cholesterol 05/11/2022 95  mg/dL Final    Iron 05/11/2022 84  30 - 160 ug/dL Final    Transferrin 05/11/2022 199 (L)  200 - 375 mg/dL Final    TIBC 05/11/2022 295  250 - 450 ug/dL Final    Saturated Iron 05/11/2022 28  20 - 50 % Final    Vit D, 25-Hydroxy 05/11/2022 29 (L)  30 - 96 ng/mL Final       Past Medical History:   Diagnosis Date    Abnormal Pap smear 1986    Cryosurgery    Anxiety     Arthritis     Essential hypertension 11/26/2020    GERD (gastroesophageal reflux disease)     Venous stasis dermatitis of left lower extremity 12/31/2018     Social History     Socioeconomic History    Marital status: Single   Tobacco Use    Smoking status: Former     Packs/day: 0.50     Years: 20.00     Pack years: 10.00     Types: Cigarettes     Quit date:  2005     Years since quittin.3    Smokeless tobacco: Never   Substance and Sexual Activity    Alcohol use: No    Drug use: No   Social History Narrative    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 -      Past Surgical History:   Procedure Laterality Date    BREAST BIOPSY      BREAST SURGERY      biopsy right side    DILATION AND CURETTAGE OF UTERUS      GYNECOLOGIC CRYOSURGERY      LAPAROSCOPIC BIOPSY OF LIVER N/A 10/25/2021    Procedure: BIOPSY, LIVER, LAPAROSCOPIC;  Surgeon: Thierry Victor MD;  Location: [facility] OR;  Service: General;  Laterality: N/A;    LAPAROSCOPIC CHOLECYSTECTOMY N/A 10/25/2021    Procedure: CHOLECYSTECTOMY, LAPAROSCOPIC- diagnostic lap - hepatic cysts;  Surgeon: Thierry Victor MD;  Location: [facility] OR;  Service: General;  Laterality: N/A;    LAPAROSCOPIC SLEEVE GASTRECTOMY N/A 2021    Procedure: GASTRECTOMY, SLEEVE, LAPAROSCOPIC;  Surgeon: Thierry Victor MD;  Location: [facility] OR [address] [place] [address];  Service: General;  Laterality: N/A;    LASIK Bilateral     ulner nerve transposition      left     Family History   Problem Relation Age of Onset    Ovarian cancer Mother     Breast cancer Neg Hx     Colon cancer Neg Hx     Collagen disease Neg Hx     Glaucoma Neg Hx     Melanoma Neg Hx     Psoriasis Neg Hx     Lupus Neg Hx     Eczema Neg Hx        Review of patient's allergies indicates:   Allergen Reactions    Demerol [meperidine] Hives       Current Outpatient Medications:     calcium carbonate-magnesium hydroxide (ROLAIDS) 550-110 mg Chew, Take 1 tablet by mouth every evening., Disp: , Rfl:     ibuprofen (ADVIL,MOTRIN) 800 MG tablet, Take 1 tablet (800 mg total) by mouth 3 (three) times daily., Disp: 60 tablet, Rfl: 6    pantoprazole (PROTONIX) 20 MG tablet, Take 1 tablet (20 mg total) by mouth once daily., Disp: 90 tablet, Rfl: 1    tiZANidine (ZANAFLEX) 4 MG tablet, TAKE 1 TABLET BY MOUTH THREE TIMES A DAY AS NEEDED FOR 10 DAYS, Disp: 90 tablet, Rfl:  "1    ALPRAZolam (XANAX) 0.25 MG tablet, Take 1 tablet (0.25 mg total) by mouth daily as needed for Anxiety., Disp: 60 tablet, Rfl: 1    buPROPion (WELLBUTRIN XL) 300 MG 24 hr tablet, Take 1 tablet (300 mg total) by mouth once daily., Disp: 90 tablet, Rfl: 2    Current Facility-Administered Medications:     aminophylline injection 75 mg, 75 mg, Intravenous, Once PRN, Christiano Xiao MD    Review of Systems   Constitutional:  Negative for chills, fever and unexpected weight change.   HENT:  Negative for ear pain, rhinorrhea and sore throat.    Eyes:  Negative for pain and visual disturbance.   Respiratory:  Negative for cough and shortness of breath.    Cardiovascular:  Negative for chest pain, palpitations and leg swelling.   Gastrointestinal:  Negative for abdominal pain, diarrhea, nausea and vomiting.   Genitourinary:  Negative for difficulty urinating, hematuria and vaginal bleeding.   Musculoskeletal:  Negative for arthralgias.   Skin:  Negative for rash.   Neurological:  Negative for dizziness, weakness and headaches.   Psychiatric/Behavioral:  Negative for agitation and sleep disturbance. The patient is not nervous/anxious.         Objective:      Vitals:    09/29/22 0859   BP: 120/70   Pulse: 76   Weight: 115.2 kg (254 lb)   Height: 5' 11" (1.803 m)     Physical Exam  Constitutional:       General: She is not in acute distress.     Appearance: Normal appearance. She is well-developed.   HENT:      Head: Normocephalic.      Right Ear: External ear normal.      Left Ear: External ear normal.   Eyes:      Conjunctiva/sclera: Conjunctivae normal.      Pupils: Pupils are equal, round, and reactive to light.   Neck:      Vascular: No JVD.   Cardiovascular:      Rate and Rhythm: Normal rate and regular rhythm.      Heart sounds: No murmur heard.  Pulmonary:      Effort: Pulmonary effort is normal.      Breath sounds: Normal breath sounds.   Abdominal:      General: Bowel sounds are normal.      Palpations: " Abdomen is soft.   Musculoskeletal:         General: No deformity. Normal range of motion.      Cervical back: Normal range of motion and neck supple.   Lymphadenopathy:      Cervical: No cervical adenopathy.   Skin:     General: Skin is warm and dry.      Findings: No rash.   Neurological:      Mental Status: She is alert and oriented to person, place, and time.      Gait: Gait normal.   Psychiatric:         Speech: Speech normal.         Behavior: Behavior normal.         Assessment:       1. Gastroesophageal reflux disease, unspecified whether esophagitis present    2. Anxiety    3. Primary osteoarthritis of left knee    4. MDD (major depressive disorder), recurrent episode, mild    5. History of bariatric surgery           Plan:       Gastroesophageal reflux disease, unspecified whether esophagitis present    Anxiety  -     ALPRAZolam (XANAX) 0.25 MG tablet; Take 1 tablet (0.25 mg total) by mouth daily as needed for Anxiety.  Dispense: 60 tablet; Refill: 1    Primary osteoarthritis of left knee    MDD (major depressive disorder), recurrent episode, mild    History of bariatric surgery  Comments:  continue as is. doing well    Other orders  -     buPROPion (WELLBUTRIN XL) 300 MG 24 hr tablet; Take 1 tablet (300 mg total) by mouth once daily.  Dispense: 90 tablet; Refill: 2    Follow up in about 6 months (around 3/29/2023), or if symptoms worsen or fail to improve, for medication management.        10/10/2022 Lianet Hansen

## 2022-10-10 ENCOUNTER — OFFICE VISIT (OUTPATIENT)
Dept: DERMATOLOGY | Facility: CLINIC | Age: 62
End: 2022-10-10
Payer: COMMERCIAL

## 2022-10-10 DIAGNOSIS — L72.0 EIC (EPIDERMAL INCLUSION CYST): Primary | ICD-10-CM

## 2022-10-10 PROCEDURE — 1159F MED LIST DOCD IN RCRD: CPT | Mod: CPTII,S$GLB,, | Performed by: STUDENT IN AN ORGANIZED HEALTH CARE EDUCATION/TRAINING PROGRAM

## 2022-10-10 PROCEDURE — 99999 PR PBB SHADOW E&M-EST. PATIENT-LVL III: ICD-10-PCS | Mod: PBBFAC,,, | Performed by: STUDENT IN AN ORGANIZED HEALTH CARE EDUCATION/TRAINING PROGRAM

## 2022-10-10 PROCEDURE — 11104 PR PUNCH BIOPSY, SKIN, SINGLE LESION: ICD-10-PCS | Mod: S$GLB,,, | Performed by: STUDENT IN AN ORGANIZED HEALTH CARE EDUCATION/TRAINING PROGRAM

## 2022-10-10 PROCEDURE — 11104 PUNCH BX SKIN SINGLE LESION: CPT | Mod: S$GLB,,, | Performed by: STUDENT IN AN ORGANIZED HEALTH CARE EDUCATION/TRAINING PROGRAM

## 2022-10-10 PROCEDURE — 88304 PR  SURG PATH,LEVEL III: ICD-10-PCS | Mod: 26,,, | Performed by: PATHOLOGY

## 2022-10-10 PROCEDURE — 99999 PR PBB SHADOW E&M-EST. PATIENT-LVL III: CPT | Mod: PBBFAC,,, | Performed by: STUDENT IN AN ORGANIZED HEALTH CARE EDUCATION/TRAINING PROGRAM

## 2022-10-10 PROCEDURE — 88304 TISSUE EXAM BY PATHOLOGIST: CPT | Performed by: PATHOLOGY

## 2022-10-10 PROCEDURE — 1159F PR MEDICATION LIST DOCUMENTED IN MEDICAL RECORD: ICD-10-PCS | Mod: CPTII,S$GLB,, | Performed by: STUDENT IN AN ORGANIZED HEALTH CARE EDUCATION/TRAINING PROGRAM

## 2022-10-10 PROCEDURE — 1160F RVW MEDS BY RX/DR IN RCRD: CPT | Mod: CPTII,S$GLB,, | Performed by: STUDENT IN AN ORGANIZED HEALTH CARE EDUCATION/TRAINING PROGRAM

## 2022-10-10 PROCEDURE — 88304 TISSUE EXAM BY PATHOLOGIST: CPT | Mod: 26,,, | Performed by: PATHOLOGY

## 2022-10-10 PROCEDURE — 99499 UNLISTED E&M SERVICE: CPT | Mod: S$GLB,,, | Performed by: STUDENT IN AN ORGANIZED HEALTH CARE EDUCATION/TRAINING PROGRAM

## 2022-10-10 PROCEDURE — 1160F PR REVIEW ALL MEDS BY PRESCRIBER/CLIN PHARMACIST DOCUMENTED: ICD-10-PCS | Mod: CPTII,S$GLB,, | Performed by: STUDENT IN AN ORGANIZED HEALTH CARE EDUCATION/TRAINING PROGRAM

## 2022-10-10 PROCEDURE — 99499 NO LOS: ICD-10-PCS | Mod: S$GLB,,, | Performed by: STUDENT IN AN ORGANIZED HEALTH CARE EDUCATION/TRAINING PROGRAM

## 2022-10-10 NOTE — PROGRESS NOTES
Subjective:       Patient ID:  Domonique Carmona is a 62 y.o. female who presents for   Chief Complaint   Patient presents with    Spot     Left breast     LOV 3/11/21    Patient here today for spot on left breast x years. Patient states it is getting bigger. Had drainage once when she tried popping it. Not tender to touch.    Review of Systems   Constitutional:  Negative for fever, chills and fatigue.   Respiratory:  Negative for cough and shortness of breath.    Gastrointestinal:  Negative for nausea and vomiting.      Objective:    Physical Exam   Constitutional: She appears well-developed and well-nourished.   Neurological: She is alert and oriented to person, place, and time.   Psychiatric: She has a normal mood and affect.   Skin:   Areas Examined (abnormalities noted in diagram):   Chest / Axilla Inspection Performed            Diagram Legend     Erythematous scaling macule/papule c/w actinic keratosis       Vascular papule c/w angioma      Pigmented verrucoid papule/plaque c/w seborrheic keratosis      Yellow umbilicated papule c/w sebaceous hyperplasia      Irregularly shaped tan macule c/w lentigo     1-2 mm smooth white papules consistent with Milia      Movable subcutaneous cyst with punctum c/w epidermal inclusion cyst      Subcutaneous movable cyst c/w pilar cyst      Firm pink to brown papule c/w dermatofibroma      Pedunculated fleshy papule(s) c/w skin tag(s)      Evenly pigmented macule c/w junctional nevus     Mildly variegated pigmented, slightly irregular-bordered macule c/w mildly atypical nevus      Flesh colored to evenly pigmented papule c/w intradermal nevus       Pink pearly papule/plaque c/w basal cell carcinoma      Erythematous hyperkeratotic cursted plaque c/w SCC      Surgical scar with no sign of skin cancer recurrence      Open and closed comedones      Inflammatory papules and pustules      Verrucoid papule consistent consistent with wart     Erythematous eczematous patches and  plaques     Dystrophic onycholytic nail with subungual debris c/w onychomycosis     Umbilicated papule    Erythematous-base heme-crusted tan verrucoid plaque consistent with inflamed seborrheic keratosis     Erythematous Silvery Scaling Plaque c/w Psoriasis     See annotation        Assessment / Plan:      Pathology Orders:       Normal Orders This Visit    Specimen to Pathology, Dermatology     Questions:    Procedure Type: Dermatology and skin neoplasms    Number of Specimens: 1    ------------------------: -------------------------    Spec 1 Procedure: Biopsy    Spec 1 Clinical Impression: EIC    Spec 1 Source: left breast    Release to patient:           EIC (epidermal inclusion cyst)  -     Specimen to Pathology, Dermatology  Punch biopsy procedure note:  Punch biopsy performed after verbal consent obtained. Area marked and prepped with alcohol. Approximately 1cc of 1% lidocaine with epinephrine injected. 8 mm disposable punch used to remove lesion. Hemostasis obtained and biopsy site closed with 3 Prolene sutures. Wound care instructions reviewed with patient and handout given.         2 week s/r  No follow-ups on file.

## 2022-10-10 NOTE — PATIENT INSTRUCTIONS
Punch Biopsy Wound Care    Your doctor has performed a punch biopsy today.  A band aid and antibiotic ointment has been placed over the site.  This should remain in place for 24 hours.  It is recommended that you keep the area dry for the first 24 hours.  After 24 hours, you may remove the band aid and wash the area with warm soap and water and apply Vaseline jelly.  Many patients prefer to use Neosporin or Bacitracin ointment.  This is acceptable; however know that you can develop an allergy to this medication even if you have used it safely for years.  It is important to keep the area moist.  Letting it dry out and get air slows healing time, will worsen the scar, and make it more difficult to remove the stitches if they were placed.  Band aid is optional after first 24 hours.      If you notice increasing redness, tenderness, pain, or yellow drainage at the biopsy or surgical site, please notify your doctor.  These are signs of an infection.    If your biopsy/surgical site is bleeding, apply firm pressure for 15 minutes straight.  Repeat for another 15 minutes, if it is still bleeding.   If the surgical site continues to bleed, then please contact your doctor.      [address] [facility] [place] [address]/ [phone] FAX/ [email or web address]

## 2022-10-14 LAB
FINAL PATHOLOGIC DIAGNOSIS: NORMAL
GROSS: NORMAL
Lab: NORMAL
MICROSCOPIC EXAM: NORMAL

## 2022-10-17 ENCOUNTER — OFFICE VISIT (OUTPATIENT)
Dept: OPTOMETRY | Facility: CLINIC | Age: 62
End: 2022-10-17
Payer: COMMERCIAL

## 2022-10-17 DIAGNOSIS — H52.4 MYOPIA WITH ASTIGMATISM AND PRESBYOPIA, BILATERAL: Primary | ICD-10-CM

## 2022-10-17 DIAGNOSIS — H52.203 MYOPIA WITH ASTIGMATISM AND PRESBYOPIA, BILATERAL: Primary | ICD-10-CM

## 2022-10-17 DIAGNOSIS — H52.13 MYOPIA WITH ASTIGMATISM AND PRESBYOPIA, BILATERAL: Primary | ICD-10-CM

## 2022-10-17 PROCEDURE — 99999 PR PBB SHADOW E&M-EST. PATIENT-LVL II: CPT | Mod: PBBFAC,,, | Performed by: OPTOMETRIST

## 2022-10-17 PROCEDURE — 1159F MED LIST DOCD IN RCRD: CPT | Mod: CPTII,S$GLB,, | Performed by: OPTOMETRIST

## 2022-10-17 PROCEDURE — 99499 UNLISTED E&M SERVICE: CPT | Mod: S$GLB,,, | Performed by: OPTOMETRIST

## 2022-10-17 PROCEDURE — 1159F PR MEDICATION LIST DOCUMENTED IN MEDICAL RECORD: ICD-10-PCS | Mod: CPTII,S$GLB,, | Performed by: OPTOMETRIST

## 2022-10-17 PROCEDURE — 99499 NO LOS: ICD-10-PCS | Mod: S$GLB,,, | Performed by: OPTOMETRIST

## 2022-10-17 PROCEDURE — 99999 PR PBB SHADOW E&M-EST. PATIENT-LVL II: ICD-10-PCS | Mod: PBBFAC,,, | Performed by: OPTOMETRIST

## 2022-10-17 NOTE — PROGRESS NOTES
HPI    Glasses check-dle-5/22    Pt complains of blurred with with distance glasses.   Last edited by Maude Jackman on 10/17/2022 11:15 AM.        ROS    Positive for: Eyes  Negative for: Constitutional, Gastrointestinal, Neurological, Skin,   Genitourinary, Musculoskeletal, HENT, Endocrine, Cardiovascular,   Respiratory, Psychiatric, Allergic/Imm, Heme/Lymph  Last edited by KATHERINE Islas, OD on 10/17/2022 11:47 AM.        Assessment /Plan     For exam results, see Encounter Report.    Myopia with astigmatism and presbyopia, bilateral      Had 3 pr specs made  Happy w/ near and intermediate lenses     Feels slightly reduced at distance   Recheck refraction ---demo slight changes vs old Rx in hallway at long distance--appears stable without appreciable change    Continue with current Rx    RTC full exam late May / early June 2023

## 2022-10-18 ENCOUNTER — TELEPHONE (OUTPATIENT)
Dept: DERMATOLOGY | Facility: CLINIC | Age: 62
End: 2022-10-18
Payer: COMMERCIAL

## 2022-10-18 NOTE — TELEPHONE ENCOUNTER
Contacted pt in regards to bx results. Voices understanding. She's aware to call with any questions.

## 2022-10-18 NOTE — TELEPHONE ENCOUNTER
----- Message from Patricia Campbell MD sent at 10/18/2022  8:05 AM CDT -----  Final Pathologic Diagnosis Skin, left breast, punch biopsy:   -KERATINOUS CYST (FOLLICULAR CYST, INFUNDIBULAR TYPE)   This lesion is benign.   Comment: Interp By Jj Pennington M.D., Signed on 10/14/2022 at 13:33    ##Please notify patient that this is a benign lesion and no further intervention is warranted.

## 2022-10-19 DIAGNOSIS — Z12.31 OTHER SCREENING MAMMOGRAM: Primary | ICD-10-CM

## 2022-10-20 ENCOUNTER — TELEPHONE (OUTPATIENT)
Dept: GASTROENTEROLOGY | Facility: CLINIC | Age: 62
End: 2022-10-20
Payer: COMMERCIAL

## 2022-10-20 ENCOUNTER — PATIENT MESSAGE (OUTPATIENT)
Dept: GASTROENTEROLOGY | Facility: CLINIC | Age: 62
End: 2022-10-20
Payer: COMMERCIAL

## 2022-10-20 DIAGNOSIS — Z12.11 SCREENING FOR COLON CANCER: Primary | ICD-10-CM

## 2022-10-20 NOTE — TELEPHONE ENCOUNTER
Colonoscopy 12/12 at 11am arrive for 10am instructions reviewed and patient states understanding. Clenpiq called to [facility] and copy to portal

## 2022-11-14 ENCOUNTER — HOSPITAL ENCOUNTER (OUTPATIENT)
Dept: RADIOLOGY | Facility: HOSPITAL | Age: 62
Discharge: HOME | End: 2022-11-14
Attending: SPECIALIST
Payer: COMMERCIAL

## 2022-11-14 DIAGNOSIS — Z12.31 OTHER SCREENING MAMMOGRAM: ICD-10-CM

## 2022-11-14 PROCEDURE — 77063 MAMMO DIGITAL SCREENING BILAT WITH TOMO: ICD-10-PCS | Mod: 26,,, | Performed by: RADIOLOGY

## 2022-11-14 PROCEDURE — 77063 BREAST TOMOSYNTHESIS BI: CPT | Mod: 26,,, | Performed by: RADIOLOGY

## 2022-11-14 PROCEDURE — 77067 SCR MAMMO BI INCL CAD: CPT | Mod: 26,,, | Performed by: RADIOLOGY

## 2022-11-14 PROCEDURE — 77063 BREAST TOMOSYNTHESIS BI: CPT | Mod: TC

## 2022-11-14 PROCEDURE — 77067 SCR MAMMO BI INCL CAD: CPT | Mod: TC

## 2022-11-14 PROCEDURE — 77067 MAMMO DIGITAL SCREENING BILAT WITH TOMO: ICD-10-PCS | Mod: 26,,, | Performed by: RADIOLOGY

## 2022-12-12 ENCOUNTER — HOSPITAL ENCOUNTER (OUTPATIENT)
Facility: HOSPITAL | Age: 62
Discharge: HOME | End: 2022-12-12
Attending: INTERNAL MEDICINE | Admitting: INTERNAL MEDICINE
Payer: COMMERCIAL

## 2022-12-12 ENCOUNTER — ANESTHESIA (OUTPATIENT)
Dept: ENDOSCOPY | Facility: HOSPITAL | Age: 62
End: 2022-12-12
Payer: COMMERCIAL

## 2022-12-12 ENCOUNTER — ANESTHESIA EVENT (OUTPATIENT)
Dept: ENDOSCOPY | Facility: HOSPITAL | Age: 62
End: 2022-12-12
Payer: COMMERCIAL

## 2022-12-12 DIAGNOSIS — K63.5 POLYP OF COLON, UNSPECIFIED PART OF COLON, UNSPECIFIED TYPE: ICD-10-CM

## 2022-12-12 DIAGNOSIS — Z12.11 COLON CANCER SCREENING: ICD-10-CM

## 2022-12-12 DIAGNOSIS — K64.8 INTERNAL HEMORRHOIDS: Primary | ICD-10-CM

## 2022-12-12 DIAGNOSIS — K57.90 DIVERTICULOSIS: ICD-10-CM

## 2022-12-12 PROCEDURE — 27201089 HC SNARE, DISP (ANY): Performed by: INTERNAL MEDICINE

## 2022-12-12 PROCEDURE — D9220A PRA ANESTHESIA: ICD-10-PCS | Mod: 33,CRNA,, | Performed by: NURSE ANESTHETIST, CERTIFIED REGISTERED

## 2022-12-12 PROCEDURE — 88305 TISSUE EXAM BY PATHOLOGIST: CPT | Performed by: STUDENT IN AN ORGANIZED HEALTH CARE EDUCATION/TRAINING PROGRAM

## 2022-12-12 PROCEDURE — 25000003 PHARM REV CODE 250: Performed by: STUDENT IN AN ORGANIZED HEALTH CARE EDUCATION/TRAINING PROGRAM

## 2022-12-12 PROCEDURE — 37000008 HC ANESTHESIA 1ST 15 MINUTES: Performed by: INTERNAL MEDICINE

## 2022-12-12 PROCEDURE — 63600175 PHARM REV CODE 636 W HCPCS: Performed by: STUDENT IN AN ORGANIZED HEALTH CARE EDUCATION/TRAINING PROGRAM

## 2022-12-12 PROCEDURE — D9220A PRA ANESTHESIA: ICD-10-PCS | Mod: 33,ANES,, | Performed by: ANESTHESIOLOGY

## 2022-12-12 PROCEDURE — 45385 COLONOSCOPY W/LESION REMOVAL: CPT | Mod: PT | Performed by: INTERNAL MEDICINE

## 2022-12-12 PROCEDURE — 88305 TISSUE EXAM BY PATHOLOGIST: ICD-10-PCS | Mod: 26,,, | Performed by: STUDENT IN AN ORGANIZED HEALTH CARE EDUCATION/TRAINING PROGRAM

## 2022-12-12 PROCEDURE — 88305 TISSUE EXAM BY PATHOLOGIST: CPT | Mod: 26,,, | Performed by: STUDENT IN AN ORGANIZED HEALTH CARE EDUCATION/TRAINING PROGRAM

## 2022-12-12 PROCEDURE — D9220A PRA ANESTHESIA: Mod: 33,CRNA,, | Performed by: NURSE ANESTHETIST, CERTIFIED REGISTERED

## 2022-12-12 PROCEDURE — 45385 COLONOSCOPY W/LESION REMOVAL: CPT | Mod: 33,,, | Performed by: INTERNAL MEDICINE

## 2022-12-12 PROCEDURE — 37000009 HC ANESTHESIA EA ADD 15 MINS: Performed by: INTERNAL MEDICINE

## 2022-12-12 PROCEDURE — 45385 PR COLONOSCOPY,REMV LESN,SNARE: ICD-10-PCS | Mod: 33,,, | Performed by: INTERNAL MEDICINE

## 2022-12-12 PROCEDURE — D9220A PRA ANESTHESIA: Mod: 33,ANES,, | Performed by: ANESTHESIOLOGY

## 2022-12-12 PROCEDURE — 25000003 PHARM REV CODE 250: Performed by: INTERNAL MEDICINE

## 2022-12-12 RX ORDER — LIDOCAINE HYDROCHLORIDE 20 MG/ML
INJECTION INTRAVENOUS
Status: DISCONTINUED | OUTPATIENT
Start: 2022-12-12 | End: 2022-12-12

## 2022-12-12 RX ORDER — SODIUM CHLORIDE 9 MG/ML
INJECTION, SOLUTION INTRAVENOUS CONTINUOUS
Status: DISCONTINUED | OUTPATIENT
Start: 2022-12-12 | End: 2022-12-12 | Stop reason: HOSPADM

## 2022-12-12 RX ORDER — PROPOFOL 10 MG/ML
VIAL (ML) INTRAVENOUS
Status: DISCONTINUED | OUTPATIENT
Start: 2022-12-12 | End: 2022-12-12

## 2022-12-12 RX ADMIN — PROPOFOL 120 MG: 10 INJECTION, EMULSION INTRAVENOUS at 01:12

## 2022-12-12 RX ADMIN — LIDOCAINE HYDROCHLORIDE 50 MG: 20 INJECTION, SOLUTION INTRAVENOUS at 01:12

## 2022-12-12 RX ADMIN — PROPOFOL 40 MG: 10 INJECTION, EMULSION INTRAVENOUS at 01:12

## 2022-12-12 RX ADMIN — PROPOFOL 20 MG: 10 INJECTION, EMULSION INTRAVENOUS at 01:12

## 2022-12-12 RX ADMIN — PROPOFOL 80 MG: 10 INJECTION, EMULSION INTRAVENOUS at 01:12

## 2022-12-12 RX ADMIN — SODIUM CHLORIDE: 9 INJECTION, SOLUTION INTRAVENOUS at 10:12

## 2022-12-12 NOTE — TRANSFER OF CARE
Anesthesia Transfer of Care Note    Patient: Domonique Carmona    Procedure(s) Performed: Procedure(s) (LRB):  COLONOSCOPY (N/A)    Patient location: GI    Anesthesia Type: general    Transport from OR: Transported from OR on room air with adequate spontaneous ventilation    Post pain: adequate analgesia    Post assessment: no apparent anesthetic complications and tolerated procedure well    Post vital signs: stable    Level of consciousness: awake    Nausea/Vomiting: no nausea/vomiting    Complications: none    Transfer of care protocol was followed      Last vitals:   Visit Vitals  /82 (BP Location: Left arm, Patient Position: Lying)   Pulse 78   Temp 37.2 °C (99 °F) (Skin)   Resp 18   LMP 05/27/2013   SpO2 99%   Breastfeeding No

## 2022-12-12 NOTE — PROVATION PATIENT INSTRUCTIONS
Discharge Summary/Instructions after an Endoscopic Procedure  Patient Name: Domonique Carmona  Patient MRN: [id number]  Patient YOB: 1960 Monday, December 12, 2022  Phillip Carrion MD  Dear patient,  As a result of recent federal legislation (The Federal Cures Act), you may   receive lab or pathology results from your procedure in your [facility]   account before your physician is able to contact you. Your physician or   their representative will relay the results to you with their   recommendations at their soonest availability.  Thank you,  RESTRICTIONS:  During your procedure today, you received medications for sedation.  These   medications may affect your judgment, balance and coordination.  Therefore,   for 24 hours, you have the following restrictions:   - DO NOT drive a car, operate machinery, make legal/financial decisions,   sign important papers or drink alcohol.    ACTIVITY:  Today: no heavy lifting, straining or running due to procedural   sedation/anesthesia.  The following day: return to full activity including work.  DIET:  Eat and drink normally unless instructed otherwise.     TREATMENT FOR COMMON SIDE EFFECTS:  - Mild abdominal pain, nausea, belching, bloating or excessive gas:  rest,   eat lightly and use a heating pad.  - Sore Throat: treat with throat lozenges and/or gargle with warm salt   water.  - Because air was used during the procedure, expelling large amounts of air   from your rectum or belching is normal.  - If a bowel prep was taken, you may not have a bowel movement for 1-3 days.    This is normal.  SYMPTOMS TO WATCH FOR AND REPORT TO YOUR PHYSICIAN:  1. Abdominal pain or bloating, other than gas cramps.  2. Chest pain.  3. Back pain.  4. Signs of infection such as: chills or fever occurring within 24 hours   after the procedure.  5. Rectal bleeding, which would show as bright red, maroon, or black stools.   (A tablespoon of blood from the rectum is not serious, especially  if   hemorrhoids are present.)  6. Vomiting.  7. Weakness or dizziness.  GO DIRECTLY TO THE NEAREST EMERGENCY ROOM IF YOU HAVE ANY OF THE FOLLOWING:      Difficulty breathing              Chills and/or fever over 101 F   Persistent vomiting and/or vomiting blood   Severe abdominal pain   Severe chest pain   Black, tarry stools   Bleeding- more than one tablespoon   Any other symptom or condition that you feel may need urgent attention  Your doctor recommends these additional instructions:  If any biopsies were taken, your doctors clinic will contact you in 1 to 2   weeks with any results.  - Patient has a contact number available for emergencies.  The signs and   symptoms of potential delayed complications were discussed with the   patient.  Return to normal activities tomorrow.  Written discharge   instructions were provided to the patient.   - High fiber diet.   - Continue present medications.   - Await pathology results.   - Repeat colonoscopy in 5 years for surveillance.   - Discharge patient to home (ambulatory).   - Return to GI office PRN.  For questions, problems or results please call your physician - Phillip Carrion MD at Work:  [phone].  [facility] PHONE NUMBER: [phone]  IF A COMPLICATION OR EMERGENCY SITUATION ARISES AND YOU ARE UNABLE TO REACH   YOUR PHYSICIAN - GO DIRECTLY TO THE EMERGENCY ROOM.  Phillip Carrion MD  12/12/2022 1:28:00 PM  This report has been verified and signed electronically.  Dear patient,  As a result of recent federal legislation (The Federal Cures Act), you may   receive lab or pathology results from your procedure in your [facility]   account before your physician is able to contact you. Your physician or   their representative will relay the results to you with their   recommendations at their soonest availability.  Thank you,  PROVATION

## 2022-12-12 NOTE — ANESTHESIA PREPROCEDURE EVALUATION
12/12/2022  Domonique Carmona is a 62 y.o., female.    Pre-op Assessment    I have reviewed the Patient Summary Reports.    I have reviewed the Nursing Notes. I have reviewed the NPO Status.   I have reviewed the Medications.     Review of Systems  Anesthesia Hx:  No problems with previous Anesthesia    Cardiovascular:   Hypertension    Hepatic/GI:   GERD    Musculoskeletal:   Arthritis     Neurological:   Neuromuscular Disease,    Psych:   Psychiatric History          Physical Exam  General:  Well nourished and Obesity      Airway/Jaw/Neck:  Airway Findings: Mouth Opening: Normal   Tongue: Normal   TM Distance: Normal, at least 6 cm   Jaw/Neck Findings: Neck ROM: Normal ROM      Eyes/Ears/Nose:  Eyes/Ears/Nose Findings:    Dental:  Dental Findings:   Chest/Lungs:  Chest/Lungs Findings: Normal Respiratory Rate      Heart/Vascular:  Heart Findings: Rate: Normal  Rhythm: Regular Rhythm        Mental Status:  Mental Status Findings:  Cooperative, Alert and Oriented         Anesthesia Plan  Type of Anesthesia, risks & benefits discussed:  Anesthesia Type:  general    Patient's Preference: General  Plan Factors:          Intra-op Monitoring Plan: standard ASA monitors  Intra-op Monitoring Plan Comments:   Post Op Pain Control Plan: multimodal analgesia, IV/PO Opioids PRN and per primary service following discharge from PACU  Post Op Pain Control Plan Comments:     Induction:   IV  Beta Blocker:  Patient is not currently on a Beta-Blocker (No further documentation required).       Informed Consent: Informed consent signed with the Patient and all parties understand the risks and agree with anesthesia plan.  All questions answered.  Anesthesia consent signed with patient.  ASA Score: 2     Day of Surgery Review of History & Physical:              Ready For Surgery From Anesthesia Perspective.           Physical  Exam  General: Well nourished and Obesity    Airway:  Mouth Opening: Normal  TM Distance: Normal, at least 6 cm  Tongue: Normal  Neck ROM: Normal ROM    Chest/Lungs:  Normal Respiratory Rate    Heart:  Rate: Normal  Rhythm: Regular Rhythm          Anesthesia Plan  Type of Anesthesia, risks & benefits discussed:    Anesthesia Type: general  Intra-op Monitoring Plan: standard ASA monitors  Post Op Pain Control Plan: multimodal analgesia, IV/PO Opioids PRN and per primary service following discharge from PACU  Induction:  IV  Informed Consent: Informed consent signed with the Patient and all parties understand the risks and agree with anesthesia plan.  All questions answered.   ASA Score: 2    Ready For Surgery From Anesthesia Perspective.       .

## 2022-12-12 NOTE — H&P
CC: Screening for colorectal cancer - first occurrence    62 year old female with above. States that symptoms are absent, no alleviating/exacerbating factors. No family history of colorectal CA. No personal history of polyps. No bleeding or weight loss.     ROS:  No headache, no fever/chills, no chest pain/SOB, no nausea/vomiting/diarrhea/constipation/GI bleeding/abdominal pain, no dysuria/hematuria.    VSSAF   Exam:   Alert and oriented x 3; no apparent distress   PERRLA, sclera anicteric  CV: Regular rate/rhythm, normal PMI   Lungs: Clear bilaterally with no wheeze/rales   Abdomen: Soft, NT/ND, normal bowel sounds   Ext: No cyanosis, clubbing     Impression:   As above    Plan:   Proceed with endoscopy. Further recs to follow.

## 2022-12-12 NOTE — PLAN OF CARE
Vss, abelino po fluids, denies pain, ambulates easily. IV removed, catheter intact. Discharge instructions provided and states understanding. States ready to go home.  Discharged from facility with family per wheelchair.

## 2022-12-13 VITALS
HEART RATE: 65 BPM | OXYGEN SATURATION: 100 % | DIASTOLIC BLOOD PRESSURE: 68 MMHG | SYSTOLIC BLOOD PRESSURE: 120 MMHG | RESPIRATION RATE: 16 BRPM | TEMPERATURE: 99 F

## 2022-12-13 NOTE — PROGRESS NOTES
Very pleased with care given.  Farrah Webb, and Nayana TOBIN Were great nurses, very attentive, and professional care given.

## 2022-12-13 NOTE — ANESTHESIA POSTPROCEDURE EVALUATION
Anesthesia Post Evaluation    Patient: Domonique Carmona    Procedure(s) Performed: Procedure(s) (LRB):  COLONOSCOPY (N/A)    Final Anesthesia Type: general      Patient location during evaluation: PACU  Patient participation: Yes- Able to Participate  Level of consciousness: awake and alert and oriented  Post-procedure vital signs: reviewed and stable  Pain management: adequate  Airway patency: patent    PONV status at discharge: No PONV  Anesthetic complications: no      Cardiovascular status: blood pressure returned to baseline  Respiratory status: unassisted, spontaneous ventilation and room air  Hydration status: euvolemic  Follow-up not needed.          Vitals Value Taken Time   /68 12/12/22 1345   Temp 37.1 °C (98.7 °F) 12/12/22 1345   Pulse 65 12/12/22 1345   Resp 16 12/12/22 1345   SpO2 100 % 12/12/22 1345         Event Time   Out of Recovery 13:53:07         Pain/Ara Score: Ara Score: 10 (12/12/2022  1:45 PM)

## 2022-12-21 LAB
FINAL PATHOLOGIC DIAGNOSIS: NORMAL
GROSS: NORMAL
Lab: NORMAL

## 2023-02-20 ENCOUNTER — PATIENT MESSAGE (OUTPATIENT)
Dept: BARIATRICS | Facility: CLINIC | Age: 63
End: 2023-02-20
Payer: COMMERCIAL

## 2023-02-20 RX ORDER — PANTOPRAZOLE SODIUM 20 MG/1
20 TABLET, DELAYED RELEASE ORAL DAILY
Qty: 90 TABLET | Refills: 1 | Status: SHIPPED | OUTPATIENT
Start: 2023-02-20 | End: 2023-10-02 | Stop reason: SDUPTHER

## 2023-06-12 ENCOUNTER — PATIENT MESSAGE (OUTPATIENT)
Dept: FAMILY MEDICINE | Facility: CLINIC | Age: 63
End: 2023-06-12

## 2023-06-12 NOTE — TELEPHONE ENCOUNTER
She's scheduled in October - that's a year. Do you want to see her now? Your note said 6 months...

## 2023-06-13 ENCOUNTER — PATIENT MESSAGE (OUTPATIENT)
Dept: FAMILY MEDICINE | Facility: CLINIC | Age: 63
End: 2023-06-13

## 2023-06-14 ENCOUNTER — PATIENT MESSAGE (OUTPATIENT)
Dept: FAMILY MEDICINE | Facility: CLINIC | Age: 63
End: 2023-06-14

## 2023-06-21 ENCOUNTER — TELEPHONE (OUTPATIENT)
Dept: FAMILY MEDICINE | Facility: CLINIC | Age: 63
End: 2023-06-21

## 2023-06-21 DIAGNOSIS — I73.9 PERIPHERAL VASCULAR DISEASE: ICD-10-CM

## 2023-06-21 DIAGNOSIS — I10 ESSENTIAL HYPERTENSION: ICD-10-CM

## 2023-06-21 DIAGNOSIS — Z79.899 HIGH RISK MEDICATION USE: ICD-10-CM

## 2023-06-21 DIAGNOSIS — Z00.00 PHYSICAL EXAM: Primary | ICD-10-CM

## 2023-06-21 DIAGNOSIS — Z79.899 ENCOUNTER FOR LONG-TERM (CURRENT) USE OF OTHER MEDICATIONS: ICD-10-CM

## 2023-06-26 ENCOUNTER — OFFICE VISIT (OUTPATIENT)
Dept: FAMILY MEDICINE | Facility: CLINIC | Age: 63
End: 2023-06-26
Payer: COMMERCIAL

## 2023-06-26 VITALS
SYSTOLIC BLOOD PRESSURE: 120 MMHG | HEART RATE: 78 BPM | BODY MASS INDEX: 39.23 KG/M2 | HEIGHT: 71 IN | WEIGHT: 280.19 LBS | DIASTOLIC BLOOD PRESSURE: 70 MMHG | OXYGEN SATURATION: 97 %

## 2023-06-26 DIAGNOSIS — F33.0 MDD (MAJOR DEPRESSIVE DISORDER), RECURRENT EPISODE, MILD: ICD-10-CM

## 2023-06-26 DIAGNOSIS — I73.9 PERIPHERAL VASCULAR DISEASE: ICD-10-CM

## 2023-06-26 DIAGNOSIS — E66.01 MORBID OBESITY: ICD-10-CM

## 2023-06-26 DIAGNOSIS — F41.1 GAD (GENERALIZED ANXIETY DISORDER): Primary | ICD-10-CM

## 2023-06-26 PROCEDURE — 3078F DIAST BP <80 MM HG: CPT | Mod: CPTII,S$GLB,, | Performed by: NURSE PRACTITIONER

## 2023-06-26 PROCEDURE — 99214 OFFICE O/P EST MOD 30 MIN: CPT | Mod: S$GLB,,, | Performed by: NURSE PRACTITIONER

## 2023-06-26 PROCEDURE — 99214 PR OFFICE/OUTPT VISIT, EST, LEVL IV, 30-39 MIN: ICD-10-PCS | Mod: S$GLB,,, | Performed by: NURSE PRACTITIONER

## 2023-06-26 PROCEDURE — 1159F PR MEDICATION LIST DOCUMENTED IN MEDICAL RECORD: ICD-10-PCS | Mod: CPTII,S$GLB,, | Performed by: NURSE PRACTITIONER

## 2023-06-26 PROCEDURE — 3074F SYST BP LT 130 MM HG: CPT | Mod: CPTII,S$GLB,, | Performed by: NURSE PRACTITIONER

## 2023-06-26 PROCEDURE — 3074F PR MOST RECENT SYSTOLIC BLOOD PRESSURE < 130 MM HG: ICD-10-PCS | Mod: CPTII,S$GLB,, | Performed by: NURSE PRACTITIONER

## 2023-06-26 PROCEDURE — 1160F PR REVIEW ALL MEDS BY PRESCRIBER/CLIN PHARMACIST DOCUMENTED: ICD-10-PCS | Mod: CPTII,S$GLB,, | Performed by: NURSE PRACTITIONER

## 2023-06-26 PROCEDURE — 3008F PR BODY MASS INDEX (BMI) DOCUMENTED: ICD-10-PCS | Mod: CPTII,S$GLB,, | Performed by: NURSE PRACTITIONER

## 2023-06-26 PROCEDURE — 1160F RVW MEDS BY RX/DR IN RCRD: CPT | Mod: CPTII,S$GLB,, | Performed by: NURSE PRACTITIONER

## 2023-06-26 PROCEDURE — 1159F MED LIST DOCD IN RCRD: CPT | Mod: CPTII,S$GLB,, | Performed by: NURSE PRACTITIONER

## 2023-06-26 PROCEDURE — 3008F BODY MASS INDEX DOCD: CPT | Mod: CPTII,S$GLB,, | Performed by: NURSE PRACTITIONER

## 2023-06-26 PROCEDURE — 3078F PR MOST RECENT DIASTOLIC BLOOD PRESSURE < 80 MM HG: ICD-10-PCS | Mod: CPTII,S$GLB,, | Performed by: NURSE PRACTITIONER

## 2023-06-26 RX ORDER — BUPROPION HYDROCHLORIDE 300 MG/1
300 TABLET ORAL DAILY
Qty: 90 TABLET | Refills: 2 | Status: SHIPPED | OUTPATIENT
Start: 2023-06-26 | End: 2023-07-21 | Stop reason: SDUPTHER

## 2023-06-26 NOTE — PROGRESS NOTES
"  SUBJECTIVE:    Patient ID: Domonique Carmona is a 62 y.o. female.    Chief Complaint: Medication Refill (No bottles/ Pt is here for medication refill no complaints/bg)    62y.o. female presents for check up .treated for anxiety and depression, gerd,  Had gastric sleeve in may 2022 with dr. Victor. Since surgery about 150lb weight loss. Doing well. Tolerating meals.  Bp is well controlled. No longer taking htn medications.  Taking wellbutrin for depression. Lots of stressors at work. N= Takes xanax as needed.  Controlling symptoms. Last labs were in may. Lots os stressors.       No visits with results within 6 Month(s) from this visit.   Latest known visit with results is:   Admission on 12/12/2022, Discharged on 12/12/2022   Component Date Value Ref Range Status    Final Pathologic Diagnosis 12/12/2022    Final                    Value:Colon, sigmoid polypectomy, biopsy:  - Tubular adenoma  - Negative for high-grade dysplasia or carcinoma      Gross 12/12/2022    Final                    Value:Container Label: Clinic Number/AP Number:  [id number] / [id number], and "sigmoid  polyp"  Received in formalin in polyp trap 1 is a 10 x 5 x 1 mm aggregate of soft,  tan-white tissue fragments with admixed food particles.  Specimen is filtered  and entirely submitted in DGJ--1-A.  NATI Abebe.      Disclaimer 12/12/2022    Final                    Value:Unless the case is a 'gross only' or additional testing only, the final  diagnosis for each specimen is based on a microscopic examination of  appropriate tissue sections.         Past Medical History:   Diagnosis Date    Abnormal Pap smear 1986    Cryosurgery    Anxiety     Arthritis     Essential hypertension 11/26/2020    GERD (gastroesophageal reflux disease)     Venous stasis dermatitis of left lower extremity 12/31/2018     Social History     Socioeconomic History    Marital status: Single   Tobacco Use    Smoking status: Former     Packs/day: 0.50     Years: 20.00 "     Pack years: 10.00     Types: Cigarettes     Quit date: 2005     Years since quittin.0    Smokeless tobacco: Never   Substance and Sexual Activity    Alcohol use: No    Drug use: No   Social History Narrative    Works in [employer] at [employer] with youngest son -      Past Surgical History:   Procedure Laterality Date    BREAST BIOPSY      BREAST SURGERY      biopsy right side    COLONOSCOPY N/A 2022    Procedure: COLONOSCOPY;  Surgeon: Phillip Cat MD;  Location: [facility] ENDO;  Service: Endoscopy;  Laterality: N/A;    DILATION AND CURETTAGE OF UTERUS      GYNECOLOGIC CRYOSURGERY      LAPAROSCOPIC BIOPSY OF LIVER N/A 10/25/2021    Procedure: BIOPSY, LIVER, LAPAROSCOPIC;  Surgeon: Thierry Victor MD;  Location: [facility] OR;  Service: General;  Laterality: N/A;    LAPAROSCOPIC CHOLECYSTECTOMY N/A 10/25/2021    Procedure: CHOLECYSTECTOMY, LAPAROSCOPIC- diagnostic lap - hepatic cysts;  Surgeon: Thierry Victor MD;  Location: [facility] OR;  Service: General;  Laterality: N/A;    LAPAROSCOPIC SLEEVE GASTRECTOMY N/A 2021    Procedure: GASTRECTOMY, SLEEVE, LAPAROSCOPIC;  Surgeon: Thierry Victor MD;  [facility];  Service: General;  Laterality: N/A;    LASIK Bilateral     ulner nerve transposition      left     Family History   Problem Relation Age of Onset    Ovarian cancer Mother     Breast cancer Neg Hx     Colon cancer Neg Hx     Collagen disease Neg Hx     Glaucoma Neg Hx     Melanoma Neg Hx     Psoriasis Neg Hx     Lupus Neg Hx     Eczema Neg Hx        Review of patient's allergies indicates:   Allergen Reactions    Demerol [meperidine] Hives       Current Outpatient Medications:     ALPRAZolam (XANAX) 0.25 MG tablet, Take 1 tablet (0.25 mg total) by mouth daily as needed for Anxiety., Disp: 60 tablet, Rfl: 1    buPROPion (WELLBUTRIN XL) 300 MG 24 hr tablet, Take 1 tablet (300 mg total) by mouth once daily., Disp: 90 tablet, Rfl: 2    calcium  "carbonate-magnesium hydroxide (ROLAIDS) 550-110 mg Chew, Take 1 tablet by mouth every evening., Disp: , Rfl:     ibuprofen (ADVIL,MOTRIN) 800 MG tablet, Take 1 tablet (800 mg total) by mouth 3 (three) times daily., Disp: 60 tablet, Rfl: 6    pantoprazole (PROTONIX) 20 MG tablet, Take 1 tablet (20 mg total) by mouth once daily., Disp: 90 tablet, Rfl: 1    sod picosulf-mag ox-citric ac (CLENPIQ) 10 mg-3.5 gram -12 gram/160 mL Soln, As directed, Disp: 320 mL, Rfl: 0    tiZANidine (ZANAFLEX) 4 MG tablet, TAKE 1 TABLET BY MOUTH THREE TIMES A DAY AS NEEDED FOR 10 DAYS, Disp: 90 tablet, Rfl: 1    Review of Systems   Constitutional:  Negative for chills, fever and unexpected weight change.   HENT:  Negative for ear pain, rhinorrhea and sore throat.    Eyes:  Negative for pain and visual disturbance.   Respiratory:  Negative for cough and shortness of breath.    Cardiovascular:  Negative for chest pain, palpitations and leg swelling.   Gastrointestinal:  Negative for abdominal pain, diarrhea, nausea and vomiting.   Genitourinary:  Negative for difficulty urinating, hematuria and vaginal bleeding.   Musculoskeletal:  Negative for arthralgias.   Skin:  Negative for rash.   Neurological:  Negative for dizziness, weakness and headaches.   Psychiatric/Behavioral:  Negative for agitation and sleep disturbance. The patient is not nervous/anxious.    All other systems reviewed and are negative.       Objective:      Vitals:    06/26/23 1052   BP: 120/70   Pulse: 78   SpO2: 97%   Weight: 127.1 kg (280 lb 3.2 oz)   Height: 5' 11" (1.803 m)     Physical Exam  Vitals and nursing note reviewed.   Constitutional:       General: She is not in acute distress.     Appearance: Normal appearance. She is well-developed.   HENT:      Head: Normocephalic.      Right Ear: External ear normal.      Left Ear: External ear normal.   Eyes:      Conjunctiva/sclera: Conjunctivae normal.      Pupils: Pupils are equal, round, and reactive to light. "   Neck:      Vascular: No JVD.   Cardiovascular:      Rate and Rhythm: Normal rate and regular rhythm.      Heart sounds: No murmur heard.  Pulmonary:      Effort: Pulmonary effort is normal.      Breath sounds: Normal breath sounds.   Abdominal:      General: Bowel sounds are normal.      Palpations: Abdomen is soft.   Musculoskeletal:         General: No deformity. Normal range of motion.      Cervical back: Normal range of motion and neck supple.   Lymphadenopathy:      Cervical: No cervical adenopathy.   Skin:     General: Skin is warm and dry.      Findings: No rash.   Neurological:      Mental Status: She is alert and oriented to person, place, and time.      Gait: Gait normal.   Psychiatric:         Speech: Speech normal.         Behavior: Behavior normal.         Assessment:       1. ISAÍAS (generalized anxiety disorder)    2. Morbid obesity    3. MDD (major depressive disorder), recurrent episode, mild    4. Peripheral vascular disease         Plan:       ISAÍAS (generalized anxiety disorder)  Comments:  xanax prn    Morbid obesity  Comments:  diet and exercie    MDD (major depressive disorder), recurrent episode, mild    Peripheral vascular disease    Other orders  -     buPROPion (WELLBUTRIN XL) 300 MG 24 hr tablet; Take 1 tablet (300 mg total) by mouth once daily.  Dispense: 90 tablet; Refill: 2      Follow up in about 6 months (around 12/26/2023), or if symptoms worsen or fail to improve, for medication management.        6/29/2023 Lianet Hansen

## 2023-06-29 ENCOUNTER — OFFICE VISIT (OUTPATIENT)
Dept: PSYCHIATRY | Facility: CLINIC | Age: 63
End: 2023-06-29
Payer: COMMERCIAL

## 2023-06-29 DIAGNOSIS — R69 ILL-DEFINED CAUSE OF MORBIDITY/MORTALITY: Primary | ICD-10-CM

## 2023-06-29 PROCEDURE — 90791 PSYCH DIAGNOSTIC EVALUATION: CPT | Mod: S$GLB,,, | Performed by: SOCIAL WORKER

## 2023-06-29 PROCEDURE — 90791 PR PSYCHIATRIC DIAGNOSTIC EVALUATION: ICD-10-PCS | Mod: S$GLB,,, | Performed by: SOCIAL WORKER

## 2023-06-29 NOTE — PROGRESS NOTES
Psychiatry Initial Visit (PhD/LCSW)  Diagnostic Interview - CPT 71531    Date: 6/29/2023    Site: [facility] 1 of 5     Please see clinician's confidential notes.      Diagnosis:     Ill-defined cause of morbidity/mortality       Plan:individual psychotherapy    Return to Clinic: as scheduled    Length of Service (minutes): 45

## 2023-07-11 ENCOUNTER — OFFICE VISIT (OUTPATIENT)
Dept: PSYCHIATRY | Facility: CLINIC | Age: 63
End: 2023-07-11
Payer: COMMERCIAL

## 2023-07-11 DIAGNOSIS — R69 ILL-DEFINED CAUSE OF MORBIDITY/MORTALITY: ICD-10-CM

## 2023-07-11 DIAGNOSIS — F33.1 MODERATE EPISODE OF RECURRENT MAJOR DEPRESSIVE DISORDER: Primary | ICD-10-CM

## 2023-07-11 PROCEDURE — 99999 PR PBB SHADOW E&M-EST. PATIENT-LVL I: ICD-10-PCS | Mod: PBBFAC,,, | Performed by: SOCIAL WORKER

## 2023-07-11 PROCEDURE — 99999 PR PBB SHADOW E&M-EST. PATIENT-LVL I: CPT | Mod: PBBFAC,,, | Performed by: SOCIAL WORKER

## 2023-07-11 PROCEDURE — 90834 PR PSYCHOTHERAPY W/PATIENT, 45 MIN: ICD-10-PCS | Mod: S$GLB,,, | Performed by: SOCIAL WORKER

## 2023-07-11 PROCEDURE — 90834 PSYTX W PT 45 MINUTES: CPT | Mod: S$GLB,,, | Performed by: SOCIAL WORKER

## 2023-07-11 NOTE — PROGRESS NOTES
Individual Psychotherapy (PhD/LCSW)    7/11/2023    Site:  [facility]  Visit 2 of 5     Please see clinician's confidential notes.           Diagnosis:   1. Ill-defined cause of morbidity/mortality        Plan:  individual psychotherapy    Return to clinic: as scheduled    Length of Service (minutes): 45

## 2023-07-14 ENCOUNTER — TELEPHONE (OUTPATIENT)
Dept: PSYCHIATRY | Facility: CLINIC | Age: 63
End: 2023-07-14
Payer: COMMERCIAL

## 2023-07-14 NOTE — TELEPHONE ENCOUNTER
----- Message from Vinny Charles DO sent at 7/11/2023  5:04 PM CDT -----  Regarding: Referral from Rachel ZUNIGA [id number] - Ok to prioritize as new patient with me wherever there is an opening for a new patient.   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  She is working to unite [facility] and [facility] and it's a tough job.  Family hx of depression.  Can I send her to you?

## 2023-07-14 NOTE — TELEPHONE ENCOUNTER
Called patient to schedule next available NP appt for med mgt with Dr. Charles. Patient confirmed date/time offered. No further questions or concerns at this time.

## 2023-07-21 ENCOUNTER — OFFICE VISIT (OUTPATIENT)
Dept: PSYCHIATRY | Facility: CLINIC | Age: 63
End: 2023-07-21
Payer: COMMERCIAL

## 2023-07-21 VITALS
SYSTOLIC BLOOD PRESSURE: 122 MMHG | DIASTOLIC BLOOD PRESSURE: 78 MMHG | WEIGHT: 278.31 LBS | BODY MASS INDEX: 38.96 KG/M2 | HEIGHT: 71 IN | HEART RATE: 68 BPM

## 2023-07-21 DIAGNOSIS — Z79.899 CURRENT USE OF PROTON PUMP INHIBITOR: ICD-10-CM

## 2023-07-21 DIAGNOSIS — F33.1 MODERATE EPISODE OF RECURRENT MAJOR DEPRESSIVE DISORDER: Primary | ICD-10-CM

## 2023-07-21 DIAGNOSIS — F41.1 GENERALIZED ANXIETY DISORDER WITH PANIC ATTACKS: ICD-10-CM

## 2023-07-21 DIAGNOSIS — G47.00 INSOMNIA, UNSPECIFIED TYPE: ICD-10-CM

## 2023-07-21 DIAGNOSIS — F41.0 GENERALIZED ANXIETY DISORDER WITH PANIC ATTACKS: ICD-10-CM

## 2023-07-21 DIAGNOSIS — E55.9 VITAMIN D INSUFFICIENCY: ICD-10-CM

## 2023-07-21 DIAGNOSIS — Z98.84 HISTORY OF GASTRIC BYPASS: ICD-10-CM

## 2023-07-21 PROCEDURE — 99999 PR PBB SHADOW E&M-EST. PATIENT-LVL IV: CPT | Mod: PBBFAC,,, | Performed by: STUDENT IN AN ORGANIZED HEALTH CARE EDUCATION/TRAINING PROGRAM

## 2023-07-21 PROCEDURE — 3074F SYST BP LT 130 MM HG: CPT | Mod: CPTII,S$GLB,, | Performed by: STUDENT IN AN ORGANIZED HEALTH CARE EDUCATION/TRAINING PROGRAM

## 2023-07-21 PROCEDURE — 90792 PSYCH DIAG EVAL W/MED SRVCS: CPT | Mod: S$GLB,,, | Performed by: STUDENT IN AN ORGANIZED HEALTH CARE EDUCATION/TRAINING PROGRAM

## 2023-07-21 PROCEDURE — 3074F PR MOST RECENT SYSTOLIC BLOOD PRESSURE < 130 MM HG: ICD-10-PCS | Mod: CPTII,S$GLB,, | Performed by: STUDENT IN AN ORGANIZED HEALTH CARE EDUCATION/TRAINING PROGRAM

## 2023-07-21 PROCEDURE — 3008F BODY MASS INDEX DOCD: CPT | Mod: CPTII,S$GLB,, | Performed by: STUDENT IN AN ORGANIZED HEALTH CARE EDUCATION/TRAINING PROGRAM

## 2023-07-21 PROCEDURE — 90792 PR PSYCHIATRIC DIAGNOSTIC EVALUATION W/MEDICAL SERVICES: ICD-10-PCS | Mod: S$GLB,,, | Performed by: STUDENT IN AN ORGANIZED HEALTH CARE EDUCATION/TRAINING PROGRAM

## 2023-07-21 PROCEDURE — 1160F RVW MEDS BY RX/DR IN RCRD: CPT | Mod: CPTII,S$GLB,, | Performed by: STUDENT IN AN ORGANIZED HEALTH CARE EDUCATION/TRAINING PROGRAM

## 2023-07-21 PROCEDURE — 3078F PR MOST RECENT DIASTOLIC BLOOD PRESSURE < 80 MM HG: ICD-10-PCS | Mod: CPTII,S$GLB,, | Performed by: STUDENT IN AN ORGANIZED HEALTH CARE EDUCATION/TRAINING PROGRAM

## 2023-07-21 PROCEDURE — 3008F PR BODY MASS INDEX (BMI) DOCUMENTED: ICD-10-PCS | Mod: CPTII,S$GLB,, | Performed by: STUDENT IN AN ORGANIZED HEALTH CARE EDUCATION/TRAINING PROGRAM

## 2023-07-21 PROCEDURE — 99999 PR PBB SHADOW E&M-EST. PATIENT-LVL IV: ICD-10-PCS | Mod: PBBFAC,,, | Performed by: STUDENT IN AN ORGANIZED HEALTH CARE EDUCATION/TRAINING PROGRAM

## 2023-07-21 PROCEDURE — 3078F DIAST BP <80 MM HG: CPT | Mod: CPTII,S$GLB,, | Performed by: STUDENT IN AN ORGANIZED HEALTH CARE EDUCATION/TRAINING PROGRAM

## 2023-07-21 PROCEDURE — 1159F PR MEDICATION LIST DOCUMENTED IN MEDICAL RECORD: ICD-10-PCS | Mod: CPTII,S$GLB,, | Performed by: STUDENT IN AN ORGANIZED HEALTH CARE EDUCATION/TRAINING PROGRAM

## 2023-07-21 PROCEDURE — 1159F MED LIST DOCD IN RCRD: CPT | Mod: CPTII,S$GLB,, | Performed by: STUDENT IN AN ORGANIZED HEALTH CARE EDUCATION/TRAINING PROGRAM

## 2023-07-21 PROCEDURE — 1160F PR REVIEW ALL MEDS BY PRESCRIBER/CLIN PHARMACIST DOCUMENTED: ICD-10-PCS | Mod: CPTII,S$GLB,, | Performed by: STUDENT IN AN ORGANIZED HEALTH CARE EDUCATION/TRAINING PROGRAM

## 2023-07-21 RX ORDER — HYDROXYZINE HYDROCHLORIDE 10 MG/1
10 TABLET, FILM COATED ORAL 3 TIMES DAILY PRN
Qty: 90 TABLET | Refills: 1 | Status: SHIPPED | OUTPATIENT
Start: 2023-07-21 | End: 2023-09-19

## 2023-07-21 RX ORDER — ESCITALOPRAM OXALATE 10 MG/1
10 TABLET ORAL EVERY MORNING
Qty: 30 TABLET | Refills: 1 | Status: SHIPPED | OUTPATIENT
Start: 2023-07-21 | End: 2023-08-21 | Stop reason: SDUPTHER

## 2023-07-21 RX ORDER — BUPROPION HYDROCHLORIDE 300 MG/1
300 TABLET ORAL EVERY MORNING
Qty: 30 TABLET | Refills: 1 | Status: SHIPPED | OUTPATIENT
Start: 2023-07-21 | End: 2023-08-21 | Stop reason: SDUPTHER

## 2023-07-21 NOTE — PATIENT INSTRUCTIONS
Set an ALARM every morning, including on weekends, and don't snooze.  Avoid your bed during WAKING HOURS and sleeping in places other than your bed.     Please complete lab work at your earliest convenience. Your labs are NON-FASTING.     Switch WELLBUTRIN to 300mg in the morning  Start LEXAPRO 10mg daily in the morning  Start ATARAX/VISTARIL 10mg up to three times per day as needed for anxiety    Continue XANAX - refill not needed at this time    Keep therapy appointments

## 2023-07-21 NOTE — PROGRESS NOTES
"      Outpatient Psychiatry Initial Visit (DO/MD/NP)  PSYCHIATRIC EVALUATION ("EVAL")    7/21/2023    Reason for Encounter:     [facility]:    [facility]  [address]  [address]  [place] [address]    Patient complains of Anxiety, Stress, Sleeping Problem, Depression, and Distractibilty      History of Present Illness (HPI) and Review of Symptoms (ROS):     Domonique Carmona, a 62 y.o. female, presenting for initial evaluation visit.     Chart was reviewed. Available documentation has been reviewed, and pertinent elements of the chart have been incorporated into this note where appropriate.     General     Pt is pleasant and cooperative on interview. This visit was done in person in the clinic.    Pt reports she had been seeing the therapist at this clinic through [facility] for some time, who suggested medication management by a psychiatrist.     Pt complains about feeling sad, overwhelmed and anxious. 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 She engages in binge eating and is s/p bariatric surgery. She is bothered by low energy. Concentration has been poor.     Discussed lifestyle and biomedical factors which complicate the treatment while contributing to the symptom presentation.    Pt would like to continue individual therapy. She is open to medication changes. Medication history was thoroughly explored.     Personal Functioning   Stress Home stress. Work stress. See PSS4 below.   Mood Mood is sad. POSITIVE AFFECT Structures: Both ANHEDONIA and CONTEXT INSENSITIVITY noted. Diminished ability to achieve pleasure and purpose in things; loss of motivation and disengagement noted in both positive and negative contexts. NEGATIVE AFFECT and DMN Structures: Feeling GUILT, HOPELESSNESS and WORTHLESSNESS. RUMINATION and NEGATIVE BIAS noted. PERSONAL-INTERPERSONAL Functioning: Energy is LOW. Socializing LESS.   Anxiety See instruments below. RUMINATION: Pt described repeated " worry and inward focus on negative thoughts. SALIENCE-ANXIOUS AVOIDANCE: Pt described anxious arousal and apprehension. THREAT DYSREGULATION: Pt described panic attacks with classic symptoms. See history below.   Sleep Pt reports sleep as poor overall and NOT restorative. Sleep onset is more than an hour. Duration is unclear with 2 or 3 interruptions due to bathroom needs and spontaneous awakenings. Denies naps. Patient does NOT use a sleep aid. Nightmares have NOT been a problem.   Cognition Pt reports concentration is down. Pt is easily distracted. Memory is worse than usual.  Productivity is down.   Appetite and Nutrition Pt reports appetite varies. Pt engages in episodes of binge eating.   Safety Patient did not display signs of nor endorse symptoms of overt psychosis or acute mood disorder requiring hospitalization during the encounter. Patient denied violent thoughts or suicidal or homicidal ideation, intent, or plan.   Suicide Risk Suicide risk assessment performed. Pt denies suicidal ideation, intent or plan. Pt has never attempted suicide in the past. Risk factors include anxiety and mood disorder. Protective factors include wanting to live for the family and receptivity to treatment. Suicide risk at this time is LOW. Pt agrees to safety. No safety concerns identified at this time.    Interpersonal Functioning   Home Stress   Peers Socializing less.   Work Problems with supervisor. Overwhelm.     Measurement-Based Care (MBC):     Routine Evaluation Instruments   GAD7 Interpretation: 12/21; MODERATE using 5-10-15 cutoffs. The GAD7 has acceptable properties for identifying IASÍAS at cutoff scores 7 to 10; a cutoff score of 10 is fairly sensitive (0.8) but not specific (0.4).  This is a new baseline reading.   PHQ9 Interpretation: 17/27; MODERATE using 7-15-21 cutoffs.  The PHQ-9 was found to have acceptable diagnostic properties for screening for MDD for cut-off scores between 8 and 11. PHQ-9 is useful for  "screening, but not always for diagnosis of a current epsiode of MDD in a psychiatric specialty clinic; according to the literature the optimal cutoff score for a current episode of MDD is most reliably 13 or 14.  This is a new baseline reading.   JOSE Interpretation: 2/6, SCREENED NEGATIVE   PSS4 Interpretation: 12/16; HIGH using 6-11 cutoffs. 2 PH, 0 LSE. This is a new baseline reading.   Additional Instruments   Bipolarity Index (BI)  7/21/2023   Section Scores   I. 2: Recurrent unipolar major depressive disorder (=3 episode)   II.  20: 15 to 19 years.   III.  5: Recurrent unipolar MDD with =3 or more major depressive episodes   IV.  10: Worsening dysphoria or mixed symptoms during antidepressant treatment subthreshold for stephanie (exclude worsening that is limited to known antidepressant side effects such as akathisia, anxiety or sedation)   V.  10: First-degree relative with recurrent unipolar MDD or schizoaffective disorder;   Total Score   47/100 BI   Results Interpretation: Score total is in the range of 40 to 49; this score is associated with increased RISK OF CONVERSION TO BIPOLAR DISORDER, so this patient would benefit from careful monitoring whenever prescribed an antidepressant. Scores 50 and above have a high sensitivity (0.91) and specificity (0.90) for bipolar disorder.      MDQ Interpretation: NEGATIVE (Scored 0-6, and/or answered "No" to question #2 and/or labeled problem as "minor" or less). ADMINISTERED 28JUN2023.     History:     Psychiatric History - Diagnostic   Reported Diagnoses ADD, anxiety, depression   Formal Testing Unclear if ADHD testing was formal testing.   Symptom History General Psychosis: No history of psychosis.  PANIC ATTACKS: positive history with classic symptoms, onset adolescence, most recent 3ma.  Mood cycles: No episodes of hypomania, stephanie or mixed mood episodes.  DEPRESSION: Recurrent with multiple depression episodes, onset between 18yo to 20 yo (BI 20+).  ANXIETY: Symptoms " "onset childhood.  ADHD: Symptoms onset unclear.    Suicide Attempts NO    Self Harm NO   Psychiatric History - Treatment   IOP and Inpatient NONE   Outpatient Pt had previously seen Dr. Cintron ([id number] and [id number] for bariatric surgery eval). Before Dr. Cintron saw a PMHNP 10y prior. Started at 19yo.   Psychotherapy Currently in therapy (EAP at this clinic). Started at 19yo.   RX Prior Psychotropics ADDERALL (2014, for about 4 years, took with antidepressant and slept well), CELEXA (dc in 2014, does not remember response), PROZAC (2016, caused dysphoria, "made me darker"), TRAZODONE (2016, caused nightmares), WELLBUTRIN (initiated by primary care in 2014 and again in [id number]), XANAX (JUL2023 taking 0.25mg daily prn 1d/w), ZOLOFT (does not remember effect, took for one month)    Current Psychotropics WELLBUTRIN, XANAX    Psychoactive Agents TIZANIDINE/ZANAFLEX (depressing, nervousness (3%), hallucinogenic-visual (3%))   Personal Psychosocial History   Childhood Born 3rd of 6 children. Raised by adopted father and mother. Parents  when patient was 9 years old. Pt moved around quite a bit in early childhood because bio father was in the . Does not remember childhood well.  No abuse, no serious illnesses and no injuries reported in childhood   Marital Status single, never    Children Two. Age range adulthood (18+yo).   Residence private residence, with son   Occupation employed, [employer] Hobbies collecting ([employer]), reading, and traveling   Taoism Practice Deferred.   Education History Master's degree.    History NO   Legal History Once for writing a bad check   Abuse History NO   Trauma History NO   Sexual History Deferred.   Family History    1st Degree 2nd Degree 3rd Degree   Suicides No No No   Substance Abuse son No No   Alcohol Abuse No No No   Bipolar Disorder No No No   Anxiety mother and son No No   Depression mother and son No No   Other/Medical ADHD " (son), Schizophrenia (uncle), cancer, dementia   Substance History   Alcohol NEVER regular use nor history of abuse.   Tobacco and Nicotine Formerly smoked tobacco. QUIT years ago.   Caffeine LOW   Marijuana 0/4: Denies active use within a year.   Other Recreational Substances Limited to her 20s, tried LSD twice and cocaine. Last use at 25yo.   Detox/rehab NO   Medical History   Past Medical History:   Diagnosis Date    Abnormal Pap smear 1986    Cryosurgery    Anxiety     Arthritis     Essential hypertension 11/26/2020    GERD (gastroesophageal reflux disease)     Venous stasis dermatitis of left lower extremity 12/31/2018      Active Problem List with Overview Notes    Diagnosis Date Noted    BMI 38.0-38.9,adult 07/21/2023    RUQ pain 10/25/2021    Obesity 05/11/2021    Peripheral vascular disease 01/29/2021    Idiopathic peripheral neuropathy 01/29/2021    Encounter for psychological assessment prior to bariatric surgery 01/25/2021    Essential hypertension 11/26/2020    Onychocryptosis 01/11/2019    Pain in toes of both feet 12/31/2018    Pain in left lower leg 12/31/2018    Soft tissue infection of foot 12/31/2018    Plantar fasciitis - Right Foot 10/15/2018    Pain in right foot - Right Foot 10/15/2018    Onychomycosis due to dermatophyte 10/15/2018    Generalized anxiety disorder with panic attacks 02/19/2016    Moderate episode of recurrent major depressive disorder 02/19/2016    Achilles tendinitis of right lower extremity 11/03/2015    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 OR;  Service: General;  Laterality: N/A;    LAPAROSCOPIC CHOLECYSTECTOMY N/A 10/25/2021    Procedure: CHOLECYSTECTOMY, LAPAROSCOPIC- diagnostic lap - hepatic cysts;  Surgeon: Thierry Victor MD;  Location: [facility] OR;  Service: General;  Laterality: N/A;    LAPAROSCOPIC SLEEVE GASTRECTOMY N/A 5/11/2021    Procedure: GASTRECTOMY, SLEEVE, LAPAROSCOPIC;  Surgeon: Thierry Victor MD;  Location: [facility] OR 2ND FLR;  Service: General;  Laterality: N/A;    LASIK Bilateral     ulner nerve transposition      left         Current Evaluation of Mental Status:     Nutritional Screening  "Considering the patient's height and weight, medications, medical history and preferences, should a referral be made to the dietitian?" not at this time   Constitutional       Vitals:    07/21/23 0905   BP: 122/78   Pulse: 68   Weight: 126.2 kg (278 lb 5.3 oz)   Height: 5' 11" (1.803 m)     General:  casual dress, unhealthy weight; obesity (Class II, BMI 35.0 - 39.9)    Psychiatric / Mental Status Examination  1. Appearance: Dress is informal but appropriate. Motor activity normal.  2. Discourse: Clear speech with normal rate and volume. Associations intact. Orderly.  3. Emotional Expression: Anxious and depressed mood. Affect is appropriate.  4. Perception and Thinking: No hallucinations. No suicidality, no homicidality, delusions, or paranoia.  5. Sensorium: Grossly intact. Able to focus for interview.  6. Memory and Fund of Knowledge: Intact for content of interview.  7. Insight and Judgment: Intact.    Neurological / Musculoskeletal / Osteopathic Structural  Gait, Station:  Non-ataxic gait     Auto-populated Chart Data:     Medications  Outpatient Encounter Medications as of 7/21/2023   Medication Sig Dispense Refill    ALPRAZolam (XANAX) 0.25 MG tablet Take 1 tablet (0.25 mg total) by mouth daily " as needed for Anxiety. 60 tablet 1    calcium carbonate-magnesium hydroxide (ROLAIDS) 550-110 mg Chew Take 1 tablet by mouth every evening.      ibuprofen (ADVIL,MOTRIN) 800 MG tablet Take 1 tablet (800 mg total) by mouth 3 (three) times daily. 60 tablet 6    pantoprazole (PROTONIX) 20 MG tablet Take 1 tablet (20 mg total) by mouth once daily. 90 tablet 1    tiZANidine (ZANAFLEX) 4 MG tablet TAKE 1 TABLET BY MOUTH THREE TIMES A DAY AS NEEDED FOR 10 DAYS 90 tablet 1    [DISCONTINUED] buPROPion (WELLBUTRIN XL) 300 MG 24 hr tablet Take 1 tablet (300 mg total) by mouth once daily. 90 tablet 2    buPROPion (WELLBUTRIN XL) 300 MG 24 hr tablet Take 1 tablet (300 mg total) by mouth every morning. 30 tablet 1    EScitalopram oxalate (LEXAPRO) 10 MG tablet Take 1 tablet (10 mg total) by mouth every morning. 30 tablet 1    hydrOXYzine HCL (ATARAX) 10 MG Tab Take 1 tablet (10 mg total) by mouth 3 (three) times daily as needed (anxiety). 90 tablet 1    sod picosulf-mag ox-citric ac (CLENPIQ) 10 mg-3.5 gram -12 gram/160 mL Soln As directed (Patient not taking: Reported on 7/21/2023) 320 mL 0     No facility-administered encounter medications on file as of 7/21/2023.      The chart was reviewed for recent diagnostic procedures and investigations, and pertinent results are noted below.    Wt Readings from Last 2 Encounters:   07/21/23 126.2 kg (278 lb 5.3 oz)   06/26/23 127.1 kg (280 lb 3.2 oz)     BP Readings from Last 1 Encounters:   07/21/23 122/78     Pulse Readings from Last 1 Encounters:   07/21/23 68        Blood Counts, Electrolytes & Glucose: (i.e. WBC, ANC, Hemoglobin, Hematocrit, MCV, Platelets)  Lab Results   Component Value Date    WBC 5.13 05/11/2022    GRAN 3.1 05/11/2022    GRAN 60.0 05/11/2022    HGB 12.8 05/11/2022    HCT 38.1 05/11/2022    MCV 91 05/11/2022     05/11/2022     05/11/2022    K 4.2 05/11/2022    CALCIUM 9.1 05/11/2022    PHOS 3.2 05/12/2021    MG 2.3 05/12/2021    CO2 27 05/11/2022     ANIONGAP 8 05/11/2022    GLU 85 05/11/2022       Renal, Liver, Pancreas, Thyroid, Parathyroid, Prolactin, CPK, Lipids & Vitamin Levels: (i.e. Cr, BUN, Anion Gap, GFR, Urine Specific Gravity, Urine Protein, Microalburnin, AST, ALT, GGT, Alk Phos,Total Bili, Total Protein, Albumin, Ammonia, INR, Amylase, Lipase, TSH, Total T3, Total T4, Free T4 PTH, Prolactin, CPK, Cholesterol, Triglycerides, LDH, HDL, Vitamin B12, Folate, Vitamin D)  Lab Results   Component Value Date    CREATININE 0.7 05/11/2022    BUN 16 05/11/2022    SPECGRAV 1.015 03/19/2021    PROTEINUA Negative 03/19/2021    AST 12 05/11/2022    ALT 12 05/11/2022    ALKPHOS 76 05/11/2022    BILITOT 0.7 05/11/2022    ALBUMIN 3.3 (L) 05/11/2022    TSH 0.970 03/19/2021    CHOL 142 05/11/2022    TRIG 54 05/11/2022    LDLCALC 84.2 05/11/2022    HDL 47 05/11/2022    [id number] 787 05/11/2022    THIAMINEBLOO 85 05/11/2022    [id number] 29 (L) 05/11/2022       Infection Diseases, Pregnancy Screenings & Drug Levels: (i.e. Hepatitis Panel, HIV, Syphilis, Urine & Blood Pregnancy Screens, beta hCG, Lithium, Valproic Acid, Carbamazepine, Lamotrigine, Phenytoin, Phenobarbital, Clozapine, Norclozapine, Clozapine + Norclozapine)   No results found for: HAV, HEPAIGM, HEPBIGM, HEPBCAB, HBEAG, HEPCAB, RAPIDHIV, HIV1X2, YFG43IFVT, OT9JAQSI, ABSOLUTECD4, RPR, PREGTESTUR, PREGSERUM, HCG, HCGQUANT, LITHIUM, VALPROATE, CBMZ, LAMOTRIGINE, PHENYTOIN, PHENOBARB, CLOZAPINE, NORCLOZAP, CLOZNORCLOZ    Addiction: (i.e. Urine Toxicology, Blood Alcohol, PETH, EtG, EtS, CDT, Buprenorphine, Norbuprenorphine)  No results found for: PCDSOALCOHOL, PCDSOBENZOD, BARBITURATES, PCDSCOMETHA, OPIATESCREEN, COCAINEMETAB, AMPHETAMINES, MARIJUANATHC, PCDSOPHENCYN, PCDSUBUPRE, PCDSUFENTANY, PCDSUOXYCOD, PCDSUTRAMA, [id number], PETH, ALCOHOLMEDIC, THEYLGLUCU, ETHYLSULF, CDT, BUPRENORPH, NORBUPRENOR    Results for orders placed or performed during the hospital encounter of 10/25/21   EKG 12-lead    Collection  Time: 10/22/21 11:13 AM    Narrative    Test Reason : Z01.818,    Vent. Rate : 065 BPM     Atrial Rate : 065 BPM     P-R Int : 174 ms          QRS Dur : 086 ms      QT Int : 420 ms       P-R-T Axes : 007 053 027 degrees     QTc Int : 436 ms    Normal sinus rhythm  Normal ECG  When compared with ECG of 17-AUG-2020 12:33,  No significant change was found  Confirmed by Carmenza GALVAN, Iron TOBIN ([id number]) on 10/25/2021 11:21:43 AM    Referred By: RED MARTI           Confirmed By:Iron Herr MD          Assessment - Diagnosis - Goals:     Case Formulation     ICD-10-CM ICD-9-CM   1. Moderate episode of recurrent major depressive disorder  F33.1 296.32   2. Generalized anxiety disorder with panic attacks  F41.1 300.02    F41.0 300.01   3. History of gastric bypass  Z98.84 V45.86   4. Vitamin D insufficiency  E55.9 268.9   5. BMI 38.0-38.9,adult  Z68.38 V85.38   6. Insomnia, unspecified type  G47.00 780.52   7. Current use of proton pump inhibitor  Z79.899 V58.69      Diagnostic Impression Considering history, clinical presentation and instruments, mood disorder diagnosis is most likely MDD. Clear ISAÍAS. Unclear ADHD.   Disease  Perspective Potential organic and biomedical factors influencing the case's presentation will need to be ruled out.  Relevant biomedical factors include bariatric surgery history, chronic pain, elevated BMI, GI problems, use of a benzodiazepine, use of medications for medical problems with psychoactive properties, and weight gain concerns.   Psychotropics to avoid may include those which are anxiogenic, depressing, high risk for GI symptoms, likely to cause weight gain, and too sedating.   Dimensions  Perspective Temperament and personality traits predispose the patient to certain strengths and vulnerabilities. At this time the patient's temperament is unclear.  Life circumstances provoke responses in the form of neurotic symptoms;  STRESS APPRAISAL is influenced by PERCEIVED  HELPLESSNESS.  Insufficient data is available to characterize other dimensions of the person, such as attachment patterns.   Behavior Perspective N/A   Life Story Perspective ACE (household dysfunction (divorce))   Prognosis This patient will benefit from treatment that would include both psychotherapy and medications.   Favorable prognostic factors include receptivity to treatment, established social support structures, being employed, and having hobbies     Risk Assessment and Goals   Safety Case risk, violence risk, and suicide risk at this time are NOT high. Pt agrees to safety and no safety concerns were identified during the interview.   Adherence  Patient has a history of intermittent adherence to the treatment plan. Barriers to adherence may include patient related factors (confusion, carelessness or forgetfulness), previous unfavorable experiences during treatment, poor measures of perceived self efficacy and/or helplessness (PSS4), and having risk factors for secondary nonadherence (including a history of nonpersistence). Strategies to improve adherence may include addressing potential barriers and reducing risks for nonadherence (integrating patient's preferences, counseling and psychoeducation, addressing patient expectations, addressing motivation, addressing hope, maintaining contact with therapist, etc.).   Strengths Patient accepts guidance/feedback. Patient is expressive/articulate.   Liabilities Coping skills are deficient or poorly employed.   Goals Sleep: improve sleep hygiene  Anxiety: acquiring relapse prevention skills, reducing excessive SALIENCE-ANXIOUS AVOIDANCE behaviors, reducing safety behaviors, eliminating assumptions about dangerousness of anxiety, reducing assumptions about positive value of worry, modifying schemata of vulnerability and threats, and reducing RUMINATION, reduce negative automatic thoughts, reducing time spent worrying, modifying schemata of need for  "control  Depression: acquiring relapse prevention skills, reducing ANHEDONIA/CONTEXT INSENSITIVITY, increasing interest in usual activities, increasing motivation, reducing excessive GUILT, decreasing WORTHLESSNESS, reducing RUMINATION, increasing HOPE, increasing self-reward for positive thoughts and behaviors, reducing NEGATIVE BIAS, reducing negative automatic thoughts, increasing energy, reducing fatigue, increasing social contacts  Stress/Panic: acquiring relapse prevention skills, acquiring breathing skills     Medication Management   Chart was reviewed. The risks and benefits of medication were discussed with pt. The treatment plan and followup plan were reviewed with pt. Pt understands to contact clinic if symptoms worsen. Pt understands to call 911 or go to nearest ER for suicidal ideation, intent or plan.   RX History ADDERALL (2014, for about 4 years, took with antidepressant and slept well), CELEXA (dc in 2014, does not remember response), PROZAC (2016, caused dysphoria, "made me darker"), TRAZODONE (2016, caused nightmares), WELLBUTRIN (initiated by primary care in 2014 and again in [id number]), XANAX (JUL2023 taking 0.25mg daily prn 1d/w), ZOLOFT (does not remember effect, took for one month)   Current RX Start ATARAX/VISTARIL  Useful for symptom coverage of anxiety with low risk for dependence.  Pt was provided [employer] educational material 7/21/2023.  Adjustments:  [id number]: Start 10mg TID prn anxiety  Prior to evaluation pt had been taking WELLBUTRIN 300mg daily and XANAX 0.25mg daily prn  Start LEXAPRO  Useful for symptom coverage of anxiety and depression with low risk for GI symptoms and weight gain.  Pt was provided [facility] educational material 7/21/2023.  Adjustments:  [id number]: Start 10mg daily  Prior to evaluation pt had been taking WELLBUTRIN 300mg daily and XANAX 0.25mg daily prn  Continue WELLBUTRIN XL  Anxiety may be made worse by increasing norepinephrine and dopamine  Symptom coverage is " insufficient  Pt was provided [facility] educational material 7/21/2023.  Adjustments:  [id number]: Continue 300mg daily  Prior to evaluation pt had been taking WELLBUTRIN 300mg daily  Continue XANAX  Refill not needed on day of evaluation 21JUL2023  Pt was provided [facility] educational material 7/21/2023.  Adjustments:  [id number]: Continue 0.25mg daily prn  Prior to evaluation pt had been taking XANAX 0.25mg daily prn and reported taking 1d/w on average   Education & Counseling Educational material provided  RX administration and adherence  NARX (7/21/2023) [phone] ([facility])  Clinic's CDS contract signed today 7/21/2023.  Sleep Hygiene: ALARM, DROWSY RULE   Other Orders VITAMIN B12 cobalamin (Relevant risk factor(s) for abnormal value: depression, fatigability, GI disorder, low energy, memory deficits, status post bariatric surgery, and using a medication that interferes with absorption or stability: proton pump inhibitor)  VITAMIN D calcitriol (Relevant risk factor(s) for abnormal value: a history of bariatric surgery, a previous history of deficiency or insufficiency, anxiety, depression, insufficient sunlight exposure, nonspecific musculoskeletal pains, obesity, sleep changes, and weight gain)  Continue individual psychotherapy   Monitor VITAL SIGNS  Use of: benzo  sleep  Instruments: PROMIS (A&D), PSS4   RETURN K: RETURN IN 4 WEEKS (ONE MONTH), and reassess frequency three visits from now  Indications for CLINIC ONLY include BUILDING THERAPEUTIC ALLIANCE, MEASUREMENT BASED CARE, VITAL SIGNS NEEDED  Continue medication management.      Billing Documentation:     Method of Encounter IN PERSON visit at the clinic   Type of Encounter New patient to me, BUT seen by department within last 3 years   Counseling;  Psychotherapy Not applicable: Not done or UNDER 16 minutes   Counseling;  Tobacco and/or Nicotine Not applicable: Not done or UNDER 3 minutes   Additional Codes and Modifiers N/A   Time Remaining Chart/Pt  50013: NEW patient to me and DID prescribe meds (and two or fewer 06297/62187 codes within a year)   Total Mins  (7/21/2023) N/A - Not billing for time        Vinny Charles DO  Department of Psychiatry, [facility]

## 2023-07-22 ENCOUNTER — LAB VISIT (OUTPATIENT)
Dept: LAB | Facility: HOSPITAL | Age: 63
End: 2023-07-22
Attending: STUDENT IN AN ORGANIZED HEALTH CARE EDUCATION/TRAINING PROGRAM
Payer: COMMERCIAL

## 2023-07-22 DIAGNOSIS — G47.00 INSOMNIA, UNSPECIFIED TYPE: ICD-10-CM

## 2023-07-22 DIAGNOSIS — Z79.899 CURRENT USE OF PROTON PUMP INHIBITOR: ICD-10-CM

## 2023-07-22 DIAGNOSIS — F33.1 MODERATE EPISODE OF RECURRENT MAJOR DEPRESSIVE DISORDER: ICD-10-CM

## 2023-07-22 DIAGNOSIS — Z98.84 HISTORY OF GASTRIC BYPASS: ICD-10-CM

## 2023-07-22 DIAGNOSIS — E55.9 VITAMIN D INSUFFICIENCY: ICD-10-CM

## 2023-07-22 LAB — VIT B12 SERPL-MCNC: 1209 PG/ML (ref 210–950)

## 2023-07-22 PROCEDURE — 82652 VIT D 1 25-DIHYDROXY: CPT | Performed by: STUDENT IN AN ORGANIZED HEALTH CARE EDUCATION/TRAINING PROGRAM

## 2023-07-22 PROCEDURE — 82607 VITAMIN B-12: CPT | Performed by: STUDENT IN AN ORGANIZED HEALTH CARE EDUCATION/TRAINING PROGRAM

## 2023-07-26 LAB — 1,25(OH)2D3 SERPL-MCNC: 48.2 PG/ML (ref 24.8–81.5)

## 2023-08-03 ENCOUNTER — OFFICE VISIT (OUTPATIENT)
Dept: PSYCHIATRY | Facility: CLINIC | Age: 63
End: 2023-08-03
Payer: COMMERCIAL

## 2023-08-03 DIAGNOSIS — R69 ILL-DEFINED CAUSE OF MORBIDITY/MORTALITY: Primary | ICD-10-CM

## 2023-08-03 PROCEDURE — 90834 PR PSYCHOTHERAPY W/PATIENT, 45 MIN: ICD-10-PCS | Mod: S$GLB,,, | Performed by: SOCIAL WORKER

## 2023-08-03 PROCEDURE — 90834 PSYTX W PT 45 MINUTES: CPT | Mod: S$GLB,,, | Performed by: SOCIAL WORKER

## 2023-08-03 NOTE — PROGRESS NOTES
Individual Psychotherapy (PhD/LCSW)    8/3/2023    Site:  [facility] 5    PLEASE SEE CLINICIAN'S CONFIDENTIAL NOTES.         Diagnosis:   1. Ill-defined cause of morbidity/mortality        Plan:  individual psychotherapy    Return to clinic: as scheduled    Length of Service (minutes): 45

## 2023-08-21 ENCOUNTER — OFFICE VISIT (OUTPATIENT)
Dept: PSYCHIATRY | Facility: CLINIC | Age: 63
End: 2023-08-21
Payer: COMMERCIAL

## 2023-08-21 VITALS
BODY MASS INDEX: 38.82 KG/M2 | SYSTOLIC BLOOD PRESSURE: 121 MMHG | HEART RATE: 71 BPM | DIASTOLIC BLOOD PRESSURE: 80 MMHG | HEIGHT: 71 IN

## 2023-08-21 DIAGNOSIS — F41.1 GENERALIZED ANXIETY DISORDER WITH PANIC ATTACKS: ICD-10-CM

## 2023-08-21 DIAGNOSIS — G47.00 INSOMNIA, UNSPECIFIED TYPE: ICD-10-CM

## 2023-08-21 DIAGNOSIS — F33.1 MODERATE EPISODE OF RECURRENT MAJOR DEPRESSIVE DISORDER: Primary | ICD-10-CM

## 2023-08-21 DIAGNOSIS — F41.0 GENERALIZED ANXIETY DISORDER WITH PANIC ATTACKS: ICD-10-CM

## 2023-08-21 PROCEDURE — 3008F BODY MASS INDEX DOCD: CPT | Mod: CPTII,S$GLB,, | Performed by: STUDENT IN AN ORGANIZED HEALTH CARE EDUCATION/TRAINING PROGRAM

## 2023-08-21 PROCEDURE — 3079F DIAST BP 80-89 MM HG: CPT | Mod: CPTII,S$GLB,, | Performed by: STUDENT IN AN ORGANIZED HEALTH CARE EDUCATION/TRAINING PROGRAM

## 2023-08-21 PROCEDURE — 3061F PR NEG MICROALBUMINURIA RESULT DOCUMENTED/REVIEW: ICD-10-PCS | Mod: CPTII,S$GLB,, | Performed by: STUDENT IN AN ORGANIZED HEALTH CARE EDUCATION/TRAINING PROGRAM

## 2023-08-21 PROCEDURE — 3074F PR MOST RECENT SYSTOLIC BLOOD PRESSURE < 130 MM HG: ICD-10-PCS | Mod: CPTII,S$GLB,, | Performed by: STUDENT IN AN ORGANIZED HEALTH CARE EDUCATION/TRAINING PROGRAM

## 2023-08-21 PROCEDURE — 1160F RVW MEDS BY RX/DR IN RCRD: CPT | Mod: CPTII,S$GLB,, | Performed by: STUDENT IN AN ORGANIZED HEALTH CARE EDUCATION/TRAINING PROGRAM

## 2023-08-21 PROCEDURE — 99999 PR PBB SHADOW E&M-EST. PATIENT-LVL III: CPT | Mod: PBBFAC,,, | Performed by: STUDENT IN AN ORGANIZED HEALTH CARE EDUCATION/TRAINING PROGRAM

## 2023-08-21 PROCEDURE — 99214 OFFICE O/P EST MOD 30 MIN: CPT | Mod: S$GLB,,, | Performed by: STUDENT IN AN ORGANIZED HEALTH CARE EDUCATION/TRAINING PROGRAM

## 2023-08-21 PROCEDURE — 3008F PR BODY MASS INDEX (BMI) DOCUMENTED: ICD-10-PCS | Mod: CPTII,S$GLB,, | Performed by: STUDENT IN AN ORGANIZED HEALTH CARE EDUCATION/TRAINING PROGRAM

## 2023-08-21 PROCEDURE — 3066F PR DOCUMENTATION OF TREATMENT FOR NEPHROPATHY: ICD-10-PCS | Mod: CPTII,S$GLB,, | Performed by: STUDENT IN AN ORGANIZED HEALTH CARE EDUCATION/TRAINING PROGRAM

## 2023-08-21 PROCEDURE — 1160F PR REVIEW ALL MEDS BY PRESCRIBER/CLIN PHARMACIST DOCUMENTED: ICD-10-PCS | Mod: CPTII,S$GLB,, | Performed by: STUDENT IN AN ORGANIZED HEALTH CARE EDUCATION/TRAINING PROGRAM

## 2023-08-21 PROCEDURE — 99999 PR PBB SHADOW E&M-EST. PATIENT-LVL III: ICD-10-PCS | Mod: PBBFAC,,, | Performed by: STUDENT IN AN ORGANIZED HEALTH CARE EDUCATION/TRAINING PROGRAM

## 2023-08-21 PROCEDURE — 3074F SYST BP LT 130 MM HG: CPT | Mod: CPTII,S$GLB,, | Performed by: STUDENT IN AN ORGANIZED HEALTH CARE EDUCATION/TRAINING PROGRAM

## 2023-08-21 PROCEDURE — 1159F MED LIST DOCD IN RCRD: CPT | Mod: CPTII,S$GLB,, | Performed by: STUDENT IN AN ORGANIZED HEALTH CARE EDUCATION/TRAINING PROGRAM

## 2023-08-21 PROCEDURE — 96136 PSYCL/NRPSYC TST PHY/QHP 1ST: CPT | Mod: 59,S$GLB,, | Performed by: STUDENT IN AN ORGANIZED HEALTH CARE EDUCATION/TRAINING PROGRAM

## 2023-08-21 PROCEDURE — 1159F PR MEDICATION LIST DOCUMENTED IN MEDICAL RECORD: ICD-10-PCS | Mod: CPTII,S$GLB,, | Performed by: STUDENT IN AN ORGANIZED HEALTH CARE EDUCATION/TRAINING PROGRAM

## 2023-08-21 PROCEDURE — 99214 PR OFFICE/OUTPT VISIT, EST, LEVL IV, 30-39 MIN: ICD-10-PCS | Mod: S$GLB,,, | Performed by: STUDENT IN AN ORGANIZED HEALTH CARE EDUCATION/TRAINING PROGRAM

## 2023-08-21 PROCEDURE — 96136 PR PSYCH/NEUROPSYCH TEST ADMIN/SCORING, 2+ TESTS, 1ST 30 MIN: ICD-10-PCS | Mod: 59,S$GLB,, | Performed by: STUDENT IN AN ORGANIZED HEALTH CARE EDUCATION/TRAINING PROGRAM

## 2023-08-21 PROCEDURE — 3061F NEG MICROALBUMINURIA REV: CPT | Mod: CPTII,S$GLB,, | Performed by: STUDENT IN AN ORGANIZED HEALTH CARE EDUCATION/TRAINING PROGRAM

## 2023-08-21 PROCEDURE — 3066F NEPHROPATHY DOC TX: CPT | Mod: CPTII,S$GLB,, | Performed by: STUDENT IN AN ORGANIZED HEALTH CARE EDUCATION/TRAINING PROGRAM

## 2023-08-21 PROCEDURE — 3079F PR MOST RECENT DIASTOLIC BLOOD PRESSURE 80-89 MM HG: ICD-10-PCS | Mod: CPTII,S$GLB,, | Performed by: STUDENT IN AN ORGANIZED HEALTH CARE EDUCATION/TRAINING PROGRAM

## 2023-08-21 RX ORDER — BUPROPION HYDROCHLORIDE 300 MG/1
300 TABLET ORAL EVERY MORNING
Qty: 30 TABLET | Refills: 1 | Status: SHIPPED | OUTPATIENT
Start: 2023-08-21 | End: 2023-10-24 | Stop reason: SDUPTHER

## 2023-08-21 RX ORDER — ESCITALOPRAM OXALATE 10 MG/1
10 TABLET ORAL NIGHTLY
Qty: 30 TABLET | Refills: 1 | Status: SHIPPED | OUTPATIENT
Start: 2023-08-21 | End: 2023-09-18 | Stop reason: DRUGHIGH

## 2023-08-21 NOTE — PROGRESS NOTES
"    Outpatient Psychiatry Followup Visit (DO/MD/NP)    8/21/2023  Assessment & Plan    Assessment - Plan:     Impression   8/21/2023 (3) Ongoing treatment of primary symptoms with some favorable progress.     ICD-10-CM ICD-9-CM   1. Moderate episode of recurrent major depressive disorder  F33.1 296.32   2. Generalized anxiety disorder with panic attacks  F41.1 300.02    F41.0 300.01   3. Insomnia, unspecified type  G47.00 780.52   4. BMI 38.0-38.9,adult  Z68.38 V85.38        Plan of Care & Medication Management    Chart was reviewed. The risks and benefits of medication were discussed with pt. The treatment plan and followup plan were reviewed with pt. Pt understands to contact clinic if symptoms worsen. Pt understands to call 911 or go to nearest ER for suicidal ideation, intent or plan.   RX History ADDERALL (2014, for about 4 years, took with antidepressant and slept well), CELEXA (dc in 2014, does not remember response), PROZAC (2016, caused dysphoria, "made me darker"), TRAZODONE (2016, caused nightmares), WELLBUTRIN (initiated by primary care in 2014 and again in [id number]), XANAX (JUL2023 taking 0.25mg daily prn 1d/w), ZOLOFT (does not remember effect, took for one month)   Current RX [id number]: JOSE 2/6, BI 47/100  Continue ATARAX/VISTARIL  Adjustments:  [id number]: Start 10mg TID prn anxiety  Prior to evaluation pt had been taking WELLBUTRIN 300mg daily and XANAX 0.25mg daily prn  Adjust LEXAPRO  Target dose range 10-20mg/d  Adjustments:  [id number]: Adjust to 10mg NIGHTLY   [id number]: Start 10mg daily  Prior to evaluation pt had been taking WELLBUTRIN 300mg daily and XANAX 0.25mg daily prn  Continue WELLBUTRIN XL  Target dose range 150-300mg/d  Adjustments:  [id number]: Continue 300mg daily  Prior to evaluation pt had been taking WELLBUTRIN 300mg daily  Continue XANAX  Adjustments:  [id number]: Continue 0.25mg daily prn  Prior to evaluation pt had been taking XANAX 0.25mg daily prn and reported taking 1d/w on " average   Education & Counseling RX administration and adherence   Other Orders NONE this visit   Monitor VITAL SIGNS  Instruments: PROMIS (A&D), PSS4   RETURN L: RETURN IN 4 WEEKS (ONE MONTH), and reassess frequency two visits from now  Continue medication management.     Subjective    Interval History - Review of Systems (ROS):     Available documentation has been reviewed, and pertinent elements of the chart have been incorporated into this note where appropriate.   6/29/2023 : first Epic encounter with psychiatry department  8/3/2023 : last Epic encounter with psychiatry department  7/21/2023 : last Epic encounter with writer   Domonique Murrayon, a 62 y.o. female, presenting for followup visit.      Since last visit, reports overall A LITTLE BETTER.    Sleeping better since taking WELLBUTRIN qam.    Energy rather low, says LEXAPRO makes her feel tired. Discussed switching to HS.    Took ATARAX once and it helps. Took XANAX once.    Labs reviewed. B12 level high. Advised to cut down on supplementing with B12.     Vegetative Functions   Sleep [] Y  [x] N  Better.   GI/ [] Y  [] N     Appetite [] Y  [] N     Emotion and Mood   Negative Bias [x] Y  [] N  About the same.   Hedonic Capacity [x] Y  [] N  About the same.   Mood Dysregulation [] Y  [] N     Anxiety and Threat Perception   Rumination [] Y  [x] N  Overall better.   Salience-Anxious Av. [] Y  [] N     Threat Dysregulation [] Y  [x] N  No panic attacks since last encounter.   Consciousness, Cognition and Reality Testing   Cognitive Dyscontrol [] Y  [] N     Inattention [] Y  [] N     Memory Problems [] Y  [] N     Perception Problems [] Y  [] N     Problems in Social Spheres   Home [] Y  [] N     Peers [] Y  [] N     Work [] Y  [] N     Stress [x] Y  [] N  Better.     Pt did NOT display signs of nor endorse symptoms of overt psychosis or acute mood disorder requiring hospitalization  "during the encounter. Pt denied violent thoughts or suicidal or homicidal ideation, intent, or plan.     Objective    Measurement-Based Care (MBC):     Routine Instruments   PROMIS-ANXIETY Interpretation: 4a raw score 14, T-SCORE 67.3; MODERATE using 55-60-70 cutoffs. Stratification of anxiety severity was done with GAD7 last time; compared to MODERATE last time, severity probably is about the same. Changed instruments today; severity changes may be artifact. See "Interval History."   PROMIS-DEPRESSION Interpretation: 4a raw score 13, T-SCORE 63.9; MODERATE using 55-60-70 cutoffs. Stratification of depression severity was done with PHQ9 last time; compared to MODERATE last time, severity probably is about the same. Changed instruments today; severity changes may be artifact. See "Interval History."   PSS4 Interpretation: 10/16; MODERATE using 6-11 cutoffs. 1 PH, 1 LSE. Compared to HIGH last time, PSS4 IMPROVED.   Additional Instruments   N/A     Current Evaluation of Mental Status:     Constitutional / General       Vitals:    08/21/23 1040   BP: 121/80   Pulse: 71   Height: 5' 11" (1.803 m)       Psychiatric / Mental Status Examination  1. Appearance: Dress is informal but appropriate. Motor activity normal.  2. Discourse: Clear speech with normal rate and volume. Associations intact. Orderly.  3. Emotional Expression: Anxious and depressed mood. Affect is appropriate.  4. Perception and Thinking: No hallucinations. No suicidality, no homicidality, delusions, or paranoia.  5. Sensorium: Grossly intact. Able to focus for interview.  6. Memory and Fund of Knowledge: Intact for content of interview.  7. Insight and Judgment: Intact.         Auto-populated Chart Data:     Medications  Outpatient Encounter Medications as of 8/21/2023   Medication Sig Dispense Refill    ALPRAZolam (XANAX) 0.25 MG tablet Take 1 tablet (0.25 mg total) by mouth daily as needed for Anxiety. 60 tablet 1    calcium carbonate-magnesium " hydroxide (ROLAIDS) 550-110 mg Chew Take 1 tablet by mouth every evening.      hydrOXYzine HCL (ATARAX) 10 MG Tab Take 1 tablet (10 mg total) by mouth 3 (three) times daily as needed (anxiety). 90 tablet 1    ibuprofen (ADVIL,MOTRIN) 800 MG tablet Take 1 tablet (800 mg total) by mouth 3 (three) times daily. 60 tablet 6    pantoprazole (PROTONIX) 20 MG tablet Take 1 tablet (20 mg total) by mouth once daily. 90 tablet 1    tiZANidine (ZANAFLEX) 4 MG tablet TAKE 1 TABLET BY MOUTH THREE TIMES A DAY AS NEEDED FOR 10 DAYS 90 tablet 1    [DISCONTINUED] buPROPion (WELLBUTRIN XL) 300 MG 24 hr tablet Take 1 tablet (300 mg total) by mouth every morning. 30 tablet 1    [DISCONTINUED] EScitalopram oxalate (LEXAPRO) 10 MG tablet Take 1 tablet (10 mg total) by mouth every morning. 30 tablet 1    buPROPion (WELLBUTRIN XL) 300 MG 24 hr tablet Take 1 tablet (300 mg total) by mouth every morning. 30 tablet 1    EScitalopram oxalate (LEXAPRO) 10 MG tablet Take 1 tablet (10 mg total) by mouth every evening. 30 tablet 1    sod picosulf-mag ox-citric ac (CLENPIQ) 10 mg-3.5 gram -12 gram/160 mL Soln As directed (Patient not taking: Reported on 8/21/2023) 320 mL 0     No facility-administered encounter medications on file as of 8/21/2023.      The chart was reviewed for recent diagnostic procedures and investigations, and pertinent results are noted below.    Wt Readings from Last 2 Encounters:   07/21/23 126.2 kg (278 lb 5.3 oz)   06/26/23 127.1 kg (280 lb 3.2 oz)     BP Readings from Last 1 Encounters:   08/21/23 121/80     Pulse Readings from Last 1 Encounters:   08/21/23 71        Blood Counts, Electrolytes & Glucose: (i.e. WBC, ANC, Hemoglobin, Hematocrit, MCV, Platelets)  Lab Results   Component Value Date    WBC 4.67 07/22/2023    GRAN 2.7 07/22/2023    GRAN 57.4 07/22/2023    HGB 13.0 07/22/2023    HCT 39.6 07/22/2023    MCV 92 07/22/2023     07/22/2023     07/22/2023    K 4.3 07/22/2023    CALCIUM 8.9 07/22/2023     "PHOS 3.2 05/12/2021    MG 2.3 05/12/2021    CO2 28 07/22/2023    ANIONGAP 7 (L) 07/22/2023    GLU 91 07/22/2023       Renal, Liver, Pancreas, Thyroid, Parathyroid, Prolactin, CPK, Lipids & Vitamin Levels: (i.e. Cr, BUN, Anion Gap, GFR, Urine Specific Gravity, Urine Protein, Microalburnin, AST, ALT, GGT, Alk Phos,Total Bili, Total Protein, Albumin, Ammonia, INR, Amylase, Lipase, TSH, Total T3, Total T4, Free T4 PTH, Prolactin, CPK, Cholesterol, Triglycerides, LDH, HDL, Vitamin B12, Folate, Vitamin D)  Lab Results   Component Value Date    CREATININE 0.7 07/22/2023    BUN 18 07/22/2023    EGFRNORACEVR >60.0 07/22/2023    SPECGRAV 1.020 07/22/2023    PROTEINUA Trace (A) 07/22/2023    AST 14 07/22/2023    ALT 15 07/22/2023    ALKPHOS 68 07/22/2023    BILITOT 0.8 07/22/2023    ALBUMIN 3.9 07/22/2023    TSH 1.050 07/22/2023    CHOL 178 07/22/2023    TRIG 32 07/22/2023    LDLCALC 106.6 07/22/2023    HDL 65 07/22/2023    YDMGWRBO01 1209 (H) 07/22/2023    THIAMINEBLOO 85 05/11/2022    VBXQMZBU60ES 29 (L) 05/11/2022       Infection Diseases, Pregnancy Screenings & Drug Levels: (i.e. Hepatitis Panel, HIV, Syphilis, Urine & Blood Pregnancy Screens, beta hCG, Lithium, Valproic Acid, Carbamazepine, Lamotrigine, Phenytoin, Phenobarbital, Clozapine, Norclozapine, Clozapine + Norclozapine)   No results found for: "HAV", "HEPAIGM", "HEPBIGM", "HEPBCAB", "HBEAG", "HEPCAB", "RAPIDHIV", "HIV1X2", "FVH36EGLB", "OA3SOZOW", "ABSOLUTECD4", "RPR", "PREGTESTUR", "PREGSERUM", "HCG", "HCGQUANT", "LITHIUM", "VALPROATE", "CBMZ", "LAMOTRIGINE", "PHENYTOIN", "PHENOBARB", "CLOZAPINE", "NORCLOZAP", "CLOZNORCLOZ"    Addiction: (i.e. Urine Toxicology, Blood Alcohol, PETH, EtG, EtS, CDT, Buprenorphine, Norbuprenorphine)  No results found for: "PCDSOALCOHOL", "PCDSOBENZOD", "BARBITURATES", "PCDSCOMETHA", "OPIATESCREEN", "COCAINEMETAB", "AMPHETAMINES", "MARIJUANATHC", "PCDSOPHENCYN", "PCDSUBUPRE", "PCDSUFENTANY", "PCDSUOXYCOD", "PCDSUTRAMA", "[id number]", " ""PETH", "ALCOHOLMEDIC", "THEYLGLUCU", "ETHYLSULF", "CDT", "BUPRENORPH", "NORBUPRENOR"    Results for orders placed or performed during the hospital encounter of 10/25/21   EKG 12-lead    Collection Time: 10/22/21 11:13 AM    Narrative    Test Reason : Z01.818,    Vent. Rate : 065 BPM     Atrial Rate : 065 BPM     P-R Int : 174 ms          QRS Dur : 086 ms      QT Int : 420 ms       P-R-T Axes : 007 053 027 degrees     QTc Int : 436 ms    Normal sinus rhythm  Normal ECG  When compared with ECG of 17-AUG-2020 12:33,  No significant change was found  Confirmed by Carmenza GALVAN, Iron TOBIN ([id number]) on 10/25/2021 11:21:43 AM    Referred By: RED MARTI           Confirmed By:Iron Herr MD            Billing Documentation:     Method of Encounter IN PERSON visit at the clinic   Type of Encounter Follow up visit with me   Counseling;  Psychotherapy    Counseling;  Tobacco and/or Nicotine    Additional Codes and Modifiers 85387, with modifer 59: administered and scored more than one psychological or neuropsychological tests (see MBC above) (16+ mins)   Time Remaining Chart/Pt 93161: FOLLOW UP VISIT, Rx mgmt, "Multiple STABLE chronic illnesses"   Total Mins  (8/21/2023) N/A - Not billing for time        Vinny Charles DO  Department of Psychiatry, [facility]        "

## 2023-08-21 NOTE — PATIENT INSTRUCTIONS
Keep therapy appointments   Switch LEXAPRO dosing to the evening  Continue other medications as prescribed

## 2023-08-29 ENCOUNTER — OFFICE VISIT (OUTPATIENT)
Dept: PSYCHIATRY | Facility: CLINIC | Age: 63
End: 2023-08-29
Payer: COMMERCIAL

## 2023-08-29 DIAGNOSIS — R69 ILL-DEFINED CAUSE OF MORBIDITY/MORTALITY: Primary | ICD-10-CM

## 2023-08-29 PROCEDURE — 90834 PSYTX W PT 45 MINUTES: CPT | Mod: S$GLB,,, | Performed by: SOCIAL WORKER

## 2023-08-29 PROCEDURE — 90834 PR PSYCHOTHERAPY W/PATIENT, 45 MIN: ICD-10-PCS | Mod: S$GLB,,, | Performed by: SOCIAL WORKER

## 2023-09-18 ENCOUNTER — OFFICE VISIT (OUTPATIENT)
Dept: PSYCHIATRY | Facility: CLINIC | Age: 63
End: 2023-09-18
Payer: COMMERCIAL

## 2023-09-18 VITALS
SYSTOLIC BLOOD PRESSURE: 107 MMHG | DIASTOLIC BLOOD PRESSURE: 70 MMHG | HEART RATE: 70 BPM | HEIGHT: 71 IN | BODY MASS INDEX: 39.71 KG/M2 | WEIGHT: 283.63 LBS

## 2023-09-18 DIAGNOSIS — G47.00 INSOMNIA, UNSPECIFIED TYPE: ICD-10-CM

## 2023-09-18 DIAGNOSIS — F41.1 GENERALIZED ANXIETY DISORDER WITH PANIC ATTACKS: ICD-10-CM

## 2023-09-18 DIAGNOSIS — F33.1 MODERATE EPISODE OF RECURRENT MAJOR DEPRESSIVE DISORDER: Primary | ICD-10-CM

## 2023-09-18 DIAGNOSIS — F41.0 GENERALIZED ANXIETY DISORDER WITH PANIC ATTACKS: ICD-10-CM

## 2023-09-18 PROCEDURE — 3074F SYST BP LT 130 MM HG: CPT | Mod: CPTII,S$GLB,, | Performed by: STUDENT IN AN ORGANIZED HEALTH CARE EDUCATION/TRAINING PROGRAM

## 2023-09-18 PROCEDURE — 99999 PR PBB SHADOW E&M-EST. PATIENT-LVL III: CPT | Mod: PBBFAC,,, | Performed by: STUDENT IN AN ORGANIZED HEALTH CARE EDUCATION/TRAINING PROGRAM

## 2023-09-18 PROCEDURE — 1160F PR REVIEW ALL MEDS BY PRESCRIBER/CLIN PHARMACIST DOCUMENTED: ICD-10-PCS | Mod: CPTII,S$GLB,, | Performed by: STUDENT IN AN ORGANIZED HEALTH CARE EDUCATION/TRAINING PROGRAM

## 2023-09-18 PROCEDURE — 90833 PR PSYCHOTHERAPY W/PATIENT W/E&M, 30 MIN (ADD ON): ICD-10-PCS | Mod: S$GLB,,, | Performed by: STUDENT IN AN ORGANIZED HEALTH CARE EDUCATION/TRAINING PROGRAM

## 2023-09-18 PROCEDURE — 3008F PR BODY MASS INDEX (BMI) DOCUMENTED: ICD-10-PCS | Mod: CPTII,S$GLB,, | Performed by: STUDENT IN AN ORGANIZED HEALTH CARE EDUCATION/TRAINING PROGRAM

## 2023-09-18 PROCEDURE — 1160F RVW MEDS BY RX/DR IN RCRD: CPT | Mod: CPTII,S$GLB,, | Performed by: STUDENT IN AN ORGANIZED HEALTH CARE EDUCATION/TRAINING PROGRAM

## 2023-09-18 PROCEDURE — 3078F DIAST BP <80 MM HG: CPT | Mod: CPTII,S$GLB,, | Performed by: STUDENT IN AN ORGANIZED HEALTH CARE EDUCATION/TRAINING PROGRAM

## 2023-09-18 PROCEDURE — 1159F PR MEDICATION LIST DOCUMENTED IN MEDICAL RECORD: ICD-10-PCS | Mod: CPTII,S$GLB,, | Performed by: STUDENT IN AN ORGANIZED HEALTH CARE EDUCATION/TRAINING PROGRAM

## 2023-09-18 PROCEDURE — 99999 PR PBB SHADOW E&M-EST. PATIENT-LVL III: ICD-10-PCS | Mod: PBBFAC,,, | Performed by: STUDENT IN AN ORGANIZED HEALTH CARE EDUCATION/TRAINING PROGRAM

## 2023-09-18 PROCEDURE — 3061F PR NEG MICROALBUMINURIA RESULT DOCUMENTED/REVIEW: ICD-10-PCS | Mod: CPTII,S$GLB,, | Performed by: STUDENT IN AN ORGANIZED HEALTH CARE EDUCATION/TRAINING PROGRAM

## 2023-09-18 PROCEDURE — 3078F PR MOST RECENT DIASTOLIC BLOOD PRESSURE < 80 MM HG: ICD-10-PCS | Mod: CPTII,S$GLB,, | Performed by: STUDENT IN AN ORGANIZED HEALTH CARE EDUCATION/TRAINING PROGRAM

## 2023-09-18 PROCEDURE — 3074F PR MOST RECENT SYSTOLIC BLOOD PRESSURE < 130 MM HG: ICD-10-PCS | Mod: CPTII,S$GLB,, | Performed by: STUDENT IN AN ORGANIZED HEALTH CARE EDUCATION/TRAINING PROGRAM

## 2023-09-18 PROCEDURE — 3008F BODY MASS INDEX DOCD: CPT | Mod: CPTII,S$GLB,, | Performed by: STUDENT IN AN ORGANIZED HEALTH CARE EDUCATION/TRAINING PROGRAM

## 2023-09-18 PROCEDURE — 3066F NEPHROPATHY DOC TX: CPT | Mod: CPTII,S$GLB,, | Performed by: STUDENT IN AN ORGANIZED HEALTH CARE EDUCATION/TRAINING PROGRAM

## 2023-09-18 PROCEDURE — 90833 PSYTX W PT W E/M 30 MIN: CPT | Mod: S$GLB,,, | Performed by: STUDENT IN AN ORGANIZED HEALTH CARE EDUCATION/TRAINING PROGRAM

## 2023-09-18 PROCEDURE — 99214 OFFICE O/P EST MOD 30 MIN: CPT | Mod: S$GLB,,, | Performed by: STUDENT IN AN ORGANIZED HEALTH CARE EDUCATION/TRAINING PROGRAM

## 2023-09-18 PROCEDURE — 99214 PR OFFICE/OUTPT VISIT, EST, LEVL IV, 30-39 MIN: ICD-10-PCS | Mod: S$GLB,,, | Performed by: STUDENT IN AN ORGANIZED HEALTH CARE EDUCATION/TRAINING PROGRAM

## 2023-09-18 PROCEDURE — 3061F NEG MICROALBUMINURIA REV: CPT | Mod: CPTII,S$GLB,, | Performed by: STUDENT IN AN ORGANIZED HEALTH CARE EDUCATION/TRAINING PROGRAM

## 2023-09-18 PROCEDURE — 1159F MED LIST DOCD IN RCRD: CPT | Mod: CPTII,S$GLB,, | Performed by: STUDENT IN AN ORGANIZED HEALTH CARE EDUCATION/TRAINING PROGRAM

## 2023-09-18 PROCEDURE — 3066F PR DOCUMENTATION OF TREATMENT FOR NEPHROPATHY: ICD-10-PCS | Mod: CPTII,S$GLB,, | Performed by: STUDENT IN AN ORGANIZED HEALTH CARE EDUCATION/TRAINING PROGRAM

## 2023-09-18 RX ORDER — ESCITALOPRAM OXALATE 20 MG/1
20 TABLET ORAL NIGHTLY
Qty: 30 TABLET | Refills: 1 | Status: SHIPPED | OUTPATIENT
Start: 2023-09-18 | End: 2023-10-24 | Stop reason: SDUPTHER

## 2023-09-18 NOTE — PROGRESS NOTES
"    Outpatient Psychiatry Followup Visit (DO/MD/NP)    9/18/2023  Assessment & Plan    Assessment - Plan:     Impression   9/18/2023 (3) Ongoing treatment of primary symptoms with some favorable progress.     ICD-10-CM ICD-9-CM   1. Moderate episode of recurrent major depressive disorder  F33.1 296.32   2. Generalized anxiety disorder with panic attacks  F41.1 300.02    F41.0 300.01   3. Insomnia, unspecified type  G47.00 780.52   4. BMI 39.0-39.9,adult  Z68.39 V85.39        Plan of Care & Medication Management    Chart was reviewed. The risks and benefits of medication were discussed with pt. The treatment plan and followup plan were reviewed with pt. Pt understands to contact clinic if symptoms worsen. Pt understands to call 911 or go to nearest ER for suicidal ideation, intent or plan.   RX History ADDERALL (2014, for about 4 years, took with antidepressant and slept well), ATARAX/VISTARIL, CELEXA (dc in 2014, does not remember response), LEXAPRO, PROZAC (2016, caused dysphoria, "made me darker"), TRAZODONE (2016, caused nightmares), WELLBUTRIN (initiated by primary care in 2014 and again in [id number]), XANAX (JUL2023 taking 0.25mg daily prn 1d/w), ZOLOFT (does not remember effect, took for one month)   Current RX [id number]: JOSE 2/6, BI 47/100  Continue ATARAX/VISTARIL  Adjustments:  [id number]: Start 10mg TID prn anxiety  Prior to evaluation pt had been taking WELLBUTRIN 300mg daily and XANAX 0.25mg daily prn  Increase LEXAPRO  Target dose range 10-20mg/d  Adjustments:  88HHO7624: Increase to 20mg NIGHTLY   59FCI4286: Adjust to 10mg NIGHTLY   26JZV7282: Start 10mg daily  Prior to evaluation pt had been taking WELLBUTRIN 300mg daily and XANAX 0.25mg daily prn  Continue WELLBUTRIN XL  Target dose range 150-300mg/d  Adjustments:  23QPX9643: Continue 300mg daily  Prior to evaluation pt had been taking WELLBUTRIN 300mg daily  Continue XANAX  Adjustments:  25KHZ7834: Continue 0.25mg daily prn  Prior to evaluation pt " had been taking XANAX 0.25mg daily prn and reported taking 1d/w on average   Education & Counseling RX administration and adherence   Other Orders Continue individual psychotherapy   Monitor VITAL SIGNS  Instruments: PROMIS (A&D), PSS4   RETURN M: RETURN IN 4 WEEKS (ONE MONTH), and reassess frequency next visit  Continue medication management.     Subjective    Interval History - Review of Systems (ROS):     Available documentation has been reviewed, and pertinent elements of the chart have been incorporated into this note where appropriate.   6/29/2023 : first Epic encounter with psychiatry department  8/29/2023 : last Epic encounter with psychiatry department  8/21/2023 : last Epic encounter with writer   Domonique Carmona, a 62 y.o. female, presenting for followup visit.      Since last visit, reports overall about the same.    Since adjusting LEXAPRO to NIGHTLY, more awake in the morning. Sleeping well. Hard to get out of bed and go to work. Overdue for a vacation.    Took XANAX twice since last encounter.    Discussed increasing LEXAPRO to 20mg.     Vegetative Functions   Sleep [] Y  [x] N  Better.   GI/ [] Y  [] N     Appetite [] Y  [] N     Emotion and Mood   Negative Bias [x] Y  [] N  About the same.   Hedonic Capacity [] Y  [x] N  About the same.  Consummatory pleasure INTACT.   Mood Dysregulation [] Y  [x] N  MOOD DYSREGULATION: irritability improving.  MOOD DYSREGULATION: no crying spells.   Anxiety and Threat Perception   CSTC: Rumination [x] Y  [] N  Overall better.   CSTC: Salience/Apprehen. [] Y  [x] N  Overall better.   Amygdala: Panic/Phobic [] Y  [x] N  No panic attacks since last encounter.   Consciousness, Cognition and Reality Testing   Cognitive Dyscontrol [] Y  [] N     Inattention [] Y  [] N     Memory Problems [] Y  [] N     Perception Problems [] Y  [] N     Problems in Social Spheres   Home [] Y  [] N     Peers [] Y  [] N    "  Work [x] Y  [] N  Burnout.   Stress [] Y  [] N       Pt did NOT display signs of nor endorse symptoms of overt psychosis or acute mood disorder requiring hospitalization during the encounter. Pt denied violent thoughts or suicidal or homicidal ideation, intent, or plan.     Objective    Measurement-Based Care (MBC):     Routine Instruments   PROMIS-ANXIETY Interpretation: 4a raw score 12, T-SCORE 63.4; MODERATE using 55-60-70 cutoffs. Last T-SCORE was 67. This PROMIS T-score change of 5 points or fewer indicates the score is probably stable.   PROMIS-DEPRESSION Interpretation: 4a raw score 12, T-SCORE 62.2; MODERATE using 55-60-70 cutoffs. Last T-SCORE was 64. This PROMIS T-score change of 5 points or fewer indicates the score is probably stable.   PSS4 Interpretation: 8/16; MODERATE using 6-11 cutoffs. 0 PH, 0 LSE. Compared to MODERATE 10/16 last time, PSS4 is about the same.   Additional Instruments   N/A     Current Evaluation of Mental Status:     Constitutional / General       Vitals:    09/18/23 1033   BP: 107/70   Pulse: 70   Weight: 128.7 kg (283 lb 10 oz)   Height: 5' 11" (1.803 m)       Psychiatric / Mental Status Examination  1. Appearance: Dress is informal but appropriate. Motor activity normal.  2. Discourse: Clear speech with normal rate and volume. Associations intact. Orderly.  3. Emotional Expression: Anxious and depressed mood. Affect is appropriate.  4. Perception and Thinking: No hallucinations. No suicidality, no homicidality, delusions, or paranoia.  5. Sensorium: Grossly intact. Able to focus for interview.  6. Memory and Fund of Knowledge: Intact for content of interview.  7. Insight and Judgment: Intact.         Auto-populated chart data omitted from this note for brevity.      Billing Documentation:     Method of Encounter IN PERSON visit at the clinic   Type of Encounter Follow up visit with me   Counseling;  Psychotherapy    Counseling;  Tobacco and/or Nicotine    Additional " "Codes and Modifiers 95464, with modifer 59: administered and scored more than one psychological or neuropsychological tests (see MBC above) (16+ mins)   Time Remaining Chart/Pt 69093: FOLLOW UP VISIT, Rx mgmt, "Multiple STABLE chronic illnesses"   Total Mins  (9/18/2023) N/A - Not billing for time        Vinny Charles DO  Department of Psychiatry, [facility]        "

## 2023-09-18 NOTE — PATIENT INSTRUCTIONS
Schedule a vacation from work  Increase LEXAPRO to 20mg NIGHTLY   Continue other medications as prescribed   Keep therapy appointments

## 2023-09-21 ENCOUNTER — OFFICE VISIT (OUTPATIENT)
Dept: PSYCHIATRY | Facility: CLINIC | Age: 63
End: 2023-09-21
Payer: COMMERCIAL

## 2023-09-21 DIAGNOSIS — R69 ILL-DEFINED CAUSE OF MORBIDITY/MORTALITY: Primary | ICD-10-CM

## 2023-09-21 PROCEDURE — 90834 PSYTX W PT 45 MINUTES: CPT | Mod: S$GLB,,, | Performed by: SOCIAL WORKER

## 2023-09-21 PROCEDURE — 90834 PR PSYCHOTHERAPY W/PATIENT, 45 MIN: ICD-10-PCS | Mod: S$GLB,,, | Performed by: SOCIAL WORKER

## 2023-09-21 NOTE — PROGRESS NOTES
Individual Psychotherapy (PhD/LCSW)    9/21/2023    Site:  [facility] 5     PLEASE SEE CLINICIAN'S CONFIDENTIAL NOTES.         Diagnosis:   1. Ill-defined cause of morbidity/mortality        Plan:  individual psychotherapy    Return to clinic: as scheduled    Length of Service (minutes): 45

## 2023-09-22 ENCOUNTER — PATIENT MESSAGE (OUTPATIENT)
Dept: PSYCHIATRY | Facility: CLINIC | Age: 63
End: 2023-09-22
Payer: COMMERCIAL

## 2023-09-22 NOTE — TELEPHONE ENCOUNTER
Sent msg to client re: inquiry she made about how to remove some items from her home and recommendations for one possible option.

## 2023-10-02 ENCOUNTER — OFFICE VISIT (OUTPATIENT)
Dept: FAMILY MEDICINE | Facility: CLINIC | Age: 63
End: 2023-10-02
Payer: COMMERCIAL

## 2023-10-02 VITALS
DIASTOLIC BLOOD PRESSURE: 86 MMHG | SYSTOLIC BLOOD PRESSURE: 124 MMHG | OXYGEN SATURATION: 97 % | BODY MASS INDEX: 40.04 KG/M2 | WEIGHT: 286 LBS | HEIGHT: 71 IN | HEART RATE: 65 BPM

## 2023-10-02 DIAGNOSIS — M17.12 PRIMARY OSTEOARTHRITIS OF LEFT KNEE: ICD-10-CM

## 2023-10-02 DIAGNOSIS — F41.0 GENERALIZED ANXIETY DISORDER WITH PANIC ATTACKS: ICD-10-CM

## 2023-10-02 DIAGNOSIS — Z98.84 HISTORY OF BARIATRIC SURGERY: ICD-10-CM

## 2023-10-02 DIAGNOSIS — R45.89 SADNESS: ICD-10-CM

## 2023-10-02 DIAGNOSIS — F41.1 GENERALIZED ANXIETY DISORDER WITH PANIC ATTACKS: ICD-10-CM

## 2023-10-02 DIAGNOSIS — F41.9 ANXIETY: Primary | ICD-10-CM

## 2023-10-02 DIAGNOSIS — K21.9 GASTROESOPHAGEAL REFLUX DISEASE, UNSPECIFIED WHETHER ESOPHAGITIS PRESENT: ICD-10-CM

## 2023-10-02 DIAGNOSIS — E66.9 OBESITY, UNSPECIFIED CLASSIFICATION, UNSPECIFIED OBESITY TYPE, UNSPECIFIED WHETHER SERIOUS COMORBIDITY PRESENT: ICD-10-CM

## 2023-10-02 PROCEDURE — 1160F RVW MEDS BY RX/DR IN RCRD: CPT | Mod: CPTII,S$GLB,, | Performed by: NURSE PRACTITIONER

## 2023-10-02 PROCEDURE — 3008F BODY MASS INDEX DOCD: CPT | Mod: CPTII,S$GLB,, | Performed by: NURSE PRACTITIONER

## 2023-10-02 PROCEDURE — 3066F NEPHROPATHY DOC TX: CPT | Mod: CPTII,S$GLB,, | Performed by: NURSE PRACTITIONER

## 2023-10-02 PROCEDURE — 3079F PR MOST RECENT DIASTOLIC BLOOD PRESSURE 80-89 MM HG: ICD-10-PCS | Mod: CPTII,S$GLB,, | Performed by: NURSE PRACTITIONER

## 2023-10-02 PROCEDURE — 1159F MED LIST DOCD IN RCRD: CPT | Mod: CPTII,S$GLB,, | Performed by: NURSE PRACTITIONER

## 2023-10-02 PROCEDURE — 99214 PR OFFICE/OUTPT VISIT, EST, LEVL IV, 30-39 MIN: ICD-10-PCS | Mod: S$GLB,,, | Performed by: NURSE PRACTITIONER

## 2023-10-02 PROCEDURE — 3074F SYST BP LT 130 MM HG: CPT | Mod: CPTII,S$GLB,, | Performed by: NURSE PRACTITIONER

## 2023-10-02 PROCEDURE — 1159F PR MEDICATION LIST DOCUMENTED IN MEDICAL RECORD: ICD-10-PCS | Mod: CPTII,S$GLB,, | Performed by: NURSE PRACTITIONER

## 2023-10-02 PROCEDURE — 3061F PR NEG MICROALBUMINURIA RESULT DOCUMENTED/REVIEW: ICD-10-PCS | Mod: CPTII,S$GLB,, | Performed by: NURSE PRACTITIONER

## 2023-10-02 PROCEDURE — 3074F PR MOST RECENT SYSTOLIC BLOOD PRESSURE < 130 MM HG: ICD-10-PCS | Mod: CPTII,S$GLB,, | Performed by: NURSE PRACTITIONER

## 2023-10-02 PROCEDURE — 3066F PR DOCUMENTATION OF TREATMENT FOR NEPHROPATHY: ICD-10-PCS | Mod: CPTII,S$GLB,, | Performed by: NURSE PRACTITIONER

## 2023-10-02 PROCEDURE — 3079F DIAST BP 80-89 MM HG: CPT | Mod: CPTII,S$GLB,, | Performed by: NURSE PRACTITIONER

## 2023-10-02 PROCEDURE — 3061F NEG MICROALBUMINURIA REV: CPT | Mod: CPTII,S$GLB,, | Performed by: NURSE PRACTITIONER

## 2023-10-02 PROCEDURE — 1160F PR REVIEW ALL MEDS BY PRESCRIBER/CLIN PHARMACIST DOCUMENTED: ICD-10-PCS | Mod: CPTII,S$GLB,, | Performed by: NURSE PRACTITIONER

## 2023-10-02 PROCEDURE — 99214 OFFICE O/P EST MOD 30 MIN: CPT | Mod: S$GLB,,, | Performed by: NURSE PRACTITIONER

## 2023-10-02 PROCEDURE — 3008F PR BODY MASS INDEX (BMI) DOCUMENTED: ICD-10-PCS | Mod: CPTII,S$GLB,, | Performed by: NURSE PRACTITIONER

## 2023-10-02 RX ORDER — PANTOPRAZOLE SODIUM 20 MG/1
20 TABLET, DELAYED RELEASE ORAL DAILY
Qty: 90 TABLET | Refills: 1 | Status: SHIPPED | OUTPATIENT
Start: 2023-10-02 | End: 2024-03-06 | Stop reason: SDUPTHER

## 2023-10-02 RX ORDER — SEMAGLUTIDE 0.25 MG/.5ML
0.25 INJECTION, SOLUTION SUBCUTANEOUS
Qty: 2 ML | Refills: 2 | Status: SHIPPED | OUTPATIENT
Start: 2023-10-02

## 2023-10-02 NOTE — PROGRESS NOTES
SUBJECTIVE:    Patient ID: Domonique Carmona is a 63 y.o. female.    Chief Complaint: Follow-up (No bottles//pt is here for check up and medication refills-SC)    63 y.o. female presents for check up .treated for anxiety and depression, gerd,  Had gastric sleeve in may 2022 with dr. Victor. Bp is well controlled. No longer taking htn medications.  Taking wellbutrin for depression. Lots of stressors at work. Takes xanax as needed.  Controlling symptoms. Being followed by dr. Charles. Currently adjusting meds. Happy with where they are at the moment. Also seeing therapist monthly. Last labs in July. Interested in discussing weight loss options. Frustrated with lack of any additional weight loss.     Lab Visit on 07/22/2023   Component Date Value Ref Range Status    WBC 07/22/2023 4.67  3.90 - 12.70 K/uL Final    RBC 07/22/2023 4.31  4.00 - 5.40 M/uL Final    Hemoglobin 07/22/2023 13.0  12.0 - 16.0 g/dL Final    Hematocrit 07/22/2023 39.6  37.0 - 48.5 % Final    MCV 07/22/2023 92  82 - 98 fL Final    MCH 07/22/2023 30.2  27.0 - 31.0 pg Final    MCHC 07/22/2023 32.8  32.0 - 36.0 g/dL Final    RDW 07/22/2023 13.6  11.5 - 14.5 % Final    Platelets 07/22/2023 264  150 - 450 K/uL Final    MPV 07/22/2023 10.0  9.2 - 12.9 fL Final    Immature Granulocytes 07/22/2023 0.2  0.0 - 0.5 % Final    Gran # (ANC) 07/22/2023 2.7  1.8 - 7.7 K/uL Final    Immature Grans (Abs) 07/22/2023 0.01  0.00 - 0.04 K/uL Final    Lymph # 07/22/2023 1.5  1.0 - 4.8 K/uL Final    Mono # 07/22/2023 0.4  0.3 - 1.0 K/uL Final    Eos # 07/22/2023 0.1  0.0 - 0.5 K/uL Final    Baso # 07/22/2023 0.02  0.00 - 0.20 K/uL Final    nRBC 07/22/2023 0  0 /100 WBC Final    Gran % 07/22/2023 57.4  38.0 - 73.0 % Final    Lymph % 07/22/2023 32.3  18.0 - 48.0 % Final    Mono % 07/22/2023 8.4  4.0 - 15.0 % Final    Eosinophil % 07/22/2023 1.3  0.0 - 8.0 % Final    Basophil % 07/22/2023 0.4  0.0 - 1.9 % Final    Differential Method 07/22/2023  Automated   Final    Sodium 07/22/2023 141  136 - 145 mmol/L Final    Potassium 07/22/2023 4.3  3.5 - 5.1 mmol/L Final    Chloride 07/22/2023 106  95 - 110 mmol/L Final    CO2 07/22/2023 28  23 - 29 mmol/L Final    Glucose 07/22/2023 91  70 - 110 mg/dL Final    BUN 07/22/2023 18  6 - 20 mg/dL Final    Creatinine 07/22/2023 0.7  0.5 - 1.4 mg/dL Final    Calcium 07/22/2023 8.9  8.7 - 10.5 mg/dL Final    Total Protein 07/22/2023 6.6  6.0 - 8.4 g/dL Final    Albumin 07/22/2023 3.9  3.5 - 5.2 g/dL Final    Total Bilirubin 07/22/2023 0.8  0.1 - 1.0 mg/dL Final    Alkaline Phosphatase 07/22/2023 68  55 - 135 U/L Final    AST 07/22/2023 14  10 - 40 U/L Final    ALT 07/22/2023 15  10 - 44 U/L Final    eGFR 07/22/2023 >60.0  >60 mL/min/1.73 m^2 Final    Anion Gap 07/22/2023 7 (L)  8 - 16 mmol/L Final    Cholesterol 07/22/2023 178  120 - 199 mg/dL Final    Triglycerides 07/22/2023 32  30 - 150 mg/dL Final    HDL 07/22/2023 65  40 - 75 mg/dL Final    LDL Cholesterol 07/22/2023 106.6  63.0 - 159.0 mg/dL Final    HDL/Cholesterol Ratio 07/22/2023 36.5  20.0 - 50.0 % Final    Total Cholesterol/HDL Ratio 07/22/2023 2.7  2.0 - 5.0 Final    Non-HDL Cholesterol 07/22/2023 113  mg/dL Final    Specimen UA 07/22/2023 Urine, Clean Catch   Final    Color, UA 07/22/2023 Yellow  Yellow, Straw, Jody Final    Appearance, UA 07/22/2023 Clear  Clear Final    pH, UA 07/22/2023 8.0  5.0 - 8.0 Final    Specific Gravity, UA 07/22/2023 1.020  1.005 - 1.030 Final    Protein, UA 07/22/2023 Trace (A)  Negative Final    Glucose, UA 07/22/2023 Negative  Negative Final    Ketones, UA 07/22/2023 Negative  Negative Final    Bilirubin (UA) 07/22/2023 Negative  Negative Final    Occult Blood UA 07/22/2023 Negative  Negative Final    Nitrite, UA 07/22/2023 Negative  Negative Final    Urobilinogen, UA 07/22/2023 Negative  Negative EU/dL Final    Leukocytes, UA 07/22/2023 Negative  Negative Final    TSH 07/22/2023 1.050  0.340  - 5.600 uIU/mL Final    Microalbumin, Urine 2023 3.0  <19.9 ug/mL Final    Creatinine, Urine 2023 158.0  15.0 - 325.0 mg/dL Final    Microalb/Creat Ratio 2023 1.9  0.0 - 30.0 ug/mg Final   Lab Visit on 2023   Component Date Value Ref Range Status    Vit D, 1,25-Dihydroxy 2023 48.2  24.8 - 81.5 pg/mL Final    Vitamin B-12 2023 1209 (H)  210 - 950 pg/mL Final       Past Medical History:   Diagnosis Date    Abnormal Pap smear     Cryosurgery    Anxiety     Arthritis     Essential hypertension 2020    GERD (gastroesophageal reflux disease)     Venous stasis dermatitis of left lower extremity 2018     Social History     Socioeconomic History    Marital status: Single   Tobacco Use    Smoking status: Former     Current packs/day: 0.00     Average packs/day: 0.5 packs/day for 20.0 years (10.0 ttl pk-yrs)     Types: Cigarettes     Start date: 1985     Quit date: 2005     Years since quittin.3    Smokeless tobacco: Never   Substance and Sexual Activity    Alcohol use: No    Drug use: No   Social History Narrative    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 -      Past Surgical History:   Procedure Laterality Date    BREAST BIOPSY      BREAST SURGERY      biopsy right side    COLONOSCOPY N/A 2022    Procedure: COLONOSCOPY;  Surgeon: Phillip Cat MD;  Location[facility];  Service: Endoscopy;  Laterality: N/A;    DILATION AND CURETTAGE OF UTERUS      GYNECOLOGIC CRYOSURGERY      LAPAROSCOPIC BIOPSY OF LIVER N/A 10/25/2021    Procedure: BIOPSY, LIVER, LAPAROSCOPIC;  Surgeon: Thierry Victor MD;  Location: [facility];  Service: General;  Laterality: N/A;    LAPAROSCOPIC CHOLECYSTECTOMY N/A 10/25/2021    Procedure: CHOLECYSTECTOMY, LAPAROSCOPIC- diagnostic lap - hepatic cysts;  Surgeon: Thierry Victor MD;  Location: [facility];  Service: General;  Laterality: N/A;    LAPAROSCOPIC SLEEVE GASTRECTOMY  N/A 5/11/2021    Procedure: GASTRECTOMY, SLEEVE, LAPAROSCOPIC;  Surgeon: Thierry Victor MD;  Location: [facility] OR [address] [place] [address];  Service: General;  Laterality: N/A;    LASIK Bilateral     ulner nerve transposition      left     Family History   Problem Relation Age of Onset    Ovarian cancer Mother     Breast cancer Neg Hx     Colon cancer Neg Hx     Collagen disease Neg Hx     Glaucoma Neg Hx     Melanoma Neg Hx     Psoriasis Neg Hx     Lupus Neg Hx     Eczema Neg Hx        All of your core healthy metrics are met.      Review of patient's allergies indicates:   Allergen Reactions    Demerol [meperidine] Hives       Current Outpatient Medications:     ALPRAZolam (XANAX) 0.25 MG tablet, Take 1 tablet (0.25 mg total) by mouth daily as needed for Anxiety., Disp: 60 tablet, Rfl: 1    buPROPion (WELLBUTRIN XL) 300 MG 24 hr tablet, Take 1 tablet (300 mg total) by mouth every morning., Disp: 30 tablet, Rfl: 1    calcium carbonate-magnesium hydroxide (ROLAIDS) 550-110 mg Chew, Take 1 tablet by mouth every evening., Disp: , Rfl:     EScitalopram oxalate (LEXAPRO) 20 MG tablet, Take 1 tablet (20 mg total) by mouth every evening., Disp: 30 tablet, Rfl: 1    ibuprofen (ADVIL,MOTRIN) 800 MG tablet, Take 1 tablet (800 mg total) by mouth 3 (three) times daily., Disp: 60 tablet, Rfl: 6    pantoprazole (PROTONIX) 20 MG tablet, Take 1 tablet (20 mg total) by mouth once daily., Disp: 90 tablet, Rfl: 1    semaglutide, weight loss, (WEGOVY) 0.25 mg/0.5 mL PnIj, Inject 0.25 mg into the skin every 7 days., Disp: 0.5 mL, Rfl: 2    sod picosulf-mag ox-citric ac (CLENPIQ) 10 mg-3.5 gram -12 gram/160 mL Soln, As directed (Patient not taking: Reported on 8/21/2023), Disp: 320 mL, Rfl: 0    tiZANidine (ZANAFLEX) 4 MG tablet, TAKE 1 TABLET BY MOUTH THREE TIMES A DAY AS NEEDED FOR 10 DAYS, Disp: 90 tablet, Rfl: 1    Review of Systems   Constitutional:  Negative for chills, fever and unexpected weight change.   HENT:  Negative  "for ear pain, rhinorrhea and sore throat.    Eyes:  Negative for pain and visual disturbance.   Respiratory:  Negative for cough and shortness of breath.    Cardiovascular:  Negative for chest pain, palpitations and leg swelling.   Gastrointestinal:  Negative for abdominal pain, diarrhea, nausea and vomiting.   Genitourinary:  Negative for difficulty urinating, hematuria and vaginal bleeding.   Musculoskeletal:  Negative for arthralgias.   Skin:  Negative for rash.   Neurological:  Negative for dizziness, weakness and headaches.   Psychiatric/Behavioral:  Positive for sleep disturbance. Negative for agitation. The patient is not nervous/anxious.           Objective:      Vitals:    10/02/23 0846   BP: 124/86   Pulse: 65   SpO2: 97%   Weight: 129.7 kg (286 lb)   Height: 5' 11" (1.803 m)     Physical Exam  Vitals and nursing note reviewed.   Constitutional:       General: She is not in acute distress.     Appearance: Normal appearance. She is well-developed. She is obese.   HENT:      Head: Normocephalic.      Right Ear: External ear normal.      Left Ear: External ear normal.   Eyes:      Conjunctiva/sclera: Conjunctivae normal.      Pupils: Pupils are equal, round, and reactive to light.   Neck:      Vascular: No JVD.   Cardiovascular:      Rate and Rhythm: Normal rate and regular rhythm.      Heart sounds: No murmur heard.  Pulmonary:      Effort: Pulmonary effort is normal.      Breath sounds: Normal breath sounds.   Abdominal:      General: Bowel sounds are normal.      Palpations: Abdomen is soft.   Musculoskeletal:         General: No deformity. Normal range of motion.      Cervical back: Normal range of motion and neck supple.   Lymphadenopathy:      Cervical: No cervical adenopathy.   Skin:     General: Skin is warm and dry.      Findings: No rash.   Neurological:      Mental Status: She is alert and oriented to person, place, and time.      Gait: Gait normal.   Psychiatric:         Speech: Speech normal.    "      Behavior: Behavior normal.         Assessment:       1. Anxiety    2. Gastroesophageal reflux disease, unspecified whether esophagitis present    3. Primary osteoarthritis of left knee    4. Obesity, unspecified classification, unspecified obesity type, unspecified whether serious comorbidity present    5. Sadness    6. Generalized anxiety disorder with panic attacks    7. History of bariatric surgery         Plan:       Anxiety  Comments:  xanax prn    Gastroesophageal reflux disease, unspecified whether esophagitis present  Comments:  protonix    Primary osteoarthritis of left knee    Obesity, unspecified classification, unspecified obesity type, unspecified whether serious comorbidity present  Comments:  try wegovy      Sadness  Comments:  wellbturin    Generalized anxiety disorder with panic attacks  Comments:  lexapro    History of bariatric surgery    Other orders  -     semaglutide, weight loss, (WEGOVY) 0.25 mg/0.5 mL PnIj; Inject 0.25 mg into the skin every 7 days.  Dispense: 0.5 mL; Refill: 2  -     pantoprazole (PROTONIX) 20 MG tablet; Take 1 tablet (20 mg total) by mouth once daily.  Dispense: 90 tablet; Refill: 1      Follow up in about 6 months (around 4/2/2024), or if symptoms worsen or fail to improve, for medication management.        10/2/2023 Lianet Hansen

## 2023-10-23 ENCOUNTER — PATIENT MESSAGE (OUTPATIENT)
Dept: PSYCHIATRY | Facility: CLINIC | Age: 63
End: 2023-10-23
Payer: COMMERCIAL

## 2023-10-24 ENCOUNTER — OFFICE VISIT (OUTPATIENT)
Dept: PSYCHIATRY | Facility: CLINIC | Age: 63
End: 2023-10-24
Payer: COMMERCIAL

## 2023-10-24 VITALS
SYSTOLIC BLOOD PRESSURE: 135 MMHG | BODY MASS INDEX: 39.89 KG/M2 | DIASTOLIC BLOOD PRESSURE: 89 MMHG | HEART RATE: 62 BPM | HEIGHT: 71 IN

## 2023-10-24 DIAGNOSIS — F41.0 GENERALIZED ANXIETY DISORDER WITH PANIC ATTACKS: ICD-10-CM

## 2023-10-24 DIAGNOSIS — F41.1 GENERALIZED ANXIETY DISORDER WITH PANIC ATTACKS: ICD-10-CM

## 2023-10-24 DIAGNOSIS — F33.0 MILD EPISODE OF RECURRENT MAJOR DEPRESSIVE DISORDER: Primary | ICD-10-CM

## 2023-10-24 DIAGNOSIS — G47.00 INSOMNIA, UNSPECIFIED TYPE: ICD-10-CM

## 2023-10-24 DIAGNOSIS — R69 ILL-DEFINED CAUSE OF MORBIDITY/MORTALITY: Primary | ICD-10-CM

## 2023-10-24 PROCEDURE — 3075F PR MOST RECENT SYSTOLIC BLOOD PRESS GE 130-139MM HG: ICD-10-PCS | Mod: CPTII,S$GLB,, | Performed by: STUDENT IN AN ORGANIZED HEALTH CARE EDUCATION/TRAINING PROGRAM

## 2023-10-24 PROCEDURE — 3008F PR BODY MASS INDEX (BMI) DOCUMENTED: ICD-10-PCS | Mod: CPTII,S$GLB,, | Performed by: STUDENT IN AN ORGANIZED HEALTH CARE EDUCATION/TRAINING PROGRAM

## 2023-10-24 PROCEDURE — 3061F PR NEG MICROALBUMINURIA RESULT DOCUMENTED/REVIEW: ICD-10-PCS | Mod: CPTII,S$GLB,, | Performed by: SOCIAL WORKER

## 2023-10-24 PROCEDURE — 99999 PR PBB SHADOW E&M-EST. PATIENT-LVL III: ICD-10-PCS | Mod: PBBFAC,,, | Performed by: STUDENT IN AN ORGANIZED HEALTH CARE EDUCATION/TRAINING PROGRAM

## 2023-10-24 PROCEDURE — 3066F NEPHROPATHY DOC TX: CPT | Mod: CPTII,S$GLB,, | Performed by: STUDENT IN AN ORGANIZED HEALTH CARE EDUCATION/TRAINING PROGRAM

## 2023-10-24 PROCEDURE — 99214 OFFICE O/P EST MOD 30 MIN: CPT | Mod: S$GLB,,, | Performed by: STUDENT IN AN ORGANIZED HEALTH CARE EDUCATION/TRAINING PROGRAM

## 2023-10-24 PROCEDURE — 3061F NEG MICROALBUMINURIA REV: CPT | Mod: CPTII,S$GLB,, | Performed by: SOCIAL WORKER

## 2023-10-24 PROCEDURE — 1160F PR REVIEW ALL MEDS BY PRESCRIBER/CLIN PHARMACIST DOCUMENTED: ICD-10-PCS | Mod: CPTII,S$GLB,, | Performed by: STUDENT IN AN ORGANIZED HEALTH CARE EDUCATION/TRAINING PROGRAM

## 2023-10-24 PROCEDURE — 3061F PR NEG MICROALBUMINURIA RESULT DOCUMENTED/REVIEW: ICD-10-PCS | Mod: CPTII,S$GLB,, | Performed by: STUDENT IN AN ORGANIZED HEALTH CARE EDUCATION/TRAINING PROGRAM

## 2023-10-24 PROCEDURE — 1160F RVW MEDS BY RX/DR IN RCRD: CPT | Mod: CPTII,S$GLB,, | Performed by: STUDENT IN AN ORGANIZED HEALTH CARE EDUCATION/TRAINING PROGRAM

## 2023-10-24 PROCEDURE — 3079F DIAST BP 80-89 MM HG: CPT | Mod: CPTII,S$GLB,, | Performed by: STUDENT IN AN ORGANIZED HEALTH CARE EDUCATION/TRAINING PROGRAM

## 2023-10-24 PROCEDURE — 90837 PSYTX W PT 60 MINUTES: CPT | Mod: S$GLB,,, | Performed by: SOCIAL WORKER

## 2023-10-24 PROCEDURE — 3066F PR DOCUMENTATION OF TREATMENT FOR NEPHROPATHY: ICD-10-PCS | Mod: CPTII,S$GLB,, | Performed by: SOCIAL WORKER

## 2023-10-24 PROCEDURE — 3079F PR MOST RECENT DIASTOLIC BLOOD PRESSURE 80-89 MM HG: ICD-10-PCS | Mod: CPTII,S$GLB,, | Performed by: STUDENT IN AN ORGANIZED HEALTH CARE EDUCATION/TRAINING PROGRAM

## 2023-10-24 PROCEDURE — 90837 PR PSYCHOTHERAPY W/PATIENT, 60 MIN: ICD-10-PCS | Mod: S$GLB,,, | Performed by: SOCIAL WORKER

## 2023-10-24 PROCEDURE — 96136 PR PSYCH/NEUROPSYCH TEST ADMIN/SCORING, 2+ TESTS, 1ST 30 MIN: ICD-10-PCS | Mod: 59,S$GLB,, | Performed by: STUDENT IN AN ORGANIZED HEALTH CARE EDUCATION/TRAINING PROGRAM

## 2023-10-24 PROCEDURE — 3075F SYST BP GE 130 - 139MM HG: CPT | Mod: CPTII,S$GLB,, | Performed by: STUDENT IN AN ORGANIZED HEALTH CARE EDUCATION/TRAINING PROGRAM

## 2023-10-24 PROCEDURE — 3061F NEG MICROALBUMINURIA REV: CPT | Mod: CPTII,S$GLB,, | Performed by: STUDENT IN AN ORGANIZED HEALTH CARE EDUCATION/TRAINING PROGRAM

## 2023-10-24 PROCEDURE — 99214 PR OFFICE/OUTPT VISIT, EST, LEVL IV, 30-39 MIN: ICD-10-PCS | Mod: S$GLB,,, | Performed by: STUDENT IN AN ORGANIZED HEALTH CARE EDUCATION/TRAINING PROGRAM

## 2023-10-24 PROCEDURE — 99999 PR PBB SHADOW E&M-EST. PATIENT-LVL I: CPT | Mod: PBBFAC,,, | Performed by: SOCIAL WORKER

## 2023-10-24 PROCEDURE — 1159F PR MEDICATION LIST DOCUMENTED IN MEDICAL RECORD: ICD-10-PCS | Mod: CPTII,S$GLB,, | Performed by: STUDENT IN AN ORGANIZED HEALTH CARE EDUCATION/TRAINING PROGRAM

## 2023-10-24 PROCEDURE — 3066F PR DOCUMENTATION OF TREATMENT FOR NEPHROPATHY: ICD-10-PCS | Mod: CPTII,S$GLB,, | Performed by: STUDENT IN AN ORGANIZED HEALTH CARE EDUCATION/TRAINING PROGRAM

## 2023-10-24 PROCEDURE — 3066F NEPHROPATHY DOC TX: CPT | Mod: CPTII,S$GLB,, | Performed by: SOCIAL WORKER

## 2023-10-24 PROCEDURE — 99999 PR PBB SHADOW E&M-EST. PATIENT-LVL I: ICD-10-PCS | Mod: PBBFAC,,, | Performed by: SOCIAL WORKER

## 2023-10-24 PROCEDURE — 99999 PR PBB SHADOW E&M-EST. PATIENT-LVL III: CPT | Mod: PBBFAC,,, | Performed by: STUDENT IN AN ORGANIZED HEALTH CARE EDUCATION/TRAINING PROGRAM

## 2023-10-24 PROCEDURE — 96136 PSYCL/NRPSYC TST PHY/QHP 1ST: CPT | Mod: 59,S$GLB,, | Performed by: STUDENT IN AN ORGANIZED HEALTH CARE EDUCATION/TRAINING PROGRAM

## 2023-10-24 PROCEDURE — 3008F BODY MASS INDEX DOCD: CPT | Mod: CPTII,S$GLB,, | Performed by: STUDENT IN AN ORGANIZED HEALTH CARE EDUCATION/TRAINING PROGRAM

## 2023-10-24 PROCEDURE — 1159F MED LIST DOCD IN RCRD: CPT | Mod: CPTII,S$GLB,, | Performed by: STUDENT IN AN ORGANIZED HEALTH CARE EDUCATION/TRAINING PROGRAM

## 2023-10-24 RX ORDER — HYDROXYZINE HYDROCHLORIDE 10 MG/1
10 TABLET, FILM COATED ORAL 3 TIMES DAILY PRN
Qty: 90 TABLET | Refills: 1 | Status: SHIPPED | OUTPATIENT
Start: 2023-10-24 | End: 2024-01-11 | Stop reason: SDUPTHER

## 2023-10-24 RX ORDER — ESCITALOPRAM OXALATE 20 MG/1
20 TABLET ORAL NIGHTLY
Qty: 30 TABLET | Refills: 1 | Status: SHIPPED | OUTPATIENT
Start: 2023-10-24 | End: 2024-01-10 | Stop reason: SDUPTHER

## 2023-10-24 RX ORDER — BUPROPION HYDROCHLORIDE 300 MG/1
300 TABLET ORAL EVERY MORNING
Qty: 30 TABLET | Refills: 1 | Status: SHIPPED | OUTPATIENT
Start: 2023-10-24 | End: 2024-01-10 | Stop reason: SDUPTHER

## 2023-10-24 NOTE — PROGRESS NOTES
"    Outpatient Psychiatry Followup Visit (DO/MD/NP)    10/24/2023  Assessment & Plan    Assessment - Plan:     Impression   10/24/2023 (2) Ongoing treatment of residual symptoms with some favorable progress.     ICD-10-CM ICD-9-CM   1. Mild episode of recurrent major depressive disorder  F33.0 296.31   2. Generalized anxiety disorder with panic attacks  F41.1 300.02    F41.0 300.01   3. Insomnia, unspecified type  G47.00 780.52   4. BMI 39.0-39.9,adult  Z68.39 V85.39        Plan of Care & Medication Management    Chart was reviewed. The risks and benefits of medication were discussed with pt. The treatment plan and followup plan were reviewed with pt. Pt understands to contact clinic if symptoms worsen. Pt understands to call 911 or go to nearest ER for suicidal ideation, intent or plan.   RX History ADDERALL (2014, for about 4 years, took with antidepressant and slept well), ATARAX/VISTARIL, CELEXA (dc in 2014, does not remember response), LEXAPRO, PROZAC (2016, caused dysphoria, "made me darker"), TRAZODONE (2016, caused nightmares), WELLBUTRIN (initiated by primary care in 2014 and again in [id number]), XANAX (JUL2023 taking 0.25mg daily prn 1d/w), ZOLOFT (does not remember effect, took for one month)   Current RX [id number]: JOSE 2/6, BI 47/100  Continue ATARAX/VISTARIL  Adjustments:  [id number]: Start 10mg TID prn anxiety  Prior to evaluation pt had been taking WELLBUTRIN 300mg daily and XANAX 0.25mg daily prn  Continue LEXAPRO  Target dose range 10-20mg/d  Adjustments:  [id number]: Increase to 20mg NIGHTLY   [id number]: Adjust to 10mg NIGHTLY   [id number]: Start 10mg daily  Prior to evaluation pt had been taking WELLBUTRIN 300mg daily and XANAX 0.25mg daily prn  Continue WELLBUTRIN XL  Target dose range 150-300mg/d  Adjustments:  [id number]: Continue 300mg daily  Prior to evaluation pt had been taking WELLBUTRIN 300mg daily  Continue XANAX  Adjustments:  [id number]: Continue 0.25mg daily prn  Prior to evaluation pt had " been taking XANAX 0.25mg daily prn and reported taking 1d/w on average   Education & Counseling Evidence-based OTC pain control ([place])  RX administration and adherence   Other Orders Continue individual psychotherapy   Monitor VITAL SIGNS  Instruments: PROMIS (A&D), PSS4   RETURN N: RETURN IN 6 WEEKS, and reassess frequency within three visits from now  Continue medication management.     Subjective    Interval History - Review of Systems (ROS):     Available documentation has been reviewed, and pertinent elements of the chart have been incorporated into this note where appropriate.   6/29/2023 : first Epic encounter with this clinic  10/24/2023 : last Epic encounter with this clinic  9/18/2023 : last Epic encounter with this writer   Domonique Carmona, a 63 y.o. female, presenting for followup visit.      Since last visit, reports overall A LITTLE BETTER.    Doing well. Keeping therapy appts. Mood is better. Irritability is better. Anxiety is better.    Work is stressful. Burnout. Stood up for herself. Vacation scheduled for beginning of December. Headaches.     Would like to continue medications as prescribed.     Can reduce visit frequency to q6w       Pt did NOT display signs of nor endorse symptoms of overt psychosis or acute mood disorder requiring hospitalization during the encounter. Pt denied violent thoughts or suicidal or homicidal ideation, intent, or plan.         Objective    Measurement-Based Care (MBC):     Routine Instruments   PROMIS-ANXIETY Interpretation: 4a raw score 12, T-SCORE 63.4; MODERATE using 55-60-70 cutoffs. Last T-SCORE was 63. This PROMIS T-score change of 5 points or fewer indicates the score is probably stable.   PROMIS-DEPRESSION Interpretation: 4a raw score 10, T-SCORE 58.9; MILD using 55-60-70 cutoffs. Last T-SCORE was 62. This PROMIS T-score change of 5 points or fewer indicates the score is probably stable.   PSS4 Interpretation: 8/16; MODERATE using 6-11 cutoffs. 0 PH, 0  "LSE. Last PSS4 score was 8. This PSS4 score change of less than 4 points indicates the score is probably stable.   Additional Instruments   N/A     Current Evaluation of Mental Status:     Constitutional / General       Vitals:    10/24/23 1111   BP: 135/89   Pulse: 62   Height: 5' 11" (1.803 m)       Psychiatric / Mental Status Examination  1. Appearance: Dress is informal but appropriate. Motor activity normal.  2. Discourse: Clear speech with normal rate and volume. Associations intact. Orderly.  3. Emotional Expression: Somewhat anxious. Affect is appropriate.  4. Perception and Thinking: No hallucinations. No suicidality, no homicidality, delusions, or paranoia.  5. Sensorium: Grossly intact. Able to focus for interview.  6. Memory and Fund of Knowledge: Intact for content of interview.  7. Insight and Judgment: Intact.         Auto-populated chart data omitted from this note for brevity.      Billing Documentation:     Method of Encounter IN PERSON visit at the clinic   Type of Encounter Follow up visit with me   Counseling;  Psychotherapy    Counseling;  Tobacco and/or Nicotine    Additional Codes and Modifiers 79691, with modifer 59: administered and scored more than one psychological or neuropsychological tests (see MBC above) (16+ mins)   Time Remaining Chart/Pt 65124: FOLLOW UP VISIT, Rx mgmt, "Multiple STABLE chronic illnesses; no changes in treatment at this time"   Total Mins  (10/24/2023) N/A - Not billing for time        Vinny Charles DO  Department of Psychiatry, [facility]        "

## 2023-10-24 NOTE — PROGRESS NOTES
Individual Psychotherapy (PhD/LCSW)    10/24/2023    Site:  [facility]   - [facility]     PLEASE SEE CLINICIAN'S CONFIDENTIAL NOTES.     Diagnosis:     Ill-defined cause of morbidity/mortality  Plan:  individual psychotherapy    Return to clinic: as scheduled    Length of Service (minutes): 60

## 2023-10-24 NOTE — PATIENT INSTRUCTIONS
For pain control in patients without GI bleeds, kidney problems or liver problems, I tend to recommend taking over-the counter ACETAMINOPHEN (325-500mg) WITH IBUPROFEN (200mg-400mg), as this has the best evidence for pain control.    Keep therapy appointments   Continue medications as prescribed

## 2023-10-25 ENCOUNTER — PATIENT MESSAGE (OUTPATIENT)
Dept: PSYCHIATRY | Facility: CLINIC | Age: 63
End: 2023-10-25
Payer: COMMERCIAL

## 2023-12-11 ENCOUNTER — OFFICE VISIT (OUTPATIENT)
Dept: PSYCHIATRY | Facility: CLINIC | Age: 63
End: 2023-12-11
Payer: COMMERCIAL

## 2023-12-11 VITALS
HEIGHT: 71 IN | HEART RATE: 69 BPM | BODY MASS INDEX: 39.89 KG/M2 | DIASTOLIC BLOOD PRESSURE: 80 MMHG | SYSTOLIC BLOOD PRESSURE: 132 MMHG

## 2023-12-11 DIAGNOSIS — F33.0 MILD EPISODE OF RECURRENT MAJOR DEPRESSIVE DISORDER: Primary | ICD-10-CM

## 2023-12-11 DIAGNOSIS — F41.1 GENERALIZED ANXIETY DISORDER WITH PANIC ATTACKS: ICD-10-CM

## 2023-12-11 DIAGNOSIS — F41.0 GENERALIZED ANXIETY DISORDER WITH PANIC ATTACKS: ICD-10-CM

## 2023-12-11 DIAGNOSIS — G47.00 INSOMNIA, UNSPECIFIED TYPE: ICD-10-CM

## 2023-12-11 PROCEDURE — 99999 PR PBB SHADOW E&M-EST. PATIENT-LVL I: ICD-10-PCS | Mod: PBBFAC,,, | Performed by: SOCIAL WORKER

## 2023-12-11 PROCEDURE — 96136 PR PSYCH/NEUROPSYCH TEST ADMIN/SCORING, 2+ TESTS, 1ST 30 MIN: ICD-10-PCS | Mod: 59,S$GLB,, | Performed by: STUDENT IN AN ORGANIZED HEALTH CARE EDUCATION/TRAINING PROGRAM

## 2023-12-11 PROCEDURE — 3061F NEG MICROALBUMINURIA REV: CPT | Mod: CPTII,S$GLB,, | Performed by: STUDENT IN AN ORGANIZED HEALTH CARE EDUCATION/TRAINING PROGRAM

## 2023-12-11 PROCEDURE — 3008F BODY MASS INDEX DOCD: CPT | Mod: CPTII,S$GLB,, | Performed by: STUDENT IN AN ORGANIZED HEALTH CARE EDUCATION/TRAINING PROGRAM

## 2023-12-11 PROCEDURE — 3066F NEPHROPATHY DOC TX: CPT | Mod: CPTII,S$GLB,, | Performed by: STUDENT IN AN ORGANIZED HEALTH CARE EDUCATION/TRAINING PROGRAM

## 2023-12-11 PROCEDURE — 96136 PSYCL/NRPSYC TST PHY/QHP 1ST: CPT | Mod: 59,S$GLB,, | Performed by: STUDENT IN AN ORGANIZED HEALTH CARE EDUCATION/TRAINING PROGRAM

## 2023-12-11 PROCEDURE — 1160F PR REVIEW ALL MEDS BY PRESCRIBER/CLIN PHARMACIST DOCUMENTED: ICD-10-PCS | Mod: CPTII,S$GLB,, | Performed by: STUDENT IN AN ORGANIZED HEALTH CARE EDUCATION/TRAINING PROGRAM

## 2023-12-11 PROCEDURE — 1160F RVW MEDS BY RX/DR IN RCRD: CPT | Mod: CPTII,S$GLB,, | Performed by: STUDENT IN AN ORGANIZED HEALTH CARE EDUCATION/TRAINING PROGRAM

## 2023-12-11 PROCEDURE — 3061F PR NEG MICROALBUMINURIA RESULT DOCUMENTED/REVIEW: ICD-10-PCS | Mod: CPTII,S$GLB,, | Performed by: STUDENT IN AN ORGANIZED HEALTH CARE EDUCATION/TRAINING PROGRAM

## 2023-12-11 PROCEDURE — 3079F PR MOST RECENT DIASTOLIC BLOOD PRESSURE 80-89 MM HG: ICD-10-PCS | Mod: CPTII,S$GLB,, | Performed by: STUDENT IN AN ORGANIZED HEALTH CARE EDUCATION/TRAINING PROGRAM

## 2023-12-11 PROCEDURE — 1159F MED LIST DOCD IN RCRD: CPT | Mod: CPTII,S$GLB,, | Performed by: STUDENT IN AN ORGANIZED HEALTH CARE EDUCATION/TRAINING PROGRAM

## 2023-12-11 PROCEDURE — 3075F PR MOST RECENT SYSTOLIC BLOOD PRESS GE 130-139MM HG: ICD-10-PCS | Mod: CPTII,S$GLB,, | Performed by: STUDENT IN AN ORGANIZED HEALTH CARE EDUCATION/TRAINING PROGRAM

## 2023-12-11 PROCEDURE — 3075F SYST BP GE 130 - 139MM HG: CPT | Mod: CPTII,S$GLB,, | Performed by: STUDENT IN AN ORGANIZED HEALTH CARE EDUCATION/TRAINING PROGRAM

## 2023-12-11 PROCEDURE — 3066F PR DOCUMENTATION OF TREATMENT FOR NEPHROPATHY: ICD-10-PCS | Mod: CPTII,S$GLB,, | Performed by: STUDENT IN AN ORGANIZED HEALTH CARE EDUCATION/TRAINING PROGRAM

## 2023-12-11 PROCEDURE — 1159F PR MEDICATION LIST DOCUMENTED IN MEDICAL RECORD: ICD-10-PCS | Mod: CPTII,S$GLB,, | Performed by: STUDENT IN AN ORGANIZED HEALTH CARE EDUCATION/TRAINING PROGRAM

## 2023-12-11 PROCEDURE — 3066F PR DOCUMENTATION OF TREATMENT FOR NEPHROPATHY: ICD-10-PCS | Mod: CPTII,S$GLB,, | Performed by: SOCIAL WORKER

## 2023-12-11 PROCEDURE — 3061F NEG MICROALBUMINURIA REV: CPT | Mod: CPTII,S$GLB,, | Performed by: SOCIAL WORKER

## 2023-12-11 PROCEDURE — 90837 PSYTX W PT 60 MINUTES: CPT | Mod: S$GLB,,, | Performed by: SOCIAL WORKER

## 2023-12-11 PROCEDURE — 90837 PR PSYCHOTHERAPY W/PATIENT, 60 MIN: ICD-10-PCS | Mod: S$GLB,,, | Performed by: SOCIAL WORKER

## 2023-12-11 PROCEDURE — 99214 PR OFFICE/OUTPT VISIT, EST, LEVL IV, 30-39 MIN: ICD-10-PCS | Mod: S$GLB,,, | Performed by: STUDENT IN AN ORGANIZED HEALTH CARE EDUCATION/TRAINING PROGRAM

## 2023-12-11 PROCEDURE — 3061F PR NEG MICROALBUMINURIA RESULT DOCUMENTED/REVIEW: ICD-10-PCS | Mod: CPTII,S$GLB,, | Performed by: SOCIAL WORKER

## 2023-12-11 PROCEDURE — 99999 PR PBB SHADOW E&M-EST. PATIENT-LVL III: CPT | Mod: PBBFAC,,, | Performed by: STUDENT IN AN ORGANIZED HEALTH CARE EDUCATION/TRAINING PROGRAM

## 2023-12-11 PROCEDURE — 3066F NEPHROPATHY DOC TX: CPT | Mod: CPTII,S$GLB,, | Performed by: SOCIAL WORKER

## 2023-12-11 PROCEDURE — 99999 PR PBB SHADOW E&M-EST. PATIENT-LVL III: ICD-10-PCS | Mod: PBBFAC,,, | Performed by: STUDENT IN AN ORGANIZED HEALTH CARE EDUCATION/TRAINING PROGRAM

## 2023-12-11 PROCEDURE — 99999 PR PBB SHADOW E&M-EST. PATIENT-LVL I: CPT | Mod: PBBFAC,,, | Performed by: SOCIAL WORKER

## 2023-12-11 PROCEDURE — 99214 OFFICE O/P EST MOD 30 MIN: CPT | Mod: S$GLB,,, | Performed by: STUDENT IN AN ORGANIZED HEALTH CARE EDUCATION/TRAINING PROGRAM

## 2023-12-11 PROCEDURE — 3079F DIAST BP 80-89 MM HG: CPT | Mod: CPTII,S$GLB,, | Performed by: STUDENT IN AN ORGANIZED HEALTH CARE EDUCATION/TRAINING PROGRAM

## 2023-12-11 PROCEDURE — 3008F PR BODY MASS INDEX (BMI) DOCUMENTED: ICD-10-PCS | Mod: CPTII,S$GLB,, | Performed by: STUDENT IN AN ORGANIZED HEALTH CARE EDUCATION/TRAINING PROGRAM

## 2023-12-11 RX ORDER — MIRTAZAPINE 7.5 MG/1
7.5 TABLET, FILM COATED ORAL NIGHTLY
Qty: 30 TABLET | Refills: 1 | Status: SHIPPED | OUTPATIENT
Start: 2023-12-11 | End: 2024-01-11 | Stop reason: SDUPTHER

## 2023-12-11 NOTE — PROGRESS NOTES
Individual Psychotherapy (PhD/LCSW)    12/11/2023    Site:  [place]        Therapeutic Intervention: Met with patient.  Outpatient - Supportive psychotherapy 45 min - CPT Code 45062 and Outpatient - Interactive psychotherapy 45 min - CPT code 42974    Chief complaint/reason for encounter: depression and anxiety   Medical history:   Past Medical History:   Diagnosis Date    Abnormal Pap smear 1986    Cryosurgery    Anxiety     Arthritis     Essential hypertension 11/26/2020    GERD (gastroesophageal reflux disease)     Venous stasis dermatitis of left lower extremity 12/31/2018       Medications:    Current Outpatient Medications:     ALPRAZolam (XANAX) 0.25 MG tablet, Take 1 tablet (0.25 mg total) by mouth daily as needed for Anxiety., Disp: 60 tablet, Rfl: 1    buPROPion (WELLBUTRIN XL) 300 MG 24 hr tablet, Take 1 tablet (300 mg total) by mouth every morning., Disp: 30 tablet, Rfl: 1    calcium carbonate-magnesium hydroxide (ROLAIDS) 550-110 mg Chew, Take 1 tablet by mouth every evening., Disp: , Rfl:     EScitalopram oxalate (LEXAPRO) 20 MG tablet, Take 1 tablet (20 mg total) by mouth every evening., Disp: 30 tablet, Rfl: 1    hydrOXYzine HCL (ATARAX) 10 MG Tab, Take 1 tablet (10 mg total) by mouth 3 (three) times daily as needed (anxiety)., Disp: 90 tablet, Rfl: 1    ibuprofen (ADVIL,MOTRIN) 800 MG tablet, Take 1 tablet (800 mg total) by mouth 3 (three) times daily., Disp: 60 tablet, Rfl: 6    mirtazapine (REMERON) 7.5 MG Tab, Take 1 tablet (7.5 mg total) by mouth every evening., Disp: 30 tablet, Rfl: 1    pantoprazole (PROTONIX) 20 MG tablet, Take 1 tablet (20 mg total) by mouth once daily., Disp: 90 tablet, Rfl: 1    semaglutide, weight loss, (WEGOVY) 0.25 mg/0.5 mL PnIj, Inject 0.25 mg into the skin every 7 days., Disp: 2 mL, Rfl: 2    sod picosulf-mag ox-citric ac (CLENPIQ) 10 mg-3.5 gram -12 gram/160 mL Soln, As directed (Patient not taking: Reported on 8/21/2023), Disp: 320 mL, Rfl: 0    tiZANidine  (ZANAFLEX) 4 MG tablet, TAKE 1 TABLET BY MOUTH THREE TIMES A DAY AS NEEDED FOR 10 DAYS, Disp: 90 tablet, Rfl: 1    Family history of psychiatric illness:   Family History   Problem Relation Age of Onset    Ovarian cancer Mother     Breast cancer Neg Hx     Colon cancer Neg Hx     Collagen disease Neg Hx     Glaucoma Neg Hx     Melanoma Neg Hx     Psoriasis Neg Hx     Lupus Neg Hx     Eczema Neg Hx        Social history (marriage, employment, etc.):   Social History     Tobacco Use    Smoking status: Former     Current packs/day: 0.00     Average packs/day: 0.5 packs/day for 20.0 years (10.0 ttl pk-yrs)     Types: Cigarettes     Start date: 1985     Quit date: 2005     Years since quittin.4    Smokeless tobacco: Never   Substance Use Topics    Alcohol use: No    Drug use: No       Interval history and content of current session: Domonique presents on time for follow up session although clinician began session later (10 mins) and I was able to give her full session time.  Speaks to recent vacation/getaway with son Mendez [place] cabin[place], enjoyed very much.  A bit of respite.  Speaks to relationships with Mendez and son Todd (41), describes different personalities, dynamics between the two. Reminisces about adoptive father Winston and bio son whom she heard from recently, contemplating a visit to [place] as expressed concern for his status.  Had to make two very tough calls with respect to bio mom and adoptive dad, was there for latter when he passed, did not have same opportunity with bio mom, speaks to same.  Note a distinct difference in commentary re: youngest son Mendez, expectations have changed now that he is seeing a therapist (Sheldon) in our clinic.  Moving in a positive, forward direction.  Meds stable, saw Dr. Charles prior to this session.  Plan for follow up:  Engage Domonique in [facility].    Treatment plan:  Target symptoms: recurrent depression, anxiety   Why chosen therapy is  appropriate versus another modality: relevant to diagnosis, patient responds to this modality, evidence based practice  Outcome monitoring methods: self-report, observation  Therapeutic intervention type: insight oriented psychotherapy, supportive psychotherapy    Risk parameters:  Patient reports no suicidal ideation  Patient reports no homicidal ideation  Patient reports no self-injurious behavior  Patient reports no violent behavior    Verbal deficits: None    Patient's response to intervention:  The patient's response to intervention is accepting, motivated.    Progress toward goals and other mental status changes:  The patient's progress toward goals is good.    Diagnosis:   1. Mild episode of recurrent major depressive disorder    2. Generalized anxiety disorder with panic attacks        Plan:  individual psychotherapy    Return to clinic: as scheduled    Length of Service (minutes):  55

## 2023-12-11 NOTE — PROGRESS NOTES
"    Outpatient Psychiatry Followup Visit (DO/MD/NP, etc.)    2023  Assessment & Plan    Assessment - Plan:     Impression     ICD-10-CM ICD-9-CM   1. Mild episode of recurrent major depressive disorder  F33.0 296.31   2. Generalized anxiety disorder with panic attacks  F41.1 300.02    F41.0 300.01   3. Insomnia, unspecified type  G47.00 780.52   4. BMI 39.0-39.9,adult  Z68.39 V85.39        Plan of Care & Medication Management    Chart was reviewed. The risks and benefits of medication were discussed with pt. The treatment plan and followup plan were reviewed with pt. Pt understands to contact clinic if symptoms worsen. Pt understands to call 911 or go to nearest ER for suicidal ideation, intent or plan.   RX History ADDERALL (, for about 4 years, took with antidepressant and slept well), ATARAX/VISTARIL, CELEXA (dc in , does not remember response), LEXAPRO, PROZAC (, caused dysphoria, "made me darker"), TRAZODONE (, caused nightmares), WELLBUTRIN (initiated by primary care in  and again in 5175-0947), XANAX ( taking 0.25mg daily prn 1d/w), ZOLOFT (does not remember effect, took for one month)    Current RX [id number]: JOSE 2/6, BI 47/100  Continue ATARAX/VISTARIL  Adjustments:  [id number]: Start 10mg TID prn anxiety  Prior to evaluation pt had been taking WELLBUTRIN 300mg daily and XANAX 0.25mg daily prn  Continue LEXAPRO  Target dose range 10-20mg/d  Adjustments:  [id number]: Increase to 20mg NIGHTLY   49MCI8051: Adjust to 10mg NIGHTLY   66DHY2170: Start 10mg daily  Prior to evaluation pt had been taking WELLBUTRIN 300mg daily and XANAX 0.25mg daily prn  Start REMERON  Pt was provided [employer] educational material 2023.  Metabolic monitorin2023 BMI 39.9  Target dose range 7.5-30mg/d  Adjustments:  [id number]: Start 7.5mg NIGHTLY   Continue WELLBUTRIN XL  Target dose range 150-300mg/d  Adjustments:  54OCV9254: Continue 300mg daily  Prior to evaluation pt had been taking " "WELLBUTRIN 300mg daily  Continue XANAX  Adjustments:  42IWI5876: Continue 0.25mg daily prn  Prior to evaluation pt had been taking XANAX 0.25mg daily prn and reported taking 1d/w on average   Education & Counseling RX administration and adherence   Other Orders Continue individual psychotherapy   Monitor VITAL SIGNS  Instruments: PROMIS (A&D), PSS4   RETURN M: RETURN IN 4 WEEKS (ONE MONTH), and reassess frequency next visit  Continue medication management.     Subjective    Interval History:     Available documentation has been reviewed, and pertinent elements of the chart have been incorporated into this note where appropriate. Last [facility] encounter with writer was on 10/24/2023   Domonique Carmona, a 63 y.o. female, presenting for followup visit.      Since last visit, reports overall about the same.    Difficulty getting out of bed in the morning, otherwise sleeping well. Recent return from vacation. NO upcoming vacation planned. Keeping therapy appts.    Medications are "okay." Concentration is still poor with poor motivation. NO irritability.    Discussed adding REMERON to better address depression, etc.       Pt did NOT display signs of nor endorse symptoms of overt psychosis or acute mood disorder requiring hospitalization during the encounter. Pt denied violent thoughts or suicidal or homicidal ideation, intent, or plan.         Objective    Measurement-Based Care (MBC):     Routine Instruments   PROMIS-ANXIETY Interpretation: 4a raw score 10, T-SCORE 59.5; MILD using 55-60-70 cutoffs. Last T-SCORE was 63.4. This PROMIS T-score change of 5 points or fewer indicates the score is probably stable.   PROMIS-DEPRESSION Interpretation: 4a raw score 10, T-SCORE 58.9; MILD using 55-60-70 cutoffs. Last T-SCORE was 58.9. This PROMIS T-score change of 5 points or fewer indicates the score is probably stable.   PSS4 Interpretation: 9/16; MODERATE using 6-11 cutoffs. 1 PH, 0 LSE. Last PSS4 score was 8. This PSS4 score " "change of less than 4 points indicates the score is probably stable.   Additional Instruments   N/A     Current Evaluation of Mental Status:     Constitutional / General       Vitals:    12/11/23 1038   BP: 132/80   Pulse: 69   Height: 5' 11" (1.803 m)       Psychiatric / Mental Status Examination  1. Appearance: Dress is informal but appropriate. Motor activity normal.  2. Discourse: Clear speech with normal rate and volume. Associations intact. Orderly.  3. Emotional Expression: Somewhat anxious and depressed mood. Affect is appropriate.  4. Perception and Thinking: No hallucinations. No suicidality, no homicidality, delusions, or paranoia.  5. Sensorium: Grossly intact. Able to focus for interview.  6. Memory and Fund of Knowledge: Intact for content of interview.  7. Insight and Judgment: Intact.               Billing Documentation:     Method of Encounter IN PERSON visit at the clinic   Type of Encounter Follow up visit with me   Counseling;  Psychotherapy    Counseling;  Tobacco and/or Nicotine    Additional Codes and Modifiers 96158, with modifer 59: administered and scored more than one psychological or neuropsychological tests (see MBC above) (16+ mins)   Time Remaining Chart/Pt 29018: FOLLOW UP VISIT, Rx mgmt, "Multiple STABLE chronic illnesses"   Total Mins  (12/11/2023) N/A - Not billing for time        Vinny Charles DO  Department of Psychiatry, [facility]        "

## 2023-12-11 NOTE — PATIENT INSTRUCTIONS
Plan your next vacation  Start REMERON 7.5mg NIGHTLY  Continue other medications as prescribed   Keep therapy appointments

## 2023-12-20 ENCOUNTER — HOSPITAL ENCOUNTER (OUTPATIENT)
Dept: RADIOLOGY | Facility: HOSPITAL | Age: 63
Discharge: HOME | End: 2023-12-20
Attending: SPECIALIST
Payer: COMMERCIAL

## 2023-12-20 DIAGNOSIS — Z12.31 VISIT FOR SCREENING MAMMOGRAM: ICD-10-CM

## 2023-12-20 PROCEDURE — 77063 BREAST TOMOSYNTHESIS BI: CPT | Mod: 26,,, | Performed by: RADIOLOGY

## 2023-12-20 PROCEDURE — 77067 MAMMO DIGITAL SCREENING BILAT WITH TOMO: ICD-10-PCS | Mod: 26,,, | Performed by: RADIOLOGY

## 2023-12-20 PROCEDURE — 77067 SCR MAMMO BI INCL CAD: CPT | Mod: 26,,, | Performed by: RADIOLOGY

## 2023-12-20 PROCEDURE — 77067 SCR MAMMO BI INCL CAD: CPT | Mod: TC

## 2023-12-20 PROCEDURE — 77063 MAMMO DIGITAL SCREENING BILAT WITH TOMO: ICD-10-PCS | Mod: 26,,, | Performed by: RADIOLOGY

## 2024-01-10 DIAGNOSIS — F41.1 GENERALIZED ANXIETY DISORDER WITH PANIC ATTACKS: ICD-10-CM

## 2024-01-10 DIAGNOSIS — F41.0 GENERALIZED ANXIETY DISORDER WITH PANIC ATTACKS: ICD-10-CM

## 2024-01-10 DIAGNOSIS — F33.0 MILD EPISODE OF RECURRENT MAJOR DEPRESSIVE DISORDER: ICD-10-CM

## 2024-01-11 ENCOUNTER — OFFICE VISIT (OUTPATIENT)
Dept: PSYCHIATRY | Facility: CLINIC | Age: 64
End: 2024-01-11
Payer: COMMERCIAL

## 2024-01-11 ENCOUNTER — PATIENT MESSAGE (OUTPATIENT)
Dept: PSYCHIATRY | Facility: CLINIC | Age: 64
End: 2024-01-11
Payer: COMMERCIAL

## 2024-01-11 VITALS
DIASTOLIC BLOOD PRESSURE: 83 MMHG | SYSTOLIC BLOOD PRESSURE: 134 MMHG | HEIGHT: 71 IN | BODY MASS INDEX: 39.89 KG/M2 | HEART RATE: 64 BPM

## 2024-01-11 DIAGNOSIS — G47.00 INSOMNIA, UNSPECIFIED TYPE: ICD-10-CM

## 2024-01-11 DIAGNOSIS — F41.1 GENERALIZED ANXIETY DISORDER WITH PANIC ATTACKS: ICD-10-CM

## 2024-01-11 DIAGNOSIS — F41.0 GENERALIZED ANXIETY DISORDER WITH PANIC ATTACKS: ICD-10-CM

## 2024-01-11 DIAGNOSIS — R41.840 CONCENTRATION DEFICIT: ICD-10-CM

## 2024-01-11 DIAGNOSIS — F33.0 MILD EPISODE OF RECURRENT MAJOR DEPRESSIVE DISORDER: Primary | ICD-10-CM

## 2024-01-11 PROCEDURE — 99214 OFFICE O/P EST MOD 30 MIN: CPT | Mod: S$GLB,,, | Performed by: STUDENT IN AN ORGANIZED HEALTH CARE EDUCATION/TRAINING PROGRAM

## 2024-01-11 PROCEDURE — 99999 PR PBB SHADOW E&M-EST. PATIENT-LVL III: CPT | Mod: PBBFAC,,, | Performed by: STUDENT IN AN ORGANIZED HEALTH CARE EDUCATION/TRAINING PROGRAM

## 2024-01-11 PROCEDURE — 3008F BODY MASS INDEX DOCD: CPT | Mod: CPTII,S$GLB,, | Performed by: STUDENT IN AN ORGANIZED HEALTH CARE EDUCATION/TRAINING PROGRAM

## 2024-01-11 PROCEDURE — 99999 PR PBB SHADOW E&M-EST. PATIENT-LVL I: CPT | Mod: PBBFAC,,, | Performed by: SOCIAL WORKER

## 2024-01-11 PROCEDURE — 3079F DIAST BP 80-89 MM HG: CPT | Mod: CPTII,S$GLB,, | Performed by: STUDENT IN AN ORGANIZED HEALTH CARE EDUCATION/TRAINING PROGRAM

## 2024-01-11 PROCEDURE — 96136 PSYCL/NRPSYC TST PHY/QHP 1ST: CPT | Mod: 59,S$GLB,, | Performed by: STUDENT IN AN ORGANIZED HEALTH CARE EDUCATION/TRAINING PROGRAM

## 2024-01-11 PROCEDURE — 90832 PSYTX W PT 30 MINUTES: CPT | Mod: S$GLB,,, | Performed by: SOCIAL WORKER

## 2024-01-11 PROCEDURE — 3075F SYST BP GE 130 - 139MM HG: CPT | Mod: CPTII,S$GLB,, | Performed by: STUDENT IN AN ORGANIZED HEALTH CARE EDUCATION/TRAINING PROGRAM

## 2024-01-11 PROCEDURE — 1160F RVW MEDS BY RX/DR IN RCRD: CPT | Mod: CPTII,S$GLB,, | Performed by: STUDENT IN AN ORGANIZED HEALTH CARE EDUCATION/TRAINING PROGRAM

## 2024-01-11 PROCEDURE — 1159F MED LIST DOCD IN RCRD: CPT | Mod: CPTII,S$GLB,, | Performed by: STUDENT IN AN ORGANIZED HEALTH CARE EDUCATION/TRAINING PROGRAM

## 2024-01-11 RX ORDER — ESCITALOPRAM OXALATE 20 MG/1
20 TABLET ORAL NIGHTLY
Qty: 30 TABLET | Refills: 1 | Status: SHIPPED | OUTPATIENT
Start: 2024-01-11 | End: 2024-03-17

## 2024-01-11 RX ORDER — BUPROPION HYDROCHLORIDE 300 MG/1
300 TABLET ORAL EVERY MORNING
Qty: 30 TABLET | Refills: 1 | Status: SHIPPED | OUTPATIENT
Start: 2024-01-11 | End: 2024-03-17

## 2024-01-11 RX ORDER — HYDROXYZINE HYDROCHLORIDE 10 MG/1
10 TABLET, FILM COATED ORAL 3 TIMES DAILY PRN
Qty: 90 TABLET | Refills: 1 | Status: SHIPPED | OUTPATIENT
Start: 2024-01-11 | End: 2024-03-17

## 2024-01-11 RX ORDER — ESCITALOPRAM OXALATE 20 MG/1
20 TABLET ORAL NIGHTLY
Qty: 30 TABLET | Refills: 1 | Status: SHIPPED | OUTPATIENT
Start: 2024-01-11 | End: 2024-01-11 | Stop reason: SDUPTHER

## 2024-01-11 RX ORDER — MIRTAZAPINE 7.5 MG/1
7.5 TABLET, FILM COATED ORAL NIGHTLY
Qty: 30 TABLET | Refills: 1 | Status: SHIPPED | OUTPATIENT
Start: 2024-01-11 | End: 2024-03-17

## 2024-01-11 RX ORDER — BUPROPION HYDROCHLORIDE 300 MG/1
300 TABLET ORAL EVERY MORNING
Qty: 30 TABLET | Refills: 1 | Status: SHIPPED | OUTPATIENT
Start: 2024-01-11 | End: 2024-01-11 | Stop reason: SDUPTHER

## 2024-01-11 NOTE — PROGRESS NOTES
"    Outpatient Psychiatry Followup Visit (DO/MD/NP, etc.)    2024  Assessment & Plan    Assessment - Plan:     Impression     ICD-10-CM ICD-9-CM   1. Mild episode of recurrent major depressive disorder  F33.0 296.31   2. Generalized anxiety disorder with panic attacks  F41.1 300.02    F41.0 300.01   3. Insomnia, unspecified type  G47.00 780.52   4. Concentration deficit  R41.840 799.51        Plan of Care & Medication Management    Chart was reviewed. The risks and benefits of medication were discussed with pt. The treatment plan and followup plan were reviewed with pt. Pt understands to contact clinic if symptoms worsen. Pt understands to call 911 or go to nearest ER for suicidal ideation, intent or plan.   RX History ADDERALL (, for about 4 years, took with antidepressant and slept well), ATARAX/VISTARIL, CELEXA (dc in , does not remember response), LEXAPRO, PROZAC (, caused dysphoria, "made me darker"), TRAZODONE (, caused nightmares), WELLBUTRIN (initiated by primary care in  and again in 6235-8000), XANAX ( taking 0.25mg daily prn 1d/w), ZOLOFT (does not remember effect, took for one month)     Current RX [id number]: JOSE 2/6, BI 47/100  Continue ATARAX/VISTARIL  Adjustments:  68NZA1488: Start 10mg TID prn anxiety  Prior to evaluation pt had been taking WELLBUTRIN 300mg daily and XANAX 0.25mg daily prn  Continue LEXAPRO  Target dose range 10-20mg/d  Adjustments:  05LKK7353: Increase to 20mg NIGHTLY   37XRP6360: Adjust to 10mg NIGHTLY   61WFO4858: Start 10mg daily  Prior to evaluation pt had been taking WELLBUTRIN 300mg daily and XANAX 0.25mg daily prn  Continue REMERON  Pt was provided [employer] educational material 2023.  Metabolic monitorin2023 BMI 39.9  Target dose range 7.5-30mg/d  Adjustments:  [id number]: Start 7.5mg NIGHTLY   Continue WELLBUTRIN XL  Target dose range 150-300mg/d  Adjustments:  29OWC5128: Continue 300mg daily  Prior to evaluation pt had been taking " "WELLBUTRIN 300mg daily  Continue XANAX  Adjustments:  37ZBQ7983: Continue 0.25mg daily prn  Prior to evaluation pt had been taking XANAX 0.25mg daily prn and reported taking 1d/w on average   Education & Counseling RX administration and adherence   Other Orders Referral to ADHD testing  Continue individual psychotherapy   Monitor VITAL SIGNS  Instruments: PROMIS (A&D), PSS4   RETURN N: RETURN IN 6 WEEKS, and reassess frequency within three visits from now  Continue medication management     Subjective    Interval History:     Available documentation has been reviewed, and pertinent elements of the chart have been incorporated into this note where appropriate. Last Epic encounter with writer was on 12/11/2023   Domonique Carmona, a 63 y.o. female, presenting for followup visit.      Since last visit, reports overall A LITTLE BETTER.    Financial strain.    NO panic attacks since last encounter.    Depression is "manageable." Better interpersonal interactions. Less rumination and less negative bias.    Productive at work intact.    Baseline ASRS today to help determine need for ADHD testing - ASRS is inconclusive, although may be ADHD-PI. Cognitive symptoms may be secondary to under-treated depression.    Sleeping well. Appetite stable.    Will continue treatment as planned and refer for ADHD testing.       Pt did NOT display signs of nor endorse symptoms of overt psychosis or acute mood disorder requiring hospitalization during the encounter. Pt denied violent thoughts or suicidal or homicidal ideation, intent, or plan.         Objective    Measurement-Based Care (MBC):     Routine Instruments   PROMIS-ANXIETY Interpretation: 4a raw score 11, T-SCORE 61.4; MODERATE using 55-60-70 cutoffs. Last T-SCORE was 59.5. This PROMIS T-score change of 5 points or fewer indicates the score is probably stable.   PROMIS-DEPRESSION Interpretation: 4a raw score 10, T-SCORE 58.9; MILD using 55-60-70 cutoffs. Last T-SCORE was 58.9. " "This PROMIS T-score change of 5 points or fewer indicates the score is probably stable.   PSS4 Interpretation: 8/16; MODERATE using 6-11 cutoffs. 0 PH, 0 LSE. Last PSS4 score was 9. This PSS4 score change of less than 4 points indicates the score is probably stable.   Additional Instruments   ASRS Interpretation: ASRS A: 15/24 (STRATA 3); ASRS B: 22/48 (STRATA 2). ADHD is likely when either score is in the range of 17 to 23.     Current Evaluation of Mental Status:     Constitutional / General       Vitals:    01/11/24 1045   BP: 134/83   Pulse: 64   Height: 5' 11" (1.803 m)       Psychiatric / Mental Status Examination  1. Appearance: Dress is informal but appropriate. Motor activity normal.  2. Discourse: Clear speech with normal rate and volume. Associations intact. Orderly.  3. Emotional Expression: Somewhat anxious and depressed mood. Affect is appropriate.  4. Perception and Thinking: No hallucinations. No suicidality, no homicidality, delusions, or paranoia.  5. Sensorium: Grossly intact. Able to focus for interview.  6. Memory and Fund of Knowledge: Intact for content of interview.  7. Insight and Judgment: Intact.               Billing Documentation:     Method of Encounter IN PERSON visit at the clinic   Type of Encounter Follow up visit with me   Counseling;  Psychotherapy    Counseling;  Tobacco and/or Nicotine    Additional Codes and Modifiers 10880, with modifer 59: administered and scored more than one psychological or neuropsychological tests (see MBC above) (16+ mins)   Time Remaining Chart/Pt 74316: FOLLOW UP VISIT, Rx mgmt, "Multiple STABLE chronic illnesses"   Total Mins  (1/11/2024) N/A - Not billing for time        Vinny Charles DO  Department of Psychiatry, [facility]        "

## 2024-01-11 NOTE — PROGRESS NOTES
Individual Psychotherapy (PhD/LCSW)    1/11/2024    Site:  [facility]        Therapeutic Intervention: Met with patient.  Outpatient - Supportive psychotherapy 45 min - CPT Code 52379 and Outpatient - Interactive psychotherapy 45 min - CPT code 35839    Chief complaint/reason for encounter: anxiety   Medical history:   Past Medical History:   Diagnosis Date    Abnormal Pap smear 1986    Cryosurgery    Anxiety     Arthritis     Essential hypertension 11/26/2020    GERD (gastroesophageal reflux disease)     Venous stasis dermatitis of left lower extremity 12/31/2018       Medications:    Current Outpatient Medications:     ALPRAZolam (XANAX) 0.25 MG tablet, Take 1 tablet (0.25 mg total) by mouth daily as needed for Anxiety., Disp: 60 tablet, Rfl: 1    buPROPion (WELLBUTRIN XL) 300 MG 24 hr tablet, Take 1 tablet (300 mg total) by mouth every morning., Disp: 30 tablet, Rfl: 1    calcium carbonate-magnesium hydroxide (ROLAIDS) 550-110 mg Chew, Take 1 tablet by mouth every evening., Disp: , Rfl:     EScitalopram oxalate (LEXAPRO) 20 MG tablet, Take 1 tablet (20 mg total) by mouth every evening., Disp: 30 tablet, Rfl: 1    ibuprofen (ADVIL,MOTRIN) 800 MG tablet, Take 1 tablet (800 mg total) by mouth 3 (three) times daily., Disp: 60 tablet, Rfl: 6    mirtazapine (REMERON) 7.5 MG Tab, Take 1 tablet (7.5 mg total) by mouth every evening., Disp: 30 tablet, Rfl: 1    pantoprazole (PROTONIX) 20 MG tablet, Take 1 tablet (20 mg total) by mouth once daily., Disp: 90 tablet, Rfl: 1    semaglutide, weight loss, (WEGOVY) 0.25 mg/0.5 mL PnIj, Inject 0.25 mg into the skin every 7 days., Disp: 2 mL, Rfl: 2    sod picosulf-mag ox-citric ac (CLENPIQ) 10 mg-3.5 gram -12 gram/160 mL Soln, As directed (Patient not taking: Reported on 1/11/2024), Disp: 320 mL, Rfl: 0    tiZANidine (ZANAFLEX) 4 MG tablet, TAKE 1 TABLET BY MOUTH THREE TIMES A DAY AS NEEDED FOR 10 DAYS, Disp: 90 tablet, Rfl: 1    Family history of psychiatric illness:   Family  History   Problem Relation Age of Onset    Ovarian cancer Mother     Breast cancer Neg Hx     Colon cancer Neg Hx     Collagen disease Neg Hx     Glaucoma Neg Hx     Melanoma Neg Hx     Psoriasis Neg Hx     Lupus Neg Hx     Eczema Neg Hx        Social history (marriage, employment, etc.):  Session 2023:  Domonique presents on time for follow up session although clinician began session later (10 mins) and I was able to give her full session time.  Speaks to recent vacation/getaway with son Mendez [place], enjoyed very much.  A bit of respite.  Speaks to relationships with Mendez and son Todd (41), describes different personalities, dynamics between the two. Reminisces about adoptive father Winston and bio son whom she heard from recently, contemplating a visit to [place] as expressed concern for his status.  Had to make two very tough calls with respect to bio mom and adoptive dad, was there for latter when he passed, did not have same opportunity with bio mom, speaks to same.  Note a distinct difference in commentary re: youngest son Mendez, expectations have changed now that he is seeing a therapist (Sheldon) in our clinic.  Moving in a positive, forward direction.  Meds stable, saw Dr. Charles prior to this session.  Plan for follow up:  Engage Domonique in DDB.   Social History     Tobacco Use    Smoking status: Former     Current packs/day: 0.00     Average packs/day: 0.5 packs/day for 20.0 years (10.0 ttl pk-yrs)     Types: Cigarettes     Start date: 1985     Quit date: 2005     Years since quittin.5    Smokeless tobacco: Never   Substance Use Topics    Alcohol use: No    Drug use: No       Interval history and content of current session: Domonique presents for today's session and speaks to depression has improved, anxiety not so much.  Engage her in DDB and for next session will look at CCRE and explain same to her.  Also will bring Shamir Weinstein book for her to help her  on journey through Mindfulness.      Treatment plan:  Target symptoms: recurrent depression, anxiety   Why chosen therapy is appropriate versus another modality: relevant to diagnosis, patient responds to this modality, evidence based practice  Outcome monitoring methods: self-report, observation  Therapeutic intervention type: insight oriented psychotherapy, supportive psychotherapy    Risk parameters:  Patient reports no suicidal ideation  Patient reports no homicidal ideation  Patient reports no self-injurious behavior  Patient reports no violent behavior    Verbal deficits: None    Patient's response to intervention:  The patient's response to intervention is accepting, motivated.    Progress toward goals and other mental status changes:  The patient's progress toward goals is good.    Diagnosis:   1. Mild episode of recurrent major depressive disorder    2. Generalized anxiety disorder with panic attacks        Plan:  individual psychotherapy    Return to clinic: as scheduled    Length of Service (minutes):  35 mins

## 2024-02-01 ENCOUNTER — PATIENT MESSAGE (OUTPATIENT)
Dept: PSYCHIATRY | Facility: CLINIC | Age: 64
End: 2024-02-01
Payer: COMMERCIAL

## 2024-02-01 ENCOUNTER — OFFICE VISIT (OUTPATIENT)
Dept: PSYCHIATRY | Facility: CLINIC | Age: 64
End: 2024-02-01
Payer: COMMERCIAL

## 2024-02-01 DIAGNOSIS — F90.0 ADHD (ATTENTION DEFICIT HYPERACTIVITY DISORDER), INATTENTIVE TYPE: Primary | ICD-10-CM

## 2024-02-01 PROCEDURE — 96130 PSYCL TST EVAL PHYS/QHP 1ST: CPT | Mod: S$GLB,,, | Performed by: PSYCHOLOGIST

## 2024-02-01 PROCEDURE — 99999 PR PBB SHADOW E&M-EST. PATIENT-LVL II: CPT | Mod: PBBFAC,,, | Performed by: PSYCHOLOGIST

## 2024-02-01 PROCEDURE — 96131 PSYCL TST EVAL PHYS/QHP EA: CPT | Mod: S$GLB,,, | Performed by: PSYCHOLOGIST

## 2024-02-01 PROCEDURE — 96139 PSYCL/NRPSYC TST TECH EA: CPT | Mod: S$GLB,,, | Performed by: PSYCHOLOGIST

## 2024-02-01 PROCEDURE — 99499 UNLISTED E&M SERVICE: CPT | Mod: S$GLB,,, | Performed by: PSYCHOLOGIST

## 2024-02-01 PROCEDURE — 96138 PSYCL/NRPSYC TECH 1ST: CPT | Mod: S$GLB,,, | Performed by: PSYCHOLOGIST

## 2024-02-01 NOTE — PROGRESS NOTES
Outpatient Psychological Consultation    Name: Domonique Carmona  MRN: [id number]  : 1960    Testing appointment: 2024    ID:  Patient presents for consultation for diagnostic clarity in regard to difficulties with attention/concentration    Reason for encounter Referral from Vinny Charles DO     Psychiatric records were reviewed prior to the diagnostic interview. Comprehensive psychological assessment will not be documented in this report rather will be focused on the referral question. See prior notes for comprehensive psychiatric work-up.    Chief Complaint: Pt is a 63 year old female presenting as a referral from Vinny Charles DO for diagnostic clarification due to report of difficulty concentration/poor attention    Psychological Assessments Administered  Wender [place] Rating Scale for the Attention Deficit Hyperactivity Disorder  Corinne Adult ADHD Rating Scales-IV: Other-Report, current symptoms  Karine Continuous Performance Test 3  The Dot Counting Test    Results    Wender [place] Rating Scale for the Attention Deficit Hyperactivity Disorder  The Wender [place] Rating Scale can be used to assess adults for Attention Deficit Hyperactivity Disorder with a subset of 25 questions associated with that diagnosis. It is a retrospective diagnosis of childhood ADHD.      Wender [place] Rating Scale Subscore = 55 (sum of the 25 questions endorsed that are associated with ADHD)    Scores vary from 1-100, and the cutoff score is 46.    Given pt's report of their own symptoms of ADHD in childhood, their response style does support a diagnosis of ADHD.    Corinne Adult ADHD Rating Scales-IV: Other-Report, current symptoms  The BAARS-IV: Other-Report, current symptoms is a collateral measure of the patient's current symptoms of ADHD, as noted by someone with knowledge of the patient's executive functioning.    Mendezargentina Carmona is the evaluator whose relationship to pt is her son.     Inattention total score:  28      Hyperactivity total score: 11      Impulsivity total score: 4      Sluggish Cognitive Tempo total score: 25    ADHD total score (sum of Inattention/Hyperactivity/Impulsivity Subsections): 43       Inattention Symptom Count: 7     Hyperactivity/Impulsivity Symptom Count: 2   ADHD Symptom Count total (sum of Inattention/Hyperactivity/Impulsivity Subsections): 9     Based on the evaluator's report of pt's symptoms, pt does often struggle with 7 symptoms of inattention but only 2 symptoms of hyperactivity/impulsivity. This supports a diagnosis of ADHD - predominately inattentive type    Karine Continuous Performance Test 3  The Karine Continuous Performance Test 3rd Edition (Karine CPT 3) is an objective, task-oriented computerized assessment of attention-related problems. This measure creates an index of the respondent's performance in areas of inattentiveness, impulsivity, sustained attention, and vigilance.    Pt's performance on this objective measure does indicate difficulties with sustained attention, hyperactivity, impulsivity, and difficulty maintaining vigilance with varying levels of stimulus frequency.    The Dot Counting Test  The Dot Counting Test (DCT) is a brief task that assesses test-taking effort in individuals.     Based on patient performance and score, pt appeared to demonstrate good effort.    Interpretation    Pt is a 63 year old female presenting as a referral from Vinny Charles DO for diagnostic clarification due to report of difficulty concentration/poor attention. Pt reports attention/concentration concerns consistent with ADHD, predominantly inattentive type. Pt explained that her symptoms were present before the age of 14 and cause impairment in more than one setting.    Diagnosis     Attention-Deficit / Hyperactivity Disorder, predominately inattentive    Plan and Recommendations    Return for further psychiatric medication management with Dr. Charles    Attention Tips  Remember that inattention and lack of focus are major culprits to forgetting information so be sure and practice paying attention for adequate learning of information. If you rely on passive attention to remembering something (e.g., yeah, uh-huh approach), you'll find you cannot recall it later. I recommend the following to improve attention, which may aid in later recall:   Reduce distractions as much as possible.  Look at the person as they are speaking to you.   Paraphrase as they are speaking  Write down important pieces of information   Ask people to repeat if you zone out.    Have visual cues  (posted to-do-list, daily schedule) to remind you if you need to do something later.   Processing Speed Tips Using multiple modalities (e.g., listening, writing notes, asking questions, recording) to learn new information is likely to allow additional time for processing, thus improving memory for the material.   Allowing sufficient time to complete tasks will reduce frustration and help to ensure completion.  Spend a lot of up-front time planning in advance how long a task may take and then chunk steps in the task so you don't wear yourself out.   Executive Functioning Tips: Don't attempt to multi-task.  Separate tasks so that each can be completed one at a time.  Consider using a calendar/day planner, as that may be effective to help you plan and stay on track.  Color-coding specific tasks by importance may add additional benefit to your planner.  Break down large projects into smaller tasks and write down the steps to completing the task.       BILLIN, 96139 X2 (90 minutes of testing and scoring with psychometrist), 92736, 30934 (2 hours of report writing, evaluation and feedback)

## 2024-02-07 ENCOUNTER — OFFICE VISIT (OUTPATIENT)
Dept: ORTHOPEDICS | Facility: CLINIC | Age: 64
End: 2024-02-07
Payer: COMMERCIAL

## 2024-02-07 VITALS — WEIGHT: 286 LBS | HEIGHT: 71 IN | BODY MASS INDEX: 40.04 KG/M2

## 2024-02-07 DIAGNOSIS — G56.23 ULNAR NEUROPATHY OF BOTH UPPER EXTREMITIES: Primary | ICD-10-CM

## 2024-02-07 PROCEDURE — 3008F BODY MASS INDEX DOCD: CPT | Mod: CPTII,S$GLB,, | Performed by: ORTHOPAEDIC SURGERY

## 2024-02-07 PROCEDURE — 1159F MED LIST DOCD IN RCRD: CPT | Mod: CPTII,S$GLB,, | Performed by: ORTHOPAEDIC SURGERY

## 2024-02-07 PROCEDURE — 99999 PR PBB SHADOW E&M-EST. PATIENT-LVL III: CPT | Mod: PBBFAC,,, | Performed by: ORTHOPAEDIC SURGERY

## 2024-02-07 PROCEDURE — 99204 OFFICE O/P NEW MOD 45 MIN: CPT | Mod: S$GLB,,, | Performed by: ORTHOPAEDIC SURGERY

## 2024-02-07 NOTE — PROGRESS NOTES
2/7/2024    Chief Complaint:  Chief Complaint   Patient presents with    Right Hand - Numbness    Right Wrist - Pain    Left Hand - Numbness    Left Wrist - Numbness       HPI:  Domonique Carmona is a 63 y.o. female, who presents to clinic today she has a history of bilateral numbness.  She has had a ulnar nerve decompression with transposition on the left.  She states that initially she did very well from that surgery but she is now starting him numbness and tingling on that side.  He was also having numbness and tingling to the right ring and small finger.  She does have an issue with her right ring finger where she had a mucoid cyst.  She states that that cyst recently ruptured.    PMHX:  Past Medical History:   Diagnosis Date    Abnormal Pap smear 1986    Cryosurgery    Anxiety     Arthritis     Essential hypertension 11/26/2020    GERD (gastroesophageal reflux disease)     Venous stasis dermatitis of left lower extremity 12/31/2018       PSHX:  Past Surgical History:   Procedure Laterality Date    BREAST BIOPSY      BREAST SURGERY      biopsy right side    COLONOSCOPY N/A 12/12/2022    Procedure: COLONOSCOPY;  Surgeon: Phillip Cat MD;  Location: [facility];  Service: Endoscopy;  Laterality: N/A;    DILATION AND CURETTAGE OF UTERUS      GYNECOLOGIC CRYOSURGERY  1986    LAPAROSCOPIC BIOPSY OF LIVER N/A 10/25/2021    Procedure: BIOPSY, LIVER, LAPAROSCOPIC;  Surgeon: Thierry Victor MD;  [facility];  Service: General;  Laterality: N/A;    LAPAROSCOPIC CHOLECYSTECTOMY N/A 10/25/2021    Procedure: CHOLECYSTECTOMY, LAPAROSCOPIC- diagnostic lap - hepatic cysts;  Surgeon: Thierry Victor MD;  Location: [facility] OR;  Service: General;  Laterality: N/A;    LAPAROSCOPIC SLEEVE GASTRECTOMY N/A 5/11/2021    Procedure: GASTRECTOMY, SLEEVE, LAPAROSCOPIC;  Surgeon: Thierry Victor MD;  Location: [facility] OR [address] [place] [address];  Service: General;  Laterality: N/A;    LASIK Bilateral     ulner nerve transposition      left        FMHX:  Family History   Problem Relation Age of Onset    Ovarian cancer Mother     Breast cancer Neg Hx     Colon cancer Neg Hx     Collagen disease Neg Hx     Glaucoma Neg Hx     Melanoma Neg Hx     Psoriasis Neg Hx     Lupus Neg Hx     Eczema Neg Hx        SOCHX:  Social History     Tobacco Use    Smoking status: Former     Current packs/day: 0.00     Average packs/day: 0.5 packs/day for 20.0 years (10.0 ttl pk-yrs)     Types: Cigarettes     Start date: 1985     Quit date: 2005     Years since quittin.6    Smokeless tobacco: Never   Substance Use Topics    Alcohol use: No       ALLERGIES:  Demerol [meperidine]    CURRENT MEDICATIONS:  Current Outpatient Medications on File Prior to Visit   Medication Sig Dispense Refill    ALPRAZolam (XANAX) 0.25 MG tablet Take 1 tablet (0.25 mg total) by mouth daily as needed for Anxiety. 60 tablet 1    buPROPion (WELLBUTRIN XL) 300 MG 24 hr tablet Take 1 tablet (300 mg total) by mouth every morning. 30 tablet 1    calcium carbonate-magnesium hydroxide (ROLAIDS) 550-110 mg Chew Take 1 tablet by mouth every evening.      EScitalopram oxalate (LEXAPRO) 20 MG tablet Take 1 tablet (20 mg total) by mouth every evening. 30 tablet 1    hydrOXYzine HCL (ATARAX) 10 MG Tab Take 1 tablet (10 mg total) by mouth 3 (three) times daily as needed (anxiety). 90 tablet 1    ibuprofen (ADVIL,MOTRIN) 800 MG tablet Take 1 tablet (800 mg total) by mouth 3 (three) times daily. 60 tablet 6    mirtazapine (REMERON) 7.5 MG Tab Take 1 tablet (7.5 mg total) by mouth every evening. 30 tablet 1    pantoprazole (PROTONIX) 20 MG tablet Take 1 tablet (20 mg total) by mouth once daily. 90 tablet 1    semaglutide, weight loss, (WEGOVY) 0.25 mg/0.5 mL PnIj Inject 0.25 mg into the skin every 7 days. 2 mL 2    sod picosulf-mag ox-citric ac (CLENPIQ) 10 mg-3.5 gram -12 gram/160 mL Soln As directed (Patient not taking: Reported on 2024) 320 mL 0    tiZANidine (ZANAFLEX) 4 MG tablet TAKE 1  "TABLET BY MOUTH THREE TIMES A DAY AS NEEDED FOR 10 DAYS 90 tablet 1     No current facility-administered medications on file prior to visit.       REVIEW OF SYSTEMS:  ROS    GENERAL PHYSICAL EXAM:   Ht 5' 11" (1.803 m)   Wt 129.7 kg (286 lb)   LMP 05/27/2013   BMI 39.89 kg/m²    GEN: well developed, well nourished, no acute distress   HENT: Normocephalic, atraumatic   EYES: No discharge, conjunctiva normal   NECK: Supple, non-tender   PULM: No wheezing, no respiratory distress   CV: RRR   ABD: Soft, non-tender    ORTHO EXAM:   Examination of the right arm and hand reveals that there is no edema.  There are no skin changes.  She does have a rupture mucoid cyst over the dorsum of the ring finger.  There is no surrounding edema or erythema.  She does have a small nail deformity at the area of the cyst.  She does report decreased sensation in the ulnar distribution when compared to the median and radial distribution.  Has mildly positive Tinel's overlying Guyon's canal as well as over the cubital tunnel.    Examination of the left upper extremity reveals that she has well-healed incision over the medial elbow.  There are no other skin changes.  Palpation produces no specific tenderness.  She does report decreased sensation in the ulnar distribution.  She has mildly positive Tinel's over Guyon's canal and a positive Tinel's overlying the anterior elbow at the site of the transposition of the nerve.  She does have 5/5 intrinsic muscular strength    RADIOLOGY:   None    ASSESSMENT:   Right ring finger mucoid cyst, bilateral cubital tunnel syndrome/ulnar neuropathy    PLAN:  1. Will send the patient for EMG and nerve conduction study     2. She will begin using anterolateral ointment and a basic Band-Aid to cover the ruptured cyst.  She will use this until it is healed    3.  She will follow up with me as soon as the nerve conduction study is completed for discussion of further treatment options      "

## 2024-02-15 RX ORDER — PANTOPRAZOLE SODIUM 20 MG/1
20 TABLET, DELAYED RELEASE ORAL DAILY
Qty: 90 TABLET | Refills: 1 | OUTPATIENT
Start: 2024-02-15

## 2024-02-22 ENCOUNTER — OFFICE VISIT (OUTPATIENT)
Dept: PSYCHIATRY | Facility: CLINIC | Age: 64
End: 2024-02-22
Payer: COMMERCIAL

## 2024-02-22 VITALS
SYSTOLIC BLOOD PRESSURE: 118 MMHG | HEIGHT: 71 IN | HEART RATE: 70 BPM | BODY MASS INDEX: 41.02 KG/M2 | DIASTOLIC BLOOD PRESSURE: 78 MMHG | WEIGHT: 293 LBS

## 2024-02-22 DIAGNOSIS — G47.00 INSOMNIA, UNSPECIFIED TYPE: ICD-10-CM

## 2024-02-22 DIAGNOSIS — F41.0 GENERALIZED ANXIETY DISORDER WITH PANIC ATTACKS: ICD-10-CM

## 2024-02-22 DIAGNOSIS — F41.1 GENERALIZED ANXIETY DISORDER WITH PANIC ATTACKS: ICD-10-CM

## 2024-02-22 DIAGNOSIS — F90.0 ADHD (ATTENTION DEFICIT HYPERACTIVITY DISORDER), INATTENTIVE TYPE: ICD-10-CM

## 2024-02-22 DIAGNOSIS — F33.0 MILD EPISODE OF RECURRENT MAJOR DEPRESSIVE DISORDER: Primary | ICD-10-CM

## 2024-02-22 DIAGNOSIS — Z79.899 CURRENT USE OF PROTON PUMP INHIBITOR: ICD-10-CM

## 2024-02-22 PROCEDURE — 96136 PSYCL/NRPSYC TST PHY/QHP 1ST: CPT | Mod: 59,S$GLB,, | Performed by: STUDENT IN AN ORGANIZED HEALTH CARE EDUCATION/TRAINING PROGRAM

## 2024-02-22 PROCEDURE — 99214 OFFICE O/P EST MOD 30 MIN: CPT | Mod: S$GLB,,, | Performed by: STUDENT IN AN ORGANIZED HEALTH CARE EDUCATION/TRAINING PROGRAM

## 2024-02-22 PROCEDURE — 1159F MED LIST DOCD IN RCRD: CPT | Mod: CPTII,S$GLB,, | Performed by: STUDENT IN AN ORGANIZED HEALTH CARE EDUCATION/TRAINING PROGRAM

## 2024-02-22 PROCEDURE — 90837 PSYTX W PT 60 MINUTES: CPT | Mod: S$GLB,,, | Performed by: SOCIAL WORKER

## 2024-02-22 PROCEDURE — 3008F BODY MASS INDEX DOCD: CPT | Mod: CPTII,S$GLB,, | Performed by: STUDENT IN AN ORGANIZED HEALTH CARE EDUCATION/TRAINING PROGRAM

## 2024-02-22 PROCEDURE — 3078F DIAST BP <80 MM HG: CPT | Mod: CPTII,S$GLB,, | Performed by: STUDENT IN AN ORGANIZED HEALTH CARE EDUCATION/TRAINING PROGRAM

## 2024-02-22 PROCEDURE — 3074F SYST BP LT 130 MM HG: CPT | Mod: CPTII,S$GLB,, | Performed by: STUDENT IN AN ORGANIZED HEALTH CARE EDUCATION/TRAINING PROGRAM

## 2024-02-22 PROCEDURE — 99999 PR PBB SHADOW E&M-EST. PATIENT-LVL I: CPT | Mod: PBBFAC,,, | Performed by: SOCIAL WORKER

## 2024-02-22 PROCEDURE — 99999 PR PBB SHADOW E&M-EST. PATIENT-LVL III: CPT | Mod: PBBFAC,,, | Performed by: STUDENT IN AN ORGANIZED HEALTH CARE EDUCATION/TRAINING PROGRAM

## 2024-02-22 PROCEDURE — 1160F RVW MEDS BY RX/DR IN RCRD: CPT | Mod: CPTII,S$GLB,, | Performed by: STUDENT IN AN ORGANIZED HEALTH CARE EDUCATION/TRAINING PROGRAM

## 2024-02-22 RX ORDER — LISDEXAMFETAMINE DIMESYLATE 30 MG/1
30 CAPSULE ORAL EVERY MORNING
Qty: 30 CAPSULE | Refills: 0 | Status: SHIPPED | OUTPATIENT
Start: 2024-02-22 | End: 2024-03-22 | Stop reason: DRUGHIGH

## 2024-02-22 NOTE — PROGRESS NOTES
"    Outpatient Psychiatry Followup Visit (DO/MD/NP, etc.)    2024  Assessment & Plan    Assessment - Plan:     Impression     ICD-10-CM ICD-9-CM   1. Mild episode of recurrent major depressive disorder  F33.0 296.31   2. ADHD (attention deficit hyperactivity disorder), inattentive type  F90.0 314.00   3. Generalized anxiety disorder with panic attacks  F41.1 300.02    F41.0 300.01   4. Insomnia, unspecified type  G47.00 780.52   5. Current use of proton pump inhibitor  Z79.899 V58.69   6. BMI 40.0-44.9, adult  Z68.41 V85.41        Plan of Care & Medication Management    Chart was reviewed. The risks and benefits of medication were discussed with pt. The treatment plan and followup plan were reviewed with pt. Pt understands to contact clinic if symptoms worsen. Pt understands to call 911 or go to nearest ER for suicidal ideation, intent or plan.   RX History ADDERALL XR/IR (, for about 4 years, took with antidepressant and slept well), ATARAX/VISTARIL, CELEXA (dc in , does not remember response), LEXAPRO, PROZAC (, caused dysphoria, "made me darker"), TRAZODONE (, caused nightmares), WELLBUTRIN (initiated by primary care in  and again in [phone]), XANAX ( taking 0.25mg daily prn 1d/w), ZOLOFT (does not remember effect, took for one month)     Current RX [id number]: JOSE 2, BI 47/100  Continue ATARAX/VISTARIL  Adjustments:  [id number]: Start 10mg TID prn anxiety  Prior to evaluation pt had been taking WELLBUTRIN 300mg daily and XANAX 0.25mg daily prn  Continue LEXAPRO  Target dose range 10-20mg/d  Adjustments:  [id number]: Increase to 20mg NIGHTLY   [id number]: Adjust to 10mg NIGHTLY   [id number]: Start 10mg daily  Prior to evaluation pt had been taking WELLBUTRIN 300mg daily and XANAX 0.25mg daily prn  Continue REMERON  Pt was provided [employer] educational material 2023.  Metabolic monitorin2023 BMI 39.9  Target dose range 7.5-30mg/d  Adjustments:  [id number]: Start 7.5mg " NIGHTLY   Start VYVANSE  Pt was provided [employer] educational material 2/22/2024.  Adjustments:  22FEB2024: Start 30mg daily in the morning  Prior to 22FEB2024 pt was NOT taking a stimulant  1/11/2024 ASRS 3/2 (15-22)  Continue WELLBUTRIN XL  Target dose range 150-300mg/d  Adjustments:  96PGO6518: Continue 300mg daily  Prior to evaluation pt had been taking WELLBUTRIN 300mg daily  Continue XANAX  Adjustments:  [id number]: Continue 0.25mg daily prn  Prior to evaluation pt had been taking XANAX 0.25mg daily prn and reported taking 1d/w on average   Education & Counseling RX administration and adherence  NARX/ REVIEWED (2/22/2024)  Clinic's CDS contract signed today 2/22/2024.   Other Orders VITAMIN B12 cobalamin (Relevant risk factor(s) for abnormal value: low energy and using a medication that interferes with absorption or stability: proton pump inhibitor)  Continue individual psychotherapy   Monitor VITAL SIGNS  Instruments: PROMIS (A&D), PSS4   RETURN K: RETURN IN 4 WEEKS (ONE MONTH), and reassess frequency within three visits from now  Continue medication management     Subjective    Interval History:     Available documentation has been reviewed, and pertinent elements of the chart have been incorporated into this note where appropriate. Last Epic encounter with writer was on 1/11/2024   Domonique Carmona, a 63 y.o. female, presenting for followup visit.      Since last visit, reports overall about the same.    Since last visit testing confirmed ADHD-PI.    New dog at home.    Reports depression is under better control. Less rumination.    Keeping therapy appts.    Sleeping unwell due to new dog.    Work is busy.    Reports taking XANAX less than once per week.    Discussed starting VYVANSE.    Answered questions about homeopathic remedies       Unless otherwise specified, pt did NOT display signs of nor endorse symptoms of overt psychosis or acute mood disorder requiring hospitalization during the encounter; pt  "denied violent thoughts or suicidal or homicidal ideation, intent, or plan.         Objective    Measurement-Based Care (MBC):     Routine Instruments   PROMIS-ANXIETY Interpretation: 12 (4a raw score): T-SCORE 63.4; MODERATE using 55-60-70 cutoffs. Last T-SCORE was 61.4. This PROMIS T-score change of 5 points or fewer indicates the score is probably stable.   PROMIS-DEPRESSION Interpretation: 10 (4a raw score): T-SCORE 58.9; MILD using 55-60-70 cutoffs. Last T-SCORE was 58.9. This PROMIS T-score change of 5 points or fewer indicates the score is probably stable.   PSS4 Interpretation: 8/16; MODERATE using 6-11 cutoffs. 0 PH, 0 LSE. PSS4 score has NOT significantly changed since last measurement.   Additional Instruments   ASRS Interpretation: Not completed this visit.     Current Evaluation of Mental Status:     Constitutional / General       Vitals:    02/22/24 1131   BP: 118/78   Pulse: 70   Weight: 133.7 kg (294 lb 13.8 oz)   Height: 5' 11" (1.803 m)       Psychiatric / Mental Status Examination  1. Appearance: Dress is informal but appropriate. Motor activity normal.  2. Discourse: Clear speech with normal rate and volume. Associations intact. Orderly.  3. Emotional Expression: Somewhat anxious and depressed mood. Affect is appropriate.  4. Perception and Thinking: No hallucinations. No suicidality, no homicidality, delusions, or paranoia.  5. Sensorium: Grossly intact. Able to focus for interview.  6. Memory and Fund of Knowledge: Intact for content of interview.  7. Insight and Judgment: Intact.               Billing Documentation:     Method of Encounter IN PERSON visit at the clinic   Type of Encounter Follow up visit with me   Counseling;  Psychotherapy    Counseling;  Tobacco and/or Nicotine    Additional Codes and Modifiers 48954, with modifer 59: administered and scored more than one psychological or neuropsychological tests (see MBC above) (16+ mins)   Time Remaining Chart/Pt 52696: FOLLOW UP VISIT, Rx " "mgmt, "Multiple STABLE chronic illnesses"   Total Mins  (2/22/2024) N/A - Not billing for time        Vinny Charles DO  Department of Psychiatry, [facility]        "

## 2024-02-22 NOTE — PROGRESS NOTES
Individual Psychotherapy (PhD/LCSW)    2/22/2024    Site:  [facility]        Therapeutic Intervention: Met with patient.  Outpatient - Supportive psychotherapy 45 min - CPT Code 26169 and Outpatient - Interactive psychotherapy 45 min - CPT code 89477    Chief complaint/reason for encounter: depression and anxiety   Medical history:   Past Medical History:   Diagnosis Date    Abnormal Pap smear 1986    Cryosurgery    Anxiety     Arthritis     Essential hypertension 11/26/2020    GERD (gastroesophageal reflux disease)     Venous stasis dermatitis of left lower extremity 12/31/2018       Medications:    Current Outpatient Medications:     ALPRAZolam (XANAX) 0.25 MG tablet, Take 1 tablet (0.25 mg total) by mouth daily as needed for Anxiety., Disp: 60 tablet, Rfl: 1    buPROPion (WELLBUTRIN XL) 300 MG 24 hr tablet, Take 1 tablet (300 mg total) by mouth every morning., Disp: 30 tablet, Rfl: 1    calcium carbonate-magnesium hydroxide (ROLAIDS) 550-110 mg Chew, Take 1 tablet by mouth every evening., Disp: , Rfl:     EScitalopram oxalate (LEXAPRO) 20 MG tablet, Take 1 tablet (20 mg total) by mouth every evening., Disp: 30 tablet, Rfl: 1    hydrOXYzine HCL (ATARAX) 10 MG Tab, Take 1 tablet (10 mg total) by mouth 3 (three) times daily as needed (anxiety)., Disp: 90 tablet, Rfl: 1    ibuprofen (ADVIL,MOTRIN) 800 MG tablet, Take 1 tablet (800 mg total) by mouth 3 (three) times daily., Disp: 60 tablet, Rfl: 6    lisdexamfetamine (VYVANSE) 30 MG capsule, Take 1 capsule (30 mg total) by mouth every morning., Disp: 30 capsule, Rfl: 0    mirtazapine (REMERON) 7.5 MG Tab, Take 1 tablet (7.5 mg total) by mouth every evening., Disp: 30 tablet, Rfl: 1    pantoprazole (PROTONIX) 20 MG tablet, Take 1 tablet (20 mg total) by mouth once daily., Disp: 90 tablet, Rfl: 1    semaglutide, weight loss, (WEGOVY) 0.25 mg/0.5 mL PnIj, Inject 0.25 mg into the skin every 7 days., Disp: 2 mL, Rfl: 2    tiZANidine (ZANAFLEX) 4 MG tablet, TAKE 1 TABLET BY  MOUTH THREE TIMES A DAY AS NEEDED FOR 10 DAYS, Disp: 90 tablet, Rfl: 1    Family history of psychiatric illness:   Family History   Problem Relation Age of Onset    Ovarian cancer Mother     Breast cancer Neg Hx     Colon cancer Neg Hx     Collagen disease Neg Hx     Glaucoma Neg Hx     Melanoma Neg Hx     Psoriasis Neg Hx     Lupus Neg Hx     Eczema Neg Hx        Social history (marriage, employment, etc.): Session on 2024:  Domonique presents for today's session and speaks to depression has improved, anxiety not so much. Engage her in DDB and for next session will look at CCRE and explain same to her. Also will bring Shamir Weinstein book for her to help her on journey through Mindfulness.       Social History     Tobacco Use    Smoking status: Former     Current packs/day: 0.00     Average packs/day: 0.5 packs/day for 20.0 years (10.0 ttl pk-yrs)     Types: Cigarettes     Start date: 1985     Quit date: 2005     Years since quittin.6    Smokeless tobacco: Never   Substance Use Topics    Alcohol use: No    Drug use: No       Interval history and content of current session: Domonique presents on time for today's follow up.  Is doing very well.  Noticeable difference.  Diagnosed with ADHD.  Now on Vyvanse.  Has adopted a new dog from shelter named [place].  Speaks to her values and who she is.  Give her info on [place]: MBSR to review.  Give her Willis Parker's Silence book.  Engage her in DDB.  Does well for the first few minutes then struggles.  Actually did very well.  Use to Doing Mode of Living, alien to Being Mode of Living which is more in keeping with who she is truly.  Introduction to Mindfulness continues.  Bring Shamir Weinstein book.  Will see her as scheduled.      Treatment plan:  Target symptoms: recurrent depression, anxiety   Why chosen therapy is appropriate versus another modality: relevant to diagnosis, patient responds to this modality, evidence based practice  Outcome monitoring  methods: self-report, observation  Therapeutic intervention type: insight oriented psychotherapy, behavior modifying psychotherapy, supportive psychotherapy    Risk parameters:  Patient reports no suicidal ideation  Patient reports no homicidal ideation  Patient reports no self-injurious behavior  Patient reports no violent behavior    Verbal deficits: None    Patient's response to intervention:  The patient's response to intervention is accepting, motivated.    Progress toward goals and other mental status changes:  The patient's progress toward goals is good.    Diagnosis:   1. Mild episode of recurrent major depressive disorder    2. ADHD (attention deficit hyperactivity disorder), inattentive type    3. Generalized anxiety disorder with panic attacks        Plan:  individual psychotherapy    Return to clinic: as scheduled    Length of Service (minutes): 60

## 2024-02-22 NOTE — PATIENT INSTRUCTIONS
Start VYVANSE 30mg daily  Continue other medications as prescribed   Keep therapy appointments     Please complete lab work at your earliest convenience. Your labs are NON-FASTING.

## 2024-03-01 ENCOUNTER — OFFICE VISIT (OUTPATIENT)
Dept: PHYSICAL MEDICINE AND REHAB | Facility: CLINIC | Age: 64
End: 2024-03-01
Payer: COMMERCIAL

## 2024-03-01 DIAGNOSIS — R20.0 NUMBNESS AND TINGLING IN BOTH HANDS: ICD-10-CM

## 2024-03-01 DIAGNOSIS — R20.2 NUMBNESS AND TINGLING IN BOTH HANDS: ICD-10-CM

## 2024-03-01 PROCEDURE — 99999 PR PBB SHADOW E&M-EST. PATIENT-LVL II: CPT | Mod: PBBFAC,,, | Performed by: PHYSICAL MEDICINE & REHABILITATION

## 2024-03-01 PROCEDURE — 95911 NRV CNDJ TEST 9-10 STUDIES: CPT | Mod: S$GLB,,, | Performed by: PHYSICAL MEDICINE & REHABILITATION

## 2024-03-01 PROCEDURE — 95886 MUSC TEST DONE W/N TEST COMP: CPT | Mod: S$GLB,,, | Performed by: PHYSICAL MEDICINE & REHABILITATION

## 2024-03-01 PROCEDURE — 99499 UNLISTED E&M SERVICE: CPT | Mod: S$GLB,,, | Performed by: PHYSICAL MEDICINE & REHABILITATION

## 2024-03-01 NOTE — PROGRESS NOTES
[facility]  [address] [place] [address]             Full Name: Domonique Carmona Gender: Female  Patient ID: [id number] YOB: 1960  History: Patient complaining of numbness of the 4th and 5th digits in bilateral hands with right wrist pain.   Hx of transposed ulnar nerve at the left elbow.         Visit Date: 3/1/2024 1:43 PM  Age: 63 Years  Examining Physician: Lili Wilson DO  Referring Physician: Phillip Walden MD  Technologist: ROXANNE Oconnell NCSSonu TSonu   Height: 5 feet 11 inch      Sensory NCS      Nerve / Sites Rec. Site Onset Lat Peak Lat NP Amp PP Amp Segments Distance Velocity     ms ms µV µV  cm m/s   L Median - Digit III (Antidromic)      Wrist Dig III 2.71 3.50 22.7 29.8 Wrist - Dig III 14 52   R Median - Digit III (Antidromic)      Wrist Dig III 2.83 3.44 17.2 26.5 Wrist - Dig III 14 49   L Ulnar - Digit V (Antidromic)      Wrist Dig V 2.31 3.31 10.6 5.1 Wrist - Dig V 14 61   R Ulnar - Digit V (Antidromic)      Wrist Dig V 2.46 3.10 15.5 24.7 Wrist - Dig V 14 57   L Radial - Anatomical snuff box (Forearm)      Forearm Wrist 1.73 2.31 28.9 6.2 Forearm - Wrist 10 58       Motor NCS      Nerve / Sites Muscle Latency Amplitude Amp % Duration Segments Distance Lat Diff Velocity     ms mV % ms  cm ms m/s   L Median - APB      Wrist APB 3.52 7.6 100 6.77 Wrist - APB 8        Elbow APB 8.54 6.2 82.2 7.06 Elbow - Wrist 33 5.02 66   R Median - APB      Wrist APB 3.46 8.7 100 7.33 Wrist - APB 8        Elbow APB 8.06 8.5 97.3 7.44 Elbow - Wrist 24 4.60 52   L Ulnar - ADM      Wrist ADM 3.44 7.7 100 6.42 Wrist - ADM 8        B.Elbow ADM 7.17 7.3 95.3 7.94 B.Elbow - Wrist 19 3.73 51      A.Elbow ADM 9.38 6.1 79.6 6.96 A.Elbow - B.Elbow 11 2.21 50   R Ulnar - ADM      Wrist ADM 3.15 7.9 100 6.35 Wrist - ADM 8        B.Elbow ADM 6.92 7.4 93.9 6.48 B.Elbow - Wrist 21 3.77 56      A.Elbow ADM 9.13 7.2 91.9 6.40 A.Elbow - B.Elbow 11 2.21 50       EMG Summary Table     Spontaneous  MUAP Recruitment   Muscle IA Fib PSW Fasc CRD Amp Dur. Poly Pattern   L. Deltoid N None None None None N N None N   L. Biceps brachii N None None None None N N None N   L. Triceps brachii N None None None None N N None N   L. Pronator teres N None None None None N N None N   L. First dorsal interosseous N None None None None N N None N   R. Deltoid N None None None None N N None N   R. Biceps brachii N None None None None N N None N   R. Triceps brachii N None None None None N N None N   R. Pronator teres N None None None None N N None N   R. First dorsal interosseous N None None None None N N None N       Summary    The motor conduction test was normal in all 4 of the tested nerves: L Median - APB, R Median - APB, L Ulnar - ADM, R Ulnar - ADM.    The sensory conduction test was normal in all 5 of the tested nerves: L Median - Digit III (Antidromic), R Median - Digit III (Antidromic), L Ulnar - Digit V (Antidromic), R Ulnar - Digit V (Antidromic), L Radial - Anatomical snuff box (Forearm).    The needle EMG study was normal in all 10 tested muscles: L. Deltoid, L. Biceps brachii, L. Triceps brachii, L. Pronator teres, L. First dorsal interosseous, R. Deltoid, R. Biceps brachii, R. Triceps brachii, R. Pronator teres, R. First dorsal interosseous.       Impression:  Normal study.  No electrophysiologic evidence of bilateral cervical radiculopathy/plexopathy, bilateral median mononeuropathy, bilateral ulnar mononeuropathy, or peripheral neuropathy in the upper extremities.    ------------------------------  Lili Wilson, DO

## 2024-03-06 ENCOUNTER — OFFICE VISIT (OUTPATIENT)
Dept: ORTHOPEDICS | Facility: CLINIC | Age: 64
End: 2024-03-06
Payer: COMMERCIAL

## 2024-03-06 VITALS — BODY MASS INDEX: 41.02 KG/M2 | HEIGHT: 71 IN | WEIGHT: 293 LBS

## 2024-03-06 DIAGNOSIS — F41.1 GENERALIZED ANXIETY DISORDER WITH PANIC ATTACKS: ICD-10-CM

## 2024-03-06 DIAGNOSIS — F33.0 MILD EPISODE OF RECURRENT MAJOR DEPRESSIVE DISORDER: ICD-10-CM

## 2024-03-06 DIAGNOSIS — F41.0 GENERALIZED ANXIETY DISORDER WITH PANIC ATTACKS: ICD-10-CM

## 2024-03-06 DIAGNOSIS — G56.23 ULNAR NEUROPATHY OF BOTH UPPER EXTREMITIES: Primary | ICD-10-CM

## 2024-03-06 PROCEDURE — 99999 PR PBB SHADOW E&M-EST. PATIENT-LVL III: CPT | Mod: PBBFAC,,, | Performed by: ORTHOPAEDIC SURGERY

## 2024-03-06 PROCEDURE — 1159F MED LIST DOCD IN RCRD: CPT | Mod: CPTII,S$GLB,, | Performed by: ORTHOPAEDIC SURGERY

## 2024-03-06 PROCEDURE — 99213 OFFICE O/P EST LOW 20 MIN: CPT | Mod: 25,S$GLB,, | Performed by: ORTHOPAEDIC SURGERY

## 2024-03-06 PROCEDURE — 3008F BODY MASS INDEX DOCD: CPT | Mod: CPTII,S$GLB,, | Performed by: ORTHOPAEDIC SURGERY

## 2024-03-06 PROCEDURE — 64450 NJX AA&/STRD OTHER PN/BRANCH: CPT | Mod: RT,S$GLB,, | Performed by: ORTHOPAEDIC SURGERY

## 2024-03-06 RX ORDER — BUPROPION HYDROCHLORIDE 300 MG/1
300 TABLET ORAL EVERY MORNING
Qty: 30 TABLET | Refills: 1 | Status: SHIPPED | OUTPATIENT
Start: 2024-03-06 | End: 2024-04-22 | Stop reason: SDUPTHER

## 2024-03-06 RX ORDER — ESCITALOPRAM OXALATE 20 MG/1
20 TABLET ORAL NIGHTLY
Qty: 30 TABLET | Refills: 1 | Status: SHIPPED | OUTPATIENT
Start: 2024-03-06 | End: 2024-04-22 | Stop reason: SDUPTHER

## 2024-03-06 RX ORDER — TRIAMCINOLONE ACETONIDE 40 MG/ML
40 INJECTION, SUSPENSION INTRA-ARTICULAR; INTRAMUSCULAR
Status: DISCONTINUED | OUTPATIENT
Start: 2024-03-06 | End: 2024-03-06 | Stop reason: HOSPADM

## 2024-03-06 RX ADMIN — TRIAMCINOLONE ACETONIDE 40 MG: 40 INJECTION, SUSPENSION INTRA-ARTICULAR; INTRAMUSCULAR at 03:03

## 2024-03-06 NOTE — PROCEDURES
Cubtial Tunnel    Date/Time: 3/6/2024 3:40 PM    Performed by: Phillip Walden MD  Authorized by: Phillip Walden MD    Consent Done?:  Yes (Verbal)  Indications:  Pain  Site marked: the procedure site was marked    Timeout: prior to procedure the correct patient, procedure, and site was verified    Prep: patient was prepped and draped in usual sterile fashion      Local anesthesia used?: Yes    Local anesthetic:  Lidocaine 1% without epinephrine  Anesthetic total (ml):  0.5    Location: right Cubital Tunnel.  Needle size:  25 G  Medications:  40 mg triamcinolone acetonide 40 mg/mL (20 mg injected)  Patient tolerance:  Patient tolerated the procedure well with no immediate complications

## 2024-03-06 NOTE — PROGRESS NOTES
Ms Carmona returns to clinic today.  She has a history of bilateral ulnar neuropathy at the elbows.  She has had a left ulnar nerve release with transposition.  She states that her left is much less symptomatic but her right is still giving her significant symptoms.  She was sent for nerve conduction study and she is here today for further evaluation and treatment options     Physical exam: Examination of the right hand elbow and wrist reveals that there is no edema.  Palpation produces no specific tenderness.  She does report some paresthesias in the ulnar distribution.  She does have a positive Tinel's overlying the region of the elbow at the heads of the FCU.  There is minimal tenderness proximal to that.  He does have capillary refill less than 2 seconds     EMG and nerve conduction study:  Nerve conduction study has been reviewed.  It was basically a normal study for both the right and left upper extremities.    Assessment:  Right cubital tunnel syndrome     Plan:    1. The patient's clinical symptoms are very fitting for cubital tunnel syndrome despite the negative nerve conduction study.  She does have an area of significant Tinel's overlying the region of the heads of the FCU.  I have discussed the possibility of steroid injection into that area to use both diagnostically and therapeutically.    2.  After consent was obtained injection was placed to the right elbow cubital tunnel near the heads of the FCU.      3. She will follow up in 6-8 weeks for repeat evaluation

## 2024-03-07 RX ORDER — PANTOPRAZOLE SODIUM 20 MG/1
20 TABLET, DELAYED RELEASE ORAL DAILY
Qty: 90 TABLET | Refills: 1 | Status: SHIPPED | OUTPATIENT
Start: 2024-03-07 | End: 2024-04-11 | Stop reason: SDUPTHER

## 2024-03-22 ENCOUNTER — OFFICE VISIT (OUTPATIENT)
Dept: PSYCHIATRY | Facility: CLINIC | Age: 64
End: 2024-03-22
Payer: COMMERCIAL

## 2024-03-22 VITALS
DIASTOLIC BLOOD PRESSURE: 89 MMHG | SYSTOLIC BLOOD PRESSURE: 137 MMHG | HEART RATE: 73 BPM | BODY MASS INDEX: 41 KG/M2 | HEIGHT: 71 IN

## 2024-03-22 DIAGNOSIS — F90.0 ADHD (ATTENTION DEFICIT HYPERACTIVITY DISORDER), INATTENTIVE TYPE: ICD-10-CM

## 2024-03-22 DIAGNOSIS — G47.00 INSOMNIA, UNSPECIFIED TYPE: ICD-10-CM

## 2024-03-22 DIAGNOSIS — F33.0 MILD EPISODE OF RECURRENT MAJOR DEPRESSIVE DISORDER: Primary | ICD-10-CM

## 2024-03-22 DIAGNOSIS — F41.1 GENERALIZED ANXIETY DISORDER WITH PANIC ATTACKS: ICD-10-CM

## 2024-03-22 DIAGNOSIS — F41.0 GENERALIZED ANXIETY DISORDER WITH PANIC ATTACKS: ICD-10-CM

## 2024-03-22 PROCEDURE — 99214 OFFICE O/P EST MOD 30 MIN: CPT | Mod: S$GLB,,, | Performed by: STUDENT IN AN ORGANIZED HEALTH CARE EDUCATION/TRAINING PROGRAM

## 2024-03-22 PROCEDURE — 3008F BODY MASS INDEX DOCD: CPT | Mod: CPTII,S$GLB,, | Performed by: STUDENT IN AN ORGANIZED HEALTH CARE EDUCATION/TRAINING PROGRAM

## 2024-03-22 PROCEDURE — 99999 PR PBB SHADOW E&M-EST. PATIENT-LVL III: CPT | Mod: PBBFAC,,, | Performed by: STUDENT IN AN ORGANIZED HEALTH CARE EDUCATION/TRAINING PROGRAM

## 2024-03-22 PROCEDURE — 3075F SYST BP GE 130 - 139MM HG: CPT | Mod: CPTII,S$GLB,, | Performed by: STUDENT IN AN ORGANIZED HEALTH CARE EDUCATION/TRAINING PROGRAM

## 2024-03-22 PROCEDURE — 96136 PSYCL/NRPSYC TST PHY/QHP 1ST: CPT | Mod: 59,S$GLB,, | Performed by: STUDENT IN AN ORGANIZED HEALTH CARE EDUCATION/TRAINING PROGRAM

## 2024-03-22 PROCEDURE — 3079F DIAST BP 80-89 MM HG: CPT | Mod: CPTII,S$GLB,, | Performed by: STUDENT IN AN ORGANIZED HEALTH CARE EDUCATION/TRAINING PROGRAM

## 2024-03-22 PROCEDURE — 1159F MED LIST DOCD IN RCRD: CPT | Mod: CPTII,S$GLB,, | Performed by: STUDENT IN AN ORGANIZED HEALTH CARE EDUCATION/TRAINING PROGRAM

## 2024-03-22 PROCEDURE — 1160F RVW MEDS BY RX/DR IN RCRD: CPT | Mod: CPTII,S$GLB,, | Performed by: STUDENT IN AN ORGANIZED HEALTH CARE EDUCATION/TRAINING PROGRAM

## 2024-03-22 RX ORDER — LISDEXAMFETAMINE DIMESYLATE 40 MG/1
40 CAPSULE ORAL EVERY MORNING
Qty: 30 CAPSULE | Refills: 0 | Status: SHIPPED | OUTPATIENT
Start: 2024-03-22 | End: 2024-04-22 | Stop reason: SDUPTHER

## 2024-03-22 RX ORDER — MIRTAZAPINE 7.5 MG/1
7.5 TABLET, FILM COATED ORAL NIGHTLY
Qty: 30 TABLET | Refills: 1 | Status: SHIPPED | OUTPATIENT
Start: 2024-03-22 | End: 2024-04-22

## 2024-03-22 NOTE — PATIENT INSTRUCTIONS
Increase VYVANSE to 40mg daily in the morning  Continue other medications as prescribed     Keep therapy appointments

## 2024-03-22 NOTE — PROGRESS NOTES
"    Outpatient Psychiatry Followup Visit (DO/MD/NP, etc.)    3/22/2024  Assessment & Plan    Assessment - Plan:     Impression     ICD-10-CM ICD-9-CM   1. Mild episode of recurrent major depressive disorder  F33.0 296.31   2. ADHD (attention deficit hyperactivity disorder), inattentive type  F90.0 314.00   3. Generalized anxiety disorder with panic attacks  F41.1 300.02    F41.0 300.01   4. Insomnia, unspecified type  G47.00 780.52   5. BMI 40.0-44.9, adult  Z68.41 V85.41        Plan of Care & Medication Management    Chart was reviewed. The risks and benefits of medication were discussed with pt. The treatment plan and followup plan were reviewed with pt. Pt understands to contact clinic if symptoms worsen. Pt understands to call 911 or go to nearest ER for suicidal ideation, intent or plan.   RX History ADDERALL XR/IR (2014, for about 4 years, took with antidepressant and slept well), ATARAX/VISTARIL, CELEXA (dc in 2014, does not remember response), LEXAPRO, PROZAC (2016, caused dysphoria, "made me darker"), TRAZODONE (2016, caused nightmares), WELLBUTRIN (initiated by primary care in 2014 and again in [id number]), XANAX (JUL2023 taking 0.25mg daily prn 1d/w), ZOLOFT (does not remember effect, took for one month)      Current RX [id number]: JOSE 2/6, BI 47/100  Considering discontinuing REMERON when depression is in remission  Continue ATARAX/VISTARIL  Adjustments:  [id number]: Start 10mg TID prn anxiety  Prior to evaluation pt had been taking WELLBUTRIN 300mg daily and XANAX 0.25mg daily prn  Continue LEXAPRO  Target dose range 10-20mg/d  Adjustments:  [id number]: Increase to 20mg NIGHTLY   [id number]: Adjust to 10mg NIGHTLY   [id number]: Start 10mg daily  Prior to evaluation pt had been taking WELLBUTRIN 300mg daily and XANAX 0.25mg daily prn  Continue REMERON  Pt was provided [employer] educational material 12/11/2023.  Metabolic monitoring:  MAR2024 BMI 41  OCT2023 BMI 39.9  Target dose range " 7.5-30mg/d  Adjustments:  [id number]: Start 7.5mg NIGHTLY   Increase VYVANSE  Pt was provided [employer] educational material 2/22/2024.  Adjustments:  22MAR2024: Increase to 40mg daily in the morning  22FEB2024: Start 30mg daily in the morning  3/22/2024 ASRS 2/1 (12-15)  Prior to 22FEB2024 pt was NOT taking a stimulant  1/11/2024 ASRS 3/2 (15-22)  Continue WELLBUTRIN XL  Target dose range 150-300mg/d  Adjustments:  [id number]: Continue 300mg daily  Prior to evaluation pt had been taking WELLBUTRIN 300mg daily  Continue XANAX  Adjustments:  21JUL2023: Continue 0.25mg daily prn  Prior to evaluation pt had been taking XANAX 0.25mg daily prn and reported taking 1d/w on average   Education & Counseling RX administration and adherence  NARX/ REVIEWED (3/22/2024)   Other Orders Continue individual psychotherapy  B12 PENDING   Monitor VITAL SIGNS  Instruments: PROMIS (A&D), PSS4  Instruments: ASRS   RETURN L: RETURN IN 4 WEEKS (ONE MONTH), and reassess frequency within two visits from now  Continue medication management     Subjective    Interval History:     Available documentation has been reviewed, and pertinent elements of the chart have been incorporated into this note where appropriate. Last Epic encounter with writer was on 2/22/2024   Domonique Carmona, a 63 y.o. female, presenting for followup visit.      Since last visit, reports overall A LITTLE BETTER.    Work stress.    Mood is improving.    Likes VYVANSE. Less anxious. Focusing at work better. Small crash every afternoon.    Sleeping well.    Taking ATARAX about 1-2x/w. Taking XANAX 1-2x/w.    Discussed increasing VYVANSE.    Discussed long term goals of treatment with medications.       Unless otherwise specified, pt did NOT display signs of nor endorse symptoms of overt psychosis or acute mood disorder requiring hospitalization during the encounter; pt denied violent thoughts or suicidal or homicidal ideation, intent, or plan.         Objective   "  Measurement-Based Care (MBC):     Routine Instruments   PROMIS-ANXIETY Interpretation: 10 (4a raw score): T-SCORE 59.5; MILD using 55-60-70 cutoffs. Last T-SCORE was 63.4. This PROMIS T-score change of 5 points or fewer indicates the score is probably stable.   PROMIS-DEPRESSION Interpretation: 10 (4a raw score): T-SCORE 58.9; MILD using 55-60-70 cutoffs. Last T-SCORE was 58.9. This PROMIS T-score change of 5 points or fewer indicates the score is probably stable.   PSS4 Interpretation: 9/16; MODERATE using 6-11 cutoffs. 0 PH, 1 LSE. Moderate lack of self-efficacy; pt moderately perceives an inability to handle problems.   Additional Instruments   ASRS Interpretation: ASRS A: 12/24 (STRATA 2); ASRS B: 15/48 (STRATA 1). Compared to last time of 3/2, severity IMPROVED.     Current Evaluation of Mental Status:     Constitutional / General       Vitals:    03/22/24 1409   BP: 137/89   Pulse: 73   Height: 5' 11" (1.803 m)       Psychiatric / Mental Status Examination  1. Appearance: Dress is informal but appropriate. Motor activity normal.  2. Discourse: Clear speech with normal rate and volume. Associations intact. Orderly.  3. Emotional Expression: Somewhat anxious and depressed mood. Affect is appropriate.  4. Perception and Thinking: No hallucinations. No suicidality, no homicidality, delusions, or paranoia.  5. Sensorium: Grossly intact. Able to focus for interview.  6. Memory and Fund of Knowledge: Intact for content of interview.  7. Insight and Judgment: Intact.               Billing Documentation:     Method of Encounter IN PERSON visit at the clinic   Type of Encounter Follow up visit with me   Counseling;  Psychotherapy    Counseling;  Tobacco and/or Nicotine    Additional Codes and Modifiers 76951, with modifer 59: administered and scored more than one psychological or neuropsychological tests (see MBC above) (16+ mins)   Time Remaining Chart/Pt 20111: FOLLOW UP VISIT, Rx mgmt, "Multiple STABLE chronic " "illnesses"   Total Mins  (3/22/2024) N/A - Not billing for time        Vinny Charles DO  Department of Psychiatry, [facility]        "

## 2024-04-01 ENCOUNTER — PATIENT MESSAGE (OUTPATIENT)
Dept: FAMILY MEDICINE | Facility: CLINIC | Age: 64
End: 2024-04-01
Payer: COMMERCIAL

## 2024-04-11 ENCOUNTER — OFFICE VISIT (OUTPATIENT)
Dept: FAMILY MEDICINE | Facility: CLINIC | Age: 64
End: 2024-04-11
Attending: FAMILY MEDICINE
Payer: COMMERCIAL

## 2024-04-11 VITALS
HEART RATE: 84 BPM | SYSTOLIC BLOOD PRESSURE: 132 MMHG | BODY MASS INDEX: 41.02 KG/M2 | WEIGHT: 293 LBS | HEIGHT: 71 IN | DIASTOLIC BLOOD PRESSURE: 82 MMHG

## 2024-04-11 DIAGNOSIS — E55.9 VITAMIN D DEFICIENCY: ICD-10-CM

## 2024-04-11 DIAGNOSIS — E66.01 CLASS 3 OBESITY: ICD-10-CM

## 2024-04-11 DIAGNOSIS — K21.9 GASTROESOPHAGEAL REFLUX DISEASE, UNSPECIFIED WHETHER ESOPHAGITIS PRESENT: Primary | ICD-10-CM

## 2024-04-11 DIAGNOSIS — M76.61 ACHILLES TENDINITIS OF RIGHT LOWER EXTREMITY: ICD-10-CM

## 2024-04-11 DIAGNOSIS — Z98.84 HISTORY OF BARIATRIC SURGERY: ICD-10-CM

## 2024-04-11 DIAGNOSIS — M17.12 PRIMARY OSTEOARTHRITIS OF LEFT KNEE: ICD-10-CM

## 2024-04-11 DIAGNOSIS — Z79.899 HIGH RISK MEDICATION USE: ICD-10-CM

## 2024-04-11 DIAGNOSIS — F33.0 MDD (MAJOR DEPRESSIVE DISORDER), RECURRENT EPISODE, MILD: ICD-10-CM

## 2024-04-11 PROCEDURE — 1160F RVW MEDS BY RX/DR IN RCRD: CPT | Mod: CPTII,S$GLB,, | Performed by: NURSE PRACTITIONER

## 2024-04-11 PROCEDURE — 99214 OFFICE O/P EST MOD 30 MIN: CPT | Mod: S$GLB,,, | Performed by: NURSE PRACTITIONER

## 2024-04-11 PROCEDURE — 3075F SYST BP GE 130 - 139MM HG: CPT | Mod: CPTII,S$GLB,, | Performed by: NURSE PRACTITIONER

## 2024-04-11 PROCEDURE — 1159F MED LIST DOCD IN RCRD: CPT | Mod: CPTII,S$GLB,, | Performed by: NURSE PRACTITIONER

## 2024-04-11 PROCEDURE — 3008F BODY MASS INDEX DOCD: CPT | Mod: CPTII,S$GLB,, | Performed by: NURSE PRACTITIONER

## 2024-04-11 PROCEDURE — 3079F DIAST BP 80-89 MM HG: CPT | Mod: CPTII,S$GLB,, | Performed by: NURSE PRACTITIONER

## 2024-04-11 RX ORDER — TIZANIDINE 4 MG/1
TABLET ORAL
Qty: 90 TABLET | Refills: 1 | Status: SHIPPED | OUTPATIENT
Start: 2024-04-11

## 2024-04-11 RX ORDER — IBUPROFEN 800 MG/1
800 TABLET ORAL 3 TIMES DAILY
Qty: 60 TABLET | Refills: 6 | Status: SHIPPED | OUTPATIENT
Start: 2024-04-11

## 2024-04-11 RX ORDER — PANTOPRAZOLE SODIUM 20 MG/1
20 TABLET, DELAYED RELEASE ORAL DAILY
Qty: 90 TABLET | Refills: 1 | Status: SHIPPED | OUTPATIENT
Start: 2024-04-11

## 2024-04-15 ENCOUNTER — OFFICE VISIT (OUTPATIENT)
Dept: PSYCHIATRY | Facility: CLINIC | Age: 64
End: 2024-04-15
Payer: COMMERCIAL

## 2024-04-15 DIAGNOSIS — G47.00 INSOMNIA, UNSPECIFIED TYPE: ICD-10-CM

## 2024-04-15 DIAGNOSIS — F90.0 ADHD (ATTENTION DEFICIT HYPERACTIVITY DISORDER), INATTENTIVE TYPE: ICD-10-CM

## 2024-04-15 DIAGNOSIS — F33.0 MILD EPISODE OF RECURRENT MAJOR DEPRESSIVE DISORDER: Primary | ICD-10-CM

## 2024-04-15 DIAGNOSIS — F41.0 GENERALIZED ANXIETY DISORDER WITH PANIC ATTACKS: ICD-10-CM

## 2024-04-15 DIAGNOSIS — F41.1 GENERALIZED ANXIETY DISORDER WITH PANIC ATTACKS: ICD-10-CM

## 2024-04-15 DIAGNOSIS — F33.0 MILD EPISODE OF RECURRENT MAJOR DEPRESSIVE DISORDER: ICD-10-CM

## 2024-04-15 PROCEDURE — 90837 PSYTX W PT 60 MINUTES: CPT | Mod: S$GLB,,, | Performed by: SOCIAL WORKER

## 2024-04-15 RX ORDER — ESCITALOPRAM OXALATE 20 MG/1
20 TABLET ORAL NIGHTLY
Qty: 30 TABLET | Refills: 1 | Status: CANCELLED | OUTPATIENT
Start: 2024-04-15 | End: 2024-06-14

## 2024-04-15 RX ORDER — BUPROPION HYDROCHLORIDE 300 MG/1
300 TABLET ORAL EVERY MORNING
Qty: 30 TABLET | Refills: 1 | Status: CANCELLED | OUTPATIENT
Start: 2024-04-15 | End: 2024-06-14

## 2024-04-15 NOTE — PROGRESS NOTES
Individual Psychotherapy (PhD/LCSW)    4/15/2024    Site:  [place]        Therapeutic Intervention: Met with patient.  Outpatient - Supportive psychotherapy 45 min - CPT Code 59242 and Outpatient - Interactive psychotherapy 45 min - CPT code 12739    Chief complaint/reason for encounter: depression and anxiety   Medical history:   Past Medical History:   Diagnosis Date    Abnormal Pap smear 1986    Cryosurgery    Anxiety     Arthritis     Essential hypertension 11/26/2020    GERD (gastroesophageal reflux disease)     Venous stasis dermatitis of left lower extremity 12/31/2018       Medications:    Current Outpatient Medications:     ALPRAZolam (XANAX) 0.25 MG tablet, Take 1 tablet (0.25 mg total) by mouth daily as needed for Anxiety., Disp: 60 tablet, Rfl: 1    buPROPion (WELLBUTRIN XL) 300 MG 24 hr tablet, Take 1 tablet (300 mg total) by mouth every morning., Disp: 30 tablet, Rfl: 1    calcium carbonate-magnesium hydroxide (ROLAIDS) 550-110 mg Chew, Take 1 tablet by mouth every evening., Disp: , Rfl:     EScitalopram oxalate (LEXAPRO) 20 MG tablet, Take 1 tablet (20 mg total) by mouth every evening., Disp: 30 tablet, Rfl: 1    ibuprofen (ADVIL,MOTRIN) 800 MG tablet, Take 1 tablet (800 mg total) by mouth 3 (three) times daily., Disp: 60 tablet, Rfl: 6    lisdexamfetamine (VYVANSE) 40 MG Cap, Take 1 capsule (40 mg total) by mouth every morning., Disp: 30 capsule, Rfl: 0    mirtazapine (REMERON) 7.5 MG Tab, Take 1 tablet (7.5 mg total) by mouth every evening., Disp: 30 tablet, Rfl: 1    pantoprazole (PROTONIX) 20 MG tablet, Take 1 tablet (20 mg total) by mouth once daily., Disp: 90 tablet, Rfl: 1    semaglutide, weight loss, (WEGOVY) 0.25 mg/0.5 mL PnIj, Inject 0.25 mg into the skin every 7 days., Disp: 2 mL, Rfl: 2    tiZANidine (ZANAFLEX) 4 MG tablet, TAKE 1 TABLET BY MOUTH THREE TIMES A DAY AS NEEDED FOR 10 DAYS, Disp: 90 tablet, Rfl: 1    Family history of psychiatric illness:   Family History   Problem Relation  Name Age of Onset    Ovarian cancer Mother      Breast cancer Neg Hx      Colon cancer Neg Hx      Collagen disease Neg Hx      Glaucoma Neg Hx      Melanoma Neg Hx      Psoriasis Neg Hx      Lupus Neg Hx      Eczema Neg Hx         Social history (marriage, employment, etc.):   Session on 2024:  Domonique presents on time for today's follow up. Is doing very well. Noticeable difference. Diagnosed with ADHD. Now on Vyvanse. Has adopted a new dog from shelter named Niles. Speaks to her values and who she is. Give her info on [place]: [facility]BS[facility] to review. Give her Virisalud Lonny Elena's Silence book. Engage her in DDB. Does well for the first few minutes then struggles. Actually did very well. Use to Doing Mode of Living, alien to Being Mode of Living which is more in keeping with who she is truly. Introduction to Mindfulness continues. Bring Shamir Weinstein book. Will see her as scheduled.     Social History     Tobacco Use    Smoking status: Former     Current packs/day: 0.00     Average packs/day: 0.5 packs/day for 20.0 years (10.0 ttl pk-yrs)     Types: Cigarettes     Start date: 1985     Quit date: 2005     Years since quittin.8    Smokeless tobacco: Never   Substance Use Topics    Alcohol use: No    Drug use: No       Interval history and content of current session: Speaks to work related issues, stress, upcoming certification and time crunch, son Mendez's ongoing progress with his own issues, demonstrates she is a caring, nurturing mother, wants son to be independent, self-reliant, finds herself being pulled to take care of him due to his mood.  Recognizes his judgmental stance as does Mendez.  Give her Shamir Weinstein's Mindfulness for Beginners.  Will see her as scheduled.     Treatment plan:  Target symptoms: recurrent depression, anxiety , work stress  Why chosen therapy is appropriate versus another modality: relevant to diagnosis, patient responds to this modality, evidence based practice  Outcome  monitoring methods: self-report, observation  Therapeutic intervention type: insight oriented psychotherapy, behavior modifying psychotherapy, supportive psychotherapy    Risk parameters:  Patient reports no suicidal ideation  Patient reports no homicidal ideation  Patient reports no self-injurious behavior  Patient reports no violent behavior    Verbal deficits: None    Patient's response to intervention:  The patient's response to intervention is accepting, motivated.    Progress toward goals and other mental status changes:  The patient's progress toward goals is good.    Diagnosis:   1. Mild episode of recurrent major depressive disorder    2. ADHD (attention deficit hyperactivity disorder), inattentive type    3. Generalized anxiety disorder with panic attacks    4. Insomnia, unspecified type        Plan:  individual psychotherapy    Return to clinic: as scheduled    Length of Service (minutes): 60

## 2024-04-17 ENCOUNTER — OFFICE VISIT (OUTPATIENT)
Dept: ORTHOPEDICS | Facility: CLINIC | Age: 64
End: 2024-04-17
Payer: COMMERCIAL

## 2024-04-17 VITALS — BODY MASS INDEX: 41.02 KG/M2 | WEIGHT: 293 LBS | HEIGHT: 71 IN

## 2024-04-17 DIAGNOSIS — G56.21 CUBITAL TUNNEL SYNDROME ON RIGHT: Primary | ICD-10-CM

## 2024-04-17 DIAGNOSIS — M67.40 MUCOID CYST OF JOINT: ICD-10-CM

## 2024-04-17 PROCEDURE — 3008F BODY MASS INDEX DOCD: CPT | Mod: CPTII,S$GLB,, | Performed by: ORTHOPAEDIC SURGERY

## 2024-04-17 PROCEDURE — 99213 OFFICE O/P EST LOW 20 MIN: CPT | Mod: S$GLB,,, | Performed by: ORTHOPAEDIC SURGERY

## 2024-04-17 PROCEDURE — 99999 PR PBB SHADOW E&M-EST. PATIENT-LVL III: CPT | Mod: PBBFAC,,, | Performed by: ORTHOPAEDIC SURGERY

## 2024-04-17 PROCEDURE — 1159F MED LIST DOCD IN RCRD: CPT | Mod: CPTII,S$GLB,, | Performed by: ORTHOPAEDIC SURGERY

## 2024-04-17 NOTE — PROGRESS NOTES
4/17/2024    Chief Complaint:  Chief Complaint   Patient presents with    Right Elbow - Pain    Left Elbow - Pain       HPI:`  Domonique Carmona is a 63 y.o. female, who presents to clinic today ` she has a history of right ulnar neuropathy.  This is consistent with a cubital tunnel syndrome.  We did attempt a steroid injection at her last visit.  She did have significant improvement in her symptoms.  She states that the improvement lasted 4-5 weeks.  She was now starting have return of the symptoms.  She also has complaints of a mucoid cyst over the right ring finger which has ruptured multiple times and continues to reoccur.    PMHX:  Past Medical History:   Diagnosis Date    Abnormal Pap smear 1986    Cryosurgery    Anxiety     Arthritis     Essential hypertension 11/26/2020    GERD (gastroesophageal reflux disease)     Venous stasis dermatitis of left lower extremity 12/31/2018       PSHX:  Past Surgical History:   Procedure Laterality Date    BREAST BIOPSY      BREAST SURGERY      biopsy right side    COLONOSCOPY N/A 12/12/2022    Procedure: COLONOSCOPY;  Surgeon: Phillip Cat MD;  Location: [facility];  Service: Endoscopy;  Laterality: N/A;    DILATION AND CURETTAGE OF UTERUS      GYNECOLOGIC CRYOSURGERY  1986    LAPAROSCOPIC BIOPSY OF LIVER N/A 10/25/2021    Procedure: BIOPSY, LIVER, LAPAROSCOPIC;  Surgeon: Thierry Victor MD;  Location: [facility] OR;  Service: General;  Laterality: N/A;    LAPAROSCOPIC CHOLECYSTECTOMY N/A 10/25/2021    Procedure: CHOLECYSTECTOMY, LAPAROSCOPIC- diagnostic lap - hepatic cysts;  Surgeon: Thierry Victor MD;  Location: [facility] OR;  Service: General;  Laterality: N/A;    LAPAROSCOPIC SLEEVE GASTRECTOMY N/A 5/11/2021    Procedure: GASTRECTOMY, SLEEVE, LAPAROSCOPIC;  Surgeon: Thierry Victor MD;  Location: [address];  Service: General;  Laterality: N/A;    LASIK Bilateral     ulner nerve transposition      left       FMHX:  Family History   Problem Relation Name Age of  "Onset    Ovarian cancer Mother      Breast cancer Neg Hx      Colon cancer Neg Hx      Collagen disease Neg Hx      Glaucoma Neg Hx      Melanoma Neg Hx      Psoriasis Neg Hx      Lupus Neg Hx      Eczema Neg Hx         SOCHX:  Social History     Tobacco Use    Smoking status: Former     Current packs/day: 0.00     Average packs/day: 0.5 packs/day for 20.0 years (10.0 ttl pk-yrs)     Types: Cigarettes     Start date: 1985     Quit date: 2005     Years since quittin.8    Smokeless tobacco: Never   Substance Use Topics    Alcohol use: No       ALLERGIES:  Demerol [meperidine]    CURRENT MEDICATIONS:  Current Outpatient Medications on File Prior to Visit   Medication Sig Dispense Refill    ALPRAZolam (XANAX) 0.25 MG tablet Take 1 tablet (0.25 mg total) by mouth daily as needed for Anxiety. 60 tablet 1    buPROPion (WELLBUTRIN XL) 300 MG 24 hr tablet Take 1 tablet (300 mg total) by mouth every morning. 30 tablet 1    calcium carbonate-magnesium hydroxide (ROLAIDS) 550-110 mg Chew Take 1 tablet by mouth every evening.      EScitalopram oxalate (LEXAPRO) 20 MG tablet Take 1 tablet (20 mg total) by mouth every evening. 30 tablet 1    ibuprofen (ADVIL,MOTRIN) 800 MG tablet Take 1 tablet (800 mg total) by mouth 3 (three) times daily. 60 tablet 6    lisdexamfetamine (VYVANSE) 40 MG Cap Take 1 capsule (40 mg total) by mouth every morning. 30 capsule 0    mirtazapine (REMERON) 7.5 MG Tab Take 1 tablet (7.5 mg total) by mouth every evening. 30 tablet 1    pantoprazole (PROTONIX) 20 MG tablet Take 1 tablet (20 mg total) by mouth once daily. 90 tablet 1    semaglutide, weight loss, (WEGOVY) 0.25 mg/0.5 mL PnIj Inject 0.25 mg into the skin every 7 days. 2 mL 2    tiZANidine (ZANAFLEX) 4 MG tablet TAKE 1 TABLET BY MOUTH THREE TIMES A DAY AS NEEDED FOR 10 DAYS 90 tablet 1     No current facility-administered medications on file prior to visit.       REVIEW OF SYSTEMS:  ROS    GENERAL PHYSICAL EXAM:   Ht 5' 11" (1.803 " m)   Wt 135.2 kg (298 lb 1 oz)   LMP 05/27/2013   BMI 41.57 kg/m²    GEN: well developed, well nourished, no acute distress   HENT: Normocephalic, atraumatic   EYES: No discharge, conjunctiva normal   NECK: Supple, non-tender   PULM: No wheezing, no respiratory distress   CV: RRR   ABD: Soft, non-tender    ORTHO EXAM:   Examination of the right upper extremity reveals that there is no edema.  There are no major skin changes.  Palpation does not produce significant areas of tenderness.  She does have an obvious mucoid cyst overlying the dorsum of the ring finger at the tip.  This does have small amount of erythema surrounding it.  There is no significant edema or drainage at the site.  Palpation at that site produces minimal tenderness.  She does report decreased sensation in the ulnar distribution when compared to the median and radial distribution.  Tinel's at the elbow is only mildly positive today.  She does not have subluxation of the ulnar nerve with flexion and extension.  She has a 2+ radial pulse    RADIOLOGY:   EMG and nerve conduction study has been reviewed.  It was essentially a normal study    ASSESSMENT:   Right cubital tunnel syndrome, right ring finger mucoid cyst    PLAN:  1. The patient clinically has significant evidence of cubital tunnel syndrome.  She also had a good response to steroid injection in the area of the cubital tunnel.  Due to the combination of these findings I have discussed the possibility of right elbow ulnar nerve decompression with possible transposition.  I have also discussed the possibility of excision of the mucoid cyst to the ring finger.  We discussed the risks and benefits of both of these treatments and informed consent has been obtained     2. Will proceed with right elbow cubital tunnel release with possible transposition and right ring finger mucoid cyst excision.  These will be performed under general anesthesia    3.  She will follow up with me 2 weeks  postoperatively

## 2024-04-22 ENCOUNTER — OFFICE VISIT (OUTPATIENT)
Dept: PSYCHIATRY | Facility: CLINIC | Age: 64
End: 2024-04-22
Payer: COMMERCIAL

## 2024-04-22 VITALS
SYSTOLIC BLOOD PRESSURE: 117 MMHG | BODY MASS INDEX: 41.57 KG/M2 | HEIGHT: 71 IN | DIASTOLIC BLOOD PRESSURE: 76 MMHG | HEART RATE: 66 BPM

## 2024-04-22 DIAGNOSIS — F41.1 GENERALIZED ANXIETY DISORDER WITH PANIC ATTACKS: ICD-10-CM

## 2024-04-22 DIAGNOSIS — F90.0 ADHD (ATTENTION DEFICIT HYPERACTIVITY DISORDER), INATTENTIVE TYPE: ICD-10-CM

## 2024-04-22 DIAGNOSIS — F33.0 MILD EPISODE OF RECURRENT MAJOR DEPRESSIVE DISORDER: Primary | ICD-10-CM

## 2024-04-22 DIAGNOSIS — G47.00 INSOMNIA, UNSPECIFIED TYPE: ICD-10-CM

## 2024-04-22 DIAGNOSIS — F41.0 GENERALIZED ANXIETY DISORDER WITH PANIC ATTACKS: ICD-10-CM

## 2024-04-22 PROCEDURE — 99999 PR PBB SHADOW E&M-EST. PATIENT-LVL III: CPT | Mod: PBBFAC,,, | Performed by: STUDENT IN AN ORGANIZED HEALTH CARE EDUCATION/TRAINING PROGRAM

## 2024-04-22 PROCEDURE — 99214 OFFICE O/P EST MOD 30 MIN: CPT | Mod: S$GLB,,, | Performed by: STUDENT IN AN ORGANIZED HEALTH CARE EDUCATION/TRAINING PROGRAM

## 2024-04-22 PROCEDURE — 3078F DIAST BP <80 MM HG: CPT | Mod: CPTII,S$GLB,, | Performed by: STUDENT IN AN ORGANIZED HEALTH CARE EDUCATION/TRAINING PROGRAM

## 2024-04-22 PROCEDURE — 3008F BODY MASS INDEX DOCD: CPT | Mod: CPTII,S$GLB,, | Performed by: STUDENT IN AN ORGANIZED HEALTH CARE EDUCATION/TRAINING PROGRAM

## 2024-04-22 PROCEDURE — 96136 PSYCL/NRPSYC TST PHY/QHP 1ST: CPT | Mod: 59,S$GLB,, | Performed by: STUDENT IN AN ORGANIZED HEALTH CARE EDUCATION/TRAINING PROGRAM

## 2024-04-22 PROCEDURE — 1159F MED LIST DOCD IN RCRD: CPT | Mod: CPTII,S$GLB,, | Performed by: STUDENT IN AN ORGANIZED HEALTH CARE EDUCATION/TRAINING PROGRAM

## 2024-04-22 PROCEDURE — 1160F RVW MEDS BY RX/DR IN RCRD: CPT | Mod: CPTII,S$GLB,, | Performed by: STUDENT IN AN ORGANIZED HEALTH CARE EDUCATION/TRAINING PROGRAM

## 2024-04-22 PROCEDURE — 3074F SYST BP LT 130 MM HG: CPT | Mod: CPTII,S$GLB,, | Performed by: STUDENT IN AN ORGANIZED HEALTH CARE EDUCATION/TRAINING PROGRAM

## 2024-04-22 RX ORDER — ESCITALOPRAM OXALATE 20 MG/1
20 TABLET ORAL NIGHTLY
Qty: 30 TABLET | Refills: 1 | Status: SHIPPED | OUTPATIENT
Start: 2024-04-22 | End: 2024-05-28 | Stop reason: SDUPTHER

## 2024-04-22 RX ORDER — LISDEXAMFETAMINE DIMESYLATE 40 MG/1
40 CAPSULE ORAL EVERY MORNING
Qty: 30 CAPSULE | Refills: 0 | Status: SHIPPED | OUTPATIENT
Start: 2024-04-22 | End: 2024-05-28 | Stop reason: SDUPTHER

## 2024-04-22 RX ORDER — BUPROPION HYDROCHLORIDE 300 MG/1
300 TABLET ORAL EVERY MORNING
Qty: 30 TABLET | Refills: 1 | Status: SHIPPED | OUTPATIENT
Start: 2024-04-22 | End: 2024-05-28 | Stop reason: SDUPTHER

## 2024-04-22 NOTE — PROGRESS NOTES
"    Outpatient Psychiatry Followup Visit (DO/MD/NP, etc.)    4/22/2024  Assessment & Plan    Assessment - Plan:     Impression     ICD-10-CM ICD-9-CM   1. Mild episode of recurrent major depressive disorder  F33.0 296.31   2. ADHD (attention deficit hyperactivity disorder), inattentive type  F90.0 314.00   3. Generalized anxiety disorder with panic attacks  F41.1 300.02    F41.0 300.01   4. Insomnia, unspecified type  G47.00 780.52   5. BMI 40.0-44.9, adult  Z68.41 V85.41        Plan of Care & Medication Management    Chart was reviewed. The risks and benefits of medication were discussed with pt. The treatment plan and followup plan were reviewed with pt. Pt understands to contact clinic if symptoms worsen. Pt understands to call 911 or go to nearest ER for suicidal ideation, intent or plan.   RX History ADDERALL XR/IR (2014, for about 4 years, took with antidepressant and slept well), ATARAX/VISTARIL, CELEXA (dc in 2014, does not remember response), LEXAPRO, PROZAC (2016, caused dysphoria, "made me darker"), REMERON (appetite), TRAZODONE (2016, caused nightmares), VYVANSE, WELLBUTRIN (initiated by primary care in 2014 and again in [id number]), XANAX (JUL2023 taking 0.25mg daily prn 1d/w), ZOLOFT (does not remember effect, took for one month)      Current RX [id number]: JOSE 2/6, BI 47/100  Considering discontinuing REMERON when depression is in remission  Continue ATARAX/VISTARIL  Adjustments:  [id number]: Start 10mg TID prn anxiety  Prior to evaluation pt had been taking WELLBUTRIN 300mg daily and XANAX 0.25mg daily prn  Continue LEXAPRO  Target dose range 10-20mg/d  Adjustments:  33TXR6137: Increase to 20mg NIGHTLY   [id number]: Adjust to 10mg NIGHTLY   [id number]: Start 10mg daily  Prior to evaluation pt had been taking WELLBUTRIN 300mg daily and XANAX 0.25mg daily prn  Discontinue REMERON  Pt was provided [employer] educational material 12/11/2023.  Metabolic monitoring:  MAR2024 BMI 41  OCT2023 BMI 39.9  Target dose range " 7.5-30mg/d  Adjustments:  [id number]: Discontinue (high appetite)  [id number]: Start 7.5mg NIGHTLY   Increase VYVANSE  Pt was provided [employer] educational material 2/22/2024.  Adjustments:  22MAR2024: Increase to 40mg daily in the morning  4/22/2024 ASRS 2/1 (10-12)  [id number]: Start 30mg daily in the morning  3/22/2024 ASRS 2/1 (12-15)  Prior to 22FEB2024 pt was NOT taking a stimulant  1/11/2024 ASRS 3/2 (15-22)  Continue WELLBUTRIN XL  Target dose range 150-300mg/d  Adjustments:  [id number]: Continue 300mg daily  Prior to evaluation pt had been taking WELLBUTRIN 300mg daily  Continue XANAX  Adjustments:  97YLO8864: Continue 0.25mg daily prn  Prior to evaluation pt had been taking XANAX 0.25mg daily prn and reported taking 1d/w on average   Education & Counseling RX administration and adherence   Other Orders Continue individual psychotherapy  PENDING: B12   Monitor VITAL SIGNS  Instruments: PROMIS (A&D), PSS4  Instruments: ASRS   RETURN M: RETURN IN 4 WEEKS (ONE MONTH), and reassess frequency next visit  Continue medication management     Subjective    Interval History:     Available documentation has been reviewed, and pertinent elements of the chart have been incorporated into this note where appropriate. Last Epic encounter with writer was on 3/22/2024   Domonique Carmona, a 63 y.o. female, presenting for followup visit.          Stopped REMERON due to appetite increase.     Likes VYVANSE increase, NO longer experiences crash.     Denies feeling depressed, anxiety controlled    Completed ASRS.    Will continue otherwise as planned.       Unless otherwise specified, pt did NOT display signs of nor endorse symptoms of overt psychosis or acute mood disorder requiring hospitalization during the encounter; pt denied violent thoughts or suicidal or homicidal ideation, intent, or plan.         Objective    Measurement-Based Care (MBC):     Routine Instruments   PROMIS-ANXIETY Interpretation: 9 (4a raw score): T-SCORE 57.7;  "MILD using 55-60-70 cutoffs.   PROMIS-DEPRESSION Interpretation: 10 (4a raw score): T-SCORE 58.9; MILD using 55-60-70 cutoffs.   PSS4 Interpretation: 8/16; MODERATE using 6-11 cutoffs. 0 PH, 0 LSE.   Additional Instruments   ASRS Interpretation: ASRS A: 10/24 (STRATA 2); ASRS B: 12/48 (STRATA 1). Compared to last time of 2/1, severity IMPROVED.     Current Evaluation of Mental Status:     Constitutional / General       Vitals:    04/22/24 1525   BP: 117/76   Pulse: 66   Height: 5' 11" (1.803 m)       Psychiatric / Mental Status Examination  1. Appearance: Dress is informal but appropriate. Motor activity normal.  2. Discourse: Clear speech with normal rate and volume. Associations intact. Orderly.  3. Emotional Expression: Euthymic mood with appropriate affect.  4. Perception and Thinking: No hallucinations. No suicidality, no homicidality, delusions, or paranoia.  5. Sensorium: Grossly intact. Able to focus for interview.  6. Memory and Fund of Knowledge: Intact for content of interview.  7. Insight and Judgment: Intact.               Billing Documentation:     Method of Encounter IN PERSON visit at the clinic   Type of Encounter Follow up visit with me   Counseling;  Psychotherapy    Counseling;  Tobacco and/or Nicotine    Additional Codes and Modifiers 17539, with modifer 59: administered and scored more than one psychological or neuropsychological tests (see MBC above) (16+ mins)   Time Remaining Chart/Pt 45149: FOLLOW UP VISIT, Rx mgmt, "Multiple STABLE chronic illnesses"   Total Mins  (4/22/2024) N/A - Not billing for time        Vinny Charles DO  Department of Psychiatry, [facility]        "

## 2024-04-22 NOTE — PATIENT INSTRUCTIONS
Keep therapy appointments  Continue medications as prescribed     Complete blood work at your earliest convenience

## 2024-04-30 PROBLEM — I73.9 PERIPHERAL VASCULAR DISEASE: Status: RESOLVED | Noted: 2021-01-29 | Resolved: 2024-04-30

## 2024-04-30 PROBLEM — E66.01 CLASS 3 OBESITY: Status: ACTIVE | Noted: 2024-04-30

## 2024-04-30 PROBLEM — F33.1 MAJOR DEPRESSIVE DISORDER, RECURRENT, MODERATE: Status: ACTIVE | Noted: 2024-04-30

## 2024-04-30 PROBLEM — E66.813 CLASS 3 OBESITY: Status: ACTIVE | Noted: 2024-04-30

## 2024-04-30 NOTE — PROGRESS NOTES
SUBJECTIVE:    Patient ID: Domonique Carmona is a 63 y.o. female.    Chief Complaint: Annual Exam (Did not bring bottles, states she only needs certain refills. ac)    63 y.o. female presents for check up .treated for anxiety and depression, gerd,  Had gastric sleeve in may 2022 with dr. Victor. Bp is well controlled without medications.  Seeing dr. Charles. Taking wellbutrin and lexapro for depression. Lots of stressors at work. Takes xanax as needed.  Controlling symptoms. Currently adjusting meds. Also started on vyvanse. This is helping to accomplish task and stay focused at work. Also seeing therapist hanna ramirez monthly. Last labs in July. Sleeping ok        No visits with results within 6 Month(s) from this visit.   Latest known visit with results is:   Lab Visit on 07/22/2023   Component Date Value Ref Range Status    WBC 07/22/2023 4.67  3.90 - 12.70 K/uL Final    RBC 07/22/2023 4.31  4.00 - 5.40 M/uL Final    Hemoglobin 07/22/2023 13.0  12.0 - 16.0 g/dL Final    Hematocrit 07/22/2023 39.6  37.0 - 48.5 % Final    MCV 07/22/2023 92  82 - 98 fL Final    MCH 07/22/2023 30.2  27.0 - 31.0 pg Final    MCHC 07/22/2023 32.8  32.0 - 36.0 g/dL Final    RDW 07/22/2023 13.6  11.5 - 14.5 % Final    Platelets 07/22/2023 264  150 - 450 K/uL Final    MPV 07/22/2023 10.0  9.2 - 12.9 fL Final    Immature Granulocytes 07/22/2023 0.2  0.0 - 0.5 % Final    Gran # (ANC) 07/22/2023 2.7  1.8 - 7.7 K/uL Final    Immature Grans (Abs) 07/22/2023 0.01  0.00 - 0.04 K/uL Final    Lymph # 07/22/2023 1.5  1.0 - 4.8 K/uL Final    Mono # 07/22/2023 0.4  0.3 - 1.0 K/uL Final    Eos # 07/22/2023 0.1  0.0 - 0.5 K/uL Final    Baso # 07/22/2023 0.02  0.00 - 0.20 K/uL Final    nRBC 07/22/2023 0  0 /100 WBC Final    Gran % 07/22/2023 57.4  38.0 - 73.0 % Final    Lymph % 07/22/2023 32.3  18.0 - 48.0 % Final    Mono % 07/22/2023 8.4  4.0 - 15.0 % Final    Eosinophil % 07/22/2023 1.3  0.0 - 8.0 % Final    Basophil % 07/22/2023 0.4  0.0 - 1.9 %  Final    Differential Method 07/22/2023 Automated   Final    Sodium 07/22/2023 141  136 - 145 mmol/L Final    Potassium 07/22/2023 4.3  3.5 - 5.1 mmol/L Final    Chloride 07/22/2023 106  95 - 110 mmol/L Final    CO2 07/22/2023 28  23 - 29 mmol/L Final    Glucose 07/22/2023 91  70 - 110 mg/dL Final    BUN 07/22/2023 18  6 - 20 mg/dL Final    Creatinine 07/22/2023 0.7  0.5 - 1.4 mg/dL Final    Calcium 07/22/2023 8.9  8.7 - 10.5 mg/dL Final    Total Protein 07/22/2023 6.6  6.0 - 8.4 g/dL Final    Albumin 07/22/2023 3.9  3.5 - 5.2 g/dL Final    Total Bilirubin 07/22/2023 0.8  0.1 - 1.0 mg/dL Final    Alkaline Phosphatase 07/22/2023 68  55 - 135 U/L Final    AST 07/22/2023 14  10 - 40 U/L Final    ALT 07/22/2023 15  10 - 44 U/L Final    eGFR 07/22/2023 >60.0  >60 mL/min/1.73 m^2 Final    Anion Gap 07/22/2023 7 (L)  8 - 16 mmol/L Final    Cholesterol 07/22/2023 178  120 - 199 mg/dL Final    Triglycerides 07/22/2023 32  30 - 150 mg/dL Final    HDL 07/22/2023 65  40 - 75 mg/dL Final    LDL Cholesterol 07/22/2023 106.6  63.0 - 159.0 mg/dL Final    HDL/Cholesterol Ratio 07/22/2023 36.5  20.0 - 50.0 % Final    Total Cholesterol/HDL Ratio 07/22/2023 2.7  2.0 - 5.0 Final    Non-HDL Cholesterol 07/22/2023 113  mg/dL Final    Specimen UA 07/22/2023 Urine, Clean Catch   Final    Color, UA 07/22/2023 Yellow  Yellow, Straw, Jody Final    Appearance, UA 07/22/2023 Clear  Clear Final    pH, UA 07/22/2023 8.0  5.0 - 8.0 Final    Specific Gravity, UA 07/22/2023 1.020  1.005 - 1.030 Final    Protein, UA 07/22/2023 Trace (A)  Negative Final    Glucose, UA 07/22/2023 Negative  Negative Final    Ketones, UA 07/22/2023 Negative  Negative Final    Bilirubin (UA) 07/22/2023 Negative  Negative Final    Occult Blood UA 07/22/2023 Negative  Negative Final    Nitrite, UA 07/22/2023 Negative  Negative Final    Urobilinogen, UA 07/22/2023 Negative  Negative EU/dL Final    Leukocytes, UA 07/22/2023 Negative  Negative Final    TSH 07/22/2023 1.050   0.340 - 5.600 uIU/mL Final    Microalbumin, Urine 2023 3.0  <19.9 ug/mL Final    Creatinine, Urine 2023 158.0  15.0 - 325.0 mg/dL Final    Microalb/Creat Ratio 2023 1.9  0.0 - 30.0 ug/mg Final       Past Medical History:   Diagnosis Date    Abnormal Pap smear     Cryosurgery    Anxiety     Arthritis     Essential hypertension 2020    GERD (gastroesophageal reflux disease)     Venous stasis dermatitis of left lower extremity 2018     Social History     Socioeconomic History    Marital status: Single   Tobacco Use    Smoking status: Former     Current packs/day: 0.00     Average packs/day: 0.5 packs/day for 20.0 years (10.0 ttl pk-yrs)     Types: Cigarettes     Start date: 1985     Quit date: 2005     Years since quittin.8    Smokeless tobacco: Never   Substance and Sexual Activity    Alcohol use: No    Drug use: No   Social History Narrative    Works in [employer] at [employer]        Lives with youngest son -      Social Determinants of Health     Financial Resource Strain: Low Risk  (2024)    Overall Financial Resource Strain (CARDIA)     Difficulty of Paying Living Expenses: Not very hard   Food Insecurity: No Food Insecurity (2024)    Hunger Vital Sign     Worried About Running Out of Food in the Last Year: Never true     Ran Out of Food in the Last Year: Never true   Transportation Needs: No Transportation Needs (2024)    PRAPARE - Transportation     Lack of Transportation (Medical): No     Lack of Transportation (Non-Medical): No   Physical Activity: Unknown (2024)    Exercise Vital Sign     Days of Exercise per Week: 0 days   Stress: Stress Concern Present (2024)    [employer] - Occupational Stress Questionnaire     Feeling of Stress : Very much   Social Connections: Unknown (2024)    Social Connection and Isolation Panel [NHANES]     Frequency of Communication with Friends and Family: Once a week      Frequency of Social Gatherings with Friends and Family: Once a week     Active Member of Clubs or Organizations: No     Marital Status: Never    Housing Stability: High Risk (1/18/2024)    Housing Stability Vital Sign     Unable to Pay for Housing in the Last Year: Yes     Number of Places Lived in the Last Year: 1     Unstable Housing in the Last Year: No     Past Surgical History:   Procedure Laterality Date    BREAST BIOPSY      BREAST SURGERY      biopsy right side    COLONOSCOPY N/A 12/12/2022    Procedure: COLONOSCOPY;  Surgeon: Phillip Cat MD;  Location: [facility] ENDO;  Service: Endoscopy;  Laterality: N/A;    DILATION AND CURETTAGE OF UTERUS      GYNECOLOGIC CRYOSURGERY  1986    LAPAROSCOPIC BIOPSY OF LIVER N/A 10/25/2021    Procedure: BIOPSY, LIVER, LAPAROSCOPIC;  Surgeon: Thierry Victor MD;  Location: [facility] OR;  Service: General;  Laterality: N/A;    LAPAROSCOPIC CHOLECYSTECTOMY N/A 10/25/2021    Procedure: CHOLECYSTECTOMY, LAPAROSCOPIC- diagnostic lap - hepatic cysts;  Surgeon: Thierry Victor MD;  Location: [facility] OR;  Service: General;  Laterality: N/A;    LAPAROSCOPIC SLEEVE GASTRECTOMY N/A 5/11/2021    Procedure: GASTRECTOMY, SLEEVE, LAPAROSCOPIC;  Surgeon: Thierry Victor MD;  Location: [address];  Service: General;  Laterality: N/A;    LASIK Bilateral     ulner nerve transposition      left     Family History   Problem Relation Name Age of Onset    Ovarian cancer Mother      Breast cancer Neg Hx      Colon cancer Neg Hx      Collagen disease Neg Hx      Glaucoma Neg Hx      Melanoma Neg Hx      Psoriasis Neg Hx      Lupus Neg Hx      Eczema Neg Hx         All of your core healthy metrics are met.      Review of patient's allergies indicates:   Allergen Reactions    Demerol [meperidine] Hives       Current Outpatient Medications:     ALPRAZolam (XANAX) 0.25 MG tablet, Take 1 tablet (0.25 mg total) by mouth daily as needed for Anxiety., Disp: 60 tablet, Rfl: 1    buPROPion  "(WELLBUTRIN XL) 300 MG 24 hr tablet, Take 1 tablet (300 mg total) by mouth every morning., Disp: 30 tablet, Rfl: 1    calcium carbonate-magnesium hydroxide (ROLAIDS) 550-110 mg Chew, Take 1 tablet by mouth every evening., Disp: , Rfl:     EScitalopram oxalate (LEXAPRO) 20 MG tablet, Take 1 tablet (20 mg total) by mouth every evening., Disp: 30 tablet, Rfl: 1    ibuprofen (ADVIL,MOTRIN) 800 MG tablet, Take 1 tablet (800 mg total) by mouth 3 (three) times daily., Disp: 60 tablet, Rfl: 6    lisdexamfetamine (VYVANSE) 40 MG Cap, Take 1 capsule (40 mg total) by mouth every morning., Disp: 30 capsule, Rfl: 0    pantoprazole (PROTONIX) 20 MG tablet, Take 1 tablet (20 mg total) by mouth once daily., Disp: 90 tablet, Rfl: 1    semaglutide, weight loss, (WEGOVY) 0.25 mg/0.5 mL PnIj, Inject 0.25 mg into the skin every 7 days., Disp: 2 mL, Rfl: 2    tiZANidine (ZANAFLEX) 4 MG tablet, TAKE 1 TABLET BY MOUTH THREE TIMES A DAY AS NEEDED FOR 10 DAYS, Disp: 90 tablet, Rfl: 1    Review of Systems   Constitutional:  Negative for activity change and unexpected weight change.   HENT:  Negative for hearing loss, rhinorrhea and trouble swallowing.    Eyes:  Negative for discharge and visual disturbance.   Respiratory:  Negative for chest tightness and wheezing.    Cardiovascular:  Negative for chest pain and palpitations.   Gastrointestinal:  Negative for blood in stool, constipation, diarrhea and vomiting.   Endocrine: Negative for polydipsia and polyuria.   Genitourinary:  Negative for difficulty urinating, dysuria, hematuria and menstrual problem.   Musculoskeletal:  Negative for arthralgias, joint swelling and neck pain.   Neurological:  Negative for weakness and headaches.   Psychiatric/Behavioral:  Positive for dysphoric mood. Negative for confusion.           Objective:      Vitals:    04/11/24 1138   BP: 132/82   Pulse: 84   Weight: 135.2 kg (298 lb)   Height: 5' 11" (1.803 m)     Physical Exam  Vitals and nursing note reviewed. "   Constitutional:       General: She is not in acute distress.     Appearance: Normal appearance. She is well-developed. She is obese.   HENT:      Head: Normocephalic.      Right Ear: External ear normal.      Left Ear: External ear normal.   Eyes:      Conjunctiva/sclera: Conjunctivae normal.      Pupils: Pupils are equal, round, and reactive to light.   Neck:      Vascular: No JVD.   Cardiovascular:      Rate and Rhythm: Normal rate and regular rhythm.      Heart sounds: No murmur heard.  Pulmonary:      Effort: Pulmonary effort is normal.      Breath sounds: Normal breath sounds.   Abdominal:      General: Bowel sounds are normal.      Palpations: Abdomen is soft.   Musculoskeletal:         General: No deformity. Normal range of motion.      Cervical back: Normal range of motion and neck supple.   Lymphadenopathy:      Cervical: No cervical adenopathy.   Skin:     General: Skin is warm and dry.      Findings: No rash.   Neurological:      Mental Status: She is alert and oriented to person, place, and time.      Gait: Gait normal.   Psychiatric:         Speech: Speech normal.         Behavior: Behavior normal.           Assessment:       1. Gastroesophageal reflux disease, unspecified whether esophagitis present    2. Achilles tendinitis of right lower extremity    3. Primary osteoarthritis of left knee    4. History of bariatric surgery    5. MDD (major depressive disorder), recurrent episode, mild    6. High risk medication use    7. Vitamin D deficiency    8. Class 3 obesity         Plan:       Gastroesophageal reflux disease, unspecified whether esophagitis present  Comments:  protonix    Achilles tendinitis of right lower extremity  -     ibuprofen (ADVIL,MOTRIN) 800 MG tablet; Take 1 tablet (800 mg total) by mouth 3 (three) times daily.  Dispense: 60 tablet; Refill: 6  -     tiZANidine (ZANAFLEX) 4 MG tablet; TAKE 1 TABLET BY MOUTH THREE TIMES A DAY AS NEEDED FOR 10 DAYS  Dispense: 90 tablet; Refill:  1    Primary osteoarthritis of left knee  Comments:  sol  Orders:  -     tiZANidine (ZANAFLEX) 4 MG tablet; TAKE 1 TABLET BY MOUTH THREE TIMES A DAY AS NEEDED FOR 10 DAYS  Dispense: 90 tablet; Refill: 1    History of bariatric surgery  -     CBC Auto Differential; Future; Expected date: 04/11/2024  -     Comprehensive Metabolic Panel; Future; Expected date: 04/11/2024  -     Lipid Panel; Future; Expected date: 04/11/2024  -     Microalbumin/Creatinine Ratio, Urine; Future; Expected date: 04/11/2024  -     Urinalysis, Reflex to Urine Culture Urine, Clean Catch; Future; Expected date: 04/11/2024  -     Calcitriol; Future; Expected date: 04/11/2024  -     TSH; Future; Expected date: 04/11/2024    MDD (major depressive disorder), recurrent episode, mild  Comments:  continue ov with dr. mulligan  Orders:  -     CBC Auto Differential; Future; Expected date: 04/11/2024  -     Comprehensive Metabolic Panel; Future; Expected date: 04/11/2024  -     Lipid Panel; Future; Expected date: 04/11/2024  -     Microalbumin/Creatinine Ratio, Urine; Future; Expected date: 04/11/2024  -     Urinalysis, Reflex to Urine Culture Urine, Clean Catch; Future; Expected date: 04/11/2024  -     Calcitriol; Future; Expected date: 04/11/2024  -     TSH; Future; Expected date: 04/11/2024    High risk medication use  -     CBC Auto Differential; Future; Expected date: 04/11/2024  -     Comprehensive Metabolic Panel; Future; Expected date: 04/11/2024  -     Lipid Panel; Future; Expected date: 04/11/2024  -     Microalbumin/Creatinine Ratio, Urine; Future; Expected date: 04/11/2024  -     Urinalysis, Reflex to Urine Culture Urine, Clean Catch; Future; Expected date: 04/11/2024  -     Calcitriol; Future; Expected date: 04/11/2024  -     TSH; Future; Expected date: 04/11/2024    Vitamin D deficiency  Comments:  labs  Orders:  -     Calcitriol; Future; Expected date: 04/11/2024    Class 3 obesity    Other orders  -     pantoprazole (PROTONIX) 20 MG  tablet; Take 1 tablet (20 mg total) by mouth once daily.  Dispense: 90 tablet; Refill: 1      Follow up in about 6 months (around 10/11/2024), or if symptoms worsen or fail to improve, for medication management.        4/30/2024 Lianet Hansen

## 2024-05-03 ENCOUNTER — LAB VISIT (OUTPATIENT)
Dept: LAB | Facility: HOSPITAL | Age: 64
End: 2024-05-03
Attending: STUDENT IN AN ORGANIZED HEALTH CARE EDUCATION/TRAINING PROGRAM
Payer: COMMERCIAL

## 2024-05-03 DIAGNOSIS — F41.1 GENERALIZED ANXIETY DISORDER WITH PANIC ATTACKS: ICD-10-CM

## 2024-05-03 DIAGNOSIS — F33.0 MILD EPISODE OF RECURRENT MAJOR DEPRESSIVE DISORDER: ICD-10-CM

## 2024-05-03 DIAGNOSIS — Z79.899 CURRENT USE OF PROTON PUMP INHIBITOR: ICD-10-CM

## 2024-05-03 DIAGNOSIS — F90.0 ADHD (ATTENTION DEFICIT HYPERACTIVITY DISORDER), INATTENTIVE TYPE: ICD-10-CM

## 2024-05-03 DIAGNOSIS — F41.0 GENERALIZED ANXIETY DISORDER WITH PANIC ATTACKS: ICD-10-CM

## 2024-05-03 LAB — VIT B12 SERPL-MCNC: 914 PG/ML (ref 210–950)

## 2024-05-03 PROCEDURE — 82607 VITAMIN B-12: CPT | Performed by: STUDENT IN AN ORGANIZED HEALTH CARE EDUCATION/TRAINING PROGRAM

## 2024-05-03 PROCEDURE — 36415 COLL VENOUS BLD VENIPUNCTURE: CPT | Performed by: STUDENT IN AN ORGANIZED HEALTH CARE EDUCATION/TRAINING PROGRAM

## 2024-05-20 ENCOUNTER — TELEPHONE (OUTPATIENT)
Dept: FAMILY MEDICINE | Facility: CLINIC | Age: 64
End: 2024-05-20
Payer: COMMERCIAL

## 2024-05-20 NOTE — TELEPHONE ENCOUNTER
----- Message from Lianet Hansen NP sent at 5/19/2024 11:51 PM CDT -----  Ldl has increased since last lab draw. Work on low cholesterol diet and exercise. Rest of labs are wnl

## 2024-05-27 ENCOUNTER — PATIENT MESSAGE (OUTPATIENT)
Dept: PSYCHIATRY | Facility: CLINIC | Age: 64
End: 2024-05-27
Payer: COMMERCIAL

## 2024-05-28 ENCOUNTER — OFFICE VISIT (OUTPATIENT)
Dept: PSYCHIATRY | Facility: CLINIC | Age: 64
End: 2024-05-28
Payer: COMMERCIAL

## 2024-05-28 VITALS
HEART RATE: 92 BPM | DIASTOLIC BLOOD PRESSURE: 79 MMHG | BODY MASS INDEX: 41.57 KG/M2 | HEIGHT: 71 IN | SYSTOLIC BLOOD PRESSURE: 124 MMHG

## 2024-05-28 DIAGNOSIS — F90.0 ADHD (ATTENTION DEFICIT HYPERACTIVITY DISORDER), INATTENTIVE TYPE: ICD-10-CM

## 2024-05-28 DIAGNOSIS — G47.00 INSOMNIA, UNSPECIFIED TYPE: ICD-10-CM

## 2024-05-28 DIAGNOSIS — F41.0 GENERALIZED ANXIETY DISORDER WITH PANIC ATTACKS: ICD-10-CM

## 2024-05-28 DIAGNOSIS — F33.0 MILD EPISODE OF RECURRENT MAJOR DEPRESSIVE DISORDER: Primary | ICD-10-CM

## 2024-05-28 DIAGNOSIS — F41.1 GENERALIZED ANXIETY DISORDER WITH PANIC ATTACKS: ICD-10-CM

## 2024-05-28 PROCEDURE — 96136 PSYCL/NRPSYC TST PHY/QHP 1ST: CPT | Mod: 59,S$GLB,, | Performed by: STUDENT IN AN ORGANIZED HEALTH CARE EDUCATION/TRAINING PROGRAM

## 2024-05-28 PROCEDURE — 3066F NEPHROPATHY DOC TX: CPT | Mod: CPTII,S$GLB,, | Performed by: STUDENT IN AN ORGANIZED HEALTH CARE EDUCATION/TRAINING PROGRAM

## 2024-05-28 PROCEDURE — 99214 OFFICE O/P EST MOD 30 MIN: CPT | Mod: S$GLB,,, | Performed by: STUDENT IN AN ORGANIZED HEALTH CARE EDUCATION/TRAINING PROGRAM

## 2024-05-28 PROCEDURE — 3008F BODY MASS INDEX DOCD: CPT | Mod: CPTII,S$GLB,, | Performed by: STUDENT IN AN ORGANIZED HEALTH CARE EDUCATION/TRAINING PROGRAM

## 2024-05-28 PROCEDURE — 3078F DIAST BP <80 MM HG: CPT | Mod: CPTII,S$GLB,, | Performed by: STUDENT IN AN ORGANIZED HEALTH CARE EDUCATION/TRAINING PROGRAM

## 2024-05-28 PROCEDURE — 99999 PR PBB SHADOW E&M-EST. PATIENT-LVL III: CPT | Mod: PBBFAC,,, | Performed by: STUDENT IN AN ORGANIZED HEALTH CARE EDUCATION/TRAINING PROGRAM

## 2024-05-28 PROCEDURE — 3074F SYST BP LT 130 MM HG: CPT | Mod: CPTII,S$GLB,, | Performed by: STUDENT IN AN ORGANIZED HEALTH CARE EDUCATION/TRAINING PROGRAM

## 2024-05-28 PROCEDURE — 1160F RVW MEDS BY RX/DR IN RCRD: CPT | Mod: CPTII,S$GLB,, | Performed by: STUDENT IN AN ORGANIZED HEALTH CARE EDUCATION/TRAINING PROGRAM

## 2024-05-28 PROCEDURE — 1159F MED LIST DOCD IN RCRD: CPT | Mod: CPTII,S$GLB,, | Performed by: STUDENT IN AN ORGANIZED HEALTH CARE EDUCATION/TRAINING PROGRAM

## 2024-05-28 PROCEDURE — 3061F NEG MICROALBUMINURIA REV: CPT | Mod: CPTII,S$GLB,, | Performed by: STUDENT IN AN ORGANIZED HEALTH CARE EDUCATION/TRAINING PROGRAM

## 2024-05-28 RX ORDER — BUPROPION HYDROCHLORIDE 300 MG/1
300 TABLET ORAL EVERY MORNING
Qty: 30 TABLET | Refills: 1 | Status: SHIPPED | OUTPATIENT
Start: 2024-05-28 | End: 2024-07-27

## 2024-05-28 RX ORDER — LISDEXAMFETAMINE DIMESYLATE 40 MG/1
40 CAPSULE ORAL EVERY MORNING
Qty: 30 CAPSULE | Refills: 0 | Status: SHIPPED | OUTPATIENT
Start: 2024-05-28 | End: 2024-07-14

## 2024-05-28 RX ORDER — ESCITALOPRAM OXALATE 20 MG/1
20 TABLET ORAL NIGHTLY
Qty: 30 TABLET | Refills: 1 | Status: SHIPPED | OUTPATIENT
Start: 2024-05-28 | End: 2024-07-27

## 2024-05-28 NOTE — PROGRESS NOTES
"    Outpatient Psychiatry Followup Visit (DO/MD/NP, etc.)    5/28/2024  Assessment & Plan    Assessment - Plan:     Impression     ICD-10-CM ICD-9-CM   1. Mild episode of recurrent major depressive disorder  F33.0 296.31   2. Generalized anxiety disorder with panic attacks  F41.1 300.02    F41.0 300.01   3. ADHD (attention deficit hyperactivity disorder), inattentive type  F90.0 314.00   4. Insomnia, unspecified type  G47.00 780.52        Plan of Care & Medication Management    Chart was reviewed. The risks and benefits of medication were discussed with pt. The treatment plan and followup plan were reviewed with pt. Pt understands to contact clinic if symptoms worsen. Pt understands to call 911 or go to nearest ER for suicidal ideation, intent or plan.   RX History ADDERALL XR/IR (2014, for about 4 years, took with antidepressant and slept well), ATARAX/VISTARIL, CELEXA (dc in 2014, does not remember response), LEXAPRO, PROZAC (2016, caused dysphoria, "made me darker"), REMERON (appetite), TRAZODONE (2016, caused nightmares), VYVANSE, WELLBUTRIN (initiated by primary care in 2014 and again in [id number]), XANAX (JUL2023 taking 0.25mg daily prn 1d/w), ZOLOFT (does not remember effect, took for one month)      Current RX [id number]: JOSE 2/6, BI 47/100  Considering discontinuing REMERON when depression is in remission  Continue ATARAX/VISTARIL  Adjustments:  [id number]: Start 10mg TID prn anxiety  Prior to evaluation pt had been taking WELLBUTRIN 300mg daily and XANAX 0.25mg daily prn  Continue LEXAPRO  Target dose range 10-20mg/d  Adjustments:  [id number]: Increase to 20mg NIGHTLY   87HYP2029: Adjust to 10mg NIGHTLY   [id number]: Start 10mg daily  Prior to evaluation pt had been taking WELLBUTRIN 300mg daily and XANAX 0.25mg daily prn  Continue VYVANSE  Pt was provided [facility] educational material 2/22/2024.  Adjustments:  22MAR2024: Increase to 40mg daily in the morning  4/22/2024 ASRS 2/1 (10-12)  [id number]: Start 30mg daily " in the morning  3/22/2024 ASRS 2/1 (12-15)  Prior to 22FEB2024 pt was NOT taking a stimulant  1/11/2024 ASRS 3/2 (15-22)  Continue WELLBUTRIN XL  Target dose range 150-300mg/d  Adjustments:  10HPJ8034: Continue 300mg daily  Prior to evaluation pt had been taking WELLBUTRIN 300mg daily  Continue XANAX  Adjustments:  60DWN5726: Continue 0.25mg daily prn  Prior to evaluation pt had been taking XANAX 0.25mg daily prn and reported taking 1d/w on average   Education & Counseling RX administration and adherence   Other Orders Continue individual psychotherapy   Monitor VITAL SIGNS  Instruments: PROMIS (A&D), PSS4   RETURN M: RETURN IN 4 WEEKS (ONE MONTH), and reassess frequency next visit  Continue medication management     Subjective    Interval History:     Available documentation has been reviewed, and pertinent elements of the chart have been incorporated into this note where appropriate. Last Epic encounter with writer was on 4/22/2024   Domonique Carmona, a 63 y.o. female, presenting for followup visit.      Since last visit, reports overall A LITTLE BETTER.    Labs reviewed. B12 wnl.    Appetite has normalized since dc REMERON.    Work stress. Particularly difficult case involving the death of a patient has been affecting her. Overwhelm.    Keeping therapy appts    Feeling better otherwise. Reports much better since start of treatment.    Would like to continue treatment as planned.       Unless otherwise specified, pt did NOT display signs of nor endorse symptoms of overt psychosis or acute mood disorder requiring hospitalization during the encounter; pt denied violent thoughts or suicidal or homicidal ideation, intent, or plan.         Objective    Measurement-Based Care (MBC):     Routine Instruments   PROMIS-ANXIETY Interpretation: 9 (4a raw score): T-SCORE 57.7; MILD using 55-60-70 cutoffs.   PROMIS-DEPRESSION Interpretation: 10 (4a raw score): T-SCORE 58.9; MILD using 55-60-70 cutoffs.   PSS4 Interpretation:  "8/16; MODERATE using 6-11 cutoffs. 0 PH, 0 LSE.   Additional Instruments   N/A     Current Evaluation of Mental Status:     Constitutional / General       Vitals:    05/28/24 1429   BP: 124/79   Pulse: 92   Height: 5' 11" (1.803 m)       Psychiatric / Mental Status Examination  1. Appearance: Dress is informal but appropriate. Motor activity normal.  2. Discourse: Clear speech with normal rate and volume. Associations intact. Orderly.  3. Emotional Expression: Somewhat anxious and depressed mood. Affect is appropriate.  4. Perception and Thinking: No hallucinations. No suicidality, no homicidality, delusions, or paranoia.  5. Sensorium: Grossly intact. Able to focus for interview.  6. Memory and Fund of Knowledge: Intact for content of interview.  7. Insight and Judgment: Intact.               Billing Documentation:     Method of Encounter IN PERSON visit at the clinic   Type of Encounter Follow up visit with me   Counseling;  Psychotherapy    Counseling;  Tobacco and/or Nicotine    Additional Codes and Modifiers 65983, with modifer 59: administered and scored more than one psychological or neuropsychological tests (see MBC above) (16+ mins)   Time Remaining Chart/Pt 93843: FOLLOW UP VISIT, Rx mgmt, "Multiple STABLE chronic illnesses; no changes in treatment at this time"   Total Mins  (5/28/2024) N/A - Not billing for time        Vinny Charles DO  Department of Psychiatry, [facility]        "

## 2024-05-30 ENCOUNTER — OFFICE VISIT (OUTPATIENT)
Dept: PSYCHIATRY | Facility: CLINIC | Age: 64
End: 2024-05-30
Payer: COMMERCIAL

## 2024-05-30 DIAGNOSIS — F33.0 MILD EPISODE OF RECURRENT MAJOR DEPRESSIVE DISORDER: Primary | ICD-10-CM

## 2024-05-30 DIAGNOSIS — G47.00 INSOMNIA, UNSPECIFIED TYPE: ICD-10-CM

## 2024-05-30 DIAGNOSIS — F41.0 GENERALIZED ANXIETY DISORDER WITH PANIC ATTACKS: ICD-10-CM

## 2024-05-30 DIAGNOSIS — F90.0 ADHD (ATTENTION DEFICIT HYPERACTIVITY DISORDER), INATTENTIVE TYPE: ICD-10-CM

## 2024-05-30 DIAGNOSIS — F41.1 GENERALIZED ANXIETY DISORDER WITH PANIC ATTACKS: ICD-10-CM

## 2024-05-30 PROCEDURE — 90837 PSYTX W PT 60 MINUTES: CPT | Mod: S$GLB,,, | Performed by: SOCIAL WORKER

## 2024-05-30 PROCEDURE — 3061F NEG MICROALBUMINURIA REV: CPT | Mod: CPTII,S$GLB,, | Performed by: SOCIAL WORKER

## 2024-05-30 PROCEDURE — 3066F NEPHROPATHY DOC TX: CPT | Mod: CPTII,S$GLB,, | Performed by: SOCIAL WORKER

## 2024-05-30 NOTE — PROGRESS NOTES
Individual Psychotherapy (PhD/LCSW)    5/30/2024    Site:  [facility]        Therapeutic Intervention: Met with patient.  Outpatient - Supportive psychotherapy 45 min - CPT Code 72747 and Outpatient - Interactive psychotherapy 45 min - CPT code 76044    Chief complaint/reason for encounter: depression, anxiety, behavior, and work related stressors     Medical history:   Past Medical History:   Diagnosis Date    Abnormal Pap smear 1986    Cryosurgery    Anxiety     Arthritis     Essential hypertension 11/26/2020    GERD (gastroesophageal reflux disease)     Venous stasis dermatitis of left lower extremity 12/31/2018       Medications:    Current Outpatient Medications:     ALPRAZolam (XANAX) 0.25 MG tablet, Take 1 tablet (0.25 mg total) by mouth daily as needed for Anxiety., Disp: 60 tablet, Rfl: 1    buPROPion (WELLBUTRIN XL) 300 MG 24 hr tablet, Take 1 tablet (300 mg total) by mouth every morning., Disp: 30 tablet, Rfl: 1    calcium carbonate-magnesium hydroxide (ROLAIDS) 550-110 mg Chew, Take 1 tablet by mouth every evening., Disp: , Rfl:     EScitalopram oxalate (LEXAPRO) 20 MG tablet, Take 1 tablet (20 mg total) by mouth every evening., Disp: 30 tablet, Rfl: 1    ibuprofen (ADVIL,MOTRIN) 800 MG tablet, Take 1 tablet (800 mg total) by mouth 3 (three) times daily., Disp: 60 tablet, Rfl: 6    lisdexamfetamine (VYVANSE) 40 MG Cap, Take 1 capsule (40 mg total) by mouth every morning., Disp: 30 capsule, Rfl: 0    pantoprazole (PROTONIX) 20 MG tablet, Take 1 tablet (20 mg total) by mouth once daily., Disp: 90 tablet, Rfl: 1    semaglutide, weight loss, (WEGOVY) 0.25 mg/0.5 mL PnIj, Inject 0.25 mg into the skin every 7 days., Disp: 2 mL, Rfl: 2    tiZANidine (ZANAFLEX) 4 MG tablet, TAKE 1 TABLET BY MOUTH THREE TIMES A DAY AS NEEDED FOR 10 DAYS, Disp: 90 tablet, Rfl: 1    Family history of psychiatric illness:   Family History   Problem Relation Name Age of Onset    Ovarian cancer Mother      Breast cancer Neg Hx      Colon  cancer Neg Hx      Collagen disease Neg Hx      Glaucoma Neg Hx      Melanoma Neg Hx      Psoriasis Neg Hx      Lupus Neg Hx      Eczema Neg Hx         Social history (marriage, employment, etc.):   Social History     Tobacco Use    Smoking status: Former     Current packs/day: 0.00     Average packs/day: 0.5 packs/day for 20.0 years (10.0 ttl pk-yrs)     Types: Cigarettes     Start date: 1985     Quit date: 2005     Years since quittin.9    Smokeless tobacco: Never   Substance Use Topics    Alcohol use: No    Drug use: No       Interval history and content of current session: Domonique presents on time and speaks to current stressors relating to work and having to do with how to best manage and cope.  Is doing an outstanding job of maintaining her equilibrium and admits that just coming to speak about channeling her frustrations appropriately and recognizing her limits is helpful.  Receives validation, support, affirmation.  Will see Domonique as scheduled.  Depression is well managed as is her anxious mood.     Treatment plan:  Target symptoms: recurrent depression, anxiety and work stressors  Why chosen therapy is appropriate versus another modality: relevant to diagnosis, patient responds to this modality, evidence based practice  Outcome monitoring methods: self-report, observation  Therapeutic intervention type: insight oriented psychotherapy, supportive psychotherapy    Risk parameters:  Patient reports no suicidal ideation  Patient reports no homicidal ideation  Patient reports no self-injurious behavior  Patient reports no violent behavior    Verbal deficits: None    Patient's response to intervention:  The patient's response to intervention is accepting, motivated.    Progress toward goals and other mental status changes:  The patient's progress toward goals is good.    Diagnosis:   1. Mild episode of recurrent major depressive disorder    2. Generalized anxiety disorder with panic attacks     3. ADHD (attention deficit hyperactivity disorder), inattentive type    4. Insomnia, unspecified type        Plan:  individual psychotherapy    Return to clinic: as scheduled    Length of Service (minutes): 60

## 2024-06-28 ENCOUNTER — OFFICE VISIT (OUTPATIENT)
Dept: PSYCHIATRY | Facility: CLINIC | Age: 64
End: 2024-06-28
Payer: COMMERCIAL

## 2024-06-28 VITALS
DIASTOLIC BLOOD PRESSURE: 85 MMHG | HEIGHT: 71 IN | HEART RATE: 87 BPM | SYSTOLIC BLOOD PRESSURE: 135 MMHG | BODY MASS INDEX: 41.57 KG/M2

## 2024-06-28 DIAGNOSIS — F41.1 GENERALIZED ANXIETY DISORDER WITH PANIC ATTACKS: ICD-10-CM

## 2024-06-28 DIAGNOSIS — G47.00 INSOMNIA, UNSPECIFIED TYPE: ICD-10-CM

## 2024-06-28 DIAGNOSIS — F33.0 MILD EPISODE OF RECURRENT MAJOR DEPRESSIVE DISORDER: Primary | ICD-10-CM

## 2024-06-28 DIAGNOSIS — F90.0 ADHD (ATTENTION DEFICIT HYPERACTIVITY DISORDER), INATTENTIVE TYPE: ICD-10-CM

## 2024-06-28 DIAGNOSIS — F41.0 GENERALIZED ANXIETY DISORDER WITH PANIC ATTACKS: ICD-10-CM

## 2024-06-28 PROCEDURE — 96136 PSYCL/NRPSYC TST PHY/QHP 1ST: CPT | Mod: 59,S$GLB,, | Performed by: STUDENT IN AN ORGANIZED HEALTH CARE EDUCATION/TRAINING PROGRAM

## 2024-06-28 PROCEDURE — 3008F BODY MASS INDEX DOCD: CPT | Mod: CPTII,S$GLB,, | Performed by: STUDENT IN AN ORGANIZED HEALTH CARE EDUCATION/TRAINING PROGRAM

## 2024-06-28 PROCEDURE — 1160F RVW MEDS BY RX/DR IN RCRD: CPT | Mod: CPTII,S$GLB,, | Performed by: STUDENT IN AN ORGANIZED HEALTH CARE EDUCATION/TRAINING PROGRAM

## 2024-06-28 PROCEDURE — 3079F DIAST BP 80-89 MM HG: CPT | Mod: CPTII,S$GLB,, | Performed by: STUDENT IN AN ORGANIZED HEALTH CARE EDUCATION/TRAINING PROGRAM

## 2024-06-28 PROCEDURE — 3075F SYST BP GE 130 - 139MM HG: CPT | Mod: CPTII,S$GLB,, | Performed by: STUDENT IN AN ORGANIZED HEALTH CARE EDUCATION/TRAINING PROGRAM

## 2024-06-28 PROCEDURE — 99214 OFFICE O/P EST MOD 30 MIN: CPT | Mod: S$GLB,,, | Performed by: STUDENT IN AN ORGANIZED HEALTH CARE EDUCATION/TRAINING PROGRAM

## 2024-06-28 PROCEDURE — 1159F MED LIST DOCD IN RCRD: CPT | Mod: CPTII,S$GLB,, | Performed by: STUDENT IN AN ORGANIZED HEALTH CARE EDUCATION/TRAINING PROGRAM

## 2024-06-28 PROCEDURE — 3066F NEPHROPATHY DOC TX: CPT | Mod: CPTII,S$GLB,, | Performed by: STUDENT IN AN ORGANIZED HEALTH CARE EDUCATION/TRAINING PROGRAM

## 2024-06-28 PROCEDURE — 99999 PR PBB SHADOW E&M-EST. PATIENT-LVL III: CPT | Mod: PBBFAC,,, | Performed by: STUDENT IN AN ORGANIZED HEALTH CARE EDUCATION/TRAINING PROGRAM

## 2024-06-28 PROCEDURE — 3061F NEG MICROALBUMINURIA REV: CPT | Mod: CPTII,S$GLB,, | Performed by: STUDENT IN AN ORGANIZED HEALTH CARE EDUCATION/TRAINING PROGRAM

## 2024-06-28 PROCEDURE — G0136 PR ADMINISTRATION, SOCIAL DETERMINANT ASSESSMNT, 5-15 MIN: HCPCS | Mod: 33,S$GLB,, | Performed by: STUDENT IN AN ORGANIZED HEALTH CARE EDUCATION/TRAINING PROGRAM

## 2024-06-28 RX ORDER — ESCITALOPRAM OXALATE 20 MG/1
20 TABLET ORAL EVERY MORNING
Qty: 30 TABLET | Refills: 1 | Status: SHIPPED | OUTPATIENT
Start: 2024-06-28 | End: 2024-08-27

## 2024-06-28 RX ORDER — BUPROPION HYDROCHLORIDE 300 MG/1
300 TABLET ORAL EVERY MORNING
Qty: 30 TABLET | Refills: 1 | Status: SHIPPED | OUTPATIENT
Start: 2024-06-28 | End: 2024-08-27

## 2024-06-28 RX ORDER — LISDEXAMFETAMINE DIMESYLATE 40 MG/1
40 CAPSULE ORAL EVERY MORNING
Qty: 30 CAPSULE | Refills: 0 | Status: SHIPPED | OUTPATIENT
Start: 2024-06-28 | End: 2024-07-28

## 2024-06-28 NOTE — PATIENT INSTRUCTIONS
Switch LEXAPRO to morning dosing    Continue other medications as prescribed   Keep therapy appointments

## 2024-06-28 NOTE — PROGRESS NOTES
"    Outpatient Psychiatry Followup Visit (DO/MD/NP, etc.)    6/28/2024  Assessment & Plan    Assessment - Plan:     Impression     ICD-10-CM ICD-9-CM   1. Mild episode of recurrent major depressive disorder  F33.0 296.31   2. Generalized anxiety disorder with panic attacks  F41.1 300.02    F41.0 300.01   3. ADHD (attention deficit hyperactivity disorder), inattentive type  F90.0 314.00   4. Insomnia, unspecified type  G47.00 780.52   5. BMI 40.0-44.9, adult  Z68.41 V85.41        Plan of Care & Medication Management    Chart was reviewed. The risks and benefits of medication were discussed with pt. The treatment plan and followup plan were reviewed with pt. Pt understands to contact clinic if symptoms worsen. Pt understands to call 911 or go to nearest ER for suicidal ideation, intent or plan.   RX History ADDERALL XR/IR (2014, for about 4 years, took with antidepressant and slept well), ATARAX/VISTARIL, CELEXA (dc in 2014, does not remember response), LEXAPRO, PROZAC (2016, caused dysphoria, "made me darker"), REMERON (appetite), TRAZODONE (2016, caused nightmares), VYVANSE, WELLBUTRIN (initiated by primary care in 2014 and again in 2907-6900), XANAX (JUL2023 taking 0.25mg daily prn 1d/w), ZOLOFT (does not remember effect, took for one month)      Current RX [id number]: JOSE 2/6, BI 47/100  Considering discontinuing REMERON when depression is in remission  Continue ATARAX/VISTARIL  Adjustments:  29OZJ0624: Start 10mg TID prn anxiety  Prior to evaluation pt had been taking WELLBUTRIN 300mg daily and XANAX 0.25mg daily prn  Adjust LEXAPRO  Target dose range 10-20mg/d  Adjustments:  28JUN2024: Adjust to 20mg daily in the morning  [id number]: Increase to 20mg NIGHTLY   82GBJ6851: Adjust to 10mg NIGHTLY   34MTD4540: Start 10mg daily  Prior to evaluation pt had been taking WELLBUTRIN 300mg daily and XANAX 0.25mg daily prn  Continue VYVANSE  Pt was provided [employer] educational material 2/22/2024.  Adjustments:  22MAR2024: " Increase to 40mg daily in the morning  4/22/2024 ASRS 2/1 (10-12)  [id number]: Start 30mg daily in the morning  3/22/2024 ASRS 2/1 (12-15)  Prior to 22FEB2024 pt was NOT taking a stimulant  1/11/2024 ASRS 3/2 (15-22)  Continue WELLBUTRIN XL  Target dose range 150-300mg/d  Adjustments:  69VHX4797: Continue 300mg daily  Prior to evaluation pt had been taking WELLBUTRIN 300mg daily  Continue XANAX  Adjustments:  72JBV6931: Continue 0.25mg daily prn  Prior to evaluation pt had been taking XANAX 0.25mg daily prn and reported taking 1d/w on average   Education & Counseling RX administration and adherence   Other Orders Continue individual psychotherapy   Monitor VITAL SIGNS  Instruments: PROMIS (A&D), PSS4   RETURN N: RETURN IN 6 WEEKS, and reassess frequency within three visits from now  Continue medication management     Subjective    Interval History:     Available documentation has been reviewed, and pertinent elements of the chart have been incorporated into this note where appropriate. Last Epic encounter with writer was on 5/28/2024   Domonique Carmona, a 63 y.o. female, presenting for followup visit.      Since last visit, reports overall A LITTLE BETTER.    Delays in getting risk mgmt certification, frustrated.    Exacerbated by issues at work    Afternoon crash around 4pm    Sleeping well    Mood is stable    Anxiety a bit worse    Completed SDOH survey questions.    Discussed switching LEXAPRO to AM    Will continue treatment otherwise as planned       Unless otherwise specified, pt did NOT display signs of nor endorse symptoms of overt psychosis or acute mood disorder requiring hospitalization during the encounter; pt denied violent thoughts or suicidal or homicidal ideation, intent, or plan.         Objective    Measurement-Based Care (MBC):     Routine Instruments   PROMIS-ANXIETY Interpretation: 11 (4a raw score): T-SCORE 61.4; MODERATE using 55-60-70 cutoffs.   PROMIS-DEPRESSION Interpretation: 9 (4a raw  "score): T-SCORE 57.3; MILD using 55-60-70 cutoffs.   PSS4 Interpretation: 7/16; MODERATE using 6-11 cutoffs. 0 PH, 0 LSE.   Additional Instruments   N/A     Current Evaluation of Mental Status:     Constitutional / General       Vitals:    06/28/24 1302   BP: 135/85   Pulse: 87   Height: 5' 11" (1.803 m)       Psychiatric / Mental Status Examination  1. Appearance: Dress is informal but appropriate. Motor activity normal.  2. Discourse: Clear speech with normal rate and volume. Associations intact. Orderly.  3. Emotional Expression: Somewhat anxious. Affect is appropriate.  4. Perception and Thinking: No hallucinations. No suicidality, no homicidality, delusions, or paranoia.  5. Sensorium: Grossly intact. Able to focus for interview.  6. Memory and Fund of Knowledge: Intact for content of interview.  7. Insight and Judgment: Intact.         Auto-populated chart data omitted from this note for brevity.      Billing Documentation:     Method of Encounter IN PERSON visit at the clinic   Type of Encounter Follow up visit with me   Counseling;  Psychotherapy    Counseling;  Tobacco and/or Nicotine    Additional Codes and Modifiers 83893, with modifer 59: administered and scored more than one psychological or neuropsychological tests (see MBC above) (16+ mins)  , with modifier 33: administered and scored social determinants of health (5-15 mins)   Time Remaining Chart/Pt 63086: FOLLOW UP VISIT, Rx mgmt, "Multiple STABLE chronic illnesses"   Total Mins  (6/28/2024) N/A - Not billing for time        Vinny Charles DO  Department of Psychiatry, [facility]      "

## 2024-07-03 ENCOUNTER — OFFICE VISIT (OUTPATIENT)
Dept: PSYCHIATRY | Facility: CLINIC | Age: 64
End: 2024-07-03
Payer: COMMERCIAL

## 2024-07-03 DIAGNOSIS — F41.0 GENERALIZED ANXIETY DISORDER WITH PANIC ATTACKS: ICD-10-CM

## 2024-07-03 DIAGNOSIS — F33.0 MILD EPISODE OF RECURRENT MAJOR DEPRESSIVE DISORDER: Primary | ICD-10-CM

## 2024-07-03 DIAGNOSIS — F41.1 GENERALIZED ANXIETY DISORDER WITH PANIC ATTACKS: ICD-10-CM

## 2024-07-03 PROCEDURE — 3066F NEPHROPATHY DOC TX: CPT | Mod: CPTII,S$GLB,, | Performed by: SOCIAL WORKER

## 2024-07-03 PROCEDURE — 3061F NEG MICROALBUMINURIA REV: CPT | Mod: CPTII,S$GLB,, | Performed by: SOCIAL WORKER

## 2024-07-03 PROCEDURE — 90834 PSYTX W PT 45 MINUTES: CPT | Mod: S$GLB,,, | Performed by: SOCIAL WORKER

## 2024-07-03 NOTE — PROGRESS NOTES
Individual Psychotherapy (PhD/LCSW)    7/3/2024    Site:  [facility]        Therapeutic Intervention: Met with patient.  Outpatient - Supportive psychotherapy 45 min - CPT Code 80119 and Outpatient - Interactive psychotherapy 45 min - CPT code 80446    Chief complaint/reason for encounter: depression, anxiety, and work related stressors     Medical history:   Past Medical History:   Diagnosis Date    Abnormal Pap smear 1986    Cryosurgery    Anxiety     Arthritis     Essential hypertension 11/26/2020    GERD (gastroesophageal reflux disease)     Venous stasis dermatitis of left lower extremity 12/31/2018       Medications:    Current Outpatient Medications:     ALPRAZolam (XANAX) 0.25 MG tablet, Take 1 tablet (0.25 mg total) by mouth daily as needed for Anxiety., Disp: 60 tablet, Rfl: 1    buPROPion (WELLBUTRIN XL) 300 MG 24 hr tablet, Take 1 tablet (300 mg total) by mouth every morning., Disp: 30 tablet, Rfl: 1    calcium carbonate-magnesium hydroxide (ROLAIDS) 550-110 mg Chew, Take 1 tablet by mouth every evening., Disp: , Rfl:     EScitalopram oxalate (LEXAPRO) 20 MG tablet, Take 1 tablet (20 mg total) by mouth every morning., Disp: 30 tablet, Rfl: 1    ibuprofen (ADVIL,MOTRIN) 800 MG tablet, Take 1 tablet (800 mg total) by mouth 3 (three) times daily., Disp: 60 tablet, Rfl: 6    lisdexamfetamine (VYVANSE) 40 MG Cap, Take 1 capsule (40 mg total) by mouth every morning., Disp: 30 capsule, Rfl: 0    pantoprazole (PROTONIX) 20 MG tablet, Take 1 tablet (20 mg total) by mouth once daily., Disp: 90 tablet, Rfl: 1    semaglutide, weight loss, (WEGOVY) 0.25 mg/0.5 mL PnIj, Inject 0.25 mg into the skin every 7 days., Disp: 2 mL, Rfl: 2    tiZANidine (ZANAFLEX) 4 MG tablet, TAKE 1 TABLET BY MOUTH THREE TIMES A DAY AS NEEDED FOR 10 DAYS, Disp: 90 tablet, Rfl: 1    Family history of psychiatric illness:   Family History   Problem Relation Name Age of Onset    Ovarian cancer Mother      Breast cancer Neg Hx      Colon cancer Neg  Hx      Collagen disease Neg Hx      Glaucoma Neg Hx      Melanoma Neg Hx      Psoriasis Neg Hx      Lupus Neg Hx      Eczema Neg Hx         Social history (marriage, employment, etc.):   Previous session on 2024:  Domonique presents on time and speaks to current stressors relating to work and having to do with how to best manage and cope. Is doing an outstanding job of maintaining her equilibrium and admits that just coming to speak about channeling her frustrations appropriately and recognizing her limits is helpful. Receives validation, support, affirmation. Will see Domonique as scheduled. Depression is well managed as is her anxious mood.   Social History     Tobacco Use    Smoking status: Former     Current packs/day: 0.00     Average packs/day: 0.5 packs/day for 20.0 years (10.0 ttl pk-yrs)     Types: Cigarettes     Start date: 1985     Quit date: 2005     Years since quittin.0    Smokeless tobacco: Never   Substance Use Topics    Alcohol use: No    Drug use: No       Interval history and content of current session: Domonique presents on time and speaks to current status: mood is stable; did take some time for herself, expresses desire to continue to do so, allows her to focus on other things, anxiety is manageable.  Will see her as scheduled.     Treatment plan:  Target symptoms: recurrent depression, anxiety , work stress  Why chosen therapy is appropriate versus another modality: relevant to diagnosis, patient responds to this modality, evidence based practice  Outcome monitoring methods: self-report, observation  Therapeutic intervention type: supportive psychotherapy    Risk parameters:  Patient reports no suicidal ideation  Patient reports no homicidal ideation  Patient reports no self-injurious behavior  Patient reports no violent behavior    Verbal deficits: None    Patient's response to intervention:  The patient's response to intervention is accepting, motivated.    Progress  toward goals and other mental status changes:  The patient's progress toward goals is good.    Diagnosis:   1. Mild episode of recurrent major depressive disorder    2. Generalized anxiety disorder with panic attacks        Plan:  individual psychotherapy    Return to clinic: as scheduled    Length of Service (minutes): 45

## 2024-08-05 ENCOUNTER — OFFICE VISIT (OUTPATIENT)
Dept: PSYCHIATRY | Facility: CLINIC | Age: 64
End: 2024-08-05
Payer: COMMERCIAL

## 2024-08-05 VITALS
BODY MASS INDEX: 41.57 KG/M2 | HEIGHT: 71 IN | DIASTOLIC BLOOD PRESSURE: 81 MMHG | SYSTOLIC BLOOD PRESSURE: 132 MMHG | HEART RATE: 79 BPM

## 2024-08-05 DIAGNOSIS — F33.0 MILD EPISODE OF RECURRENT MAJOR DEPRESSIVE DISORDER: Primary | ICD-10-CM

## 2024-08-05 DIAGNOSIS — F41.1 GENERALIZED ANXIETY DISORDER WITH PANIC ATTACKS: ICD-10-CM

## 2024-08-05 DIAGNOSIS — F90.0 ADHD (ATTENTION DEFICIT HYPERACTIVITY DISORDER), INATTENTIVE TYPE: ICD-10-CM

## 2024-08-05 DIAGNOSIS — F41.0 GENERALIZED ANXIETY DISORDER WITH PANIC ATTACKS: ICD-10-CM

## 2024-08-05 DIAGNOSIS — G47.00 INSOMNIA, UNSPECIFIED TYPE: ICD-10-CM

## 2024-08-05 PROCEDURE — 1160F RVW MEDS BY RX/DR IN RCRD: CPT | Mod: CPTII,S$GLB,, | Performed by: STUDENT IN AN ORGANIZED HEALTH CARE EDUCATION/TRAINING PROGRAM

## 2024-08-05 PROCEDURE — 3008F BODY MASS INDEX DOCD: CPT | Mod: CPTII,S$GLB,, | Performed by: STUDENT IN AN ORGANIZED HEALTH CARE EDUCATION/TRAINING PROGRAM

## 2024-08-05 PROCEDURE — 3066F NEPHROPATHY DOC TX: CPT | Mod: CPTII,S$GLB,, | Performed by: STUDENT IN AN ORGANIZED HEALTH CARE EDUCATION/TRAINING PROGRAM

## 2024-08-05 PROCEDURE — 1159F MED LIST DOCD IN RCRD: CPT | Mod: CPTII,S$GLB,, | Performed by: STUDENT IN AN ORGANIZED HEALTH CARE EDUCATION/TRAINING PROGRAM

## 2024-08-05 PROCEDURE — 99999 PR PBB SHADOW E&M-EST. PATIENT-LVL III: CPT | Mod: PBBFAC,,, | Performed by: STUDENT IN AN ORGANIZED HEALTH CARE EDUCATION/TRAINING PROGRAM

## 2024-08-05 PROCEDURE — 99214 OFFICE O/P EST MOD 30 MIN: CPT | Mod: S$GLB,,, | Performed by: STUDENT IN AN ORGANIZED HEALTH CARE EDUCATION/TRAINING PROGRAM

## 2024-08-05 PROCEDURE — 3061F NEG MICROALBUMINURIA REV: CPT | Mod: CPTII,S$GLB,, | Performed by: STUDENT IN AN ORGANIZED HEALTH CARE EDUCATION/TRAINING PROGRAM

## 2024-08-05 PROCEDURE — 3079F DIAST BP 80-89 MM HG: CPT | Mod: CPTII,S$GLB,, | Performed by: STUDENT IN AN ORGANIZED HEALTH CARE EDUCATION/TRAINING PROGRAM

## 2024-08-05 PROCEDURE — 96136 PSYCL/NRPSYC TST PHY/QHP 1ST: CPT | Mod: 59,S$GLB,, | Performed by: STUDENT IN AN ORGANIZED HEALTH CARE EDUCATION/TRAINING PROGRAM

## 2024-08-05 PROCEDURE — 3075F SYST BP GE 130 - 139MM HG: CPT | Mod: CPTII,S$GLB,, | Performed by: STUDENT IN AN ORGANIZED HEALTH CARE EDUCATION/TRAINING PROGRAM

## 2024-08-05 RX ORDER — DEXTROAMPHETAMINE SACCHARATE, AMPHETAMINE ASPARTATE, DEXTROAMPHETAMINE SULFATE AND AMPHETAMINE SULFATE 1.25; 1.25; 1.25; 1.25 MG/1; MG/1; MG/1; MG/1
5 TABLET ORAL DAILY
Qty: 30 TABLET | Refills: 0 | Status: SHIPPED | OUTPATIENT
Start: 2024-08-05 | End: 2024-09-06

## 2024-08-05 RX ORDER — BUPROPION HYDROCHLORIDE 300 MG/1
300 TABLET ORAL EVERY MORNING
Qty: 30 TABLET | Refills: 1 | Status: SHIPPED | OUTPATIENT
Start: 2024-08-05 | End: 2024-10-04

## 2024-08-05 RX ORDER — LISDEXAMFETAMINE DIMESYLATE 40 MG/1
40 CAPSULE ORAL EVERY MORNING
Qty: 30 CAPSULE | Refills: 0 | Status: SHIPPED | OUTPATIENT
Start: 2024-09-05 | End: 2024-10-05

## 2024-08-05 RX ORDER — ESCITALOPRAM OXALATE 20 MG/1
20 TABLET ORAL EVERY MORNING
Qty: 30 TABLET | Refills: 1 | Status: SHIPPED | OUTPATIENT
Start: 2024-08-05 | End: 2024-10-04

## 2024-08-05 RX ORDER — DEXTROAMPHETAMINE SACCHARATE, AMPHETAMINE ASPARTATE, DEXTROAMPHETAMINE SULFATE AND AMPHETAMINE SULFATE 1.25; 1.25; 1.25; 1.25 MG/1; MG/1; MG/1; MG/1
5 TABLET ORAL DAILY
Qty: 30 TABLET | Refills: 0 | Status: SHIPPED | OUTPATIENT
Start: 2024-09-05 | End: 2024-10-05

## 2024-08-05 RX ORDER — ALPRAZOLAM 0.25 MG/1
0.25 TABLET ORAL DAILY PRN
Qty: 60 TABLET | Refills: 1 | Status: SHIPPED | OUTPATIENT
Start: 2024-08-05

## 2024-08-05 RX ORDER — LISDEXAMFETAMINE DIMESYLATE 40 MG/1
40 CAPSULE ORAL EVERY MORNING
Qty: 30 CAPSULE | Refills: 0 | Status: SHIPPED | OUTPATIENT
Start: 2024-08-05 | End: 2024-09-06

## 2024-08-07 ENCOUNTER — OFFICE VISIT (OUTPATIENT)
Dept: PSYCHIATRY | Facility: CLINIC | Age: 64
End: 2024-08-07
Payer: COMMERCIAL

## 2024-08-07 DIAGNOSIS — F90.0 ADHD (ATTENTION DEFICIT HYPERACTIVITY DISORDER), INATTENTIVE TYPE: ICD-10-CM

## 2024-08-07 DIAGNOSIS — F41.1 GENERALIZED ANXIETY DISORDER WITH PANIC ATTACKS: ICD-10-CM

## 2024-08-07 DIAGNOSIS — F41.0 GENERALIZED ANXIETY DISORDER WITH PANIC ATTACKS: ICD-10-CM

## 2024-08-07 DIAGNOSIS — G47.00 INSOMNIA, UNSPECIFIED TYPE: ICD-10-CM

## 2024-08-07 DIAGNOSIS — F33.0 MILD EPISODE OF RECURRENT MAJOR DEPRESSIVE DISORDER: Primary | ICD-10-CM

## 2024-08-07 PROCEDURE — 90834 PSYTX W PT 45 MINUTES: CPT | Mod: S$GLB,,, | Performed by: SOCIAL WORKER

## 2024-08-07 PROCEDURE — 3061F NEG MICROALBUMINURIA REV: CPT | Mod: CPTII,S$GLB,, | Performed by: SOCIAL WORKER

## 2024-08-07 PROCEDURE — 3066F NEPHROPATHY DOC TX: CPT | Mod: CPTII,S$GLB,, | Performed by: SOCIAL WORKER

## 2024-10-07 ENCOUNTER — OFFICE VISIT (OUTPATIENT)
Dept: PSYCHIATRY | Facility: CLINIC | Age: 64
End: 2024-10-07
Payer: COMMERCIAL

## 2024-10-07 VITALS
SYSTOLIC BLOOD PRESSURE: 141 MMHG | HEART RATE: 64 BPM | HEIGHT: 71 IN | DIASTOLIC BLOOD PRESSURE: 92 MMHG | BODY MASS INDEX: 41.57 KG/M2

## 2024-10-07 DIAGNOSIS — G47.00 INSOMNIA, UNSPECIFIED TYPE: ICD-10-CM

## 2024-10-07 DIAGNOSIS — F41.1 GENERALIZED ANXIETY DISORDER WITH PANIC ATTACKS: ICD-10-CM

## 2024-10-07 DIAGNOSIS — F41.0 GENERALIZED ANXIETY DISORDER WITH PANIC ATTACKS: ICD-10-CM

## 2024-10-07 DIAGNOSIS — F33.41 RECURRENT MAJOR DEPRESSIVE DISORDER, IN PARTIAL REMISSION: Primary | ICD-10-CM

## 2024-10-07 DIAGNOSIS — F90.0 ADHD (ATTENTION DEFICIT HYPERACTIVITY DISORDER), INATTENTIVE TYPE: ICD-10-CM

## 2024-10-07 PROCEDURE — 90837 PSYTX W PT 60 MINUTES: CPT | Mod: S$GLB,,, | Performed by: SOCIAL WORKER

## 2024-10-07 PROCEDURE — 3080F DIAST BP >= 90 MM HG: CPT | Mod: CPTII,S$GLB,, | Performed by: STUDENT IN AN ORGANIZED HEALTH CARE EDUCATION/TRAINING PROGRAM

## 2024-10-07 PROCEDURE — 1159F MED LIST DOCD IN RCRD: CPT | Mod: CPTII,S$GLB,, | Performed by: STUDENT IN AN ORGANIZED HEALTH CARE EDUCATION/TRAINING PROGRAM

## 2024-10-07 PROCEDURE — 3077F SYST BP >= 140 MM HG: CPT | Mod: CPTII,S$GLB,, | Performed by: STUDENT IN AN ORGANIZED HEALTH CARE EDUCATION/TRAINING PROGRAM

## 2024-10-07 PROCEDURE — 3066F NEPHROPATHY DOC TX: CPT | Mod: CPTII,S$GLB,, | Performed by: STUDENT IN AN ORGANIZED HEALTH CARE EDUCATION/TRAINING PROGRAM

## 2024-10-07 PROCEDURE — 99999 PR PBB SHADOW E&M-EST. PATIENT-LVL I: CPT | Mod: PBBFAC,,, | Performed by: SOCIAL WORKER

## 2024-10-07 PROCEDURE — 1160F RVW MEDS BY RX/DR IN RCRD: CPT | Mod: CPTII,S$GLB,, | Performed by: STUDENT IN AN ORGANIZED HEALTH CARE EDUCATION/TRAINING PROGRAM

## 2024-10-07 PROCEDURE — 96136 PSYCL/NRPSYC TST PHY/QHP 1ST: CPT | Mod: 59,S$GLB,, | Performed by: STUDENT IN AN ORGANIZED HEALTH CARE EDUCATION/TRAINING PROGRAM

## 2024-10-07 PROCEDURE — 3061F NEG MICROALBUMINURIA REV: CPT | Mod: CPTII,S$GLB,, | Performed by: SOCIAL WORKER

## 2024-10-07 PROCEDURE — 99214 OFFICE O/P EST MOD 30 MIN: CPT | Mod: S$GLB,,, | Performed by: STUDENT IN AN ORGANIZED HEALTH CARE EDUCATION/TRAINING PROGRAM

## 2024-10-07 PROCEDURE — 3066F NEPHROPATHY DOC TX: CPT | Mod: CPTII,S$GLB,, | Performed by: SOCIAL WORKER

## 2024-10-07 PROCEDURE — G2211 COMPLEX E/M VISIT ADD ON: HCPCS | Mod: S$GLB,,, | Performed by: STUDENT IN AN ORGANIZED HEALTH CARE EDUCATION/TRAINING PROGRAM

## 2024-10-07 PROCEDURE — 3061F NEG MICROALBUMINURIA REV: CPT | Mod: CPTII,S$GLB,, | Performed by: STUDENT IN AN ORGANIZED HEALTH CARE EDUCATION/TRAINING PROGRAM

## 2024-10-07 PROCEDURE — 3008F BODY MASS INDEX DOCD: CPT | Mod: CPTII,S$GLB,, | Performed by: STUDENT IN AN ORGANIZED HEALTH CARE EDUCATION/TRAINING PROGRAM

## 2024-10-07 PROCEDURE — 99999 PR PBB SHADOW E&M-EST. PATIENT-LVL III: CPT | Mod: PBBFAC,,, | Performed by: STUDENT IN AN ORGANIZED HEALTH CARE EDUCATION/TRAINING PROGRAM

## 2024-10-07 RX ORDER — LISDEXAMFETAMINE DIMESYLATE 40 MG/1
40 CAPSULE ORAL EVERY MORNING
Qty: 30 CAPSULE | Refills: 0 | Status: SHIPPED | OUTPATIENT
Start: 2024-10-07 | End: 2024-11-06

## 2024-10-07 RX ORDER — DEXTROAMPHETAMINE SACCHARATE, AMPHETAMINE ASPARTATE, DEXTROAMPHETAMINE SULFATE AND AMPHETAMINE SULFATE 1.25; 1.25; 1.25; 1.25 MG/1; MG/1; MG/1; MG/1
5 TABLET ORAL DAILY
Qty: 30 TABLET | Refills: 0 | Status: SHIPPED | OUTPATIENT
Start: 2024-11-07 | End: 2024-12-07

## 2024-10-07 RX ORDER — BUPROPION HYDROCHLORIDE 300 MG/1
300 TABLET ORAL EVERY MORNING
Qty: 30 TABLET | Refills: 1 | Status: SHIPPED | OUTPATIENT
Start: 2024-10-07 | End: 2024-12-06

## 2024-10-07 RX ORDER — LISDEXAMFETAMINE DIMESYLATE 40 MG/1
40 CAPSULE ORAL EVERY MORNING
Qty: 30 CAPSULE | Refills: 0 | Status: SHIPPED | OUTPATIENT
Start: 2024-11-07 | End: 2024-12-07

## 2024-10-07 RX ORDER — DEXTROAMPHETAMINE SACCHARATE, AMPHETAMINE ASPARTATE, DEXTROAMPHETAMINE SULFATE AND AMPHETAMINE SULFATE 1.25; 1.25; 1.25; 1.25 MG/1; MG/1; MG/1; MG/1
5 TABLET ORAL DAILY
Qty: 30 TABLET | Refills: 0 | Status: SHIPPED | OUTPATIENT
Start: 2024-10-07 | End: 2024-11-06

## 2024-10-07 RX ORDER — ESCITALOPRAM OXALATE 20 MG/1
20 TABLET ORAL EVERY MORNING
Qty: 30 TABLET | Refills: 1 | Status: SHIPPED | OUTPATIENT
Start: 2024-10-07 | End: 2024-12-06

## 2024-10-07 NOTE — PROGRESS NOTES
"    Outpatient Psychiatry Followup Visit (DO/MD/NP, etc.)    10/7/2024  Assessment & Plan    Assessment - Plan:     Impression     ICD-10-CM ICD-9-CM   1. Recurrent major depressive disorder, in partial remission  F33.41 296.35   2. Generalized anxiety disorder with panic attacks  F41.1 300.02    F41.0 300.01   3. ADHD (attention deficit hyperactivity disorder), inattentive type  F90.0 314.00   4. Insomnia, unspecified type  G47.00 780.52   5. BMI 40.0-44.9, adult  Z68.41 V85.41      Plan of Care & Medication Management    Chart was reviewed. The risks and benefits of medication were discussed with pt. The treatment plan and followup plan were reviewed with pt. Pt understands to contact clinic if symptoms worsen. Pt understands to call 911 or go to nearest ER for suicidal ideation, intent or plan.   RX History ADDERALL XR/IR (2014, for about 4 years, took with antidepressant and slept well), ATARAX/VISTARIL, CELEXA (dc in 2014, does not remember response), LEXAPRO, PROZAC (2016, caused dysphoria, "made me darker"), REMERON (appetite), TRAZODONE (2016, caused nightmares), VYVANSE, WELLBUTRIN (initiated by primary care in 2014 and again in [id number]), XANAX (JUL2023 taking 0.25mg daily prn 1d/w), ZOLOFT (does not remember effect, took for one month)    Current RX [id number]: JOSE 2/6, BI 47/100  Considering discontinuing REMERON when depression is in remission  Continue ATARAX/VISTARIL  Adjustments:  34UJT8933: Start 10mg TID prn anxiety  Prior to evaluation pt had been taking WELLBUTRIN 300mg daily and XANAX 0.25mg daily prn  Continue LEXAPRO  Target dose range 10-20mg/d  Adjustments:  28JUN2024: Adjust to 20mg daily in the morning  [id number]: Increase to 20mg NIGHTLY   08PBX3755: Adjust to 10mg NIGHTLY   73UTD1630: Start 10mg daily  Prior to evaluation pt had been taking WELLBUTRIN 300mg daily and XANAX 0.25mg daily prn  Continue ADDERALL IR  Adjustments:  8/5/2024: Start 5mg daily around lunchtime  Continue " "VYVANSE  Pt was provided [employer] educational material 2/22/2024.  Adjustments:  22MAR2024: Increase to 40mg daily in the morning  4/22/2024 ASRS 2/1 (10-12)  30XPE3112: Start 30mg daily in the morning  3/22/2024 ASRS 2/1 (12-15)  Prior to 22FEB2024 pt was NOT taking a stimulant  1/11/2024 ASRS 3/2 (15-22)  Continue WELLBUTRIN XL  Target dose range 150-300mg/d  Adjustments:  39OZN6419: Continue 300mg daily  Prior to evaluation pt had been taking WELLBUTRIN 300mg daily  Continue XANAX  Adjustments:  59WBV7439: Continue 0.25mg daily prn  Prior to evaluation pt had been taking XANAX 0.25mg daily prn and reported taking 1d/w on average      Education, Counseling & Monitoring []BOX BREATHING  []SLEEP HYGIENE  []THERAPY  [] []CONTRACT 10/7/2024?  [] REVIEWED 10/7/2024?  []NRT  []LABS    []JENIFER  []CAFF   []CANNABIS  []ROBERT []ETOH []GDS []MINICOG []MOCA  []AIMS []ASRS []BI   []PCL5 []L2RTB  []   Other Orders Continue individual psychotherapy   RETURN S: RETURN IN 8 WEEKS (TWO MONTHS), and reassess frequency within two visits from now  Continue medication management     Subjective    Interval History:     Available documentation has been reviewed, and pertinent elements of the chart have been incorporated into this note where appropriate. Last Epic encounter with writer was on 8/5/2024   Domonique Carmona, a 64 y.o. female, presenting for followup visit. This visit was done in person, IN CLINIC.    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    Work is good, although feels dismissed by some coworkers  "I don't obsess over it like I used to"  Keeping therapy appointments    Denies depressed mood  Mood is stable    Anxiety is controlled  NO panic attacks    Sleeping " "well    ADHD symptoms controlled  Addition of ADDERALL eliminated the crash    Continue to monitor BP: 141/92 today    Continue treatment as planned       Unless otherwise specified, pt did NOT display signs of nor endorse symptoms of overt psychosis or acute mood disorder requiring hospitalization during the encounter; pt denied violent thoughts or suicidal or homicidal ideation, intent, or plan.         Objective    Measurement-Based Care (MBC):     Routine Instruments   PROMIS-ANXIETY Interpretation: 10 (4a raw score): T-SCORE 59.5; MILD using 55-60-70 cutoffs.   PROMIS-DEPRESSION Interpretation: 9 (4a raw score): T-SCORE 57.3; MILD using 55-60-70 cutoffs.   PSS4 Interpretation: 08/16; MODERATE using 6-11 cutoffs. 0 PH, 0 LSE.   Additional Instruments   N/A     Current Evaluation of Mental Status:     Constitutional / General       Vitals:    10/07/24 1126   BP: (!) 141/92   Pulse: 64   Height: 5' 11" (1.803 m)     (Current body mass index is 41.57 kg/m².)    Psychiatric / Mental Status Examination  1. Appearance: Dress is informal but appropriate. Motor activity normal.  2. Discourse: Clear speech with normal rate and volume. Associations intact. Orderly.  3. Emotional Expression: Somewhat anxious. Affect is appropriate.  4. Perception and Thinking: No hallucinations. No suicidality, no homicidality, delusions, or paranoia.  5. Sensorium: Grossly intact. Able to focus for interview.  6. Memory and Fund of Knowledge: Intact for content of interview.  7. Insight and Judgment: Intact.         Auto-populated chart data omitted from this note for brevity.      Billing Documentation:     Method of Encounter IN PERSON visit at the clinic   Type of Encounter Follow up visit with me   Counseling;  Psychotherapy    Counseling;  Tobacco and/or Nicotine    Additional Codes and Modifiers 48506, with modifer 59: administered and scored more than one psychological or neuropsychological tests (see MBC above) (16+ mins)  , " "without modifiers -24,-25,-53: COMPLEXITY: Visit today included increased complexity associated with the care of the episodic problem(s) (multiple psychiatric disorders - see above) addressed and managing the longitudinal care of the patient due to the serious and/or complex managed problem(s) (multiple psychiatric disorders - see above).   Time Remaining Chart/Pt 15472: FOLLOW UP VISIT, Rx mgmt, "Multiple STABLE chronic illnesses; no changes in treatment at this time"   Total Mins  (10/7/2024) N/A - Not billing for time        Vinny Charles DO  Department of Psychiatry, [facility]      "

## 2024-10-07 NOTE — PROGRESS NOTES
Individual Psychotherapy (PhD/LCSW)    10/7/2024    Site:  [facility]        Therapeutic Intervention: Met with patient.  Outpatient - Supportive psychotherapy 45 min - CPT Code 80127 and Outpatient - Interactive psychotherapy 45 min - CPT code 11115    Chief complaint/reason for encounter: depression and anxiety   Medical history:   Past Medical History:   Diagnosis Date    Abnormal Pap smear 1986    Cryosurgery    Anxiety     Arthritis     Essential hypertension 11/26/2020    GERD (gastroesophageal reflux disease)     Venous stasis dermatitis of left lower extremity 12/31/2018       Medications:    Current Outpatient Medications:     ALPRAZolam (XANAX) 0.25 MG tablet, Take 1 tablet (0.25 mg total) by mouth daily as needed for Anxiety., Disp: 60 tablet, Rfl: 1    buPROPion (WELLBUTRIN XL) 300 MG 24 hr tablet, Take 1 tablet (300 mg total) by mouth every morning., Disp: 30 tablet, Rfl: 1    dextroamphetamine-amphetamine 5 mg Tab, Take 5 mg by mouth once daily. Take at lunchtime., Disp: 30 tablet, Rfl: 0    [START ON 11/7/2024] dextroamphetamine-amphetamine 5 mg Tab, Take 5 mg by mouth once daily. Take at lunchtime., Disp: 30 tablet, Rfl: 0    EScitalopram oxalate (LEXAPRO) 20 MG tablet, Take 1 tablet (20 mg total) by mouth every morning., Disp: 30 tablet, Rfl: 1    ibuprofen (ADVIL,MOTRIN) 800 MG tablet, Take 1 tablet (800 mg total) by mouth 3 (three) times daily., Disp: 60 tablet, Rfl: 6    lisdexamfetamine (VYVANSE) 40 MG Cap, Take 1 capsule (40 mg total) by mouth every morning., Disp: 30 capsule, Rfl: 0    [START ON 11/7/2024] lisdexamfetamine (VYVANSE) 40 MG Cap, Take 1 capsule (40 mg total) by mouth every morning., Disp: 30 capsule, Rfl: 0    pantoprazole (PROTONIX) 20 MG tablet, Take 1 tablet (20 mg total) by mouth once daily., Disp: 90 tablet, Rfl: 1    tiZANidine (ZANAFLEX) 4 MG tablet, TAKE 1 TABLET BY MOUTH THREE TIMES A DAY AS NEEDED FOR 10 DAYS, Disp: 90 tablet, Rfl: 1    Family history of psychiatric  illness:   Family History   Problem Relation Name Age of Onset    Ovarian cancer Mother      Breast cancer Neg Hx      Colon cancer Neg Hx      Collagen disease Neg Hx      Glaucoma Neg Hx      Melanoma Neg Hx      Psoriasis Neg Hx      Lupus Neg Hx      Eczema Neg Hx         From Dr. Charles's Initial on 7/21/2023:       Patient complains of Anxiety, Stress, Sleeping Problem, Depression, and Distractibilty        History of Present Illness (HPI) and Review of Symptoms (ROS):          Domonique Carmona, a 62 y.o. female, presenting for initial evaluation visit.      Chart was reviewed. Available documentation has been reviewed, and pertinent elements of the chart have been incorporated into this note where appropriate.      General      Pt is pleasant and cooperative on interview. This visit was done in person in the clinic.     Pt reports she had been seeing the therapist at this clinic through [facility] for some time, who suggested medication management by a psychiatrist.      Pt complains about feeling sad, overwhelmed and anxious. 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 She engages in binge eating and is s/p bariatric surgery. She is bothered by low energy. Concentration has been poor.      Discussed lifestyle and biomedical factors which complicate the treatment while contributing to the symptom presentation.     Pt would like to continue individual therapy. She is open to medication changes. Medication history was thoroughly explored.      Personal Functioning   Stress Home stress. Work stress. See PSS4 below.   Mood Mood is sad. POSITIVE AFFECT Structures: Both ANHEDONIA and CONTEXT INSENSITIVITY noted. Diminished ability to achieve pleasure and purpose in things; loss of motivation and disengagement noted in both positive and negative contexts. NEGATIVE AFFECT and DMN Structures: Feeling GUILT, HOPELESSNESS and WORTHLESSNESS. RUMINATION and NEGATIVE BIAS noted. PERSONAL-INTERPERSONAL  Functioning: Energy is LOW. Socializing LESS.   Anxiety See instruments below. RUMINATION: Pt described repeated worry and inward focus on negative thoughts. SALIENCE-ANXIOUS AVOIDANCE: Pt described anxious arousal and apprehension. THREAT DYSREGULATION: Pt described panic attacks with classic symptoms. See history below.   Sleep Pt reports sleep as poor overall and NOT restorative. Sleep onset is more than an hour. Duration is unclear with 2 or 3 interruptions due to bathroom needs and spontaneous awakenings. Denies naps. Patient does NOT use a sleep aid. Nightmares have NOT been a problem.   Cognition Pt reports concentration is down. Pt is easily distracted. Memory is worse than usual.  Productivity is down.   Appetite and Nutrition Pt reports appetite varies. Pt engages in episodes of binge eating.   Safety Patient did not display signs of nor endorse symptoms of overt psychosis or acute mood disorder requiring hospitalization during the encounter. Patient denied violent thoughts or suicidal or homicidal ideation, intent, or plan.   Suicide Risk Suicide risk assessment performed. Pt denies suicidal ideation, intent or plan. Pt has never attempted suicide in the past. Risk factors include anxiety and mood disorder. Protective factors include wanting to live for the family and receptivity to treatment. Suicide risk at this time is LOW. Pt agrees to safety. No safety concerns identified at this time.    Interpersonal Functioning   Home Stress   Peers Socializing less.   Work Problems with supervisor. Overwhelm.      Measurement-Based Care (MBC):      Routine Evaluation Instruments   GAD7 Interpretation: 12/21; MODERATE using 5-10-15 cutoffs. The GAD7 has acceptable properties for identifying ISAÍAS at cutoff scores 7 to 10; a cutoff score of 10 is fairly sensitive (0.8) but not specific (0.4).  This is a new baseline reading.   PHQ9 Interpretation: 17/27; MODERATE using 7-15-21 cutoffs.  The PHQ-9 was found to have  "acceptable diagnostic properties for screening for MDD for cut-off scores between 8 and 11. PHQ-9 is useful for screening, but not always for diagnosis of a current epsiode of MDD in a psychiatric specialty clinic; according to the literature the optimal cutoff score for a current episode of MDD is most reliably 13 or 14.  This is a new baseline reading.   JOSE Interpretation: 2/6, SCREENED NEGATIVE   PSS4 Interpretation: 12/16; HIGH using 6-11 cutoffs. 2 PH, 0 LSE. This is a new baseline reading.   Additional Instruments       Bipolarity Index (BI)  7/21/2023   Section Scores   I. 2: Recurrent unipolar major depressive disorder (=3 episode)   II.  20: 15 to 19 years.   III.  5: Recurrent unipolar MDD with =3 or more major depressive episodes   IV.  10: Worsening dysphoria or mixed symptoms during antidepressant treatment subthreshold for [facility] (exclude worsening that is limited to known antidepressant side effects such as akathisia, anxiety or sedation)   V.  10: First-degree relative with recurrent unipolar MDD or schizoaffective disorder;   Total Score   47/100 BI   Results Interpretation: Score total is in the range of 40 to 49; this score is associated with increased RISK OF CONVERSION TO BIPOLAR DISORDER, so this patient would benefit from careful monitoring whenever prescribed an antidepressant. Scores 50 and above have a high sensitivity (0.91) and specificity (0.90) for bipolar disorder.      MDQ Interpretation: NEGATIVE (Scored 0-6, and/or answered "No" to question #2 and/or labeled problem as "minor" or less). ADMINISTERED [id number].      History:            Psychiatric History - Diagnostic   Reported Diagnoses ADD, anxiety, depression   Formal Testing Unclear if ADHD testing was formal testing.   Symptom History General Psychosis: No history of psychosis.  PANIC ATTACKS: positive history with classic symptoms, onset adolescence, most recent 3ma.  Mood cycles: No episodes of hypomania, stephanie or mixed mood " "episodes.  DEPRESSION: Recurrent with multiple depression episodes, onset between 16yo to 20 yo (BI 20+).  ANXIETY: Symptoms onset childhood.  ADHD: Symptoms onset unclear.    Suicide Attempts NO    Self Harm NO   Psychiatric History - Treatment   IOP and Inpatient NONE   Outpatient Pt had previously seen Dr. Cintron ([id number] and [id number] for bariatric surgery eval). Before Dr. Cintron saw a PMHNP 10y prior. Started at 17yo.   Psychotherapy Currently in therapy ([facility] at this clinic). Started at 17yo.   RX Prior Psychotropics ADDERALL (2014, for about 4 years, took with antidepressant and slept well), CELEXA (dc in 2014, does not remember response), PROZAC (2016, caused dysphoria, "made me darker"), TRAZODONE (2016, caused nightmares), WELLBUTRIN (initiated by primary care in 2014 and again in [id number]), XANAX (JUL2023 taking 0.25mg daily prn 1d/w), ZOLOFT (does not remember effect, took for one month)    Current Psychotropics WELLBUTRIN, XANAX    Psychoactive Agents TIZANIDINE/ZANAFLEX (depressing, nervousness (3%), hallucinogenic-visual (3%))   Personal Psychosocial History   Childhood Born 3rd of 6 children. Raised by adopted father and mother. Parents  when patient was 9 years old. Pt moved around quite a bit in early childhood because bio father was in the . Does not remember childhood well.  No abuse, no serious illnesses and no injuries reported in childhood   Marital Status single, never    Children Two. Age range adulthood (18+yo).   Residence private residence, with son   Occupation employed, [employer] Hobbies collecting ([employer]s), reading, and traveling   Yazdanism Practice Deferred.   Education History Master's degree.    History NO   Legal History Once for writing a bad check   Abuse History NO   Trauma History NO   Sexual History Deferred.   Family History     1st Degree 2nd Degree 3rd Degree   Suicides No No No   Substance Abuse son No No   Alcohol Abuse " No No No   Bipolar Disorder No No No   Anxiety mother and son No No   Depression mother and son No No   Other/Medical ADHD (son), Schizophrenia (uncle), cancer, dementia   Substance History   Alcohol NEVER regular use nor history of abuse.   Tobacco and Nicotine Formerly smoked tobacco. QUIT years ago.   Caffeine LOW   Marijuana 0/4: Denies active use within a year.   Other Recreational Substances Limited to her 20s, tried LSD twice and cocaine. Last use at 27yo.   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  Session on 2024:  Domonique presents for follow up and reports she is doing very well. Mood is stable, good eye contact, direct speech, goal directed, thoughts processes intact, behavior is cooperative. Agrees to move to maintenance plan so will see her in 2 months time. Agrees that if she needs to come in more frequently we can certainly accommodate.     Domonique presents for follow up today.    Social History     Tobacco Use    Smoking status: Former     Current packs/day: 0.00     Average packs/day: 0.5 packs/day for 20.0 years (10.0 ttl pk-yrs)     Types: Cigarettes     Start date: 1985     Quit date: 2005     Years since quittin.3    Smokeless tobacco: Never   Substance Use Topics    Alcohol use: No    Drug use: No       Interval history and content of current session: Domonique presents on time for today's follow up and speaks to doing well, is active, continues to clear out what is no longer serving her in her home, repurposing some things, mood is euthymic, less ruminations, does admit to worse case scenario, fully aware, more intentional in moving away from same.  Follow up scheduled for 3 months.      Treatment  plan:  Target symptoms: anxiety , depression at bay  Why chosen therapy is appropriate versus another modality: relevant to diagnosis, patient responds to this modality, evidence based practice  Outcome monitoring methods: self-report, observation  Therapeutic intervention type: insight oriented psychotherapy, behavior modifying psychotherapy, supportive psychotherapy    Risk parameters:  Patient reports no suicidal ideation  Patient reports no homicidal ideation  Patient reports no self-injurious behavior  Patient reports no violent behavior    Verbal deficits: None    Patient's response to intervention:  The patient's response to intervention is accepting, motivated.    Progress toward goals and other mental status changes:  The patient's progress toward goals is good.    Diagnosis:   1. Recurrent major depressive disorder, in partial remission    2. Generalized anxiety disorder with panic attacks    3. ADHD (attention deficit hyperactivity disorder), inattentive type    4. Insomnia, unspecified type        Plan:  individual psychotherapy    Return to clinic: as scheduled    Length of Service (minutes):  54

## 2024-12-10 ENCOUNTER — OFFICE VISIT (OUTPATIENT)
Dept: PSYCHIATRY | Facility: CLINIC | Age: 64
End: 2024-12-10
Payer: COMMERCIAL

## 2024-12-10 VITALS — DIASTOLIC BLOOD PRESSURE: 95 MMHG | HEART RATE: 79 BPM | SYSTOLIC BLOOD PRESSURE: 136 MMHG

## 2024-12-10 DIAGNOSIS — F41.0 GENERALIZED ANXIETY DISORDER WITH PANIC ATTACKS: ICD-10-CM

## 2024-12-10 DIAGNOSIS — F41.1 GENERALIZED ANXIETY DISORDER WITH PANIC ATTACKS: ICD-10-CM

## 2024-12-10 DIAGNOSIS — F90.0 ADHD (ATTENTION DEFICIT HYPERACTIVITY DISORDER), INATTENTIVE TYPE: ICD-10-CM

## 2024-12-10 DIAGNOSIS — F33.41 RECURRENT MAJOR DEPRESSIVE DISORDER, IN PARTIAL REMISSION: Primary | ICD-10-CM

## 2024-12-10 DIAGNOSIS — F33.1 MODERATE EPISODE OF RECURRENT MAJOR DEPRESSIVE DISORDER: Primary | ICD-10-CM

## 2024-12-10 DIAGNOSIS — G47.00 INSOMNIA, UNSPECIFIED TYPE: ICD-10-CM

## 2024-12-10 PROCEDURE — 99999 PR PBB SHADOW E&M-EST. PATIENT-LVL I: CPT | Mod: PBBFAC,,, | Performed by: SOCIAL WORKER

## 2024-12-10 PROCEDURE — 99999 PR PBB SHADOW E&M-EST. PATIENT-LVL III: CPT | Mod: PBBFAC,,, | Performed by: STUDENT IN AN ORGANIZED HEALTH CARE EDUCATION/TRAINING PROGRAM

## 2024-12-10 RX ORDER — ESCITALOPRAM OXALATE 20 MG/1
20 TABLET ORAL EVERY MORNING
Qty: 30 TABLET | Refills: 1 | Status: SHIPPED | OUTPATIENT
Start: 2024-12-10 | End: 2025-02-08

## 2024-12-10 RX ORDER — LISDEXAMFETAMINE DIMESYLATE 40 MG/1
40 CAPSULE ORAL EVERY MORNING
Qty: 30 CAPSULE | Refills: 0 | Status: SHIPPED | OUTPATIENT
Start: 2024-12-10 | End: 2025-01-09

## 2024-12-10 RX ORDER — BUPROPION HYDROCHLORIDE 300 MG/1
300 TABLET ORAL EVERY MORNING
Qty: 30 TABLET | Refills: 1 | Status: SHIPPED | OUTPATIENT
Start: 2024-12-10 | End: 2025-02-08

## 2024-12-10 RX ORDER — ALPRAZOLAM 0.25 MG/1
0.25 TABLET ORAL DAILY PRN
Qty: 60 TABLET | Refills: 1 | Status: SHIPPED | OUTPATIENT
Start: 2024-12-10

## 2024-12-10 RX ORDER — DEXTROAMPHETAMINE SACCHARATE, AMPHETAMINE ASPARTATE, DEXTROAMPHETAMINE SULFATE AND AMPHETAMINE SULFATE 1.25; 1.25; 1.25; 1.25 MG/1; MG/1; MG/1; MG/1
5 TABLET ORAL DAILY
Qty: 30 TABLET | Refills: 0 | Status: SHIPPED | OUTPATIENT
Start: 2024-12-10 | End: 2025-01-09

## 2024-12-10 NOTE — PROGRESS NOTES
Individual Psychotherapy (PhD/LCSW)    12/10/2024    Site:  [place]        Therapeutic Intervention: Met with patient.  Outpatient - Supportive psychotherapy 45 min - CPT Code 36723 and Outpatient - Interactive psychotherapy 45 min - CPT code 35648    Chief complaint/reason for encounter: depression and anxiety   Medical history:   Past Medical History:   Diagnosis Date    Abnormal Pap smear 1986    Cryosurgery    Anxiety     Arthritis     Essential hypertension 11/26/2020    GERD (gastroesophageal reflux disease)     Venous stasis dermatitis of left lower extremity 12/31/2018       Medications:    Current Outpatient Medications:     ALPRAZolam (XANAX) 0.25 MG tablet, Take 1 tablet (0.25 mg total) by mouth daily as needed for Anxiety., Disp: 60 tablet, Rfl: 1    buPROPion (WELLBUTRIN XL) 300 MG 24 hr tablet, Take 1 tablet (300 mg total) by mouth every morning., Disp: 30 tablet, Rfl: 1    dextroamphetamine-amphetamine 5 mg Tab, Take 5 mg by mouth once daily. Take at lunchtime., Disp: 30 tablet, Rfl: 0    EScitalopram oxalate (LEXAPRO) 20 MG tablet, Take 1 tablet (20 mg total) by mouth every morning., Disp: 30 tablet, Rfl: 1    ibuprofen (ADVIL,MOTRIN) 800 MG tablet, Take 1 tablet (800 mg total) by mouth 3 (three) times daily., Disp: 60 tablet, Rfl: 6    lisdexamfetamine (VYVANSE) 40 MG Cap, Take 1 capsule (40 mg total) by mouth every morning., Disp: 30 capsule, Rfl: 0    pantoprazole (PROTONIX) 20 MG tablet, Take 1 tablet (20 mg total) by mouth once daily., Disp: 90 tablet, Rfl: 1    tiZANidine (ZANAFLEX) 4 MG tablet, TAKE 1 TABLET BY MOUTH THREE TIMES A DAY AS NEEDED FOR 10 DAYS, Disp: 90 tablet, Rfl: 1    Family history of psychiatric illness:   Family History   Problem Relation Name Age of Onset    Ovarian cancer Mother      Breast cancer Neg Hx      Colon cancer Neg Hx      Collagen disease Neg Hx      Glaucoma Neg Hx      Melanoma Neg Hx      Psoriasis Neg Hx      Lupus Neg Hx      Eczema Neg Hx         Social  history (marriage, employment, etc.):   Initial:    Pt is pleasant and cooperative on interview. This visit was done in person in the clinic.     Pt reports she had been seeing the therapist at this clinic through [facility] for some time, who suggested medication management by a psychiatrist.      Pt complains about feeling sad, overwhelmed and anxious. 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 She engages in binge eating and is s/p bariatric surgery. She is bothered by low energy. Concentration has been poor.      Discussed lifestyle and biomedical factors which complicate the treatment while contributing to the symptom presentation.     Pt would like to continue individual therapy. She is open to medication changes. Medication history was thoroughly explored.      Personal Functioning   Stress Home stress. Work stress. See PSS4 below.   Mood Mood is sad. POSITIVE AFFECT Structures: Both ANHEDONIA and CONTEXT INSENSITIVITY noted. Diminished ability to achieve pleasure and purpose in things; loss of motivation and disengagement noted in both positive and negative contexts. NEGATIVE AFFECT and DMN Structures: Feeling GUILT, HOPELESSNESS and WORTHLESSNESS. RUMINATION and NEGATIVE BIAS noted. PERSONAL-INTERPERSONAL Functioning: Energy is LOW. Socializing LESS.   Anxiety See instruments below. RUMINATION: Pt described repeated worry and inward focus on negative thoughts. SALIENCE-ANXIOUS AVOIDANCE: Pt described anxious arousal and apprehension. THREAT DYSREGULATION: Pt described panic attacks with classic symptoms. See history below.   Sleep Pt reports sleep as poor overall and NOT restorative. Sleep onset is more than an hour. Duration is unclear with 2 or 3 interruptions due to bathroom needs and spontaneous awakenings. Denies naps. Patient does NOT use a sleep aid. Nightmares have NOT been a problem.   Cognition Pt reports concentration is down. Pt is easily distracted. Memory is worse than usual.   Productivity is down.   Appetite and Nutrition Pt reports appetite varies. Pt engages in episodes of binge eating.   Safety Patient did not display signs of nor endorse symptoms of overt psychosis or acute mood disorder requiring hospitalization during the encounter. Patient denied violent thoughts or suicidal or homicidal ideation, intent, or plan.   Suicide Risk Suicide risk assessment performed. Pt denies suicidal ideation, intent or plan. Pt has never attempted suicide in the past. Risk factors include anxiety and mood disorder. Protective factors include wanting to live for the family and receptivity to treatment. Suicide risk at this time is LOW. Pt agrees to safety. No safety concerns identified at this time.    Interpersonal Functioning   Home Stress   Peers Socializing less.   Work Problems with supervisor. Overwhelm.      Measurement-Based Care (MBC):      Routine Evaluation Instruments   GAD7 Interpretation: 12/21; MODERATE using 5-10-15 cutoffs. The GAD7 has acceptable properties for identifying ISAÍAS at cutoff scores 7 to 10; a cutoff score of 10 is fairly sensitive (0.8) but not specific (0.4).  This is a new baseline reading.   PHQ9 Interpretation: 17/27; MODERATE using 7-15-21 cutoffs.  The PHQ-9 was found to have acceptable diagnostic properties for screening for MDD for cut-off scores between 8 and 11. PHQ-9 is useful for screening, but not always for diagnosis of a current epsiode of MDD in a psychiatric specialty clinic; according to the literature the optimal cutoff score for a current episode of MDD is most reliably 13 or 14.  This is a new baseline reading.   JOSE Interpretation: 2/6, SCREENED NEGATIVE   PSS4 Interpretation: 12/16; HIGH using 6-11 cutoffs. 2 PH, 0 LSE. This is a new baseline reading.   Additional Instruments         Bipolarity Index (BI)  7/21/2023   Section Scores   I. 2: Recurrent unipolar major depressive disorder (=3 episode)   II.  20: 15 to 19 years.   III.  5: Recurrent  "unipolar MDD with =3 or more major depressive episodes   IV.  10: Worsening dysphoria or mixed symptoms during antidepressant treatment subthreshold for stephanie (exclude worsening that is limited to known antidepressant side effects such as akathisia, anxiety or sedation)   V.  10: First-degree relative with recurrent unipolar MDD or schizoaffective disorder;   Total Score   47/100 BI   Results Interpretation: Score total is in the range of 40 to 49; this score is associated with increased RISK OF CONVERSION TO BIPOLAR DISORDER, so this patient would benefit from careful monitoring whenever prescribed an antidepressant. Scores 50 and above have a high sensitivity (0.91) and specificity (0.90) for bipolar disorder.      MDQ Interpretation: NEGATIVE (Scored 0-6, and/or answered "No" to question #2 and/or labeled problem as "minor" or less). ADMINISTERED 28JUN2023.      History:                Psychiatric History - Diagnostic   Reported Diagnoses ADD, anxiety, depression   Formal Testing Unclear if ADHD testing was formal testing.   Symptom History General Psychosis: No history of psychosis.  PANIC ATTACKS: positive history with classic symptoms, onset adolescence, most recent 3ma.  Mood cycles: No episodes of hypomania, stephanie or mixed mood episodes.  DEPRESSION: Recurrent with multiple depression episodes, onset between 16yo to 20 yo (BI 20+).  ANXIETY: Symptoms onset childhood.  ADHD: Symptoms onset unclear.    Suicide Attempts NO    Self Harm NO   Psychiatric History - Treatment   IOP and Inpatient NONE   Outpatient Pt had previously seen Dr. Cintron ([id number] and [id number] for bariatric surgery eval). Before Dr. Cintron saw a PMHNP 10y prior. Started at 17yo.   Psychotherapy Currently in therapy (EAP at this clinic). Started at 17yo.   RX Prior Psychotropics ADDERALL (2014, for about 4 years, took with antidepressant and slept well), CELEXA (dc in 2014, does not remember response), PROZAC (2016, caused dysphoria, " ""made me darker"), TRAZODONE (2016, caused nightmares), WELLBUTRIN (initiated by primary care in 2014 and again in [id number]), XANAX (JUL2023 taking 0.25mg daily prn 1d/w), ZOLOFT (does not remember effect, took for one month)    Current Psychotropics WELLBUTRIN, XANAX    Psychoactive Agents TIZANIDINE/ZANAFLEX (depressing, nervousness (3%), hallucinogenic-visual (3%))   Personal Psychosocial History   Childhood Born 3rd of 6 children. Raised by adopted father and mother. Parents  when patient was 9 years old. Pt moved around quite a bit in early childhood because bio father was in the . Does not remember childhood well.  No abuse, no serious illnesses and no injuries reported in childhood   Marital Status single, never    Children Two. Age range adulthood (18+yo).   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   Education History Master's degree.    History NO   Legal History Once for writing a bad check   Abuse History NO   Trauma History NO   Sexual History Deferred.   Family History     1st Degree 2nd Degree 3rd Degree   Suicides No No No   Substance Abuse son No No   Alcohol Abuse No No No   Bipolar Disorder No No No   Anxiety mother and son No No   Depression mother and son No No   Other/Medical ADHD (son), Schizophrenia (uncle), cancer, dementia   Substance History   Alcohol NEVER regular use nor history of abuse.   Tobacco and Nicotine Formerly smoked tobacco. QUIT years ago.   Caffeine LOW   Marijuana 0/4: Denies active use within a year.   Other Recreational Substances Limited to her 20s, tried LSD twice and cocaine. Last use at 27yo.   Detox/rehab NO            Social History     Tobacco Use    Smoking status: Former     Current packs/day: 0.00     Average packs/day: 0.5 packs/day for 20.0 years (10.0 ttl pk-yrs)     Types: Cigarettes     Start date: " 1985     Quit date: 2005     Years since quittin.4    Smokeless tobacco: Never   Substance Use Topics    Alcohol use: No    Drug use: No       Interval history and content of current session: Domonique presents with good eye contact, direct speech, mood is stable, somewhat anxious and somewhat despondent over election results, and we speak to same, normalize response, thought processes intact, goal directed, behavior is most cooperative, speaks to recent developments including son Mendez's giving up marijuana, now increased motivation, dating, is happy with this new development, work stressors still there, coping and managing well, sets realistic goals, we speak to what we are able to change and accepting what we can't.  Scheduled for follow up in one month however door is always open for follow up whenever Domonique is in need.      Treatment plan:  Target symptoms: recurrent depression, anxiety   Why chosen therapy is appropriate versus another modality: relevant to diagnosis, patient responds to this modality, evidence based practice  Outcome monitoring methods: self-report, observation  Therapeutic intervention type: insight oriented psychotherapy, supportive psychotherapy    Risk parameters:  Patient reports no suicidal ideation  Patient reports no homicidal ideation  Patient reports no self-injurious behavior  Patient reports no violent behavior    Verbal deficits: None    Patient's response to intervention:  The patient's response to intervention is accepting, motivated.    Progress toward goals and other mental status changes:  The patient's progress toward goals is excellent.    Diagnosis:   1. Recurrent major depressive disorder, in partial remission    2. Generalized anxiety disorder with panic attacks    3. ADHD (attention deficit hyperactivity disorder), inattentive type        Plan:  individual psychotherapy    Return to clinic: as scheduled    Length of Service (minutes): 60

## 2024-12-10 NOTE — PROGRESS NOTES
"    Outpatient Psychiatry Followup Visit  12/10/2024  Assessment & Plan    Impression     ICD-10-CM ICD-9-CM   1. Moderate episode of recurrent major depressive disorder  F33.1 296.32   2. ADHD (attention deficit hyperactivity disorder), inattentive type  F90.0 314.00   3. Generalized anxiety disorder with panic attacks  F41.1 300.02    F41.0 300.01   4. Insomnia, unspecified type  G47.00 780.52      Plan of Care & Medication Management    Chart was reviewed. The risks and benefits of medication were discussed with pt. The treatment plan and followup plan were reviewed with pt. Pt understands to contact clinic if symptoms worsen. Pt understands to call 911 or go to nearest ER for suicidal ideation, intent or plan. Unless otherwise specified, pt did NOT display signs of nor endorse symptoms of overt psychosis or acute mood disorder requiring hospitalization during the encounter; pt denied violent thoughts or suicidal or homicidal ideation, intent, or plan.   RX History ADDERALL XR/IR (2014, for about 4 years, took with antidepressant and slept well), ATARAX/VISTARIL, CELEXA (dc in 2014, does not remember response), LEXAPRO, PROZAC (2016, caused dysphoria, "made me darker"), REMERON (appetite), TRAZODONE (2016, caused nightmares), VYVANSE, WELLBUTRIN (initiated by primary care in 2014 and again in [id number]), XANAX (JUL2023 taking 0.25mg daily prn 1d/w), ZOLOFT (does not remember effect, took for one month)    Current RX [id number]: JOSE 2/6, BI 47/100  Considering discontinuing REMERON when depression is in remission  Continue ATARAX/VISTARIL  Adjustments:  [id number]: Start 10mg TID prn anxiety  Prior to evaluation pt had been taking WELLBUTRIN 300mg daily and XANAX 0.25mg daily prn  Continue LEXAPRO  Target dose range 10-20mg/d  Adjustments:  28JUN2024: Adjust to 20mg daily in the morning  12LRQ1532: Increase to 20mg NIGHTLY   [id number]: Adjust to 10mg NIGHTLY   [id number]: Start 10mg daily  Prior to evaluation pt had " been taking WELLBUTRIN 300mg daily and XANAX 0.25mg daily prn  Continue ADDERALL IR  Adjustments:  8/5/2024: Start 5mg daily around lunchtime  Continue VYVANSE  Pt was provided [facility] educational material 2/22/2024.  Adjustments:  22MAR2024: Increase to 40mg daily in the morning  4/22/2024 ASRS 2/1 (10-12)  [id number]: Start 30mg daily in the morning  3/22/2024 ASRS 2/1 (12-15)  Prior to 22FEB2024 pt was NOT taking a stimulant  1/11/2024 ASRS 3/2 (15-22)  Continue WELLBUTRIN XL  Target dose range 150-300mg/d  Adjustments:  [id number]: Continue 300mg daily  Prior to evaluation pt had been taking WELLBUTRIN 300mg daily  Continue XANAX  Adjustments:  11BJG1199: Continue 0.25mg daily prn  Prior to evaluation pt had been taking XANAX 0.25mg daily prn and reported taking 1d/w on average      Education, Counseling & Monitoring []SLEEP HYGIENE  []THERAPY  []CONTRACT 12/10/2024?  [] REVIEWED 12/10/2024?  []NRT  []   Other Orders    RETURN K: STANDARD PROTOCOL: RETURN IN 4 WEEKS (ONE MONTH)       Psychotherapy   Target Symptoms anxiety, depression, stress   Why chosen therapy is appropriate versus another modality: patient responds to this modality, relevant to diagnosis   Outcome Monitoring observation, self-report   Therapeutic Intervention CBT (cognitive behavioral therapy), psychoeducation, supportive psychotherapy   Topics and Themes building skills sets for symptom management, symptom recognition   Pt Response The patient's response to the intervention is accepting.    Pt Progress The patient's progress toward treatment goals is positive progress.   Duration 16 minutes       Subjective    Available documentation has been reviewed, and pertinent elements of the chart have been incorporated into this note where appropriate. Last Epic encounter with writer was on 10/7/2024   Domonique Carmona, a 64 y.o. female, presenting for followup visit. This visit was done in person, IN CLINIC.      Election stress  Work  "stress  Therapy provided and documented above    "I am better, though"  Sleep fair  Reports depression improving    Keeping therapy appointments     Seems to be adherent to pharmacotherapy   Increase visit frequency to q1m  Will continue treatment otherwise as planned        Objective    Mental Status and Physical Exam  1. Appearance: Dress is informal but appropriate. Motor activity normal.  2. Discourse: Clear speech with normal rate and volume. Associations intact. Orderly.  3. Emotional Expression: Anxious and depressed mood. Affect is appropriate.  4. Perception and Thinking: No hallucinations. No suicidality, no homicidality, delusions, or paranoia.  5. Sensorium: Grossly intact. Able to focus for interview.  6. Memory and Fund of Knowledge: Intact for content of interview.  7. Insight and Judgment: Intact.    Constitutional / General  Vitals:    12/10/24 1032   BP: (!) 136/95   Pulse: 79     (Current body mass index is unknown because there is no height or weight on file.)         Measurement-Based Care (MBC):     Routine Instruments   PROMIS-ANXIETY Interpretation: 13 (4a raw score): T-SCORE 65.3; MODERATE using 55-60-70 cutoffs.   PROMIS-DEPRESSION Interpretation: 12 (4a raw score): T-SCORE 62.2; MODERATE using 55-60-70 cutoffs.   PSS4 Interpretation: 09/16; MODERATE using 6-11 cutoffs. 1 PH, 0 LSE. Moderate perceived helplessness; pt moderately experiences a lack of control over responses to stress.   Additional Instruments   MBC ANCHOR : about the same         Auto-populated chart data omitted from this note for brevity.      Billing Documentation:     Method of Encounter IN PERSON visit at the clinic, established   E/M Code 11145: FOLLOW UP VISIT, Rx mgmt, "Multiple STABLE chronic illnesses"   Additional Codes and Modifiers 90446: 16 minutes (with therapy documentation above)    04506, with modifer 59: administered and scored more than one psychological or neuropsychological tests (see MBC above) (16+ " mins)  , without modifiers -24,-25,-53: COMPLEXITY: Visit today included increased complexity associated with the care of the episodic problem(s) (multiple psychiatric disorders - see above) addressed and managing the longitudinal care of the patient due to the serious and/or complex managed problem(s) (multiple psychiatric disorders - see above).   Time N/A - Not billing for time        Vinny Charles DO  Department of Psychiatry, [facility]

## 2024-12-12 NOTE — PROGRESS NOTES
Individual Psychotherapy (PhD/LCSW)    8/29/2023    Stephanie MARIANO #4 of 5     PLEASE SEE CLINICIAN'S CONFIDENTIAL NOTES.     Diagnosis:   1. Ill-defined cause of morbidity/mortality        Plan:  individual psychotherapy    Return to clinic: as scheduled    Length of Service (minutes): 45   Counseling and Referral of Other Preventative  (Italic type indicates deductible and co-insurance are waived)    Patient Name: Jovana Fall  Today's Date: 12/12/2024    Health Maintenance       Date Due Completion Date    Eye Exam 11/28/2024 11/28/2023    COVID-19 Vaccine (7 - 2024-25 season) 12/03/2024 10/8/2024    Hemoglobin A1c 04/10/2025 10/10/2024    Diabetes Urine Screening 10/08/2025 10/8/2024    Lipid Panel 10/10/2025 10/10/2024    Aspirin/Antiplatelet Therapy 12/12/2025 12/12/2024    DEXA Scan 05/08/2026 5/8/2023    TETANUS VACCINE 04/11/2028 4/11/2018    Override on 4/11/2018: Done    Override on 10/28/2016: Declined        No orders of the defined types were placed in this encounter.    The following information is provided to all patients.  This information is to help you find resources for any of the problems found today that may be affecting your health:                  Living healthy guide: ms.gov    Understanding Diabetes: www.diabetes.org      Eating healthy: www.cdc.gov/healthyweight      CDC home safety checklist: www.cdc.gov/steadi/patient.html      Agency on Aging: ms.gov    Alcoholics anonymous (AA): www.aa.org      Physical Activity: www.césar.nih.gov/mn7ksnq      Tobacco use: ms.gov

## 2024-12-17 RX ORDER — PANTOPRAZOLE SODIUM 20 MG/1
20 TABLET, DELAYED RELEASE ORAL DAILY
Qty: 90 TABLET | Refills: 0 | Status: SHIPPED | OUTPATIENT
Start: 2024-12-17

## 2025-01-09 ENCOUNTER — OFFICE VISIT (OUTPATIENT)
Dept: PSYCHIATRY | Facility: CLINIC | Age: 65
End: 2025-01-09
Payer: COMMERCIAL

## 2025-01-09 DIAGNOSIS — F41.1 GENERALIZED ANXIETY DISORDER WITH PANIC ATTACKS: ICD-10-CM

## 2025-01-09 DIAGNOSIS — F90.0 ADHD (ATTENTION DEFICIT HYPERACTIVITY DISORDER), INATTENTIVE TYPE: ICD-10-CM

## 2025-01-09 DIAGNOSIS — F33.41 RECURRENT MAJOR DEPRESSIVE DISORDER, IN PARTIAL REMISSION: Primary | ICD-10-CM

## 2025-01-09 DIAGNOSIS — G47.00 INSOMNIA, UNSPECIFIED TYPE: ICD-10-CM

## 2025-01-09 DIAGNOSIS — F41.0 GENERALIZED ANXIETY DISORDER WITH PANIC ATTACKS: ICD-10-CM

## 2025-01-09 NOTE — PROGRESS NOTES
Individual Psychotherapy (PhD/LCSW)    1/9/2025    Site:  [facility]        Therapeutic Intervention: Met with patient.  Outpatient - Supportive psychotherapy 45 min - CPT Code 54228 and Outpatient - Interactive psychotherapy 45 min - CPT code 04071    Chief complaint/reason for encounter: depression, anxiety, and work related stressors   Expand All Collapse All  Individual Psychotherapy (PhD/LCSW)     12/10/2024     Site:  [facility]                      Therapeutic Intervention: Met with patient.  Outpatient - Supportive psychotherapy 45 min - CPT Code 76725 and Outpatient - Interactive psychotherapy 45 min - CPT code 54120     Chief complaint/reason for encounter: depression and anxiety     Medical history:        Past Medical History:   Diagnosis Date    Abnormal Pap smear 1986     Cryosurgery    Anxiety      Arthritis      Essential hypertension 11/26/2020    GERD (gastroesophageal reflux disease)      Venous stasis dermatitis of left lower extremity 12/31/2018         Medications:    Current Medications      Current Outpatient Medications:     ALPRAZolam (XANAX) 0.25 MG tablet, Take 1 tablet (0.25 mg total) by mouth daily as needed for Anxiety., Disp: 60 tablet, Rfl: 1    buPROPion (WELLBUTRIN XL) 300 MG 24 hr tablet, Take 1 tablet (300 mg total) by mouth every morning., Disp: 30 tablet, Rfl: 1    dextroamphetamine-amphetamine 5 mg Tab, Take 5 mg by mouth once daily. Take at lunchtime., Disp: 30 tablet, Rfl: 0    EScitalopram oxalate (LEXAPRO) 20 MG tablet, Take 1 tablet (20 mg total) by mouth every morning., Disp: 30 tablet, Rfl: 1    ibuprofen (ADVIL,MOTRIN) 800 MG tablet, Take 1 tablet (800 mg total) by mouth 3 (three) times daily., Disp: 60 tablet, Rfl: 6    lisdexamfetamine (VYVANSE) 40 MG Cap, Take 1 capsule (40 mg total) by mouth every morning., Disp: 30 capsule, Rfl: 0    pantoprazole (PROTONIX) 20 MG tablet, Take 1 tablet (20 mg total) by mouth once daily., Disp: 90 tablet, Rfl: 1    tiZANidine (ZANAFLEX) 4  MG tablet, TAKE 1 TABLET BY MOUTH THREE TIMES A DAY AS NEEDED FOR 10 DAYS, Disp: 90 tablet, Rfl: 1        Family history of psychiatric illness:          Family History   Problem Relation Name Age of Onset    Ovarian cancer Mother        Breast cancer Neg Hx        Colon cancer Neg Hx        Collagen disease Neg Hx        Glaucoma Neg Hx        Melanoma Neg Hx        Psoriasis Neg Hx        Lupus Neg Hx        Eczema Neg Hx             Social history (marriage, employment, etc.):   Initial:        Pt is pleasant and cooperative on interview. This visit was done in person in the clinic.     Pt reports she had been seeing the therapist at this clinic through [facility] for some time, who suggested medication management by a psychiatrist.      Pt complains about feeling sad, overwhelmed and anxious. 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 She engages in binge eating and is s/p bariatric surgery. She is bothered by low energy. Concentration has been poor.      Discussed lifestyle and biomedical factors which complicate the treatment while contributing to the symptom presentation.     Pt would like to continue individual therapy. She is open to medication changes. Medication history was thoroughly explored.      Personal Functioning   Stress Home stress. Work stress. See PSS4 below.   Mood Mood is sad. POSITIVE AFFECT Structures: Both ANHEDONIA and CONTEXT INSENSITIVITY noted. Diminished ability to achieve pleasure and purpose in things; loss of motivation and disengagement noted in both positive and negative contexts. NEGATIVE AFFECT and DMN Structures: Feeling GUILT, HOPELESSNESS and WORTHLESSNESS. RUMINATION and NEGATIVE BIAS noted. PERSONAL-INTERPERSONAL Functioning: Energy is LOW. Socializing LESS.   Anxiety See instruments below. RUMINATION: Pt described repeated worry and inward focus on negative thoughts. SALIENCE-ANXIOUS AVOIDANCE: Pt described anxious arousal and apprehension. THREAT  DYSREGULATION: Pt described panic attacks with classic symptoms. See history below.   Sleep Pt reports sleep as poor overall and NOT restorative. Sleep onset is more than an hour. Duration is unclear with 2 or 3 interruptions due to bathroom needs and spontaneous awakenings. Denies naps. Patient does NOT use a sleep aid. Nightmares have NOT been a problem.   Cognition Pt reports concentration is down. Pt is easily distracted. Memory is worse than usual.  Productivity is down.   Appetite and Nutrition Pt reports appetite varies. Pt engages in episodes of binge eating.   Safety Patient did not display signs of nor endorse symptoms of overt psychosis or acute mood disorder requiring hospitalization during the encounter. Patient denied violent thoughts or suicidal or homicidal ideation, intent, or plan.   Suicide Risk Suicide risk assessment performed. Pt denies suicidal ideation, intent or plan. Pt has never attempted suicide in the past. Risk factors include anxiety and mood disorder. Protective factors include wanting to live for the family and receptivity to treatment. Suicide risk at this time is LOW. Pt agrees to safety. No safety concerns identified at this time.    Interpersonal Functioning   Home Stress   Peers Socializing less.   Work Problems with supervisor. Overwhelm.                            Psychiatric History - Diagnostic   Reported Diagnoses ADD, anxiety, depression   Formal Testing Unclear if ADHD testing was formal testing.   Symptom History General Psychosis: No history of psychosis.  PANIC ATTACKS: positive history with classic symptoms, onset adolescence, most recent 3ma.  Mood cycles: No episodes of hypomania, stephan[facility] or mixed mood episodes.  DEPRESSION: Recurrent with multiple depression episodes, onset between 16yo to 22 yo (BI 20+).  ANXIETY: Symptoms onset childhood.  ADHD: Symptoms onset unclear.    Suicide Attempts NO    Self Harm NO   Psychiatric History - Treatment   IOP and Inpatient NONE  "  Outpatient Pt had previously seen Dr. Cintron ([id number] and JAN2021 for bariatric surgery eval). Before Dr. Cintron saw a PMHNP 10y prior. Started at 19yo.   Psychotherapy Currently in therapy ([facility] at this clinic). Started at 19yo.   RX Prior Psychotropics ADDERALL (2014, for about 4 years, took with antidepressant and slept well), CELEXA (dc in 2014, does not remember response), PROZAC (2016, caused dysphoria, "made me darker"), TRAZODONE (2016, caused nightmares), WELLBUTRIN (initiated by primary care in 2014 and again in [id number]), XANAX (JUL2023 taking 0.25mg daily prn 1d/w), ZOLOFT (does not remember effect, took for one month)   Current: Xanax and Wellbutrin  Personal Psychosocial History   Childhood Born 3rd of 6 children. Raised by adopted father and mother. Parents  when patient was 9 years old. Pt moved around quite a bit in early childhood because bio father was in the . Does not remember childhood well.  No abuse, no serious illnesses and no injuries reported in childhood   Marital Status single, never    Children Two. Age range adulthood (18+yo).   Residence private residence, with son   Occupation employed, [employer] Hobbies collecting ([employer]), reading, and traveling   Islam Practice Deferred.   Education History Master's degree.    History NO   Legal History Once for writing a bad check   Abuse History NO   Trauma History NO   Sexual History Deferred.   Family History     1st Degree 2nd Degree 3rd Degree   Suicides No No No   Substance Abuse son No No   Alcohol Abuse No No No   Bipolar Disorder No No No   Anxiety mother and son No No   Depression mother and son No No   Other/Medical ADHD (son), Schizophrenia (uncle), cancer, dementia   Substance History   Alcohol NEVER regular use nor history of abuse.   Tobacco and Nicotine Formerly smoked tobacco. QUIT years ago.   Caffeine LOW   Marijuana 0/4: Denies active use within a year.   Other " Recreational Substances Limited to her 20s, tried LSD twice and cocaine. Last use at 27yo.   Detox/rehab NO     Medical history:   Past Medical History:   Diagnosis Date    Abnormal Pap smear 1986    Cryosurgery    Anxiety     Arthritis     Essential hypertension 11/26/2020    GERD (gastroesophageal reflux disease)     Venous stasis dermatitis of left lower extremity 12/31/2018       Medications:    Current Outpatient Medications:     ALPRAZolam (XANAX) 0.25 MG tablet, Take 1 tablet (0.25 mg total) by mouth daily as needed for Anxiety., Disp: 60 tablet, Rfl: 1    buPROPion (WELLBUTRIN XL) 300 MG 24 hr tablet, Take 1 tablet (300 mg total) by mouth every morning., Disp: 30 tablet, Rfl: 1    dextroamphetamine-amphetamine 5 mg Tab, Take 1 tablet (5 mg) by mouth once daily. Take at lunchtime., Disp: 30 tablet, Rfl: 0    EScitalopram oxalate (LEXAPRO) 20 MG tablet, Take 1 tablet (20 mg total) by mouth every morning., Disp: 30 tablet, Rfl: 1    ibuprofen (ADVIL,MOTRIN) 800 MG tablet, Take 1 tablet (800 mg total) by mouth 3 (three) times daily., Disp: 60 tablet, Rfl: 6    lisdexamfetamine (VYVANSE) 40 MG Cap, Take 1 capsule (40 mg total) by mouth every morning., Disp: 30 capsule, Rfl: 0    pantoprazole (PROTONIX) 20 MG tablet, Take 1 tablet (20 mg total) by mouth once daily., Disp: 90 tablet, Rfl: 0    tiZANidine (ZANAFLEX) 4 MG tablet, TAKE 1 TABLET BY MOUTH THREE TIMES A DAY AS NEEDED FOR 10 DAYS, Disp: 90 tablet, Rfl: 1    Family history of psychiatric illness:   Family History   Problem Relation Name Age of Onset    Ovarian cancer Mother      Breast cancer Neg Hx      Colon cancer Neg Hx      Collagen disease Neg Hx      Glaucoma Neg Hx      Melanoma Neg Hx      Psoriasis Neg Hx      Lupus Neg Hx      Eczema Neg Hx         Social history (marriage, employment, etc.):  Previous session on 12/10/2024:     Domonique presents with good eye contact, direct speech, mood is stable, somewhat anxious and somewhat despondent over  election results, and we speak to same, normalize response, thought processes intact, goal directed, behavior is most cooperative, speaks to recent developments including son Mendez's giving up marijuana, now increased motivation, dating, is happy with this new development, work stressors still there, coping and managing well, sets realistic goals, we speak to what we are able to change and accepting what we can't.  Scheduled for follow up in one month however door is always open for follow up whenever Domonique is in need.      Social History     Tobacco Use    Smoking status: Former     Current packs/day: 0.00     Average packs/day: 0.5 packs/day for 20.0 years (10.0 ttl pk-yrs)     Types: Cigarettes     Start date: 1985     Quit date: 2005     Years since quittin.5    Smokeless tobacco: Never   Substance Use Topics    Alcohol use: No    Drug use: No       Interval history and content of current session: Domonique presents with good eye contact, direct speech, mood is stable, thought processes intact, goal directed, behavior is most cooperative and speaks to current status, work related stressors and frustrations.  Coping exceedingly well, was able to disengage from work during the holidays, respite care, self care at play.  Will see her as scheduled.     Treatment plan:  Target symptoms: anxiety   Why chosen therapy is appropriate versus another modality: relevant to diagnosis, patient responds to this modality, evidence based practice  Outcome monitoring methods: self-report, observation  Therapeutic intervention type: supportive psychotherapy    Risk parameters:  Patient reports no suicidal ideation  Patient reports no homicidal ideation  Patient reports no self-injurious behavior  Patient reports no violent behavior    Verbal deficits: None    Patient's response to intervention:  The patient's response to intervention is accepting, motivated.    Progress toward goals and other mental status  changes:  The patient's progress toward goals is excellent.    Diagnosis:   1. Recurrent major depressive disorder, in partial remission    2. Generalized anxiety disorder with panic attacks    3. ADHD (attention deficit hyperactivity disorder), inattentive type    4. Insomnia, unspecified type        Plan:  individual psychotherapy    Return to clinic: as scheduled    Length of Service (minutes):  55

## 2025-01-10 ENCOUNTER — OFFICE VISIT (OUTPATIENT)
Dept: PSYCHIATRY | Facility: CLINIC | Age: 65
End: 2025-01-10
Payer: COMMERCIAL

## 2025-01-10 VITALS
BODY MASS INDEX: 41.57 KG/M2 | SYSTOLIC BLOOD PRESSURE: 138 MMHG | HEIGHT: 71 IN | DIASTOLIC BLOOD PRESSURE: 94 MMHG | HEART RATE: 74 BPM

## 2025-01-10 DIAGNOSIS — F41.1 GENERALIZED ANXIETY DISORDER WITH PANIC ATTACKS: ICD-10-CM

## 2025-01-10 DIAGNOSIS — G47.00 INSOMNIA, UNSPECIFIED TYPE: ICD-10-CM

## 2025-01-10 DIAGNOSIS — I10 ESSENTIAL HYPERTENSION: ICD-10-CM

## 2025-01-10 DIAGNOSIS — F90.0 ADHD (ATTENTION DEFICIT HYPERACTIVITY DISORDER), INATTENTIVE TYPE: ICD-10-CM

## 2025-01-10 DIAGNOSIS — F33.1 MODERATE EPISODE OF RECURRENT MAJOR DEPRESSIVE DISORDER: Primary | ICD-10-CM

## 2025-01-10 DIAGNOSIS — F41.0 GENERALIZED ANXIETY DISORDER WITH PANIC ATTACKS: ICD-10-CM

## 2025-01-10 PROCEDURE — 99999 PR PBB SHADOW E&M-EST. PATIENT-LVL III: CPT | Mod: PBBFAC,,, | Performed by: STUDENT IN AN ORGANIZED HEALTH CARE EDUCATION/TRAINING PROGRAM

## 2025-01-10 RX ORDER — LISDEXAMFETAMINE DIMESYLATE 40 MG/1
40 CAPSULE ORAL EVERY MORNING
Qty: 30 CAPSULE | Refills: 0 | Status: SHIPPED | OUTPATIENT
Start: 2025-01-10 | End: 2025-02-09

## 2025-01-10 RX ORDER — GUANFACINE 1 MG/1
1 TABLET ORAL NIGHTLY
Qty: 30 TABLET | Refills: 1 | Status: SHIPPED | OUTPATIENT
Start: 2025-01-10 | End: 2025-03-11

## 2025-01-10 RX ORDER — DEXTROAMPHETAMINE SACCHARATE, AMPHETAMINE ASPARTATE, DEXTROAMPHETAMINE SULFATE AND AMPHETAMINE SULFATE 1.25; 1.25; 1.25; 1.25 MG/1; MG/1; MG/1; MG/1
5 TABLET ORAL DAILY
Qty: 30 TABLET | Refills: 0 | Status: SHIPPED | OUTPATIENT
Start: 2025-01-10 | End: 2025-02-09

## 2025-01-10 NOTE — PROGRESS NOTES
"    Outpatient Psychiatry Followup Visit  1/10/2025  Assessment & Plan    Impression     ICD-10-CM ICD-9-CM   1. Moderate episode of recurrent major depressive disorder  F33.1 296.32   2. ADHD (attention deficit hyperactivity disorder), inattentive type  F90.0 314.00   3. Generalized anxiety disorder with panic attacks  F41.1 300.02    F41.0 300.01   4. Insomnia, unspecified type  G47.00 780.52   5. Essential hypertension  I10 401.9   6. BMI 40.0-44.9, adult  Z68.41 V85.41      Plan of Care & Medication Management    Chart was reviewed. The risks and benefits of medication were discussed with pt. The treatment plan and followup plan were reviewed with pt. Pt understands to contact clinic if symptoms worsen. Pt understands to call 911 or go to nearest ER for suicidal ideation, intent or plan. Unless otherwise specified, pt did NOT display signs of nor endorse symptoms of overt psychosis or acute mood disorder requiring hospitalization during the encounter; pt denied violent thoughts or suicidal or homicidal ideation, intent, or plan.   RX History ADDERALL XR/IR (2014, for about 4 years, took with antidepressant and slept well), ATARAX/VISTARIL, CELEXA (dc in 2014, does not remember response), LEXAPRO, PROZAC (2016, caused dysphoria, "made me darker"), REMERON (appetite), TRAZODONE (2016, caused nightmares), VYVANSE, WELLBUTRIN (initiated by primary care in 2014 and again in 2557-6873), XANAX (JUL2023 taking 0.25mg daily prn 1d/w), ZOLOFT (does not remember effect, took for one month)    Current RX [id number]: JOSE 2/6, BI 47/100  Considering discontinuing REMERON when depression is in remission  Continue ATARAX/VISTARIL  Adjustments:  [id number]: Start 10mg TID prn anxiety  Prior to evaluation pt had been taking WELLBUTRIN 300mg daily and XANAX 0.25mg daily prn  Continue ADDERALL IR  Adjustments:  8/5/2024: Start 5mg daily around lunchtime  Start GUANFACINE  Pt was provided [facility] educational material " "1/10/2025  Adjustments:  1/10/2025: Start 1mg HS  Continue LEXAPRO  Target dose range 10-20mg/d  Adjustments:  50BLM2528: Adjust to 20mg daily in the morning  00RSL7047: Increase to 20mg NIGHTLY   35LVS2514: Adjust to 10mg NIGHTLY   87QOF6310: Start 10mg daily  Prior to evaluation pt had been taking WELLBUTRIN 300mg daily and XANAX 0.25mg daily prn  Continue VYVANSE  Pt was provided [facility] educational material 2/22/2024.  Adjustments:  22MAR2024: Increase to 40mg daily in the morning  4/22/2024 ASRS 2/1 (10-12)  06ZBK5131: Start 30mg daily in the morning  3/22/2024 ASRS 2/1 (12-15)  Prior to 22FEB2024 pt was NOT taking a stimulant  1/11/2024 ASRS 3/2 (15-22)  Continue WELLBUTRIN XL  Target dose range 150-300mg/d  Adjustments:  24LDA8419: Continue 300mg daily  Prior to evaluation pt had been taking WELLBUTRIN 300mg daily  Continue XANAX  Adjustments:  18YYB6750: Continue 0.25mg daily prn  Prior to evaluation pt had been taking XANAX 0.25mg daily prn and reported taking 1d/w on average      Education, Counseling & Monitoring []SLEEP HYGIENE  []THERAPY  []CONTRACT 1/10/2025?  [] REVIEWED 1/10/2025?  []NRT  []   Other Orders    RETURN L: STANDARD PROTOCOL: RETURN IN 4 WEEKS (ONE MONTH)  EXTENDED FOR ONE HOUR  Continue medication management     Subjective    Available documentation has been reviewed, and pertinent elements of the chart have been incorporated into this note where appropriate. Last Epic encounter with writer was on 12/10/2024   Domonique Carmona, a 64 y.o. female, presenting for followup visit. This visit was done in person, IN CLINIC.      Now has a 5th dog    Took time off work  "Marlene was easy"  Calmer home life    Explored "inappropriate shopping"  Online, adding to collection of pots and Jyoti dolls  "I struggle with the impulse of it"  Will be selling some  Some financial strain      Work stress  Struggle with maintaining focus and staying on task    Mood is stable  Denies depressed " "mood  Anxiety is controlled  Sleeping well    Pattern of high BP, including today  Encourage pt to check BP at home  Discussed adding INTUNIV  Pt was encouraged to keep therapy appointments   Will continue treatment otherwise as planned        Objective    Mental Status and Physical Exam  1. Appearance: Dress is informal but appropriate. Motor activity normal.  2. Discourse: Clear speech with normal rate and volume. Associations intact. Orderly.  3. Emotional Expression: Somewhat anxious and depressed mood. Affect is appropriate.  4. Perception and Thinking: No hallucinations. No suicidality, no homicidality, delusions, or paranoia.  5. Sensorium: Grossly intact. Able to focus for interview.  6. Memory and Fund of Knowledge: Intact for content of interview.  7. Insight and Judgment: Intact.    Constitutional / General  Vitals:    01/10/25 0936   BP: (!) 138/94   Pulse: 74   Height: 5' 11" (1.803 m)     (Current body mass index is 41.57 kg/m².)         Measurement-Based Care (MBC):     Routine Instruments   PROMIS-ANXIETY Interpretation: 9 (4a raw score): T-SCORE 57.7; MILD using 55-60-70 cutoffs.   PROMIS-DEPRESSION Interpretation: 11 (4a raw score): T-SCORE 60.5; MODERATE using 55-60-70 cutoffs.   PSS4 Interpretation: 08/16; MODERATE using 6-11 cutoffs. 0 PH, 0 LSE.   Additional Instruments   MBC ANCHOR : a little better         Auto-populated Chart Data:     The chart was reviewed for recent diagnostic procedures and investigations, and pertinent results are noted below.   Encounter Medications  Outpatient Encounter Medications as of 1/10/2025   Medication Sig Dispense Refill    ALPRAZolam (XANAX) 0.25 MG tablet Take 1 tablet (0.25 mg total) by mouth daily as needed for Anxiety. 60 tablet 1    buPROPion (WELLBUTRIN XL) 300 MG 24 hr tablet Take 1 tablet (300 mg total) by mouth every morning. 30 tablet 1    EScitalopram oxalate (LEXAPRO) 20 MG tablet Take 1 tablet (20 mg total) by mouth every morning. 30 tablet 1    " ibuprofen (ADVIL,MOTRIN) 800 MG tablet Take 1 tablet (800 mg total) by mouth 3 (three) times daily. 60 tablet 6    pantoprazole (PROTONIX) 20 MG tablet Take 1 tablet (20 mg total) by mouth once daily. 90 tablet 0    tiZANidine (ZANAFLEX) 4 MG tablet TAKE 1 TABLET BY MOUTH THREE TIMES A DAY AS NEEDED FOR 10 DAYS 90 tablet 1    [DISCONTINUED] dextroamphetamine-amphetamine 5 mg Tab Take 1 tablet (5 mg) by mouth once daily. Take at lunchtime. 30 tablet 0    [DISCONTINUED] lisdexamfetamine (VYVANSE) 40 MG Cap Take 1 capsule (40 mg total) by mouth every morning. 30 capsule 0    dextroamphetamine-amphetamine 5 mg Tab Take 1 tablet (5 mg) by mouth once daily. Take at lunchtime. 30 tablet 0    guanFACINE (TENEX) 1 MG Tab Take 1 tablet (1 mg total) by mouth nightly. 30 tablet 1    lisdexamfetamine (VYVANSE) 40 MG Cap Take 1 capsule (40 mg total) by mouth every morning. 30 capsule 0    [DISCONTINUED] pantoprazole (PROTONIX) 20 MG tablet Take 1 tablet (20 mg total) by mouth once daily. 90 tablet 1     No facility-administered encounter medications on file as of 1/10/2025.      Cardiometabolic  EKG Results  Results for orders placed or performed during the hospital encounter of 10/25/21   EKG 12-lead    Collection Time: 10/22/21 11:13 AM    Narrative    Test Reason : Z01.818,    Vent. Rate : 065 BPM     Atrial Rate : 065 BPM     P-R Int : 174 ms          QRS Dur : 086 ms      QT Int : 420 ms       P-R-T Axes : 007 053 027 degrees     QTc Int : 436 ms    Normal sinus rhythm  Normal ECG  When compared with ECG of 17-AUG-2020 12:33,  No significant change was found  Confirmed by Carmenza GALVAN, Iron TOBIN ([id number]) on 10/25/2021 11:21:43 AM    Referred By: RED MARTI           Confirmed By:Iron Herr MD        Labs  Lab Results   Component Value Date    TSH 0.607 05/03/2024    GLU 84 05/03/2024    CHOL 198 05/03/2024    TRIG 69 05/03/2024    HDL 58 05/03/2024    LDLCALC 126.2 05/03/2024       BMI Trend - (Current body  "mass index is 41.57 kg/m².)  Wt Readings from Last 7 Encounters:   04/17/24 135.2 kg (298 lb 1 oz)   04/11/24 135.2 kg (298 lb)   03/06/24 133.4 kg (294 lb)   02/22/24 133.7 kg (294 lb 13.8 oz)   02/07/24 129.7 kg (286 lb)   10/02/23 129.7 kg (286 lb)   09/18/23 128.7 kg (283 lb 10 oz)       BP/HR Trend   BP Readings from Last 3 Encounters:   01/10/25 (!) 138/94   12/10/24 (!) 136/95   10/07/24 (!) 141/92      Pulse Readings from Last 3 Encounters:   01/10/25 74   12/10/24 79   10/07/24 64       Endocrine Lab Results   Component Value Date    TSH 0.607 05/03/2024      Hematologic   Lab Results   Component Value Date    WBC 4.24 05/03/2024    RBC 4.40 05/03/2024    HGB 13.1 05/03/2024    HCT 41.0 05/03/2024    MCV 93 05/03/2024    MCH 29.8 05/03/2024    RDW 13.5 05/03/2024     05/03/2024    MPV 10.4 05/03/2024    GRAN 2.6 05/03/2024    GRAN 61.9 05/03/2024      Hepatorenal Lab Results   Component Value Date     05/03/2024    K 3.8 05/03/2024    CALCIUM 9.0 05/03/2024    PHOS 3.2 05/12/2021    MG 2.3 05/12/2021    CO2 30 (H) 05/03/2024    ANIONGAP 6 (L) 05/03/2024    CREATININE 0.8 05/03/2024    BUN 18 05/03/2024    EGFRNORACEVR >60.0 05/03/2024    SPECGRAV 1.010 05/03/2024    PROTEINUA Negative 05/03/2024    AST 9 (L) 05/03/2024    ALT 8 (L) 05/03/2024    BILITOT 0.7 05/03/2024    ALBUMIN 4.0 05/03/2024      Medication Level No results found for: "LITHIUM", "VALPROATE", "CBMZ", "CARBAMAZ", "LAMOTRIGINE", "PHENYTOIN", "PHENOBARB", "CLOZAPINE", "NORCLOZAP", "CLOZNORCLOZ", "CLONAZEPAM", "MAYUR", "VENLAFAXINE", "NORTRIP", "OXCARBAZ"   Other Labs Lab Results   Component Value Date    THIAMINEBLOO 85 05/11/2022    YIKEVDJA33 914 05/03/2024    HUSPFNLA89KC 29 (L) 05/11/2022      Drug Screening   AMP * (*) METHAMP * (*)   LARA * (*) MORPH * (*)   BUP * (*) OXY * (*)   BENZO * (*) METHADONE * (*)   CHARLOTTE * (*) THC * (*)   HX No results found for: "AMPHETAMINES", "PCDSOPHENCYN", "COCAINEMETAB", "POCCOC", "COCAINE", " ""CHAR[place]OTTE", "COCAINEURINE", "POCCOCDRUG", "PCDSUTRAMA", "PCDSOBENZOD", "BARBITURATES", "PCDSCOMETHA", "OPIATESCREEN", "PCDSUBUPRE", "PCDSUFENTANY", "PCDSUOXYCOD", "BUPRENORPH", "NORBUPRENOR", "MARIJUANATHC"  No results found for: "PCDSOALCOHOL", "ALCOHOLMEDIC", "THEYLGLUCU", "ETHYLSULF", "KPSD72406", "PETH"         Billing Documentation:     Method of Encounter IN PERSON visit at the clinic, established   E/M Code 44876: FOLLOW UP VISIT, Rx mgmt, "Multiple STABLE chronic illnesses"   Additional Codes and Modifiers     10415, with modifer 59: administered and scored more than one psychological or neuropsychological tests (see MBC above) (16+ mins)  , without modifiers -24,-25,-53: COMPLEXITY: Visit today included increased complexity associated with the care of the episodic problem(s) (multiple psychiatric disorders - see above) addressed and managing the longitudinal care of the patient due to the serious and/or complex managed problem(s) (multiple psychiatric disorders - see above).   Time N/A - Not billing for time        Vinny Charles DO  Department of Psychiatry, [facility]        "

## 2025-01-13 ENCOUNTER — HOSPITAL ENCOUNTER (OUTPATIENT)
Dept: RADIOLOGY | Facility: HOSPITAL | Age: 65
Discharge: HOME | End: 2025-01-13
Attending: STUDENT IN AN ORGANIZED HEALTH CARE EDUCATION/TRAINING PROGRAM
Payer: COMMERCIAL

## 2025-01-13 DIAGNOSIS — Z12.31 ENCOUNTER FOR SCREENING MAMMOGRAM FOR BREAST CANCER: ICD-10-CM

## 2025-01-13 PROCEDURE — 77067 SCR MAMMO BI INCL CAD: CPT | Mod: TC

## 2025-01-13 PROCEDURE — 77067 SCR MAMMO BI INCL CAD: CPT | Mod: 26,,, | Performed by: RADIOLOGY

## 2025-01-13 PROCEDURE — 77063 BREAST TOMOSYNTHESIS BI: CPT | Mod: 26,,, | Performed by: RADIOLOGY

## 2025-01-31 ENCOUNTER — PATIENT MESSAGE (OUTPATIENT)
Dept: PSYCHIATRY | Facility: CLINIC | Age: 65
End: 2025-01-31
Payer: COMMERCIAL

## 2025-02-10 ENCOUNTER — OFFICE VISIT (OUTPATIENT)
Dept: PSYCHIATRY | Facility: CLINIC | Age: 65
End: 2025-02-10
Payer: COMMERCIAL

## 2025-02-10 VITALS
HEART RATE: 83 BPM | DIASTOLIC BLOOD PRESSURE: 76 MMHG | BODY MASS INDEX: 41.57 KG/M2 | SYSTOLIC BLOOD PRESSURE: 124 MMHG | HEIGHT: 71 IN

## 2025-02-10 DIAGNOSIS — R41.3 MEMORY PROBLEM: ICD-10-CM

## 2025-02-10 DIAGNOSIS — Z79.899 CURRENT USE OF PROTON PUMP INHIBITOR: ICD-10-CM

## 2025-02-10 DIAGNOSIS — F41.0 GENERALIZED ANXIETY DISORDER WITH PANIC ATTACKS: ICD-10-CM

## 2025-02-10 DIAGNOSIS — Z98.84 HISTORY OF GASTRIC BYPASS: ICD-10-CM

## 2025-02-10 DIAGNOSIS — I10 ESSENTIAL HYPERTENSION: ICD-10-CM

## 2025-02-10 DIAGNOSIS — F90.0 ADHD (ATTENTION DEFICIT HYPERACTIVITY DISORDER), INATTENTIVE TYPE: ICD-10-CM

## 2025-02-10 DIAGNOSIS — F33.1 MODERATE EPISODE OF RECURRENT MAJOR DEPRESSIVE DISORDER: Primary | ICD-10-CM

## 2025-02-10 DIAGNOSIS — F41.1 GENERALIZED ANXIETY DISORDER WITH PANIC ATTACKS: ICD-10-CM

## 2025-02-10 DIAGNOSIS — R41.840 CONCENTRATION DEFICIT: ICD-10-CM

## 2025-02-10 PROCEDURE — 99999 PR PBB SHADOW E&M-EST. PATIENT-LVL III: CPT | Mod: PBBFAC,,, | Performed by: STUDENT IN AN ORGANIZED HEALTH CARE EDUCATION/TRAINING PROGRAM

## 2025-02-10 RX ORDER — DEXTROAMPHETAMINE SACCHARATE, AMPHETAMINE ASPARTATE, DEXTROAMPHETAMINE SULFATE AND AMPHETAMINE SULFATE 1.25; 1.25; 1.25; 1.25 MG/1; MG/1; MG/1; MG/1
5 TABLET ORAL DAILY
Qty: 30 TABLET | Refills: 0 | Status: SHIPPED | OUTPATIENT
Start: 2025-02-10 | End: 2025-03-12

## 2025-02-10 RX ORDER — GUANFACINE 1 MG/1
1 TABLET ORAL NIGHTLY
Qty: 30 TABLET | Refills: 1 | Status: SHIPPED | OUTPATIENT
Start: 2025-02-10 | End: 2025-04-11

## 2025-02-10 RX ORDER — BUPROPION HYDROCHLORIDE 300 MG/1
300 TABLET ORAL EVERY MORNING
Qty: 30 TABLET | Refills: 1 | Status: SHIPPED | OUTPATIENT
Start: 2025-02-10 | End: 2025-04-11

## 2025-02-10 RX ORDER — ESCITALOPRAM OXALATE 20 MG/1
20 TABLET ORAL EVERY MORNING
Qty: 30 TABLET | Refills: 1 | Status: SHIPPED | OUTPATIENT
Start: 2025-02-10 | End: 2025-04-11

## 2025-02-10 RX ORDER — LISDEXAMFETAMINE DIMESYLATE 40 MG/1
40 CAPSULE ORAL EVERY MORNING
Qty: 30 CAPSULE | Refills: 0 | Status: SHIPPED | OUTPATIENT
Start: 2025-02-10 | End: 2025-03-12

## 2025-02-10 NOTE — PATIENT INSTRUCTIONS
Please complete lab work at your earliest convenience. Your labs are NON-FASTING.  Continue medicine as prescribed

## 2025-02-10 NOTE — PROGRESS NOTES
"    Outpatient Psychiatry Followup Visit  2/10/2025  Assessment & Plan    Impression     ICD-10-CM ICD-9-CM   1. Moderate episode of recurrent major depressive disorder  F33.1 296.32   2. ADHD (attention deficit hyperactivity disorder), inattentive type  F90.0 314.00   3. Generalized anxiety disorder with panic attacks  F41.1 300.02    F41.0 300.01   4. History of gastric bypass  Z98.84 V45.86   5. Memory problem  R41.3 780.93   6. Concentration deficit  R41.840 799.51   7. Current use of proton pump inhibitor  Z79.899 V58.69   8. Essential hypertension  I10 401.9   9. BMI 40.0-44.9, adult  Z68.41 V85.41      Plan of Care & Medication Management    Chart was reviewed. The risks and benefits of medication were discussed with pt. The treatment plan and followup plan were reviewed with pt. Pt understands to contact clinic if symptoms worsen. Pt understands to call 911 or go to nearest ER for suicidal ideation, intent or plan. Unless otherwise specified, pt did NOT display signs of nor endorse symptoms of overt psychosis or acute mood disorder requiring hospitalization during the encounter; pt denied violent thoughts or suicidal or homicidal ideation, intent, or plan.   RX History ADDERALL XR/IR (2014, for about 4 years, took with antidepressant and slept well), ATARAX/VISTARIL, CELEXA (dc in 2014, does not remember response), LEXAPRO, PROZAC (2016, caused dysphoria, "made me darker"), REMERON (appetite), TRAZODONE (2016, caused nightmares), VYVANSE, WELLBUTRIN (initiated by primary care in 2014 and again in [id number]), XANAX (JUL2023 taking 0.25mg daily prn 1d/w), ZOLOFT (does not remember effect, took for one month)    Current RX [id number]: JOSE 2/6, BI 47/100  Considering discontinuing REMERON when depression is in remission  Continue ATARAX/VISTARIL  Adjustments:  [id number]: Start 10mg TID prn anxiety  Prior to evaluation pt had been taking WELLBUTRIN 300mg daily and XANAX 0.25mg daily prn  Continue ADDERALL " IR  Adjustments:  8/5/2024: Start 5mg daily around lunchtime  Continue GUANFACINE  Pt was provided [employer] educational material 1/10/2025  Adjustments:  1/10/2025: Start 1mg HS  Continue LEXAPRO  Target dose range 10-20mg/d  Adjustments:  54BKA0616: Adjust to 20mg daily in the morning  72DVV0976: Increase to 20mg NIGHTLY   91CZP2658: Adjust to 10mg NIGHTLY   25TUY4444: Start 10mg daily  Prior to evaluation pt had been taking WELLBUTRIN 300mg daily and XANAX 0.25mg daily prn  Continue VYVANSE  Pt was provided [employer] educational material 2/22/2024.  Adjustments:  22MAR2024: Increase to 40mg daily in the morning  4/22/2024 ASRS 2/1 (10-12)  [id number]: Start 30mg daily in the morning  3/22/2024 ASRS 2/1 (12-15)  Prior to [id number] pt was NOT taking a stimulant  1/11/2024 ASRS 3/2 (15-22)  Continue WELLBUTRIN XL  Target dose range 150-300mg/d  Adjustments:  54GUQ1493: Continue 300mg daily  Prior to evaluation pt had been taking WELLBUTRIN 300mg daily  Continue XANAX  Adjustments:  04JHB0360: Continue 0.25mg daily prn  Prior to evaluation pt had been taking XANAX 0.25mg daily prn and reported taking 1d/w on average      Education, Counseling & Monitoring []SLEEP HYGIENE  []THERAPY  []CONTRACT 2/10/2025?  [] REVIEWED 2/10/2025?  []NRT  []   Other Orders B1, B12   RETURN M: STANDARD PROTOCOL: RETURN IN 4 WEEKS (ONE MONTH)       Subjective    Available documentation has been reviewed, and pertinent elements of the chart have been incorporated into this note where appropriate. Last Epic encounter with writer was on 1/10/2025   Domonique Carmona, a 64 y.o. female, presenting for followup visit. This visit was done in person, IN CLINIC.      Work stress  Feels undervalued at work  Burned out  Anger, sadness, frustration  Losing her office    Sleeping well  Less depressed    BP wnl  BP better at home 120s/70s    Seems to be adherent to pharmacotherapy     Reports some word-finding difficulty  Has been substituting words  Will  "continue to monitor  Note pt's bariatric surgery history  Reports hasn't been taking vitamins for several months  Labs reviewed  Will order thiamine and B12    Will continue treatment otherwise as planned        Objective    Mental Status and Physical Exam  1. Appearance: Dress is informal but appropriate. Motor activity normal.  2. Discourse: Clear speech with normal rate and volume. Associations intact. Orderly.  3. Emotional Expression: Somewhat anxious and depressed mood. Affect is appropriate.  4. Perception and Thinking: No hallucinations. No suicidality, no homicidality, delusions, or paranoia.  5. Sensorium: Grossly intact. Able to focus for interview.  6. Memory and Fund of Knowledge: Intact for content of interview.  7. Insight and Judgment: Intact.    Constitutional / General  Vitals:    02/10/25 1541   BP: 124/76   Pulse: 83   Height: 5' 11" (1.803 m)     (Current body mass index is 41.57 kg/m².)         Measurement-Based Care (MBC):     Routine Instruments   PROMIS-ANXIETY Interpretation: 12 (4a raw score): T-SCORE 63.4; MODERATE using 55-60-70 cutoffs.   PROMIS-DEPRESSION Interpretation: 12 (4a raw score): T-SCORE 62.2; MODERATE using 55-60-70 cutoffs.   PSS4 Interpretation: 08/16; MODERATE using 6-11 cutoffs. 0 PH, 0 LSE.   Additional Instruments   MBC ANCHOR : about the same         Auto-populated Chart Data:     The chart was reviewed for recent diagnostic procedures and investigations, and pertinent results are noted below.   Encounter Medications  Outpatient Encounter Medications as of 2/10/2025   Medication Sig Dispense Refill    ALPRAZolam (XANAX) 0.25 MG tablet Take 1 tablet (0.25 mg total) by mouth daily as needed for Anxiety. 60 tablet 1    ibuprofen (ADVIL,MOTRIN) 800 MG tablet Take 1 tablet (800 mg total) by mouth 3 (three) times daily. 60 tablet 6    pantoprazole (PROTONIX) 20 MG tablet Take 1 tablet (20 mg total) by mouth once daily. 90 tablet 0    tiZANidine (ZANAFLEX) 4 MG tablet TAKE 1 " TABLET BY MOUTH THREE TIMES A DAY AS NEEDED FOR 10 DAYS 90 tablet 1    [DISCONTINUED] buPROPion (WELLBUTRIN XL) 300 MG 24 hr tablet Take 1 tablet (300 mg total) by mouth every morning. 30 tablet 1    [DISCONTINUED] dextroamphetamine-amphetamine 5 mg Tab Take 1 tablet (5 mg) by mouth once daily. Take at lunchtime. 30 tablet 0    [DISCONTINUED] EScitalopram oxalate (LEXAPRO) 20 MG tablet Take 1 tablet (20 mg total) by mouth every morning. 30 tablet 1    [DISCONTINUED] guanFACINE (TENEX) 1 MG Tab Take 1 tablet (1 mg total) by mouth nightly. 30 tablet 1    [DISCONTINUED] lisdexamfetamine (VYVANSE) 40 MG Cap Take 1 capsule (40 mg total) by mouth every morning. 30 capsule 0    buPROPion (WELLBUTRIN XL) 300 MG 24 hr tablet Take 1 tablet (300 mg total) by mouth every morning. 30 tablet 1    dextroamphetamine-amphetamine 5 mg Tab Take 1 tablet (5 mg) by mouth once daily. Take at lunchtime. 30 tablet 0    EScitalopram oxalate (LEXAPRO) 20 MG tablet Take 1 tablet (20 mg total) by mouth every morning. 30 tablet 1    guanFACINE (TENEX) 1 MG Tab Take 1 tablet (1 mg total) by mouth nightly. 30 tablet 1    lisdexamfetamine (VYVANSE) 40 MG Cap Take 1 capsule (40 mg total) by mouth every morning. 30 capsule 0     No facility-administered encounter medications on file as of 2/10/2025.      Cardiometabolic  EKG Results  Results for orders placed or performed during the hospital encounter of 10/25/21   EKG 12-lead    Collection Time: 10/22/21 11:13 AM    Narrative    Test Reason : Z01.818,    Vent. Rate : 065 BPM     Atrial Rate : 065 BPM     P-R Int : 174 ms          QRS Dur : 086 ms      QT Int : 420 ms       P-R-T Axes : 007 053 027 degrees     QTc Int : 436 ms    Normal sinus rhythm  Normal ECG  When compared with ECG of 17-AUG-2020 12:33,  No significant change was found  Confirmed by Iron Herr MD [id number]) on 10/25/2021 11:21:43 AM    Referred By: RED MARTI           Confirmed By:Iron Herr MD         "Labs  Lab Results   Component Value Date    TSH 0.607 05/03/2024    GLU 84 05/03/2024    CHOL 198 05/03/2024    TRIG 69 05/03/2024    HDL 58 05/03/2024    LDLCALC 126.2 05/03/2024       BMI Trend - (Current body mass index is 41.57 kg/m².)  Wt Readings from Last 7 Encounters:   04/17/24 135.2 kg (298 lb 1 oz)   04/11/24 135.2 kg (298 lb)   03/06/24 133.4 kg (294 lb)   02/22/24 133.7 kg (294 lb 13.8 oz)   02/07/24 129.7 kg (286 lb)   10/02/23 129.7 kg (286 lb)   09/18/23 128.7 kg (283 lb 10 oz)       BP/HR Trend   BP Readings from Last 3 Encounters:   02/10/25 124/76   01/10/25 (!) 138/94   12/10/24 (!) 136/95      Pulse Readings from Last 3 Encounters:   02/10/25 83   01/10/25 74   12/10/24 79       Endocrine Lab Results   Component Value Date    TSH 0.607 05/03/2024      Hematologic   Lab Results   Component Value Date    WBC 4.24 05/03/2024    RBC 4.40 05/03/2024    HGB 13.1 05/03/2024    HCT 41.0 05/03/2024    MCV 93 05/03/2024    MCH 29.8 05/03/2024    RDW 13.5 05/03/2024     05/03/2024    MPV 10.4 05/03/2024    GRAN 2.6 05/03/2024    GRAN 61.9 05/03/2024      Hepatorenal Lab Results   Component Value Date     05/03/2024    K 3.8 05/03/2024    CALCIUM 9.0 05/03/2024    PHOS 3.2 05/12/2021    MG 2.3 05/12/2021    CO2 30 (H) 05/03/2024    ANIONGAP 6 (L) 05/03/2024    CREATININE 0.8 05/03/2024    BUN 18 05/03/2024    EGFRNORACEVR >60.0 05/03/2024    SPECGRAV 1.010 05/03/2024    PROTEINUA Negative 05/03/2024    AST 9 (L) 05/03/2024    ALT 8 (L) 05/03/2024    BILITOT 0.7 05/03/2024    ALBUMIN 4.0 05/03/2024      Medication Level No results found for: "LITHIUM", "VALPROATE", "CBMZ", "CARBAMAZ", "LAMOTRIGINE", "PHENYTOIN", "PHENOBARB", "CLOZAPINE", "NORCLOZAP", "CLOZNORCLOZ", "CLONAZEPAM", "MAYUR", "VENLAFAXINE", "NORTRIP", "OXCARBAZ"   Other Labs Lab Results   Component Value Date    THIAMINEBLOO 85 05/11/2022    JHIVSKDA04 914 05/03/2024    [id number] 29 (L) 05/11/2022      Drug Screening   AMP * (*) " "METHAMP * (*)   LARA * (*) MORPH * (*)   BUP * (*) OXY * (*)   BENZO * (*) METHADONE * (*)   CHARLOTTE * (*) THC * (*)   HX No results found for: "AMPHETAMINES", "PCDSOPHENCYN", "COCAINEMETAB", "POCCOC", "COCAINE", "CHARLOTTE", "COCAINEURINE", "POCCOCDRUG", "PCDSUTRAMA", "PCDSOBENZOD", "BARBITURATES", "PCDSCOMETHA", "OPIATESCREEN", "PCDSUBUPRE", "PCDSUFENTANY", "PCDSUOXYCOD", "BUPRENORPH", "NORBUPRENOR", "MARIJUANATHC"  No results found for: "PCDSOALCOHOL", "ALCOHOLMEDIC", "THEYLGLUCU", "ETHYLSULF", "[id number]", "PETH"         Billing Documentation:     Method of Encounter IN PERSON visit at the clinic, established   E/M Code 96065: FOLLOW UP VISIT, 42 minutes   Additional Codes and Modifiers     32648, with modifer 59: administered and scored more than one psychological or neuropsychological tests (see MBC above) (16+ mins)  , without modifiers -24,-25,-53: COMPLEXITY: Visit today included increased complexity associated with the care of the episodic problem(s) (multiple psychiatric disorders - see above) addressed and managing the longitudinal care of the patient due to the serious and/or complex managed problem(s) (multiple psychiatric disorders - see above).   Time I spent a total of 58 minutes on the day of the visit. This includes face to face time and non-face to face time preparing to see the patient (eg, review of tests), obtaining and/or reviewing separately obtained history, documenting clinical information in the electronic or other health record, independently interpreting results and communicating results to the patient/family/caregiver, or care coordinator.        Vinny Charles, DO  Department of Psychiatry, [facility]        "

## 2025-02-12 ENCOUNTER — OFFICE VISIT (OUTPATIENT)
Dept: PSYCHIATRY | Facility: CLINIC | Age: 65
End: 2025-02-12
Payer: COMMERCIAL

## 2025-02-12 DIAGNOSIS — F90.0 ADHD (ATTENTION DEFICIT HYPERACTIVITY DISORDER), INATTENTIVE TYPE: ICD-10-CM

## 2025-02-12 DIAGNOSIS — F41.1 GENERALIZED ANXIETY DISORDER WITH PANIC ATTACKS: ICD-10-CM

## 2025-02-12 DIAGNOSIS — F41.0 GENERALIZED ANXIETY DISORDER WITH PANIC ATTACKS: ICD-10-CM

## 2025-02-12 DIAGNOSIS — F33.1 MODERATE EPISODE OF RECURRENT MAJOR DEPRESSIVE DISORDER: Primary | ICD-10-CM

## 2025-02-12 PROCEDURE — 90837 PSYTX W PT 60 MINUTES: CPT | Mod: S$GLB,,, | Performed by: SOCIAL WORKER

## 2025-02-12 NOTE — PROGRESS NOTES
Individual Psychotherapy (PhD/LCSW)    2/12/2025    Site:  [facility]        Therapeutic Intervention: Met with patient.  Outpatient - Supportive psychotherapy 45 min - CPT Code 29995 and Outpatient - Interactive psychotherapy 45 min - CPT code 29059    Chief complaint/reason for encounter: depression, anxiety, and work related concerns, recent family changes/additions     Medical history:   Past Medical History:   Diagnosis Date    Abnormal Pap smear 1986    Cryosurgery    Anxiety     Arthritis     Essential hypertension 11/26/2020    GERD (gastroesophageal reflux disease)     Venous stasis dermatitis of left lower extremity 12/31/2018       Medications:    Current Outpatient Medications:     ALPRAZolam (XANAX) 0.25 MG tablet, Take 1 tablet (0.25 mg total) by mouth daily as needed for Anxiety., Disp: 60 tablet, Rfl: 1    buPROPion (WELLBUTRIN XL) 300 MG 24 hr tablet, Take 1 tablet (300 mg total) by mouth every morning., Disp: 30 tablet, Rfl: 1    dextroamphetamine-amphetamine 5 mg Tab, Take 1 tablet (5 mg) by mouth once daily. Take at lunchtime., Disp: 30 tablet, Rfl: 0    EScitalopram oxalate (LEXAPRO) 20 MG tablet, Take 1 tablet (20 mg total) by mouth every morning., Disp: 30 tablet, Rfl: 1    guanFACINE (TENEX) 1 MG Tab, Take 1 tablet (1 mg total) by mouth nightly., Disp: 30 tablet, Rfl: 1    ibuprofen (ADVIL,MOTRIN) 800 MG tablet, Take 1 tablet (800 mg total) by mouth 3 (three) times daily., Disp: 60 tablet, Rfl: 6    lisdexamfetamine (VYVANSE) 40 MG Cap, Take 1 capsule (40 mg total) by mouth every morning., Disp: 30 capsule, Rfl: 0    pantoprazole (PROTONIX) 20 MG tablet, Take 1 tablet (20 mg total) by mouth once daily., Disp: 90 tablet, Rfl: 0    tiZANidine (ZANAFLEX) 4 MG tablet, TAKE 1 TABLET BY MOUTH THREE TIMES A DAY AS NEEDED FOR 10 DAYS, Disp: 90 tablet, Rfl: 1    Family history of psychiatric illness:   Family History   Problem Relation Name Age of Onset    Ovarian cancer Mother      Breast cancer Neg Hx       Colon cancer Neg Hx      Collagen disease Neg Hx      Glaucoma Neg Hx      Melanoma Neg Hx      Psoriasis Neg Hx      Lupus Neg Hx      Eczema Neg Hx         Social history (marriage, employment, etc.): Previous session on 12/10/2024:     Domonique presents with good eye contact, direct speech, mood is stable, somewhat anxious and somewhat despondent over election results, and we speak to same, normalize response, thought processes intact, goal directed, behavior is most cooperative, speaks to recent developments including son Mendez's giving up marijuana, now increased motivation, dating, is happy with this new development, work stressors still there, coping and managing well, sets realistic goals, we speak to what we are able to change and accepting what we can't.  Scheduled for follow up in one month however door is always open for follow up whenever Domonique is in need.       Social History   Session on 2025: Domonique presents with good eye contact, direct speech, mood is stable, thought processes intact, goal directed, behavior is most cooperative and speaks to current status, work related stressors and frustrations.  Coping exceedingly well, was able to disengage from work during the holidays, respite care, self care at play.  Will see her as scheduled.     Social History     Tobacco Use    Smoking status: Former     Current packs/day: 0.00     Average packs/day: 0.5 packs/day for 20.0 years (10.0 ttl pk-yrs)     Types: Cigarettes     Start date: 1985     Quit date: 2005     Years since quittin.6    Smokeless tobacco: Never   Substance Use Topics    Alcohol use: No    Drug use: No       Interval history and content of current session: Domonique presents on time and speaks to new family developments as bio father's step-son-in-law (Aguila's ex-, father's stepdaughter from his wife Yulissa) reached out to Domonique, initially Aguila did through Mendez, eventually Domonique, to  reconnect with her bio father, now with dementia.  Is thinking of flying out to him for last visit.  We speak at length to this.  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  Agrees to continue in therapy.  Mood is stable, coping well.     Treatment plan:  Target symptoms: recurrent depression, anxiety , adjustment, work stress  Why chosen therapy is appropriate versus another modality: relevant to diagnosis, patient responds to this modality, evidence based practice  Outcome monitoring methods: self-report, observation  Therapeutic intervention type: supportive psychotherapy    Risk parameters:  Patient reports no suicidal ideation  Patient reports no homicidal ideation  Patient reports no self-injurious behavior  Patient reports no violent behavior    Verbal deficits: None    Patient's response to intervention:  The patient's response to intervention is accepting, motivated.    Progress toward goals and other mental status changes:  The patient's progress toward goals is good.    Diagnosis:   1. Moderate episode of recurrent major depressive disorder    2. Generalized anxiety disorder with panic attacks    3. ADHD (attention deficit hyperactivity disorder), inattentive type        Plan:  individual psychotherapy    Return to clinic: as scheduled    Length of Service (minutes): 60

## 2025-03-11 ENCOUNTER — OFFICE VISIT (OUTPATIENT)
Dept: PSYCHIATRY | Facility: CLINIC | Age: 65
End: 2025-03-11
Payer: COMMERCIAL

## 2025-03-11 VITALS — DIASTOLIC BLOOD PRESSURE: 81 MMHG | HEART RATE: 75 BPM | SYSTOLIC BLOOD PRESSURE: 129 MMHG

## 2025-03-11 DIAGNOSIS — R41.840 CONCENTRATION DEFICIT: ICD-10-CM

## 2025-03-11 DIAGNOSIS — Z98.84 HISTORY OF GASTRIC BYPASS: ICD-10-CM

## 2025-03-11 DIAGNOSIS — F41.1 GENERALIZED ANXIETY DISORDER WITH PANIC ATTACKS: ICD-10-CM

## 2025-03-11 DIAGNOSIS — Z79.899 CURRENT USE OF PROTON PUMP INHIBITOR: ICD-10-CM

## 2025-03-11 DIAGNOSIS — Z56.9 PROBLEMS AT WORK: ICD-10-CM

## 2025-03-11 DIAGNOSIS — F33.0 MILD EPISODE OF RECURRENT MAJOR DEPRESSIVE DISORDER: Primary | ICD-10-CM

## 2025-03-11 DIAGNOSIS — I10 ESSENTIAL HYPERTENSION: ICD-10-CM

## 2025-03-11 DIAGNOSIS — F41.0 GENERALIZED ANXIETY DISORDER WITH PANIC ATTACKS: ICD-10-CM

## 2025-03-11 DIAGNOSIS — F90.0 ADHD (ATTENTION DEFICIT HYPERACTIVITY DISORDER), INATTENTIVE TYPE: ICD-10-CM

## 2025-03-11 DIAGNOSIS — R41.3 MEMORY PROBLEM: ICD-10-CM

## 2025-03-11 PROCEDURE — 99999 PR PBB SHADOW E&M-EST. PATIENT-LVL III: CPT | Mod: PBBFAC,,, | Performed by: STUDENT IN AN ORGANIZED HEALTH CARE EDUCATION/TRAINING PROGRAM

## 2025-03-11 RX ORDER — DEXTROAMPHETAMINE SACCHARATE, AMPHETAMINE ASPARTATE, DEXTROAMPHETAMINE SULFATE AND AMPHETAMINE SULFATE 1.25; 1.25; 1.25; 1.25 MG/1; MG/1; MG/1; MG/1
5 TABLET ORAL DAILY
Qty: 30 TABLET | Refills: 0 | Status: SHIPPED | OUTPATIENT
Start: 2025-03-11 | End: 2025-04-11

## 2025-03-11 RX ORDER — LISDEXAMFETAMINE DIMESYLATE 50 MG/1
50 CAPSULE ORAL EVERY MORNING
Qty: 30 CAPSULE | Refills: 0 | Status: SHIPPED | OUTPATIENT
Start: 2025-03-11

## 2025-03-11 RX ORDER — GUANFACINE 1 MG/1
1 TABLET ORAL NIGHTLY
Qty: 30 TABLET | Refills: 1 | Status: SHIPPED | OUTPATIENT
Start: 2025-03-11 | End: 2025-05-10

## 2025-03-11 SDOH — SOCIAL DETERMINANTS OF HEALTH (SDOH): UNSPECIFIED PROBLEMS RELATED TO EMPLOYMENT: Z56.9

## 2025-03-11 NOTE — PATIENT INSTRUCTIONS
Increase VYVANSE to 50mg in the morning  Continue other medicine as prescribed   Please keep therapy appointments     Please complete lab work at your earliest convenience. Your labs are NON-FASTING.

## 2025-03-11 NOTE — PROGRESS NOTES
"    Outpatient Psychiatry Followup Visit  3/11/2025  Assessment & Plan    Impression     ICD-10-CM ICD-9-CM   1. Mild episode of recurrent major depressive disorder  F33.0 296.31   2. ADHD (attention deficit hyperactivity disorder), inattentive type  F90.0 314.00   3. Generalized anxiety disorder with panic attacks  F41.1 300.02    F41.0 300.01   4. History of gastric bypass  Z98.84 V45.86   5. Memory problem  R41.3 780.93   6. Concentration deficit  R41.840 799.51   7. Current use of proton pump inhibitor  Z79.899 V58.69   8. Essential hypertension  I10 401.9   9. BMI 40.0-44.9, adult  Z68.41 V85.41   10. Problems at work  Z56.9 V62.29      Plan of Care & Medication Management    Chart was reviewed. The risks and benefits of medication were discussed with pt. The treatment plan and followup plan were reviewed with pt. Pt understands to contact clinic if symptoms worsen. Pt understands to call 911 or go to nearest ER for suicidal ideation, intent or plan. Unless otherwise specified, pt did NOT display signs of nor endorse symptoms of overt psychosis or acute mood disorder requiring hospitalization during the encounter; pt denied violent thoughts or suicidal or homicidal ideation, intent, or plan.   RX History ADDERALL XR/IR (2014, for about 4 years, took with antidepressant and slept well), ATARAX/VISTARIL, CELEXA (dc in 2014, does not remember response), LEXAPRO, PROZAC (2016, caused dysphoria, "made me darker"), REMERON (appetite), TRAZODONE (2016, caused nightmares), VYVANSE, WELLBUTRIN (initiated by primary care in 2014 and again in [id number]), XANAX (JUL2023 taking 0.25mg daily prn 1d/w), ZOLOFT (does not remember effect, took for one month)    Current RX [id number]: JOSE 2/6, BI 47/100  Considering discontinuing REMERON when depression is in remission  Continue ATARAX/VISTARIL  Adjustments:  [id number]: Start 10mg TID prn anxiety  Prior to evaluation pt had been taking WELLBUTRIN 300mg daily and XANAX 0.25mg " daily prn  Continue ADDERALL IR  Adjustments:  8/5/2024: Start 5mg daily around lunchtime  Continue GUANFACINE  Pt was provided [facility] educational material 1/10/2025  Adjustments:  1/10/2025: Start 1mg HS  Continue LEXAPRO  Target dose range 10-20mg/d  Adjustments:  46GCN2362: Adjust to 20mg daily in the morning  34FLE9558: Increase to 20mg NIGHTLY   98FYA6091: Adjust to 10mg NIGHTLY   [id number]: Start 10mg daily  Prior to evaluation pt had been taking WELLBUTRIN 300mg daily and XANAX 0.25mg daily prn  Increase  VYVANSE  Pt was provided [employer] educational material 2/22/2024.  Adjustments:  3/11/2025: Increase to 50mg qam  42ZOP1275: Increase to 40mg daily in the morning  3/11/2025 ASRS 4/1 (18-17)  4/22/2024 ASRS 2/1 (10-12)  68AFF4862: Start 30mg daily in the morning  3/22/2024 ASRS 2/1 (12-15)  Prior to [id number] pt was NOT taking a stimulant  1/11/2024 ASRS 3/2 (15-22)  Continue WELLBUTRIN XL  Target dose range 150-300mg/d  Adjustments:  57HHU2553: Continue 300mg daily  Prior to evaluation pt had been taking WELLBUTRIN 300mg daily  Continue XANAX  Adjustments:  49TCT6361: Continue 0.25mg daily prn  Prior to evaluation pt had been taking XANAX 0.25mg daily prn and reported taking 1d/w on average      Education, Counseling & Monitoring []SLEEP HYGIENE  []THERAPY  []CONTRACT 3/11/2025?  [x] REVIEWED 3/11/2025?  []NRT  []   Other Orders    RETURN K: STANDARD PROTOCOL: RETURN IN 4 WEEKS (ONE MONTH)       Psychotherapy   Target Symptoms anxiety, stress, interpersonal problems   Why chosen therapy is appropriate versus another modality: patient responds to this modality, relevant to diagnosis   Outcome Monitoring observation, self-report   Therapeutic Intervention MI/MET (motivational interviewing/motivation enhancement therapy), supportive psychotherapy   Topics and Themes building skills sets for symptom management, symptom recognition   Pt Response The patient's response to the intervention is accepting.    Pt Progress  The patient's progress toward treatment goals is positive progress.   Duration 16 minutes       Subjective    Available documentation has been reviewed, and pertinent elements of the chart have been incorporated into this note where appropriate. Last Epic encounter with writer was on 2/10/2025   Domonique Carmona, a 64 y.o. female, presenting for followup visit. This visit was done in person, IN CLINIC.      New tattoo, nose piercing    Work stress, burnout, disappointment, frustration  Taking some time off  Therapy provided and documented above     Anxiety is controlled    NO ETOH    BP wnl  Labs pending    Focus problems  Word finding problems  Repeated ASRS    Encouraged to complete labs ordered last visit  Pt was encouraged to keep therapy appointments   Discussed increasing VYVANSE  Continue treatment otherwise as planned        Objective    Vitals:    03/11/25 1506   BP: 129/81   Pulse: 75     (Current body mass index is unknown because there is no height or weight on file.)    MSE/PE  1. Appearance: Dress is informal but appropriate. Motor activity normal.  2. Discourse: Clear speech with normal rate and volume. Associations intact. Orderly.  3. Emotional Expression: Anxious and depressed mood.  4. Perception and Thinking: No hallucinations. No suicidality, no homicidality, delusions, or paranoia.  5. Sensorium: Grossly intact. Able to focus for interview.  6. Memory and Fund of Knowledge: Intact for content of interview.  7. Insight and Judgment: Intact.       Measurement-Based Care (MBC):     Routine Instruments   PROMIS-ANXIETY Interpretation: 11 (4a raw score): T-SCORE 61.4; MODERATE using 55-60-70 cutoffs.   PROMIS-DEPRESSION Interpretation: 10 (4a raw score): T-SCORE 58.9; MILD using 55-60-70 cutoffs.   PSS4 Interpretation: 09/16; MODERATE using 6-11 cutoffs. 1 PH, 0 LSE. Moderate perceived helplessness; pt moderately experiences a lack of control over responses to stress.   Additional Instruments      ASRS Interpretation: ASRS A: 18/24 (STRATA 4); ASRS B: 17/48 (STRATA 1). Compared to last time of 2/1, severity WORSENED.       Auto-populated chart data omitted from this note for brevity.      Billing Documentation:     Method of Encounter IN PERSON visit at the clinic, established   E/M Code 78686: FOLLOW UP VISIT, 40 minutes   Additional Codes and Modifiers 72355: 16 minutes (with therapy documentation above)    51148, with modifer 59: administered and scored more than one psychological or neuropsychological tests (see MBC above) (16+ mins)  , without modifiers -24,-25,-53: COMPLEXITY: Visit today included increased complexity associated with the care of the episodic problem(s) (multiple psychiatric disorders - see above) addressed and managing the longitudinal care of the patient due to the serious and/or complex managed problem(s) (multiple psychiatric disorders - see above).   Time I spent a total of 72 minutes on the day of the visit. This includes face to face time and non-face to face time preparing to see the patient (eg, review of tests), obtaining and/or reviewing separately obtained history, documenting clinical information in the electronic or other health record, independently interpreting results and communicating results to the patient/family/caregiver, or care coordinator.        Vinny Charles DO  Department of Psychiatry, [facility]

## 2025-03-12 ENCOUNTER — OFFICE VISIT (OUTPATIENT)
Dept: PSYCHIATRY | Facility: CLINIC | Age: 65
End: 2025-03-12
Payer: COMMERCIAL

## 2025-03-12 DIAGNOSIS — F90.0 ADHD (ATTENTION DEFICIT HYPERACTIVITY DISORDER), INATTENTIVE TYPE: ICD-10-CM

## 2025-03-12 DIAGNOSIS — F33.1 MODERATE EPISODE OF RECURRENT MAJOR DEPRESSIVE DISORDER: Primary | ICD-10-CM

## 2025-03-12 PROCEDURE — 90837 PSYTX W PT 60 MINUTES: CPT | Mod: S$GLB,,, | Performed by: SOCIAL WORKER

## 2025-03-12 NOTE — PROGRESS NOTES
"Individual Psychotherapy (PhD/LCSW)    3/12/2025    Site:  [facility]        Therapeutic Intervention: Met with patient.  Outpatient - Supportive psychotherapy 45 min - CPT Code 44760 and Outpatient - Interactive psychotherapy 45 min - CPT code 83154    Chief complaint/reason for encounter: depression and anxiety   Medical history:   Past Medical History:   Diagnosis Date    Abnormal Pap smear 1986    Cryosurgery    Anxiety     Arthritis     Essential hypertension 11/26/2020    GERD (gastroesophageal reflux disease)     Venous stasis dermatitis of left lower extremity 12/31/2018       Medications:  Current Medications[1]    Family history of psychiatric illness:   Family History   Problem Relation Name Age of Onset    Ovarian cancer Mother      Breast cancer Neg Hx      Colon cancer Neg Hx      Collagen disease Neg Hx      Glaucoma Neg Hx      Melanoma Neg Hx      Psoriasis Neg Hx      Lupus Neg Hx      Eczema Neg Hx         Social history (marriage, employment, etc.):  Domonique, nurse, 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  Mother dismissive while growing up, threw away Barbies that were very meaningful to Domoniuqe.  Collects same today, large collection.  Fills a need within.  Significant relationship Benja: Son Mendez's father, Mendez resides with her 31, suffers from social anxiety disorder.  Emotionally involved with Benja, gave much of herself, took it hard when relationship did not work, "scoundrel".  Estranged from bio father.  Currently has cancer and may be dying.  Plans on flying out to see him. Having major renovations and work done on her house, major stressors as well.  Has several members of her furry family, just added one, loves dogs.  Derives comfort from same.  Hard working.  Mother and grandmother both had depression also.  Has struggled with depression all her life.  Is actually doing well under treatment of Dr. CRISTA Charles.  Admits to " difficulties in going out to events, prefers to be closer to home.  Enjoys solitude.     Previous session on 12/10/2024:    Domonique presents with good eye contact, direct speech, mood is stable, somewhat anxious and somewhat despondent over election results, and we speak to same, normalize response, thought processes intact, goal directed, behavior is most cooperative, speaks to recent developments including son Mendez's giving up marijuana, now increased motivation, dating, is happy with this new development, work stressors still there, coping and managing well, sets realistic goals, we speak to what we are able to change and accepting what we can't.  Scheduled for follow up in one month however door is always open for follow up whenever Domonique is in need.       Social History   Session on 1/9/2025: Domonique presents with good eye contact, direct speech, mood is stable, thought processes intact, goal directed, behavior is most cooperative and speaks to current status, work related stressors and frustrations.  Coping exceedingly well, was able to disengage from work during the holidays, respite care, self care at play.  Will see her as scheduled.      Social History   Session on 2/12/2025: Domonique presents on time and speaks to new family developments as bio father's step-son-in-law (Aguila's ex-, father's stepdaughter from his wife Yulissa) reached out to Domonique, initially Aguila did through Mendez, eventually Domonique, to reconnect with her bio father, now with dementia.  Is thinking of flying out to him for last visit.  We speak at length to this.  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  Agrees to continue in therapy.  Mood is stable, coping well.      Interval history and content of current session: Presents on time with good eye contact, direct speech, mood is euthymic, stable, thought processes intact, goal directed, behavior is most cooperative.  Speaks to new tattoos,  nose piercing, feels she is liberating herself as in letting go of others' perceptions/expectations and doing what feels right for Domonique.  Tattoos have bees, as in Domonique with a B.  Son Mendez is learning new skills, looks forward to visiting with him on Friday.  Furry family doing well.  Scheduled for one month.     Treatment plan:  Target symptoms: recurrent depression, anxiety   Why chosen therapy is appropriate versus another modality: relevant to diagnosis, patient responds to this modality, evidence based practice  Outcome monitoring methods: self-report, observation  Therapeutic intervention type: supportive psychotherapy    Risk parameters:  Patient reports no suicidal ideation  Patient reports no homicidal ideation  Patient reports no self-injurious behavior  Patient reports no violent behavior    Verbal deficits: None    Patient's response to intervention:  The patient's response to intervention is accepting.    Progress toward goals and other mental status changes:  The patient's progress toward goals is good.    Diagnosis:   1. Moderate episode of recurrent major depressive disorder    2. ADHD (attention deficit hyperactivity disorder), inattentive type        Plan:  individual psychotherapy    Return to clinic: as scheduled    Length of Service (minutes):  57 mins         [1]   Current Outpatient Medications:     ALPRAZolam (XANAX) 0.25 MG tablet, Take 1 tablet (0.25 mg total) by mouth daily as needed for Anxiety., Disp: 60 tablet, Rfl: 1    buPROPion (WELLBUTRIN XL) 300 MG 24 hr tablet, Take 1 tablet (300 mg total) by mouth every morning., Disp: 30 tablet, Rfl: 1    dextroamphetamine-amphetamine 5 mg Tab, Take 1 tablet (5 mg) by mouth once daily. Take at lunchtime., Disp: 30 tablet, Rfl: 0    EScitalopram oxalate (LEXAPRO) 20 MG tablet, Take 1 tablet (20 mg total) by mouth every morning., Disp: 30 tablet, Rfl: 1    guanFACINE (TENEX) 1 MG Tab, Take 1 tablet (1 mg total) by mouth nightly., Disp: 30  tablet, Rfl: 1    ibuprofen (ADVIL,MOTRIN) 800 MG tablet, Take 1 tablet (800 mg total) by mouth 3 (three) times daily., Disp: 60 tablet, Rfl: 6    lisdexamfetamine (VYVANSE) 50 MG capsule, Take 1 capsule (50 mg total) by mouth every morning., Disp: 30 capsule, Rfl: 0    pantoprazole (PROTONIX) 20 MG tablet, Take 1 tablet (20 mg total) by mouth once daily., Disp: 90 tablet, Rfl: 0    tiZANidine (ZANAFLEX) 4 MG tablet, TAKE 1 TABLET BY MOUTH THREE TIMES A DAY AS NEEDED FOR 10 DAYS, Disp: 90 tablet, Rfl: 1

## 2025-03-17 PROBLEM — F33.0 MILD EPISODE OF RECURRENT MAJOR DEPRESSIVE DISORDER: Status: ACTIVE | Noted: 2024-04-30

## 2025-03-17 RX ORDER — PANTOPRAZOLE SODIUM 20 MG/1
20 TABLET, DELAYED RELEASE ORAL DAILY
Qty: 90 TABLET | Refills: 0 | OUTPATIENT
Start: 2025-03-17

## 2025-03-24 RX ORDER — PANTOPRAZOLE SODIUM 20 MG/1
20 TABLET, DELAYED RELEASE ORAL DAILY
Qty: 90 TABLET | Refills: 0 | OUTPATIENT
Start: 2025-03-24

## 2025-04-02 ENCOUNTER — PATIENT MESSAGE (OUTPATIENT)
Dept: FAMILY MEDICINE | Facility: CLINIC | Age: 65
End: 2025-04-02
Payer: COMMERCIAL

## 2025-04-03 RX ORDER — PANTOPRAZOLE SODIUM 20 MG/1
20 TABLET, DELAYED RELEASE ORAL DAILY
Qty: 90 TABLET | Refills: 0 | Status: SHIPPED | OUTPATIENT
Start: 2025-04-03

## 2025-04-09 ENCOUNTER — TELEPHONE (OUTPATIENT)
Dept: FAMILY MEDICINE | Facility: CLINIC | Age: 65
End: 2025-04-09
Payer: COMMERCIAL

## 2025-04-09 DIAGNOSIS — Z00.00 PHYSICAL EXAM: ICD-10-CM

## 2025-04-09 DIAGNOSIS — Z79.899 HIGH RISK MEDICATION USE: Primary | ICD-10-CM

## 2025-04-09 DIAGNOSIS — I10 ESSENTIAL HYPERTENSION: ICD-10-CM

## 2025-04-09 DIAGNOSIS — Z00.00 ROUTINE GENERAL MEDICAL EXAMINATION AT A HEALTH CARE FACILITY: ICD-10-CM

## 2025-04-09 DIAGNOSIS — Z79.899 ENCOUNTER FOR LONG-TERM (CURRENT) USE OF MEDICATIONS: ICD-10-CM

## 2025-04-13 ENCOUNTER — TELEPHONE (OUTPATIENT)
Dept: HEMATOLOGY/ONCOLOGY | Facility: CLINIC | Age: 65
End: 2025-04-13
Payer: COMMERCIAL

## 2025-04-13 DIAGNOSIS — F90.0 ADHD (ATTENTION DEFICIT HYPERACTIVITY DISORDER), INATTENTIVE TYPE: Primary | ICD-10-CM

## 2025-04-14 ENCOUNTER — LAB VISIT (OUTPATIENT)
Dept: LAB | Facility: HOSPITAL | Age: 65
End: 2025-04-14
Attending: NURSE PRACTITIONER
Payer: COMMERCIAL

## 2025-04-14 DIAGNOSIS — R41.840 CONCENTRATION DEFICIT: ICD-10-CM

## 2025-04-14 DIAGNOSIS — Z79.899 ENCOUNTER FOR LONG-TERM (CURRENT) USE OF MEDICATIONS: ICD-10-CM

## 2025-04-14 DIAGNOSIS — Z98.84 HISTORY OF GASTRIC BYPASS: ICD-10-CM

## 2025-04-14 DIAGNOSIS — Z00.00 PHYSICAL EXAM: ICD-10-CM

## 2025-04-14 DIAGNOSIS — Z79.899 CURRENT USE OF PROTON PUMP INHIBITOR: ICD-10-CM

## 2025-04-14 DIAGNOSIS — F33.1 MODERATE EPISODE OF RECURRENT MAJOR DEPRESSIVE DISORDER: ICD-10-CM

## 2025-04-14 DIAGNOSIS — R41.3 MEMORY PROBLEM: ICD-10-CM

## 2025-04-14 DIAGNOSIS — I10 ESSENTIAL HYPERTENSION: ICD-10-CM

## 2025-04-14 DIAGNOSIS — Z79.899 HIGH RISK MEDICATION USE: ICD-10-CM

## 2025-04-14 DIAGNOSIS — Z00.00 ROUTINE GENERAL MEDICAL EXAMINATION AT A HEALTH CARE FACILITY: ICD-10-CM

## 2025-04-14 LAB
ABSOLUTE EOSINOPHIL (SMH): 0.05 K/UL
ABSOLUTE MONOCYTE (SMH): 0.33 K/UL (ref 0.3–1)
ABSOLUTE NEUTROPHIL COUNT (SMH): 2.6 K/UL (ref 1.8–7.7)
ALBUMIN SERPL-MCNC: 3.9 G/DL (ref 3.5–5.2)
ALBUMIN/CREAT UR: NORMAL
ALP SERPL-CCNC: 100 UNIT/L (ref 40–150)
ALT SERPL-CCNC: 8 UNIT/L (ref 10–44)
ANION GAP (SMH): 7 MMOL/L (ref 8–16)
AST SERPL-CCNC: 11 UNIT/L (ref 11–45)
BASOPHILS # BLD AUTO: 0.01 K/UL
BASOPHILS NFR BLD AUTO: 0.2 %
BILIRUB SERPL-MCNC: 0.8 MG/DL (ref 0.1–1)
BILIRUB UR QL STRIP.AUTO: NEGATIVE
BUN SERPL-MCNC: 17 MG/DL (ref 8–23)
CALCIUM SERPL-MCNC: 9.2 MG/DL (ref 8.7–10.5)
CHLORIDE SERPL-SCNC: 105 MMOL/L (ref 95–110)
CHOLEST SERPL-MCNC: 233 MG/DL (ref 120–199)
CHOLEST/HDLC SERPL: 4.2 {RATIO} (ref 2–5)
CLARITY UR: CLEAR
CO2 SERPL-SCNC: 27 MMOL/L (ref 23–29)
COLOR UR AUTO: YELLOW
CREAT SERPL-MCNC: 0.9 MG/DL (ref 0.5–1.4)
CREAT UR-MCNC: 139.2 MG/DL (ref 15–325)
ERYTHROCYTE [DISTWIDTH] IN BLOOD BY AUTOMATED COUNT: 13.2 % (ref 11.5–14.5)
GFR SERPLBLD CREATININE-BSD FMLA CKD-EPI: >60 ML/MIN/1.73/M2
GLUCOSE SERPL-MCNC: 83 MG/DL (ref 70–110)
GLUCOSE UR QL STRIP: NEGATIVE
HCT VFR BLD AUTO: 41.8 % (ref 37–48.5)
HDLC SERPL-MCNC: 55 MG/DL (ref 40–75)
HDLC SERPL: 23.6 % (ref 20–50)
HGB BLD-MCNC: 13.3 GM/DL (ref 12–16)
HGB UR QL STRIP: NEGATIVE
IMM GRANULOCYTES # BLD AUTO: 0.01 K/UL (ref 0–0.04)
IMM GRANULOCYTES NFR BLD AUTO: 0.2 % (ref 0–0.5)
KETONES UR QL STRIP: NEGATIVE
LDLC SERPL CALC-MCNC: 161.8 MG/DL (ref 63–159)
LEUKOCYTE ESTERASE UR QL STRIP: NEGATIVE
LYMPHOCYTES # BLD AUTO: 1.09 K/UL (ref 1–4.8)
MCH RBC QN AUTO: 29.5 PG (ref 27–31)
MCHC RBC AUTO-ENTMCNC: 31.8 G/DL (ref 32–36)
MCV RBC AUTO: 93 FL (ref 82–98)
MICROALBUMIN UR-MCNC: <5 UG/ML (ref ?–5000)
NITRITE UR QL STRIP: NEGATIVE
NONHDLC SERPL-MCNC: 178 MG/DL
NUCLEATED RBC (/100WBC) (SMH): 0 /100 WBC
PH UR STRIP: 6 [PH]
PLATELET # BLD AUTO: 277 K/UL (ref 150–450)
PMV BLD AUTO: 10.2 FL (ref 9.2–12.9)
POTASSIUM SERPL-SCNC: 4 MMOL/L (ref 3.5–5.1)
PROT SERPL-MCNC: 6.4 GM/DL (ref 6–8.4)
PROT UR QL STRIP: NEGATIVE
RBC # BLD AUTO: 4.51 M/UL (ref 4–5.4)
RELATIVE EOSINOPHIL (SMH): 1.2 % (ref 0–8)
RELATIVE LYMPHOCYTE (SMH): 26.8 % (ref 18–48)
RELATIVE MONOCYTE (SMH): 8.1 % (ref 4–15)
RELATIVE NEUTROPHIL (SMH): 63.5 % (ref 38–73)
SODIUM SERPL-SCNC: 139 MMOL/L (ref 136–145)
SP GR UR STRIP: 1.02
TRIGL SERPL-MCNC: 81 MG/DL (ref 30–150)
TSH SERPL-ACNC: 0.71 UIU/ML (ref 0.4–4)
UROBILINOGEN UR STRIP-ACNC: ABNORMAL EU/DL
VIT B12 SERPL-MCNC: 202 PG/ML (ref 210–950)
WBC # BLD AUTO: 4.07 K/UL (ref 3.9–12.7)

## 2025-04-14 PROCEDURE — 83718 ASSAY OF LIPOPROTEIN: CPT

## 2025-04-14 PROCEDURE — 82040 ASSAY OF SERUM ALBUMIN: CPT

## 2025-04-14 PROCEDURE — 36415 COLL VENOUS BLD VENIPUNCTURE: CPT

## 2025-04-14 PROCEDURE — 82043 UR ALBUMIN QUANTITATIVE: CPT

## 2025-04-14 PROCEDURE — 81003 URINALYSIS AUTO W/O SCOPE: CPT

## 2025-04-14 PROCEDURE — 85025 COMPLETE CBC W/AUTO DIFF WBC: CPT

## 2025-04-14 PROCEDURE — 84443 ASSAY THYROID STIM HORMONE: CPT

## 2025-04-14 PROCEDURE — 82607 VITAMIN B-12: CPT

## 2025-04-14 PROCEDURE — 84425 ASSAY OF VITAMIN B-1: CPT

## 2025-04-15 ENCOUNTER — OFFICE VISIT (OUTPATIENT)
Dept: PSYCHIATRY | Facility: CLINIC | Age: 65
End: 2025-04-15
Payer: COMMERCIAL

## 2025-04-15 VITALS — SYSTOLIC BLOOD PRESSURE: 134 MMHG | DIASTOLIC BLOOD PRESSURE: 89 MMHG | HEART RATE: 68 BPM

## 2025-04-15 DIAGNOSIS — F33.0 MILD EPISODE OF RECURRENT MAJOR DEPRESSIVE DISORDER: Primary | ICD-10-CM

## 2025-04-15 DIAGNOSIS — F41.1 GENERALIZED ANXIETY DISORDER WITH PANIC ATTACKS: ICD-10-CM

## 2025-04-15 DIAGNOSIS — I10 ESSENTIAL HYPERTENSION: ICD-10-CM

## 2025-04-15 DIAGNOSIS — F90.0 ADHD (ATTENTION DEFICIT HYPERACTIVITY DISORDER), INATTENTIVE TYPE: ICD-10-CM

## 2025-04-15 DIAGNOSIS — Z63.9 RELATIONSHIP DYSFUNCTION: ICD-10-CM

## 2025-04-15 DIAGNOSIS — F41.0 GENERALIZED ANXIETY DISORDER WITH PANIC ATTACKS: ICD-10-CM

## 2025-04-15 DIAGNOSIS — Z56.9 PROBLEMS AT WORK: ICD-10-CM

## 2025-04-15 DIAGNOSIS — F33.1 MODERATE EPISODE OF RECURRENT MAJOR DEPRESSIVE DISORDER: Primary | ICD-10-CM

## 2025-04-15 PROCEDURE — 96136 PSYCL/NRPSYC TST PHY/QHP 1ST: CPT | Mod: 59,S$GLB,, | Performed by: STUDENT IN AN ORGANIZED HEALTH CARE EDUCATION/TRAINING PROGRAM

## 2025-04-15 PROCEDURE — 3061F NEG MICROALBUMINURIA REV: CPT | Mod: CPTII,S$GLB,, | Performed by: STUDENT IN AN ORGANIZED HEALTH CARE EDUCATION/TRAINING PROGRAM

## 2025-04-15 PROCEDURE — 90837 PSYTX W PT 60 MINUTES: CPT | Mod: S$GLB,,, | Performed by: SOCIAL WORKER

## 2025-04-15 PROCEDURE — 1160F RVW MEDS BY RX/DR IN RCRD: CPT | Mod: CPTII,S$GLB,, | Performed by: STUDENT IN AN ORGANIZED HEALTH CARE EDUCATION/TRAINING PROGRAM

## 2025-04-15 PROCEDURE — 99999 PR PBB SHADOW E&M-EST. PATIENT-LVL I: CPT | Mod: PBBFAC,,, | Performed by: SOCIAL WORKER

## 2025-04-15 PROCEDURE — 3079F DIAST BP 80-89 MM HG: CPT | Mod: CPTII,S$GLB,, | Performed by: STUDENT IN AN ORGANIZED HEALTH CARE EDUCATION/TRAINING PROGRAM

## 2025-04-15 PROCEDURE — 3075F SYST BP GE 130 - 139MM HG: CPT | Mod: CPTII,S$GLB,, | Performed by: STUDENT IN AN ORGANIZED HEALTH CARE EDUCATION/TRAINING PROGRAM

## 2025-04-15 PROCEDURE — 99215 OFFICE O/P EST HI 40 MIN: CPT | Mod: S$GLB,,, | Performed by: STUDENT IN AN ORGANIZED HEALTH CARE EDUCATION/TRAINING PROGRAM

## 2025-04-15 PROCEDURE — G2211 COMPLEX E/M VISIT ADD ON: HCPCS | Mod: S$GLB,,, | Performed by: STUDENT IN AN ORGANIZED HEALTH CARE EDUCATION/TRAINING PROGRAM

## 2025-04-15 PROCEDURE — 3061F NEG MICROALBUMINURIA REV: CPT | Mod: CPTII,S$GLB,, | Performed by: SOCIAL WORKER

## 2025-04-15 PROCEDURE — 3066F NEPHROPATHY DOC TX: CPT | Mod: CPTII,S$GLB,, | Performed by: STUDENT IN AN ORGANIZED HEALTH CARE EDUCATION/TRAINING PROGRAM

## 2025-04-15 PROCEDURE — 1159F MED LIST DOCD IN RCRD: CPT | Mod: CPTII,S$GLB,, | Performed by: STUDENT IN AN ORGANIZED HEALTH CARE EDUCATION/TRAINING PROGRAM

## 2025-04-15 PROCEDURE — 99999 PR PBB SHADOW E&M-EST. PATIENT-LVL III: CPT | Mod: PBBFAC,,, | Performed by: STUDENT IN AN ORGANIZED HEALTH CARE EDUCATION/TRAINING PROGRAM

## 2025-04-15 PROCEDURE — 3066F NEPHROPATHY DOC TX: CPT | Mod: CPTII,S$GLB,, | Performed by: SOCIAL WORKER

## 2025-04-15 RX ORDER — BUPROPION HYDROCHLORIDE 300 MG/1
300 TABLET ORAL EVERY MORNING
Qty: 30 TABLET | Refills: 1 | Status: SHIPPED | OUTPATIENT
Start: 2025-04-15 | End: 2025-06-14

## 2025-04-15 RX ORDER — DEXTROAMPHETAMINE SACCHARATE, AMPHETAMINE ASPARTATE, DEXTROAMPHETAMINE SULFATE AND AMPHETAMINE SULFATE 1.25; 1.25; 1.25; 1.25 MG/1; MG/1; MG/1; MG/1
5 TABLET ORAL DAILY
Qty: 30 TABLET | Refills: 0 | Status: SHIPPED | OUTPATIENT
Start: 2025-04-15 | End: 2025-05-15

## 2025-04-15 RX ORDER — GUANFACINE 1 MG/1
1 TABLET ORAL NIGHTLY
Qty: 30 TABLET | Refills: 1 | Status: SHIPPED | OUTPATIENT
Start: 2025-04-15 | End: 2025-06-14

## 2025-04-15 RX ORDER — ESCITALOPRAM OXALATE 20 MG/1
20 TABLET ORAL EVERY MORNING
Qty: 30 TABLET | Refills: 1 | Status: SHIPPED | OUTPATIENT
Start: 2025-04-15 | End: 2025-06-14

## 2025-04-15 RX ORDER — LISDEXAMFETAMINE DIMESYLATE 50 MG/1
50 CAPSULE ORAL EVERY MORNING
Qty: 30 CAPSULE | Refills: 0 | Status: SHIPPED | OUTPATIENT
Start: 2025-04-15

## 2025-04-15 SDOH — SOCIAL DETERMINANTS OF HEALTH (SDOH): UNSPECIFIED PROBLEMS RELATED TO EMPLOYMENT: Z56.9

## 2025-04-15 SDOH — SOCIAL DETERMINANTS OF HEALTH (SDOH): PROBLEM RELATED TO PRIMARY SUPPORT GROUP, UNSPECIFIED: Z63.9

## 2025-04-15 NOTE — PROGRESS NOTES
"Individual Psychotherapy (PhD/LCSW)    4/15/2025    Site:  [facility]        Therapeutic Intervention: Met with patient.  Outpatient - Supportive psychotherapy 45 min - CPT Code 88504 and Outpatient - Interactive psychotherapy 45 min - CPT code 12444    Chief complaint/reason for encounter: depression and anxiety   Medical history:   Past Medical History:   Diagnosis Date    Abnormal Pap smear 1986    Cryosurgery    Anxiety     Arthritis     Essential hypertension 11/26/2020    GERD (gastroesophageal reflux disease)     Venous stasis dermatitis of left lower extremity 12/31/2018       Medications:  Current Medications[1]    Family history of psychiatric illness:   Family History   Problem Relation Name Age of Onset    Ovarian cancer Mother      Breast cancer Neg Hx      Colon cancer Neg Hx      Collagen disease Neg Hx      Glaucoma Neg Hx      Melanoma Neg Hx      Psoriasis Neg Hx      Lupus Neg Hx      Eczema Neg Hx         Social history (marriage, employment, etc.): Domonique, nurse, 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  Mother dismissive while growing up, threw away Barbies that were very meaningful to Domonique.  Collects same today, large collection.  Fills a need within.  Significant relationship Benja: Son Mendez's father, Mendez resides with her 31, suffers from social anxiety disorder.  Emotionally involved with Benja, gave much of herself, took it hard when relationship did not work, "scoundrel".  Estranged from bio father.  Currently has cancer and may be dying.  Plans on flying out to see him. Having major renovations and work done on her house, major stressors as well.  Has several members of her furry family, just added one, loves dogs.  Derives comfort from same.  Hard working.  Mother and grandmother both had depression also.  Has struggled with depression all her life.  Is actually doing well under treatment of Dr. CRISTA Charles.  Admits to " difficulties in going out to events, prefers to be closer to home.  Enjoys solitude.      Previous session on 12/10/2024:    Domonique presents with good eye contact, direct speech, mood is stable, somewhat anxious and somewhat despondent over election results, and we speak to same, normalize response, thought processes intact, goal directed, behavior is most cooperative, speaks to recent developments including son Mendez's giving up marijuana, now increased motivation, dating, is happy with this new development, work stressors still there, coping and managing well, sets realistic goals, we speak to what we are able to change and accepting what we can't.  Scheduled for follow up in one month however door is always open for follow up whenever Domonique is in need.       Social History   Session on 1/9/2025: Domonique presents with good eye contact, direct speech, mood is stable, thought processes intact, goal directed, behavior is most cooperative and speaks to current status, work related stressors and frustrations.  Coping exceedingly well, was able to disengage from work during the holidays, respite care, self care at play.  Will see her as scheduled.     Session on 2/12/2025: Domonique presents on time and speaks to new family developments as bio father's step-son-in-law (Aguila's ex-, father's stepdaughter from his wife Yulissa) reached out to Domonique, initially Aguila did through Mendez, eventually Domonique, to reconnect with her bio father, now with dementia.  Is thinking of flying out to him for last visit.  We speak at length to this.  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  Agrees to continue in therapy.  Mood is stable, coping well.      Session on 3/12/2025: Presents on time with good eye contact, direct speech, mood is euthymic, stable, thought processes intact, goal directed, behavior is most cooperative.  Speaks to new tattoos, nose piercing, feels she is liberating herself  as in letting go of others' perceptions/expectations and doing what feels right for Domonique.  Tattoos have bees, as in Domonique with a B.  Son Mendez is learning new skills, looks forward to visiting with him on Friday.  Furry family doing well.  Scheduled for one month.        Interval history and content of current session:   Speaks to relationship with Benja and africa Simms.  Discuss IE skill DEAR MAN.      Treatment plan:  Target symptoms: distractability, recurrent depression, anxiety , adjustment, work stress  Why chosen therapy is appropriate versus another modality: relevant to diagnosis, patient responds to this modality, evidence based practice  Outcome monitoring methods: self-report, observation  Therapeutic intervention type: supportive psychotherapy    Risk parameters:  Patient reports no suicidal ideation  Patient reports no homicidal ideation  Patient reports no self-injurious behavior  Patient reports no violent behavior    Verbal deficits: None    Patient's response to intervention:  The patient's response to intervention is accepting.    Progress toward goals and other mental status changes:  The patient's progress toward goals is good.    Diagnosis:   1. Mild episode of recurrent major depressive disorder    2. ADHD (attention deficit hyperactivity disorder), inattentive type        Plan:  individual psychotherapy    Return to clinic: as scheduled    Length of Service (minutes): 60         [1]   Current Outpatient Medications:     ALPRAZolam (XANAX) 0.25 MG tablet, Take 1 tablet (0.25 mg total) by mouth daily as needed for Anxiety., Disp: 60 tablet, Rfl: 1    buPROPion (WELLBUTRIN XL) 300 MG 24 hr tablet, Take 1 tablet (300 mg total) by mouth every morning., Disp: 30 tablet, Rfl: 1    dextroamphetamine-amphetamine 5 mg Tab, Take 1 tablet (5 mg) by mouth once daily. Take at lunchtime., Disp: 30 tablet, Rfl: 0    EScitalopram oxalate (LEXAPRO) 20 MG tablet, Take 1 tablet (20 mg total) by mouth  every morning., Disp: 30 tablet, Rfl: 1    guanFACINE (TENEX) 1 MG Tab, Take 1 tablet (1 mg total) by mouth nightly., Disp: 30 tablet, Rfl: 1    ibuprofen (ADVIL,MOTRIN) 800 MG tablet, Take 1 tablet (800 mg total) by mouth 3 (three) times daily., Disp: 60 tablet, Rfl: 6    lisdexamfetamine (VYVANSE) 50 MG capsule, Take 1 capsule (50 mg total) by mouth every morning., Disp: 30 capsule, Rfl: 0    pantoprazole (PROTONIX) 20 MG tablet, Take 1 tablet (20 mg total) by mouth once daily., Disp: 90 tablet, Rfl: 0    tiZANidine (ZANAFLEX) 4 MG tablet, TAKE 1 TABLET BY MOUTH THREE TIMES A DAY AS NEEDED FOR 10 DAYS, Disp: 90 tablet, Rfl: 1

## 2025-04-15 NOTE — PROGRESS NOTES
"     Outpatient Psychiatry Followup Visit  4/15/2025  Assessment & Plan    Impression     ICD-10-CM ICD-9-CM   1. Moderate episode of recurrent major depressive disorder  F33.1 296.32   2. Generalized anxiety disorder with panic attacks  F41.1 300.02    F41.0 300.01   3. ADHD (attention deficit hyperactivity disorder), inattentive type  F90.0 314.00   4. Essential hypertension  I10 401.9   5. Problems at work  Z56.9 V62.29   6. Relationship dysfunction  Z63.9 V62.81   7. BMI 40.0-44.9, adult  Z68.41 V85.41      Plan of Care & Medication Management    Chart was reviewed. The risks and benefits of medication were discussed with pt. The treatment plan and followup plan were reviewed with pt. Pt understands to contact clinic if symptoms worsen. Pt understands to call 911 or go to nearest ER for suicidal ideation, intent or plan. Unless otherwise specified, pt did NOT display signs of nor endorse symptoms of overt psychosis or acute mood disorder requiring hospitalization during the encounter; pt denied violent thoughts or suicidal or homicidal ideation, intent, or plan.   RX History ADDERALL XR/IR (2014, for about 4 years, took with antidepressant and slept well), ATARAX/VISTARIL, CELEXA (dc in 2014, does not remember response), LEXAPRO, PROZAC (2016, caused dysphoria, "made me darker"), REMERON (appetite), TRAZODONE (2016, caused nightmares), VYVANSE, WELLBUTRIN (initiated by primary care in 2014 and again in [id number]), XANAX (JUL2023 taking 0.25mg daily prn 1d/w), ZOLOFT (does not remember effect, took for one month)    Current RX [id number]: JOSE 2/6, BI 47/100  Considering discontinuing REMERON when depression is in remission  Continue ATARAX/VISTARIL  Adjustments:  [id number]: Start 10mg TID prn anxiety  Prior to evaluation pt had been taking WELLBUTRIN 300mg daily and XANAX 0.25mg daily prn  Continue ADDERALL IR  Adjustments:  8/5/2024: Start 5mg daily around lunchtime  Continue GUANFACINE  Pt was provided [facility] " educational material 1/10/2025  Adjustments:  1/10/2025: Start 1mg HS  Continue LEXAPRO  Target dose range 10-20mg/d  Adjustments:  [id number]: Adjust to 20mg daily in the morning  [id number]: Increase to 20mg NIGHTLY   96JOF2802: Adjust to 10mg NIGHTLY   [id number]: Start 10mg daily  Prior to evaluation pt had been taking WELLBUTRIN 300mg daily and XANAX 0.25mg daily prn  Continue VYVANSE  Pt was provided [facility] educational material 2/22/2024.  Adjustments:  3/11/2025: Increase to 50mg qam  [id number]: Increase to 40mg daily in the morning  3/11/2025 ASRS 4/1 (18-17)  4/22/2024 ASRS 2/1 (10-12)  [id number]: Start 30mg daily in the morning  3/22/2024 ASRS 2/1 (12-15)  Prior to [id number] pt was NOT taking a stimulant  1/11/2024 ASRS 3/2 (15-22)  Continue WELLBUTRIN XL  Target dose range 150-300mg/d  Adjustments:  [id number]: Continue 300mg daily  Prior to evaluation pt had been taking WELLBUTRIN 300mg daily  Continue XANAX  Adjustments:  [id number]: Continue 0.25mg daily prn  Prior to evaluation pt had been taking XANAX 0.25mg daily prn and reported taking 1d/w on average      Education, Counseling & Monitoring []SLEEP HYGIENE  []THERAPY  []CONTRACT 4/15/2025?  [] REVIEWED 4/15/2025?  []NRT  []   Other Orders    RETURN L: STANDARD PROTOCOL: RETURN IN 4 WEEKS (ONE MONTH)       Subjective    Available documentation has been reviewed, and pertinent elements of the chart have been incorporated into this note where appropriate. Last Epic encounter with writer was on 3/11/2025   Domonique Carmona, a 64 y.o. female, presenting for followup visit. This visit was done in person, IN CLINIC.      Work stress    Mood is stable  Some sad evenings    Home stress  Explored relationship with son  Avoiding home, feeling overwhelmed  Strained relationship with son, wonder how relationship could be improved    Labs reviewed  B12 low, encouraged to supplement and see PCP    Tolerated VYVANSE increase  BP wnl    Keeping therapy  appointments  Seems to be adherent to pharmacotherapy   Continue treatment otherwise as planned        Objective    Vitals:    04/15/25 1110   BP: 134/89   Pulse: 68     (Current body mass index is unknown because there is no height or weight on file.)    MSE/PE  1. Appearance: Dress is informal but appropriate. Motor activity normal.  2. Discourse: Clear speech with normal rate and volume. Associations intact. Orderly.  3. Emotional Expression: Somewhat anxious.  4. Perception and Thinking: No hallucinations. No suicidality, no homicidality, delusions, or paranoia.  5. Sensorium: Grossly intact. Able to focus for interview.  6. Memory and Fund of Knowledge: Intact for content of interview.  7. Insight and Judgment: Intact.       Measurement-Based Care (MBC):     Routine Instruments   PROMIS-ANXIETY Interpretation: 12 (4a raw score): T-SCORE 63.4; MODERATE using 55-60-70 cutoffs.   PROMIS-DEPRESSION Interpretation: 12 (4a raw score): T-SCORE 62.2; MODERATE using 55-60-70 cutoffs.   PSS4 Interpretation: 800: Stress appraisal is MODERATE. 0 PH, 0 LSE.   Additional Instruments   MBC ANCHOR : about the same         Auto-populated chart data omitted from this note for brevity.      Billing Documentation:     Method of Encounter IN PERSON visit at the clinic, established   E/M Code 85150: FOLLOW UP VISIT, 42 minutes   Additional Codes and Modifiers     98883, with modifer 59: administered and scored more than one psychological or neuropsychological tests (see MBC above) (16+ mins)  , without modifiers -24,-25,-53: COMPLEXITY: Visit today included increased complexity associated with the care of the episodic problem(s) (multiple psychiatric disorders - see above) addressed and managing the longitudinal care of the patient due to the serious and/or complex managed problem(s) (multiple psychiatric disorders - see above).   Time I spent a total of 58 minutes on the day of the visit. This includes face to face time and  non-face to face time preparing to see the patient (eg, review of tests), obtaining and/or reviewing separately obtained history, documenting clinical information in the electronic or other health record, independently interpreting results and communicating results to the patient/family/caregiver, or care coordinator.        Vinny Charles DO  Department of Psychiatry, [facility]

## 2025-04-15 NOTE — PATIENT INSTRUCTIONS
Your vitamin B12 level is LOW. I recommend that you start taking an over the counter vitamin B12 supplement, which you should take in the morning and with food (If you take it at night, it could worsen insomnia, and if you take it on an empty stomach, it may cause GI upset.).     You should also follow up with primary care for further management of your vitamin B12 deficiency.     Please keep therapy appointments     Continue medicine as prescribed

## 2025-04-18 LAB — W VITAMIN B1: 50 UG/L

## 2025-04-21 ENCOUNTER — RESULTS FOLLOW-UP (OUTPATIENT)
Dept: PSYCHIATRY | Facility: CLINIC | Age: 65
End: 2025-04-21

## 2025-04-21 NOTE — PROGRESS NOTES
LOW B12. Pt notified by portal. Recommended to start OTC PO supplementation and to follow up with primary care for further management.

## 2025-05-13 ENCOUNTER — OFFICE VISIT (OUTPATIENT)
Dept: PSYCHIATRY | Facility: CLINIC | Age: 65
End: 2025-05-13
Payer: COMMERCIAL

## 2025-05-13 DIAGNOSIS — F33.1 MODERATE EPISODE OF RECURRENT MAJOR DEPRESSIVE DISORDER: ICD-10-CM

## 2025-05-13 DIAGNOSIS — F41.1 GENERALIZED ANXIETY DISORDER WITH PANIC ATTACKS: Primary | ICD-10-CM

## 2025-05-13 DIAGNOSIS — F41.0 GENERALIZED ANXIETY DISORDER WITH PANIC ATTACKS: Primary | ICD-10-CM

## 2025-05-13 DIAGNOSIS — F90.0 ADHD (ATTENTION DEFICIT HYPERACTIVITY DISORDER), INATTENTIVE TYPE: ICD-10-CM

## 2025-05-13 PROCEDURE — 3061F NEG MICROALBUMINURIA REV: CPT | Mod: CPTII,S$GLB,, | Performed by: SOCIAL WORKER

## 2025-05-13 PROCEDURE — 90837 PSYTX W PT 60 MINUTES: CPT | Mod: S$GLB,,, | Performed by: SOCIAL WORKER

## 2025-05-13 PROCEDURE — 3066F NEPHROPATHY DOC TX: CPT | Mod: CPTII,S$GLB,, | Performed by: SOCIAL WORKER

## 2025-05-13 NOTE — PROGRESS NOTES
Individual Psychotherapy (PhD/LCSW)    5/13/2025    Site:  [facility]        Therapeutic Intervention: Met with patient.  Outpatient - Supportive psychotherapy 45 min - CPT Code 68190 and Outpatient - Interactive psychotherapy 45 min - CPT code 54468    Chief complaint/reason for encounter: depression, anger, anxiety, sleep, interpersonal, and work related stressors     Medical history:   Past Medical History:   Diagnosis Date    Abnormal Pap smear 1986    Cryosurgery    Anxiety     Arthritis     Essential hypertension 11/26/2020    GERD (gastroesophageal reflux disease)     Venous stasis dermatitis of left lower extremity 12/31/2018       Medications:  Current Medications[1]    Family history of psychiatric illness:   Family History   Problem Relation Name Age of Onset    Ovarian cancer Mother      Breast cancer Neg Hx      Colon cancer Neg Hx      Collagen disease Neg Hx      Glaucoma Neg Hx      Melanoma Neg Hx      Psoriasis Neg Hx      Lupus Neg Hx      Eczema Neg Hx         Social history (marriage, employment, etc.): Social History[2]    Interval history and content of current session: Domonique presents on time and speaks to relationship with son Mendez.  Was able to speak to him about how his behavior gives rise to certain feelings in her resulting from how she perceives what is transpiring, and validate same.  Speaks to work related stressors, frustrations, and how when others lack a sense of accountability it gives rise to frustrations.  Has very good self awareness, was able to verbalize to her son (first time) how she felt.  Did good work.  Scheduled for follow up in one month.  Supportive psychotherapy.     Treatment plan:  Target symptoms: recurrent depression, anxiety , work stress  Why chosen therapy is appropriate versus another modality: relevant to diagnosis, patient responds to this modality, evidence based practice  Outcome monitoring methods: self-report, observation  Therapeutic intervention type:  supportive psychotherapy    Risk parameters:  Patient reports no suicidal ideation  Patient reports no homicidal ideation  Patient reports no self-injurious behavior  Patient reports no violent behavior    Verbal deficits: None    Patient's response to intervention:  The patient's response to intervention is accepting.    Progress toward goals and other mental status changes:  The patient's progress toward goals is good.    Diagnosis:   1. Generalized anxiety disorder with panic attacks    2. ADHD (attention deficit hyperactivity disorder), inattentive type    3. Moderate episode of recurrent major depressive disorder        Plan:  individual psychotherapy    Return to clinic: as scheduled    Length of Service (minutes): 60         [1]   Current Outpatient Medications:     ALPRAZolam (XANAX) 0.25 MG tablet, Take 1 tablet (0.25 mg total) by mouth daily as needed for Anxiety., Disp: 60 tablet, Rfl: 1    buPROPion (WELLBUTRIN XL) 300 MG 24 hr tablet, Take 1 tablet (300 mg total) by mouth every morning., Disp: 30 tablet, Rfl: 1    dextroamphetamine-amphetamine 5 mg Tab, Take 1 tablet (5 mg) by mouth once daily. Take at lunchtime., Disp: 30 tablet, Rfl: 0    EScitalopram oxalate (LEXAPRO) 20 MG tablet, Take 1 tablet (20 mg total) by mouth every morning., Disp: 30 tablet, Rfl: 1    guanFACINE (TENEX) 1 MG Tab, Take 1 tablet (1 mg total) by mouth nightly., Disp: 30 tablet, Rfl: 1    ibuprofen (ADVIL,MOTRIN) 800 MG tablet, Take 1 tablet (800 mg total) by mouth 3 (three) times daily., Disp: 60 tablet, Rfl: 6    lisdexamfetamine (VYVANSE) 50 MG capsule, Take 1 capsule (50 mg total) by mouth every morning., Disp: 30 capsule, Rfl: 0    pantoprazole (PROTONIX) 20 MG tablet, Take 1 tablet (20 mg total) by mouth once daily., Disp: 90 tablet, Rfl: 0    tiZANidine (ZANAFLEX) 4 MG tablet, TAKE 1 TABLET BY MOUTH THREE TIMES A DAY AS NEEDED FOR 10 DAYS, Disp: 90 tablet, Rfl: 1  [2]   Social History  Tobacco Use    Smoking status:  Former     Current packs/day: 0.00     Average packs/day: 0.5 packs/day for 20.0 years (10.0 ttl pk-yrs)     Types: Cigarettes     Start date: 1985     Quit date: 2005     Years since quittin.9    Smokeless tobacco: Never   Substance Use Topics    Alcohol use: No    Drug use: No

## 2025-05-15 ENCOUNTER — OFFICE VISIT (OUTPATIENT)
Dept: PSYCHIATRY | Facility: CLINIC | Age: 65
End: 2025-05-15
Payer: COMMERCIAL

## 2025-05-15 VITALS
SYSTOLIC BLOOD PRESSURE: 135 MMHG | DIASTOLIC BLOOD PRESSURE: 84 MMHG | BODY MASS INDEX: 41.57 KG/M2 | HEART RATE: 75 BPM | HEIGHT: 71 IN

## 2025-05-15 DIAGNOSIS — F41.1 GENERALIZED ANXIETY DISORDER WITH PANIC ATTACKS: ICD-10-CM

## 2025-05-15 DIAGNOSIS — Z63.9 RELATIONSHIP DYSFUNCTION: ICD-10-CM

## 2025-05-15 DIAGNOSIS — F41.0 GENERALIZED ANXIETY DISORDER WITH PANIC ATTACKS: ICD-10-CM

## 2025-05-15 DIAGNOSIS — I10 ESSENTIAL HYPERTENSION: ICD-10-CM

## 2025-05-15 DIAGNOSIS — Z56.9 PROBLEMS AT WORK: ICD-10-CM

## 2025-05-15 DIAGNOSIS — F90.0 ADHD (ATTENTION DEFICIT HYPERACTIVITY DISORDER), INATTENTIVE TYPE: ICD-10-CM

## 2025-05-15 DIAGNOSIS — F33.1 MODERATE EPISODE OF RECURRENT MAJOR DEPRESSIVE DISORDER: Primary | ICD-10-CM

## 2025-05-15 PROCEDURE — 99999 PR PBB SHADOW E&M-EST. PATIENT-LVL III: CPT | Mod: PBBFAC,,, | Performed by: STUDENT IN AN ORGANIZED HEALTH CARE EDUCATION/TRAINING PROGRAM

## 2025-05-15 PROCEDURE — 90833 PSYTX W PT W E/M 30 MIN: CPT | Mod: S$GLB,,, | Performed by: STUDENT IN AN ORGANIZED HEALTH CARE EDUCATION/TRAINING PROGRAM

## 2025-05-15 PROCEDURE — 3066F NEPHROPATHY DOC TX: CPT | Mod: CPTII,S$GLB,, | Performed by: STUDENT IN AN ORGANIZED HEALTH CARE EDUCATION/TRAINING PROGRAM

## 2025-05-15 PROCEDURE — 3079F DIAST BP 80-89 MM HG: CPT | Mod: CPTII,S$GLB,, | Performed by: STUDENT IN AN ORGANIZED HEALTH CARE EDUCATION/TRAINING PROGRAM

## 2025-05-15 PROCEDURE — 3008F BODY MASS INDEX DOCD: CPT | Mod: CPTII,S$GLB,, | Performed by: STUDENT IN AN ORGANIZED HEALTH CARE EDUCATION/TRAINING PROGRAM

## 2025-05-15 PROCEDURE — 1160F RVW MEDS BY RX/DR IN RCRD: CPT | Mod: CPTII,S$GLB,, | Performed by: STUDENT IN AN ORGANIZED HEALTH CARE EDUCATION/TRAINING PROGRAM

## 2025-05-15 PROCEDURE — G2211 COMPLEX E/M VISIT ADD ON: HCPCS | Mod: S$GLB,,, | Performed by: STUDENT IN AN ORGANIZED HEALTH CARE EDUCATION/TRAINING PROGRAM

## 2025-05-15 PROCEDURE — 3075F SYST BP GE 130 - 139MM HG: CPT | Mod: CPTII,S$GLB,, | Performed by: STUDENT IN AN ORGANIZED HEALTH CARE EDUCATION/TRAINING PROGRAM

## 2025-05-15 PROCEDURE — 3061F NEG MICROALBUMINURIA REV: CPT | Mod: CPTII,S$GLB,, | Performed by: STUDENT IN AN ORGANIZED HEALTH CARE EDUCATION/TRAINING PROGRAM

## 2025-05-15 PROCEDURE — 96136 PSYCL/NRPSYC TST PHY/QHP 1ST: CPT | Mod: 59,S$GLB,, | Performed by: STUDENT IN AN ORGANIZED HEALTH CARE EDUCATION/TRAINING PROGRAM

## 2025-05-15 PROCEDURE — 99214 OFFICE O/P EST MOD 30 MIN: CPT | Mod: S$GLB,,, | Performed by: STUDENT IN AN ORGANIZED HEALTH CARE EDUCATION/TRAINING PROGRAM

## 2025-05-15 PROCEDURE — 1159F MED LIST DOCD IN RCRD: CPT | Mod: CPTII,S$GLB,, | Performed by: STUDENT IN AN ORGANIZED HEALTH CARE EDUCATION/TRAINING PROGRAM

## 2025-05-15 RX ORDER — DEXTROAMPHETAMINE SACCHARATE, AMPHETAMINE ASPARTATE, DEXTROAMPHETAMINE SULFATE AND AMPHETAMINE SULFATE 1.25; 1.25; 1.25; 1.25 MG/1; MG/1; MG/1; MG/1
5 TABLET ORAL DAILY
Qty: 30 TABLET | Refills: 0 | Status: SHIPPED | OUTPATIENT
Start: 2025-05-15 | End: 2025-06-14

## 2025-05-15 RX ORDER — ESCITALOPRAM OXALATE 20 MG/1
20 TABLET ORAL EVERY MORNING
Qty: 30 TABLET | Refills: 1 | Status: SHIPPED | OUTPATIENT
Start: 2025-05-15 | End: 2025-07-14

## 2025-05-15 RX ORDER — GUANFACINE 1 MG/1
1 TABLET ORAL NIGHTLY
Qty: 30 TABLET | Refills: 1 | Status: SHIPPED | OUTPATIENT
Start: 2025-05-15 | End: 2025-07-14

## 2025-05-15 RX ORDER — BUPROPION HYDROCHLORIDE 300 MG/1
300 TABLET ORAL EVERY MORNING
Qty: 30 TABLET | Refills: 1 | Status: SHIPPED | OUTPATIENT
Start: 2025-05-15 | End: 2025-07-14

## 2025-05-15 RX ORDER — LISDEXAMFETAMINE DIMESYLATE 50 MG/1
50 CAPSULE ORAL EVERY MORNING
Qty: 30 CAPSULE | Refills: 0 | Status: SHIPPED | OUTPATIENT
Start: 2025-05-15

## 2025-05-15 RX ORDER — ALPRAZOLAM 0.25 MG/1
0.25 TABLET ORAL DAILY PRN
Qty: 60 TABLET | Refills: 1 | Status: SHIPPED | OUTPATIENT
Start: 2025-05-15

## 2025-05-15 SDOH — SOCIAL DETERMINANTS OF HEALTH (SDOH): UNSPECIFIED PROBLEMS RELATED TO EMPLOYMENT: Z56.9

## 2025-05-15 SDOH — SOCIAL DETERMINANTS OF HEALTH (SDOH): PROBLEM RELATED TO PRIMARY SUPPORT GROUP, UNSPECIFIED: Z63.9

## 2025-05-15 NOTE — PROGRESS NOTES
Outpatient Psychiatry Followup Visit (with Ambient Listening)  5/15/2025  Assessment & Plan    Impression     ICD-10-CM ICD-9-CM   1. Moderate episode of recurrent major depressive disorder  F33.1 296.32   2. Generalized anxiety disorder with panic attacks  F41.1 300.02    F41.0 300.01   3. ADHD (attention deficit hyperactivity disorder), inattentive type  F90.0 314.00   4. Essential hypertension  I10 401.9   5. Problems at work  Z56.9 V62.29   6. Relationship dysfunction  Z63.9 V62.81   7. BMI 40.0-44.9, adult  Z68.41 V85.41      Plan of Care & Medication Management    Chart was reviewed. The risks and benefits of medication were discussed with pt. The treatment plan and followup plan were reviewed with pt. Pt understands to contact clinic if symptoms worsen. Pt understands to call 911 or go to nearest ER for suicidal ideation, intent or plan. Unless otherwise specified, pt did NOT display signs of nor endorse symptoms of overt psychosis or acute mood disorder requiring hospitalization during the encounter; pt denied violent thoughts or suicidal or homicidal ideation, intent, or plan. Portions of this note was generated with the assistance of ambient listening technology. Verbal consent was obtained by the patient and accompanying visitor(s) for the recording of patient appointment to facilitate this note. I attest to having reviewed and edited the generated note for accuracy, though some syntax or spelling errors may persist. Please contact the author of this note for any clarification. Unless otherwise specified, pt did NOT display signs of nor endorse symptoms of overt psychosis or acute mood disorder requiring hospitalization during the encounter; pt denied violent thoughts or suicidal or homicidal ideation, intent, or plan.   Ambient Data Assessment & Plan    IMPRESSION:  Assessed mood and work-related stress, noting improved stress management with current medication regimen.  Evaluated effectiveness of  "B12 supplementation started at previous visit, noting improved energy levels.  Noted report of intermittent blurry vision in left eye  Mood is stable    MEDICATIONS - SEE BOX BELOW:  Continue B12 supplementation, taken in the morning with food.  Continue current medication regimen including hydroxyzine, Adderall, Vyvanse, guanfacine, Lexapro, Wellbutrin, Xanax    FOLLOW UP:  Follow up on Tuesday, June 10th at 11:30 AM as scheduled.        RX History ADDERALL XR/IR (2014, for about 4 years, took with antidepressant and slept well), ATARAX/VISTARIL, CELEXA (dc in 2014, does not remember response), LEXAPRO, PROZAC (2016, caused dysphoria, "made me darker"), REMERON (appetite), TRAZODONE (2016, caused nightmares), VYVANSE, WELLBUTRIN (initiated by primary care in 2014 and again in [id number]), XANAX (JUL2023 taking 0.25mg daily prn 1d/w), ZOLOFT (does not remember effect, took for one month)    Current RX [id number]: JOSE 2/6, BI 47/100  Considering discontinuing REMERON when depression is in remission  Continue ATARAX/VISTARIL  Adjustments:  [id number]: Start 10mg TID prn anxiety  Prior to evaluation pt had been taking WELLBUTRIN 300mg daily and XANAX 0.25mg daily prn  Continue ADDERALL IR  Adjustments:  8/5/2024: Start 5mg daily around lunchtime  Continue GUANFACINE  Pt was provided [employer] educational material 1/10/2025  Adjustments:  1/10/2025: Start 1mg HS  Continue LEXAPRO  Target dose range 10-20mg/d  Adjustments:  21GWG4793: Adjust to 20mg daily in the morning  82AQA5094: Increase to 20mg NIGHTLY   73CRV5389: Adjust to 10mg NIGHTLY   41DYL6431: Start 10mg daily  Prior to evaluation pt had been taking WELLBUTRIN 300mg daily and XANAX 0.25mg daily prn  Continue VYVANSE  Pt was provided [employer] educational material 2/22/2024.  Adjustments:  3/11/2025: Increase to 50mg qam  81MRD2709: Increase to 40mg daily in the morning  3/11/2025 ASRS 4/1 (18-17)  4/22/2024 ASRS 2/1 (10-12)  34GWA3940: Start 30mg daily in the " "morning  3/22/2024 ASRS 2/1 (12-15)  Prior to 21QTH8264 pt was NOT taking a stimulant  1/11/2024 ASRS 3/2 (15-22)  Continue WELLBUTRIN XL  Target dose range 150-300mg/d  Adjustments:  52WAX1564: Continue 300mg daily  Prior to evaluation pt had been taking WELLBUTRIN 300mg daily  Continue XANAX  Adjustments:  11UNQ5754: Continue 0.25mg daily prn  Prior to evaluation pt had been taking XANAX 0.25mg daily prn and reported taking 1d/w on average      Other []SLEEP HYGIENE  []THERAPY  []CONTRACT 5/15/2025?  [x] REVIEWED 5/15/2025?  []NRT  []     RETURN M: STANDARD PROTOCOL: RETURN IN 4 WEEKS (ONE MONTH)       Psychotherapy   Target Symptoms stress, interpersonal problems   Why chosen therapy is appropriate versus another modality: patient responds to this modality, relevant to diagnosis   Outcome Monitoring observation, self-report   Therapeutic Intervention IPT (interpersonal psychotherapy), psychoeducation, supportive psychotherapy   Topics and Themes building skills sets for symptom management, role disputes, symptom recognition   Pt Response The patient's response to the intervention is accepting.    Pt Progress The patient's progress toward treatment goals is positive progress.   Duration 20 minutes       Subjective    Available documentation has been reviewed, and pertinent elements of the chart have been incorporated into this note where appropriate. Last Epic encounter with writer was on 4/15/2025   Domonique Carmona, a 64 y.o. female, presenting for followup visit. This visit was done in person, IN CLINIC.    Ambient Data     History of Present Illness    HPI:  Patient presents with frustration related to work issues, as well as concerns about her son's behavior. Patient reports being in a "foul mood" due to ongoing issues at work. She feels disregarded, talked over, ignored, and talked down to by colleagues, causing frustration and embarrassment. 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    Patient expresses frustration with communication breakdowns and lack of recognition at work, feeling undermined during meetings and having her contributions dismissed or overlooked by colleagues. These experiences contribute to her negative mood and feelings of being undervalued in her professional role.    Patient recognizes this as a temporary state, saying, "I'm sure it's just now and it'll be okay." Patient is actively trying to manage her stress and negative thoughts, indicating some level of self-awareness and coping strategies.    Patient discusses a recent conflict with her son, Mendez, which occurred on Mother's Day and the following day. She describes his behavior as cold, stiff, and unpleasant, which hurt her feelings and triggered negative self-perceptions. Patient confronted her son about his behavior, leading to a discussion about communication and mutual understanding of each other's needs and expectations.    Therapy was provided and documented above.    Patient reports experiencing intermittent blurry vision in her left eye, which has recently worsened. She has scheduled appointments with both optometry and ophthalmology to address this concern.            Objective    Vitals:    05/15/25 1105   BP: 135/84   Pulse: 75   Height: 5' 11" (1.803 m)     (Current body mass index is 41.57 kg/m².)    MSE/PE  1. Appearance: Dress is informal but appropriate. Motor activity normal.  2. Discourse: Clear speech with normal rate and volume. Associations intact. Orderly.  3. Emotional Expression: Somewhat anxious.  4. Perception and Thinking: No hallucinations. No suicidality, no homicidality, delusions, or paranoia.  5. Sensorium: Grossly intact. Able to focus for interview.  6. Memory and Fund of Knowledge: Intact for content of interview.  7. Insight and Judgment: Intact.   "     Measurement-Based Care (MBC):     Routine Instruments   PROMIS-ANXIETY Interpretation: 13 (4a raw score): T-SCORE 65.3; MODERATE using 55-60-70 cutoffs.   PROMIS-DEPRESSION Interpretation: 12 (4a raw score): T-SCORE 62.2; MODERATE using 55-60-70 cutoffs.   PSS4 Interpretation: 1121: Stress appraisal is HIGH. High perceived helplessness; pt strongly experiences a lack of control over responses to stress. Moderate lack of self-efficacy; pt moderately perceives an inability to handle problems.   Additional Instruments   MBC ANCHOR : about the same         Auto-populated Chart Data:     The chart was reviewed for recent diagnostic procedures and investigations, and pertinent results are noted below.   Encounter Medications  Encounter Medications[1]   Cardiometabolic  EKG Results  Results for orders placed or performed during the hospital encounter of 10/25/21   EKG 12-lead    Collection Time: 10/22/21 11:13 AM    Narrative    Test Reason : Z01.818,    Vent. Rate : 065 BPM     Atrial Rate : 065 BPM     P-R Int : 174 ms          QRS Dur : 086 ms      QT Int : 420 ms       P-R-T Axes : 007 053 027 degrees     QTc Int : 436 ms    Normal sinus rhythm  Normal ECG  When compared with ECG of 17-AUG-2020 12:33,  No significant change was found  Confirmed by Carmenza GALVAN, Iron TOBIN ([id number]) on 10/25/2021 11:21:43 AM    Referred By: RED MARTI           Confirmed By:Iron Herr MD        Labs  Lab Results   Component Value Date    TSH 0.710 04/14/2025    GLU 83 04/14/2025    CHOL 233 (H) 04/14/2025    TRIG 81 04/14/2025    HDL 55 04/14/2025    LDLCALC 161.8 (H) 04/14/2025       BMI Trend - (Current body mass index is 41.57 kg/m².)  Wt Readings from Last 7 Encounters:   04/17/24 135.2 kg (298 lb 1 oz)   04/11/24 135.2 kg (298 lb)   03/06/24 133.4 kg (294 lb)   02/22/24 133.7 kg (294 lb 13.8 oz)   02/07/24 129.7 kg (286 lb)   10/02/23 129.7 kg (286 lb)   09/18/23 128.7 kg (283 lb 10 oz)       BP/HR Trend   BP  "Readings from Last 3 Encounters:   05/15/25 135/84   04/15/25 134/89   03/11/25 129/81      Pulse Readings from Last 3 Encounters:   05/15/25 75   04/15/25 68   03/11/25 75       Endocrine Lab Results   Component Value Date    TSH 0.710 04/14/2025      Hematologic   Lab Results   Component Value Date    WBC 4.07 04/14/2025    RBC 4.51 04/14/2025    HGB 13.3 04/14/2025    HCT 41.8 04/14/2025    MCV 93 04/14/2025    MCH 29.5 04/14/2025    RDW 13.2 04/14/2025     04/14/2025    MPV 10.2 04/14/2025    GRAN 2.6 05/03/2024    GRAN 61.9 05/03/2024      Hepatorenal Lab Results   Component Value Date     04/14/2025    K 4.0 04/14/2025    CALCIUM 9.2 04/14/2025    PHOS 3.2 05/12/2021    MG 2.3 05/12/2021    CO2 27 04/14/2025    ANIONGAP 7 (L) 04/14/2025    CREATININE 0.9 04/14/2025    BUN 17 04/14/2025    EGFRNORACEVR >60 04/14/2025    SPECGRAV 1.020 04/14/2025    PROTEINUA Negative 04/14/2025    AST 11 04/14/2025    ALT 8 (L) 04/14/2025    BILITOT 0.8 04/14/2025    ALBUMIN 3.9 04/14/2025      Medication Level No results found for: "LITHIUM", "VALPROATE", "CBMZ", "CARBAMAZ", "LAMOTRIGINE", "PHENYTOIN", "PHENOBARB", "CLOZAPINE", "NORCLOZAP", "CLOZNORCLOZ", "CLONAZEPAM", "MAYUR", "VENLAFAXINE", "NORTRIP", "OXCARBAZ"   Other Labs Lab Results   Component Value Date    THIAMINEBLOO 85 05/11/2022    JYFBGLQM76 202 (L) 04/14/2025    [id number] 29 (L) 05/11/2022      Drug Screening   AMP * (*) METHAMP * (*)   LARA * (*) MORPH * (*)   BUP * (*) OXY * (*)   BENZO * (*) METHADONE * (*)   CHARLOTTE * (*) THC * (*)   HX No results found for: "AMPHETAMINES", "PCDSOPHENCYN", "COCAINEMETAB", "POCCOC", "COCAINE", "CHARLOTTE", "COCAINEURINE", "POCCOCDRUG", "PCDSUTRAMA", "PCDSOBENZOD", "BARBITURATES", "PCDSCOMETHA", "OPIATESCREEN", "PCDSUBUPRE", "PCDSUFENTANY", "PCDSUOXYCOD", "BUPRENORPH", "NORBUPRENOR", "MARIJUANATHC"  No results found for: "PCDSOALCOHOL", "ALCOHOLMEDIC", "THEYLGLUCU", "ETHYLSULF", "GSSD13222", "PETH"         Billing " "Documentation:     Method IN PERSON visit at the clinic, established   E/M 70373: FOLLOW UP VISIT, Rx mgmt, "Multiple STABLE chronic illnesses"   Additional 67400, with modifer 59: administered and scored more than one psychological or neuropsychological tests (see MBC above) (16+ mins)  , without modifiers -24,-25,-53: COMPLEXITY: Visit today included increased complexity associated with the care of the episodic problem(s) (multiple psychiatric disorders - see above) addressed and managing the longitudinal care of the patient due to the serious and/or complex managed problem(s) (multiple psychiatric disorders - see above).  32512: 20 minutes (with therapy documentation above)              Vinny Charles DO  Department of Psychiatry, [facility]             [1]   Outpatient Encounter Medications as of 5/15/2025   Medication Sig Dispense Refill    ALPRAZolam (XANAX) 0.25 MG tablet Take 1 tablet (0.25 mg total) by mouth daily as needed for Anxiety. 60 tablet 1    buPROPion (WELLBUTRIN XL) 300 MG 24 hr tablet Take 1 tablet (300 mg total) by mouth every morning. 30 tablet 1    dextroamphetamine-amphetamine 5 mg Tab Take 1 tablet (5 mg) by mouth once daily. Take at lunchtime. 30 tablet 0    EScitalopram oxalate (LEXAPRO) 20 MG tablet Take 1 tablet (20 mg total) by mouth every morning. 30 tablet 1    guanFACINE (TENEX) 1 MG Tab Take 1 tablet (1 mg total) by mouth nightly. 30 tablet 1    ibuprofen (ADVIL,MOTRIN) 800 MG tablet Take 1 tablet (800 mg total) by mouth 3 (three) times daily. 60 tablet 6    lisdexamfetamine (VYVANSE) 50 MG capsule Take 1 capsule (50 mg total) by mouth every morning. 30 capsule 0    pantoprazole (PROTONIX) 20 MG tablet Take 1 tablet (20 mg total) by mouth once daily. 90 tablet 0    tiZANidine (ZANAFLEX) 4 MG tablet TAKE 1 TABLET BY MOUTH THREE TIMES A DAY AS NEEDED FOR 10 DAYS 90 tablet 1    [DISCONTINUED] ALPRAZolam (XANAX) 0.25 MG tablet Take 1 tablet (0.25 mg total) by mouth " daily as needed for Anxiety. 60 tablet 1    [DISCONTINUED] buPROPion (WELLBUTRIN XL) 300 MG 24 hr tablet Take 1 tablet (300 mg total) by mouth every morning. 30 tablet 1    [DISCONTINUED] dextroamphetamine-amphetamine 5 mg Tab Take 1 tablet (5 mg) by mouth once daily. Take at lunchtime. 30 tablet 0    [DISCONTINUED] EScitalopram oxalate (LEXAPRO) 20 MG tablet Take 1 tablet (20 mg total) by mouth every morning. 30 tablet 1    [DISCONTINUED] guanFACINE (TENEX) 1 MG Tab Take 1 tablet (1 mg total) by mouth nightly. 30 tablet 1    [DISCONTINUED] lisdexamfetamine (VYVANSE) 50 MG capsule Take 1 capsule (50 mg total) by mouth every morning. 30 capsule 0     No facility-administered encounter medications on file as of 5/15/2025.

## 2025-05-16 ENCOUNTER — OFFICE VISIT (OUTPATIENT)
Dept: FAMILY MEDICINE | Facility: CLINIC | Age: 65
End: 2025-05-16
Payer: COMMERCIAL

## 2025-05-16 ENCOUNTER — TELEPHONE (OUTPATIENT)
Dept: OPHTHALMOLOGY | Facility: CLINIC | Age: 65
End: 2025-05-16
Payer: COMMERCIAL

## 2025-05-16 ENCOUNTER — PATIENT MESSAGE (OUTPATIENT)
Dept: FAMILY MEDICINE | Facility: CLINIC | Age: 65
End: 2025-05-16

## 2025-05-16 VITALS
OXYGEN SATURATION: 96 % | SYSTOLIC BLOOD PRESSURE: 128 MMHG | WEIGHT: 287.81 LBS | BODY MASS INDEX: 40.29 KG/M2 | HEIGHT: 71 IN | DIASTOLIC BLOOD PRESSURE: 86 MMHG | HEART RATE: 69 BPM

## 2025-05-16 DIAGNOSIS — F32.A DEPRESSION, UNSPECIFIED DEPRESSION TYPE: ICD-10-CM

## 2025-05-16 DIAGNOSIS — M17.12 PRIMARY OSTEOARTHRITIS OF LEFT KNEE: ICD-10-CM

## 2025-05-16 DIAGNOSIS — M76.61 ACHILLES TENDINITIS OF RIGHT LOWER EXTREMITY: ICD-10-CM

## 2025-05-16 DIAGNOSIS — E66.01 MORBID (SEVERE) OBESITY DUE TO EXCESS CALORIES: Primary | ICD-10-CM

## 2025-05-16 DIAGNOSIS — H53.9 VISUAL CHANGES: ICD-10-CM

## 2025-05-16 DIAGNOSIS — K21.9 GASTROESOPHAGEAL REFLUX DISEASE, UNSPECIFIED WHETHER ESOPHAGITIS PRESENT: ICD-10-CM

## 2025-05-16 DIAGNOSIS — F41.9 ANXIETY: ICD-10-CM

## 2025-05-16 DIAGNOSIS — E78.2 MIXED HYPERLIPIDEMIA: ICD-10-CM

## 2025-05-16 DIAGNOSIS — H53.9 VISION CHANGES: Primary | ICD-10-CM

## 2025-05-16 DIAGNOSIS — F98.8 ATTENTION DEFICIT DISORDER, UNSPECIFIED TYPE: ICD-10-CM

## 2025-05-16 RX ORDER — ROSUVASTATIN CALCIUM 5 MG/1
5 TABLET, COATED ORAL DAILY
Qty: 90 TABLET | Refills: 3 | Status: CANCELLED | OUTPATIENT
Start: 2025-05-16 | End: 2026-05-16

## 2025-05-16 RX ORDER — PANTOPRAZOLE SODIUM 20 MG/1
20 TABLET, DELAYED RELEASE ORAL DAILY
Qty: 90 TABLET | Refills: 0 | Status: CANCELLED | OUTPATIENT
Start: 2025-05-16

## 2025-05-16 RX ORDER — IBUPROFEN 800 MG/1
800 TABLET, FILM COATED ORAL 3 TIMES DAILY
Qty: 60 TABLET | Refills: 6 | Status: CANCELLED | OUTPATIENT
Start: 2025-05-16

## 2025-05-16 RX ORDER — TIZANIDINE 4 MG/1
TABLET ORAL
Qty: 90 TABLET | Refills: 1 | Status: CANCELLED | OUTPATIENT
Start: 2025-05-16

## 2025-05-16 NOTE — TELEPHONE ENCOUNTER
----- Message from Baldemar Donald OD sent at 5/16/2025 11:08 AM CDT -----  Hi, we have another referral for eye pain. Can someone reach out and see what's going on and schedule accordingly? Marilee

## 2025-05-19 RX ORDER — TIZANIDINE 4 MG/1
TABLET ORAL
Qty: 90 TABLET | Refills: 1 | Status: SHIPPED | OUTPATIENT
Start: 2025-05-19

## 2025-05-19 RX ORDER — IBUPROFEN 800 MG/1
800 TABLET, FILM COATED ORAL 3 TIMES DAILY
Qty: 60 TABLET | Refills: 6 | Status: SHIPPED | OUTPATIENT
Start: 2025-05-19

## 2025-05-19 RX ORDER — PANTOPRAZOLE SODIUM 20 MG/1
20 TABLET, DELAYED RELEASE ORAL DAILY
Qty: 90 TABLET | Refills: 0 | Status: SHIPPED | OUTPATIENT
Start: 2025-05-19

## 2025-05-20 ENCOUNTER — OFFICE VISIT (OUTPATIENT)
Dept: OPTOMETRY | Facility: CLINIC | Age: 65
End: 2025-05-20
Payer: COMMERCIAL

## 2025-05-20 DIAGNOSIS — H25.13 NUCLEAR SCLEROSIS, BILATERAL: ICD-10-CM

## 2025-05-20 DIAGNOSIS — G43.109 OCULAR MIGRAINE: Primary | ICD-10-CM

## 2025-05-20 DIAGNOSIS — H26.9 CORTICAL CATARACT: ICD-10-CM

## 2025-05-20 DIAGNOSIS — H57.12 PAIN AROUND LEFT EYE: ICD-10-CM

## 2025-05-20 PROCEDURE — 1159F MED LIST DOCD IN RCRD: CPT | Mod: CPTII,S$GLB,, | Performed by: OPTOMETRIST

## 2025-05-20 PROCEDURE — 99999 PR PBB SHADOW E&M-EST. PATIENT-LVL III: CPT | Mod: PBBFAC,,, | Performed by: OPTOMETRIST

## 2025-05-20 PROCEDURE — 1160F RVW MEDS BY RX/DR IN RCRD: CPT | Mod: CPTII,S$GLB,, | Performed by: OPTOMETRIST

## 2025-05-20 PROCEDURE — 92014 COMPRE OPH EXAM EST PT 1/>: CPT | Mod: S$GLB,,, | Performed by: OPTOMETRIST

## 2025-05-20 PROCEDURE — 3061F NEG MICROALBUMINURIA REV: CPT | Mod: CPTII,S$GLB,, | Performed by: OPTOMETRIST

## 2025-05-20 PROCEDURE — 3066F NEPHROPATHY DOC TX: CPT | Mod: CPTII,S$GLB,, | Performed by: OPTOMETRIST

## 2025-05-20 NOTE — PROGRESS NOTES
"HPI    Pt here today for OS pain, blurred vision and feels swollen x 1 week ago.       Started with blurred vision like she was looking through vaseline, then   vision "blacked out" then changed to a purple color and slowly went back   to normal.    Saw what she thought was a floater and possibly light flashes but did not   last.    Still has a blurry area in upper nasal peripheral vision.    Has had a headaches on & off since then with pressure behind OS x 1 week.    No pain in eye, just around it.   Feels like eye is " too big".    No symptoms OD.    _____    Weekend before last, looking thru OS like petroleum jelly  Lasted 5 minutes, eyeball was painful   Then vision faded to purple, saw static   Last edited by Baldemar Donald, OD on 5/20/2025  3:55 PM.            Assessment /Plan     For exam results, see Encounter Report.    Ocular migraine    Nuclear sclerosis, bilateral    Cortical cataract      1. Ocular migraine (Primary)  2. Pain around left eye  Discussed possible etiologies   Discussed possible triggers -- recommend keeping journal of occurrences   Pt notes sinus issues recently, reports BP well controlled  Recommend otc decongestant as directed, f/u with PCP / ENT prn  Report back if ocular symptoms reoccurring     3. Nuclear sclerosis, bilateral  4. Cortical cataract  Mild OU, not VS  Recommend returning for DFE / refraction prn                    "

## 2025-05-22 ENCOUNTER — PATIENT MESSAGE (OUTPATIENT)
Dept: OBSTETRICS AND GYNECOLOGY | Facility: CLINIC | Age: 65
End: 2025-05-22
Payer: COMMERCIAL

## 2025-05-27 ENCOUNTER — PATIENT MESSAGE (OUTPATIENT)
Dept: FAMILY MEDICINE | Facility: CLINIC | Age: 65
End: 2025-05-27
Payer: COMMERCIAL

## 2025-05-30 ENCOUNTER — HOSPITAL ENCOUNTER (OUTPATIENT)
Dept: RADIOLOGY | Facility: HOSPITAL | Age: 65
Discharge: HOME | End: 2025-05-30
Attending: NURSE PRACTITIONER
Payer: COMMERCIAL

## 2025-05-30 DIAGNOSIS — H53.9 VISION CHANGES: ICD-10-CM

## 2025-05-30 PROCEDURE — 93880 EXTRACRANIAL BILAT STUDY: CPT | Mod: 26,,, | Performed by: RADIOLOGY

## 2025-05-30 PROCEDURE — 93880 EXTRACRANIAL BILAT STUDY: CPT | Mod: TC,PO

## 2025-05-31 RX ORDER — ROSUVASTATIN CALCIUM 10 MG/1
10 TABLET, COATED ORAL DAILY
Qty: 90 TABLET | Refills: 3 | Status: SHIPPED | OUTPATIENT
Start: 2025-05-31 | End: 2026-05-31

## 2025-06-01 ENCOUNTER — RESULTS FOLLOW-UP (OUTPATIENT)
Dept: FAMILY MEDICINE | Facility: CLINIC | Age: 65
End: 2025-06-01

## 2025-06-01 PROBLEM — E66.01 MORBID (SEVERE) OBESITY DUE TO EXCESS CALORIES: Status: ACTIVE | Noted: 2025-06-01

## 2025-06-03 ENCOUNTER — OFFICE VISIT (OUTPATIENT)
Dept: OPTOMETRY | Facility: CLINIC | Age: 65
End: 2025-06-03
Payer: COMMERCIAL

## 2025-06-03 DIAGNOSIS — H57.12 PAIN AROUND LEFT EYE: ICD-10-CM

## 2025-06-03 DIAGNOSIS — G43.109 OCULAR MIGRAINE: Primary | ICD-10-CM

## 2025-06-03 PROCEDURE — 3061F NEG MICROALBUMINURIA REV: CPT | Mod: CPTII,S$GLB,, | Performed by: OPTOMETRIST

## 2025-06-03 PROCEDURE — 99999 PR PBB SHADOW E&M-EST. PATIENT-LVL III: CPT | Mod: PBBFAC,,, | Performed by: OPTOMETRIST

## 2025-06-03 PROCEDURE — 3066F NEPHROPATHY DOC TX: CPT | Mod: CPTII,S$GLB,, | Performed by: OPTOMETRIST

## 2025-06-03 PROCEDURE — 99213 OFFICE O/P EST LOW 20 MIN: CPT | Mod: S$GLB,,, | Performed by: OPTOMETRIST

## 2025-06-03 PROCEDURE — 92250 FUNDUS PHOTOGRAPHY W/I&R: CPT | Mod: S$GLB,,, | Performed by: OPTOMETRIST

## 2025-06-03 PROCEDURE — 1159F MED LIST DOCD IN RCRD: CPT | Mod: CPTII,S$GLB,, | Performed by: OPTOMETRIST

## 2025-06-03 RX ORDER — LOTEPREDNOL ETABONATE 5 MG/ML
SUSPENSION/ DROPS OPHTHALMIC
Qty: 10 ML | Refills: 1 | Status: SHIPPED | OUTPATIENT
Start: 2025-06-03 | End: 2026-06-03

## 2025-06-10 ENCOUNTER — OFFICE VISIT (OUTPATIENT)
Dept: PSYCHIATRY | Facility: CLINIC | Age: 65
End: 2025-06-10
Payer: COMMERCIAL

## 2025-06-10 VITALS
SYSTOLIC BLOOD PRESSURE: 135 MMHG | BODY MASS INDEX: 40.14 KG/M2 | HEART RATE: 61 BPM | DIASTOLIC BLOOD PRESSURE: 85 MMHG | HEIGHT: 71 IN

## 2025-06-10 DIAGNOSIS — F33.1 MODERATE EPISODE OF RECURRENT MAJOR DEPRESSIVE DISORDER: Primary | ICD-10-CM

## 2025-06-10 DIAGNOSIS — F41.0 GENERALIZED ANXIETY DISORDER WITH PANIC ATTACKS: ICD-10-CM

## 2025-06-10 DIAGNOSIS — F90.0 ADHD (ATTENTION DEFICIT HYPERACTIVITY DISORDER), INATTENTIVE TYPE: ICD-10-CM

## 2025-06-10 DIAGNOSIS — Z56.9 PROBLEMS AT WORK: ICD-10-CM

## 2025-06-10 DIAGNOSIS — F41.1 GENERALIZED ANXIETY DISORDER WITH PANIC ATTACKS: ICD-10-CM

## 2025-06-10 DIAGNOSIS — I10 ESSENTIAL HYPERTENSION: ICD-10-CM

## 2025-06-10 DIAGNOSIS — F33.0 MILD EPISODE OF RECURRENT MAJOR DEPRESSIVE DISORDER: Primary | ICD-10-CM

## 2025-06-10 DIAGNOSIS — Z63.9 RELATIONSHIP DYSFUNCTION: ICD-10-CM

## 2025-06-10 PROCEDURE — 99999 PR PBB SHADOW E&M-EST. PATIENT-LVL III: CPT | Mod: PBBFAC,,, | Performed by: STUDENT IN AN ORGANIZED HEALTH CARE EDUCATION/TRAINING PROGRAM

## 2025-06-10 PROCEDURE — 3066F NEPHROPATHY DOC TX: CPT | Mod: CPTII,S$GLB,, | Performed by: STUDENT IN AN ORGANIZED HEALTH CARE EDUCATION/TRAINING PROGRAM

## 2025-06-10 PROCEDURE — 90837 PSYTX W PT 60 MINUTES: CPT | Mod: S$GLB,,, | Performed by: SOCIAL WORKER

## 2025-06-10 PROCEDURE — G2211 COMPLEX E/M VISIT ADD ON: HCPCS | Mod: S$GLB,,, | Performed by: STUDENT IN AN ORGANIZED HEALTH CARE EDUCATION/TRAINING PROGRAM

## 2025-06-10 PROCEDURE — 3061F NEG MICROALBUMINURIA REV: CPT | Mod: CPTII,S$GLB,, | Performed by: SOCIAL WORKER

## 2025-06-10 PROCEDURE — 99214 OFFICE O/P EST MOD 30 MIN: CPT | Mod: S$GLB,,, | Performed by: STUDENT IN AN ORGANIZED HEALTH CARE EDUCATION/TRAINING PROGRAM

## 2025-06-10 PROCEDURE — 3066F NEPHROPATHY DOC TX: CPT | Mod: CPTII,S$GLB,, | Performed by: SOCIAL WORKER

## 2025-06-10 PROCEDURE — 3075F SYST BP GE 130 - 139MM HG: CPT | Mod: CPTII,S$GLB,, | Performed by: STUDENT IN AN ORGANIZED HEALTH CARE EDUCATION/TRAINING PROGRAM

## 2025-06-10 PROCEDURE — 1159F MED LIST DOCD IN RCRD: CPT | Mod: CPTII,S$GLB,, | Performed by: STUDENT IN AN ORGANIZED HEALTH CARE EDUCATION/TRAINING PROGRAM

## 2025-06-10 PROCEDURE — 1160F RVW MEDS BY RX/DR IN RCRD: CPT | Mod: CPTII,S$GLB,, | Performed by: STUDENT IN AN ORGANIZED HEALTH CARE EDUCATION/TRAINING PROGRAM

## 2025-06-10 PROCEDURE — 96136 PSYCL/NRPSYC TST PHY/QHP 1ST: CPT | Mod: 59,S$GLB,, | Performed by: STUDENT IN AN ORGANIZED HEALTH CARE EDUCATION/TRAINING PROGRAM

## 2025-06-10 PROCEDURE — 3008F BODY MASS INDEX DOCD: CPT | Mod: CPTII,S$GLB,, | Performed by: STUDENT IN AN ORGANIZED HEALTH CARE EDUCATION/TRAINING PROGRAM

## 2025-06-10 PROCEDURE — 3079F DIAST BP 80-89 MM HG: CPT | Mod: CPTII,S$GLB,, | Performed by: STUDENT IN AN ORGANIZED HEALTH CARE EDUCATION/TRAINING PROGRAM

## 2025-06-10 PROCEDURE — 3061F NEG MICROALBUMINURIA REV: CPT | Mod: CPTII,S$GLB,, | Performed by: STUDENT IN AN ORGANIZED HEALTH CARE EDUCATION/TRAINING PROGRAM

## 2025-06-10 RX ORDER — ESCITALOPRAM OXALATE 20 MG/1
20 TABLET ORAL EVERY MORNING
Qty: 30 TABLET | Refills: 1 | Status: SHIPPED | OUTPATIENT
Start: 2025-06-10 | End: 2025-08-09

## 2025-06-10 RX ORDER — ALPRAZOLAM 0.25 MG/1
0.25 TABLET ORAL DAILY PRN
Qty: 60 TABLET | Refills: 1 | Status: SHIPPED | OUTPATIENT
Start: 2025-06-10

## 2025-06-10 RX ORDER — DEXTROAMPHETAMINE SACCHARATE, AMPHETAMINE ASPARTATE, DEXTROAMPHETAMINE SULFATE AND AMPHETAMINE SULFATE 1.25; 1.25; 1.25; 1.25 MG/1; MG/1; MG/1; MG/1
5 TABLET ORAL DAILY
Qty: 30 TABLET | Refills: 0 | Status: SHIPPED | OUTPATIENT
Start: 2025-06-10 | End: 2025-07-12

## 2025-06-10 RX ORDER — GUANFACINE 1 MG/1
1 TABLET ORAL NIGHTLY
Qty: 30 TABLET | Refills: 1 | Status: SHIPPED | OUTPATIENT
Start: 2025-06-10 | End: 2025-08-09

## 2025-06-10 RX ORDER — BUPROPION HYDROCHLORIDE 300 MG/1
300 TABLET ORAL EVERY MORNING
Qty: 30 TABLET | Refills: 1 | Status: SHIPPED | OUTPATIENT
Start: 2025-06-10 | End: 2025-08-09

## 2025-06-10 RX ORDER — LISDEXAMFETAMINE DIMESYLATE 50 MG/1
50 CAPSULE ORAL EVERY MORNING
Qty: 30 CAPSULE | Refills: 0 | Status: SHIPPED | OUTPATIENT
Start: 2025-06-10

## 2025-06-10 SDOH — SOCIAL DETERMINANTS OF HEALTH (SDOH): PROBLEM RELATED TO PRIMARY SUPPORT GROUP, UNSPECIFIED: Z63.9

## 2025-06-10 SDOH — SOCIAL DETERMINANTS OF HEALTH (SDOH): UNSPECIFIED PROBLEMS RELATED TO EMPLOYMENT: Z56.9

## 2025-06-10 NOTE — PROGRESS NOTES
"    Outpatient Psychiatry Followup Visit - IN PERSON  6/10/2025  Assessment & Plan    Impression     ICD-10-CM ICD-9-CM   1. Mild episode of recurrent major depressive disorder  F33.0 296.31   2. Generalized anxiety disorder with panic attacks  F41.1 300.02    F41.0 300.01   3. ADHD (attention deficit hyperactivity disorder), inattentive type  F90.0 314.00   4. Essential hypertension  I10 401.9   5. Problems at work  Z56.9 V62.29   6. Relationship dysfunction  Z63.9 V62.81   7. BMI 40.0-44.9, adult  Z68.41 V85.41      Plan of Care & Medication Management    Chart was reviewed. The risks and benefits of medication were discussed with pt. The treatment plan and followup plan were reviewed with pt. Pt understands to contact clinic if symptoms worsen. Pt understands to call 911 or go to nearest ER for suicidal ideation, intent or plan. Unless otherwise specified, pt did NOT display signs of nor endorse symptoms of overt psychosis or acute mood disorder requiring hospitalization during the encounter; pt denied violent thoughts or suicidal or homicidal ideation, intent, or plan.   RX History ADDERALL XR/IR (2014, for about 4 years, took with antidepressant and slept well), ATARAX/VISTARIL, CELEXA (dc in 2014, does not remember response), LEXAPRO, PROZAC (2016, caused dysphoria, "made me darker"), REMERON (appetite), TRAZODONE (2016, caused nightmares), VYVANSE, WELLBUTRIN (initiated by primary care in 2014 and again in [id number]), XANAX (JUL2023 taking 0.25mg daily prn 1d/w), ZOLOFT (does not remember effect, took for one month)    Current RX [id number]: JOSE 2/6, BI 47/100  Considering discontinuing REMERON when depression is in remission  Continue ATARAX/VISTARIL  Adjustments:  75TDR1547: Start 10mg TID prn anxiety  Prior to evaluation pt had been taking WELLBUTRIN 300mg daily and XANAX 0.25mg daily prn  Continue ADDERALL IR  Adjustments:  8/5/2024: Start 5mg daily around lunchtime  Continue GUANFACINE  Pt was provided " [employer] educational material 1/10/2025  Adjustments:  1/10/2025: Start 1mg HS  Continue LEXAPRO  Target dose range 10-20mg/d  Adjustments:  78YZV0795: Adjust to 20mg daily in the morning  95ENZ2523: Increase to 20mg NIGHTLY   07NFT8788: Adjust to 10mg NIGHTLY   01INV1830: Start 10mg daily  Prior to evaluation pt had been taking WELLBUTRIN 300mg daily and XANAX 0.25mg daily prn  Continue VYVANSE  Pt was provided [employer] educational material 2/22/2024.  Adjustments:  3/11/2025: Increase to 50mg qam  26ETM1684: Increase to 40mg daily in the morning  3/11/2025 ASRS 4/1 (18-17)  4/22/2024 ASRS 2/1 (10-12)  49FMY2548: Start 30mg daily in the morning  3/22/2024 ASRS 2/1 (12-15)  Prior to 71BTF3484 pt was NOT taking a stimulant  1/11/2024 ASRS 3/2 (15-22)  Continue WELLBUTRIN XL  Target dose range 150-300mg/d  Adjustments:  19WDG7840: Continue 300mg daily  Prior to evaluation pt had been taking WELLBUTRIN 300mg daily  Continue XANAX  Adjustments:  04EXA4895: Continue 0.25mg daily prn  Prior to evaluation pt had been taking XANAX 0.25mg daily prn and reported taking 1d/w on average      Other []CONTRACT 6/10/2025?  [x] REVIEWED 6/10/2025?  []   RETURN K: STANDARD PROTOCOL: RETURN IN 4 WEEKS (ONE MONTH)       Subjective    Available documentation has been reviewed, and pertinent elements of the chart have been incorporated into this note where appropriate. Last Epic encounter with writer was on 5/15/2025   Domonique Carmona, a 64 y.o. female, presenting for followup visit. This visit was done in person, IN CLINIC.      Trouble with word substitutions    Work stress, overwhelm  Looking for alternate job  Feels under appreciated for hard work    BP wnl    Less depressed  Slight improvement on PROMIS-D noted    NO panic attacks in interim  Anxiety is controlled    Eye problems, ocular migraine  Encouraged to keep appointments with OPHTHO and ENT    Continue treatment as planned        Objective    Vitals:    06/10/25 1130   BP:  "135/85   Pulse: 61   Height: 5' 11" (1.803 m)     (Current body mass index is 40.14 kg/m².)    MSE/PE  1. Appearance: Dress is informal but appropriate. Motor activity normal.  2. Discourse: Clear speech with normal rate and volume. Associations intact. Orderly.  3. Emotional Expression: Somewhat anxious.  4. Perception and Thinking: No hallucinations. No suicidality, no homicidality, delusions, or paranoia.  5. Sensorium: Grossly intact. Able to focus for interview.  6. Memory and Fund of Knowledge: Intact for content of interview.  7. Insight and Judgment: Intact.       Measurement-Based Care (MBC):     Routine Instruments   PROMIS-ANXIETY Interpretation: 11 (4a raw score): T-SCORE 61.4; MODERATE using 55-60-70 cutoffs.   PROMIS-DEPRESSION Interpretation: 10 (4a raw score): T-SCORE 58.9; MILD using 55-60-70 cutoffs.   WHO-5: 9 raw: 36%   Additional Instruments   MBC ANCHOR : about the same               Billing Documentation:     Method IN PERSON visit at the clinic, established   E/M 45279: FOLLOW UP VISIT, Rx mgmt, "Multiple STABLE chronic illnesses"   Additional 52878, with modifer 59: administered and scored more than one psychological or neuropsychological tests (see MBC above) (16+ mins)  , without modifiers -24,-25,-53: COMPLEXITY: Visit today included increased complexity associated with the care of the episodic problem(s) (multiple psychiatric disorders - see above) addressed and managing the longitudinal care of the patient due to the serious and/or complex managed problem(s) (multiple psychiatric disorders - see above).                Vinny Charles DO  Department of Psychiatry, [facility]      "

## 2025-06-10 NOTE — PROGRESS NOTES
Individual Psychotherapy (PhD/LCSW)    6/10/2025    Site:  [facility]        Therapeutic Intervention: Met with patient.  Outpatient - Supportive psychotherapy 45 min - CPT Code 93755 and Outpatient - Interactive psychotherapy 45 min - CPT code 51430    Chief complaint/reason for encounter: depression, anxiety, and work related stressors; interpersonal relationship challenges and communications     Medical history:   Past Medical History:   Diagnosis Date    Abnormal Pap smear 1986    Cryosurgery    Anxiety     Arthritis     GERD (gastroesophageal reflux disease)     Venous stasis dermatitis of left lower extremity 12/31/2018       Medications:  Current Medications[1]    Family history of psychiatric illness:   Family History   Problem Relation Name Age of Onset    Ovarian cancer Mother      Breast cancer Neg Hx      Colon cancer Neg Hx      Collagen disease Neg Hx      Glaucoma Neg Hx      Melanoma Neg Hx      Psoriasis Neg Hx      Lupus Neg Hx      Eczema Neg Hx      Amblyopia Neg Hx      Blindness Neg Hx      Cataracts Neg Hx      Macular degeneration Neg Hx      Retinal detachment Neg Hx      Strabismus Neg Hx         Social history (marriage, employment, etc.): Session on 5/13/2025:  Domonique presents on time and speaks to relationship with son Mendez. Was able to speak to him about how his behavior gives rise to certain feelings in her resulting from how she perceives what is transpiring, and validate same. Speaks to work related stressors, frustrations, and how when others lack a sense of accountability it gives rise to frustrations. Has very good self awareness, was able to verbalize to her son (first time) how she felt. Did good work. Scheduled for follow up in one month. Supportive psychotherapy.     Interval history and content of current session:  Domonique presents on time and speaks to current status.  Somewhat adrift, attempting to anchor herself, we speak to stressors, is measured and thoughtful in her  analysis, demonstrates compassion and empathy.  Offer support and validation.      Treatment plan:  Target symptoms: recurrent depression, anxiety , work stress  Why chosen therapy is appropriate versus another modality: relevant to diagnosis, patient responds to this modality, evidence based practice  Outcome monitoring methods: self-report, observation  Therapeutic intervention type: insight oriented psychotherapy, supportive psychotherapy    Risk parameters:  Patient reports no suicidal ideation  Patient reports no homicidal ideation  Patient reports no self-injurious behavior  Patient reports no violent behavior    Verbal deficits: None    Patient's response to intervention:  The patient's response to intervention is accepting, motivated.    Progress toward goals and other mental status changes:  The patient's progress toward goals is good.    Diagnosis:   1. Moderate episode of recurrent major depressive disorder    2. ADHD (attention deficit hyperactivity disorder), inattentive type    3. Generalized anxiety disorder with panic attacks        Plan:  individual psychotherapy    Return to clinic: as scheduled    Length of Service (minutes): 60         [1]   Current Outpatient Medications:     ALPRAZolam (XANAX) 0.25 MG tablet, Take 1 tablet (0.25 mg total) by mouth daily as needed for Anxiety., Disp: 60 tablet, Rfl: 1    buPROPion (WELLBUTRIN XL) 300 MG 24 hr tablet, Take 1 tablet (300 mg total) by mouth every morning., Disp: 30 tablet, Rfl: 1    dextroamphetamine-amphetamine 5 mg Tab, Take 1 tablet (5 mg) by mouth once daily. Take at lunchtime., Disp: 30 tablet, Rfl: 0    EScitalopram oxalate (LEXAPRO) 20 MG tablet, Take 1 tablet (20 mg total) by mouth every morning., Disp: 30 tablet, Rfl: 1    guanFACINE (TENEX) 1 MG Tab, Take 1 tablet (1 mg total) by mouth nightly., Disp: 30 tablet, Rfl: 1    ibuprofen (ADVIL,MOTRIN) 800 MG tablet, Take 1 tablet (800 mg total) by mouth 3 (three) times daily., Disp: 60 tablet,  Rfl: 6    lisdexamfetamine (VYVANSE) 50 MG capsule, Take 1 capsule (50 mg total) by mouth every morning., Disp: 30 capsule, Rfl: 0    loteprednol (LOTEMAX) 0.5 % ophthalmic suspension, I drop in the left eye three times a day  x 7 days, then every am as needed for inflammation, Disp: 10 mL, Rfl: 1    pantoprazole (PROTONIX) 20 MG tablet, Take 1 tablet (20 mg total) by mouth once daily., Disp: 90 tablet, Rfl: 0    rosuvastatin (CRESTOR) 10 MG tablet, Take 1 tablet (10 mg total) by mouth once daily., Disp: 90 tablet, Rfl: 3    tiZANidine (ZANAFLEX) 4 MG tablet, TAKE 1 TABLET BY MOUTH THREE TIMES A DAY AS NEEDED FOR 10 DAYS, Disp: 90 tablet, Rfl: 1

## 2025-06-13 ENCOUNTER — OFFICE VISIT (OUTPATIENT)
Dept: OTOLARYNGOLOGY | Facility: CLINIC | Age: 65
End: 2025-06-13
Payer: COMMERCIAL

## 2025-06-13 VITALS — BODY MASS INDEX: 41.02 KG/M2 | WEIGHT: 293 LBS | HEIGHT: 71 IN

## 2025-06-13 DIAGNOSIS — H57.12 EYE PAIN, LEFT: Primary | ICD-10-CM

## 2025-06-13 DIAGNOSIS — H54.7 UNSPECIFIED VISUAL LOSS: ICD-10-CM

## 2025-06-13 PROCEDURE — 99999 PR PBB SHADOW E&M-EST. PATIENT-LVL III: CPT | Mod: PBBFAC,,, | Performed by: OTOLARYNGOLOGY

## 2025-06-13 NOTE — PROGRESS NOTES
"Subjective:       Patient ID: Domonique Carmona is a 64 y.o. female.    Chief Complaint: Nasal Congestion (Patient is here with c/o " my left eye feels swollen and left sided constant low discomfort. I had blurry vision a few weeks ago while driving and then the vision went black in only the left eye. I have since seen a PCP and my optometrist all of which stated the eye looked good. I was sent to see ENT to rule out headache or sinus pressure that could be causing this swelling and discomfort." )      This patient couple of weeks ago developed fairly sudden change in her vision in the left eye with a brief period of no vision than some blurry vision and all along the eyes been sensitive to touch it hurts to touch the eyeball itself she was told that this maybe an ocular migraine but she really has not had much in the way of a headache and does not have a history of migraines and the most obvious ongoing symptom is that are eyes tender to touch and has a swollen feeling.    Sinus problem for mentioned but she is not having any sinus or nasal symptoms no nasal congestion she is not blowing anything out that is abnormal and she is not prone to sinus problems and her experience.                Objective:      ENT Physical Exam    Her ex your ocular movements are intact and normal her eye does not appear swollen are bulging or anything like that she does mentioned that it feels swollen and just on palpating the sclera through her lower lid she mentions that is pretty tender on the left and not so on the right.        Assessment:       1. Eye pain, left         Plan:          I made it clear to this patient that I would just do not have any experience dealing with vision changes or eye pain.  The possibility of a sinus problem causing that I guess isn't unheard of but it is nothing I have encountered in the absence of fairly dramatic sinus problems that are in an up them self causing nasal symptoms discharge and I would " not expect that is to be a source of a sudden visual change like she experienced at the beginning of this.      I am also not familiar with ocular migraines from a specialty standpoint but I am not of the impression that that would lead to an eye being tender to touch for two weeks straight.    She has a an ophthalmology visit at the end of July and thankfully her vision seems pretty good to her now and the optometrist has looked at things and from that is standpoint things look normal I think she probably needs an MR of her orbits to rule out any type of problem that would be identify with such a study, realizing that ordering such a study is outside of my usual expertise but I do not know that she can wait until July to get an answer for this new onset of I problems that at one point included significant visual changes brief though they were.

## 2025-06-17 ENCOUNTER — TELEPHONE (OUTPATIENT)
Dept: OTOLARYNGOLOGY | Facility: CLINIC | Age: 65
End: 2025-06-17
Payer: COMMERCIAL

## 2025-06-17 DIAGNOSIS — Z92.89 HISTORY OF MRI: Primary | ICD-10-CM

## 2025-06-17 DIAGNOSIS — H57.12 EYE PAIN, LEFT: ICD-10-CM

## 2025-06-17 NOTE — TELEPHONE ENCOUNTER
Copied from CRM #[id number]. Topic: General Inquiry - Patient Advice  >> Jun 17, 2025  9:59 AM Nayana wrote:  Type:  Needs Medical Advice    Who Called: Tamiko/Scott العلي     Best Call Back Number:  [phone]  Additional Information:  states needs BUN Ricardo in creatine put in on pt.. [phone]

## 2025-06-20 ENCOUNTER — HOSPITAL ENCOUNTER (OUTPATIENT)
Dept: RADIOLOGY | Facility: HOSPITAL | Age: 65
Discharge: HOME | End: 2025-06-20
Attending: OTOLARYNGOLOGY
Payer: COMMERCIAL

## 2025-06-20 DIAGNOSIS — H54.7 UNSPECIFIED VISUAL LOSS: ICD-10-CM

## 2025-06-20 PROCEDURE — 70543 MRI ORBT/FAC/NCK W/O &W/DYE: CPT | Mod: 26,,, | Performed by: RADIOLOGY

## 2025-06-20 PROCEDURE — 25500020 PHARM REV CODE 255: Performed by: OTOLARYNGOLOGY

## 2025-06-20 PROCEDURE — 70543 MRI ORBT/FAC/NCK W/O &W/DYE: CPT | Mod: TC

## 2025-06-20 PROCEDURE — A9585 GADOBUTROL INJECTION: HCPCS | Performed by: OTOLARYNGOLOGY

## 2025-06-20 RX ORDER — GADOBUTROL 604.72 MG/ML
13 INJECTION INTRAVENOUS
Status: COMPLETED | OUTPATIENT
Start: 2025-06-20 | End: 2025-06-20

## 2025-06-20 RX ADMIN — GADOBUTROL 13 ML: 604.72 INJECTION INTRAVENOUS at 04:06

## 2025-06-23 ENCOUNTER — RESULTS FOLLOW-UP (OUTPATIENT)
Dept: OTOLARYNGOLOGY | Facility: CLINIC | Age: 65
End: 2025-06-23

## 2025-06-23 ENCOUNTER — OFFICE VISIT (OUTPATIENT)
Dept: NEUROLOGY | Facility: CLINIC | Age: 65
End: 2025-06-23
Payer: COMMERCIAL

## 2025-06-23 VITALS
HEART RATE: 81 BPM | TEMPERATURE: 98 F | HEIGHT: 71 IN | WEIGHT: 293 LBS | DIASTOLIC BLOOD PRESSURE: 79 MMHG | RESPIRATION RATE: 17 BRPM | BODY MASS INDEX: 41.02 KG/M2 | SYSTOLIC BLOOD PRESSURE: 127 MMHG

## 2025-06-23 DIAGNOSIS — R51.9 NEW ONSET HEADACHE: Primary | ICD-10-CM

## 2025-06-23 DIAGNOSIS — H54.7 UNSPECIFIED VISUAL LOSS: ICD-10-CM

## 2025-06-23 DIAGNOSIS — G43.111 INTRACTABLE MIGRAINE WITH AURA WITH STATUS MIGRAINOSUS: ICD-10-CM

## 2025-06-23 PROCEDURE — 3074F SYST BP LT 130 MM HG: CPT | Mod: CPTII,S$GLB,, | Performed by: PHYSICIAN ASSISTANT

## 2025-06-23 PROCEDURE — 99205 OFFICE O/P NEW HI 60 MIN: CPT | Mod: S$GLB,,, | Performed by: PHYSICIAN ASSISTANT

## 2025-06-23 PROCEDURE — 1159F MED LIST DOCD IN RCRD: CPT | Mod: CPTII,S$GLB,, | Performed by: PHYSICIAN ASSISTANT

## 2025-06-23 PROCEDURE — 3078F DIAST BP <80 MM HG: CPT | Mod: CPTII,S$GLB,, | Performed by: PHYSICIAN ASSISTANT

## 2025-06-23 PROCEDURE — 3008F BODY MASS INDEX DOCD: CPT | Mod: CPTII,S$GLB,, | Performed by: PHYSICIAN ASSISTANT

## 2025-06-23 PROCEDURE — 1160F RVW MEDS BY RX/DR IN RCRD: CPT | Mod: CPTII,S$GLB,, | Performed by: PHYSICIAN ASSISTANT

## 2025-06-23 PROCEDURE — 3061F NEG MICROALBUMINURIA REV: CPT | Mod: CPTII,S$GLB,, | Performed by: PHYSICIAN ASSISTANT

## 2025-06-23 PROCEDURE — 3066F NEPHROPATHY DOC TX: CPT | Mod: CPTII,S$GLB,, | Performed by: PHYSICIAN ASSISTANT

## 2025-06-23 PROCEDURE — 99999 PR PBB SHADOW E&M-EST. PATIENT-LVL V: CPT | Mod: PBBFAC,,, | Performed by: PHYSICIAN ASSISTANT

## 2025-06-23 RX ORDER — PROCHLORPERAZINE MALEATE 10 MG
10 TABLET ORAL 3 TIMES DAILY PRN
Qty: 20 TABLET | Refills: 1 | Status: SHIPPED | OUTPATIENT
Start: 2025-06-23

## 2025-06-23 RX ORDER — CELECOXIB 100 MG/1
CAPSULE ORAL
Qty: 10 CAPSULE | Refills: 0 | Status: SHIPPED | OUTPATIENT
Start: 2025-06-23

## 2025-06-23 RX ORDER — ATOGEPANT 60 MG/1
60 TABLET ORAL DAILY
Qty: 30 TABLET | Refills: 6 | Status: SHIPPED | OUTPATIENT
Start: 2025-06-23

## 2025-06-23 NOTE — PATIENT INSTRUCTIONS
Consider tracking your headaches, migraine buddy free ramos  CTA/labs ordered-I will comment on findings electronically     Qulipta ordered, 1 pill daily, to reduce the headaches    To break up the headaches:  1) compazine (old antemetic), 1 pill 3x a day for 2 days, any left over medicine 1 pill every 8 hours as needed for bad headaches/nausea, may make you tired/no driving  2) celebrex (NSAID) 1 pill every 12 hours with food, take until completion           -put the over the counter medicine away       We can consider nerve blocks in the future    If signs of stroke (e.g., worst headache of life, sudden vision change, numbness/weakness one side of the body, facial droop, speech change or confusion), call 911.

## 2025-06-23 NOTE — PROGRESS NOTES
"[facility]  [address] [facility] [address] [place] [address]  Phone:[phone]  Fax: [phone]   New Patient Consultation    Patient Name: Domonique Carmona  : 1960  MRN:  [id number]  Today: 2025   chief complaint: Headache    PCP: Lianet Hansen NP.       Assessment:   Domonique Carmona is a 64 y.o. right handed female    with a PMHx of (per chart review/available records): arthritis, anxiety/ADHD, low b12 and GERD whom presents solo for HA and vision changes.  New onset HA and vision changes with a prior hx of (described) phantosmia. Negative contrasted imaging of the orbit (brain also visualized). Its possible her hx of phantosmia and reported vision changes are more of an aura. In differential is a trigeminal autonomic cephalgia (noting she reports feeling of "eyelid feeling big" and feeling her face is different, but not present for >3 months nor any rhinorrhea or lacrimation nor ptosis or facial sweating). Will get vascular imaging to help r/o any potential primary nidus to her concerns.  Noted low B12, will repeat labs (noted, low B12 can cause CNII neuropathy).       Review:    ICD-10-CM ICD-9-CM   1. New onset headache  R51.9 784.0   2. Intractable migraine with aura with status migrainosus  G43.111 346.03   3. Unspecified visual loss  H54.7 369.9         Plan:   Discussed realistic goals of care with patient at length. Discussed medication options, need for lifestyle adjustment. Discussed treatment will take time. Goal will be to reduce frequency/intensity/quantity of HA, not to be completely HA free. Gave copy of [place] [employer] triggers for migraine informational sheet (N.b., a standard I give to patients who come to seek my care in HA clinic, regardless if they have migraines or not) and discussed clinic's non narcotic policy re: HA. Patient voiced understanding and agreement.            -will have patient track HA, discussed ramos for smart phone           " -will have patient work on lifestyle           -CTA and labs ordered, I will comment on findings electronically     For HA Prevention:  1 we discussed retrying gabapentin vs cGRP (oral vs inject), chose qulipta, discussed adv effects, dosing, she agreed        For HA :  We can consider abortive after we gather information and see how she does on a preventative     To break up Headaches:  Offered vistaril (couldn't because she has dogs to take care of) vs steroids (doesn't like effects) vs depakote vs course of celebrex. Chose the latter, discussed adv effects/dosing, take with food, take until completion and stop taking OTC, she agreed    Added compazine, 1 pill TID for 2 days, any left over medicine 1 pill Q8h prn HA/nausea, may cause sedation/avoid driving, she agreed     Other:  If signs of stroke (e.g., worst headache of life, sudden vision change, numbness/weakness one side of the body, facial droop, speech change or confusion), call 911.              All test results as well as any necessary instructions were reviewed and discussed with patient.    Review:  Orders Placed This Encounter    CTA Head and Neck (xpd)    Sedimentation rate    C-Reactive Protein (In-House)    Vitamin B12    Creatinine, serum    atogepant (QULIPTA) 60 mg Tab    celecoxib (CELEBREX) 100 MG capsule    prochlorperazine (COMPAZINE) 10 MG tablet         Patient to return to PCP/other specialists for all other problems  Patient to continue on all medications as Rx'd   A detailed AVS was provided to the patient with patient readback   RTO- after testing   The patient indicates understanding of these issues and agrees to the plan.    HPI:   Domonique Carmona is a 64 y.o.right handed, female with a PMHx of (per chart review/available records): arthritis, anxiety/ADHD, low b12 and GERD whom presents solo for HA and vision changes.        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    Tried:OTC   Triggers (in bold): stress, lack of sleep, too much caffeine, too little caffeine, weather change, seasonal change, strong odours, bright lights, sunlight, food    Currently having a HA?:yes   Positives in bold: Hx of Kidney Stones, raynauds, HTN (though resolved),  asthma, GI bleed, osteoporosis, CAD/MI, CVA/TIA, DM    Imaging on file:2025  MRI Orbit   Therapies tried in past: (failures to be marked, if known---why did it fail?)  Zanaflex  Trazodone  Compazine  Zofran  Vyvanse  Vistaril  Gabapentin  Prozac  Lexapro  Celexa  Celebrex  Wellbutrin  Norvasc    As aside has described times whereby she "smelled smoke" but no one else could smell it.       Medication Reconciliation:   Current Medications[1]  Review of patient's allergies indicates:   Allergen Reactions    Demerol [meperidine] Hives       PMHx:  Past Medical History:   Diagnosis Date    Abnormal Pap smear 1986    Cryosurgery    Anxiety     Arthritis     GERD (gastroesophageal reflux disease)     Venous stasis dermatitis of left lower extremity 12/31/2018     Past Surgical History:   Procedure Laterality Date    BREAST BIOPSY      BREAST SURGERY      biopsy right side    COLONOSCOPY N/A 12/12/2022    Procedure: COLONOSCOPY;  Surgeon: Phillip Cat MD;  Location: [facility];  Service: Endoscopy;  " Laterality: N/A;    DILATION AND CURETTAGE OF UTERUS      GYNECOLOGIC CRYOSURGERY  1986    LAPAROSCOPIC BIOPSY OF LIVER N/A 10/25/2021    Procedure: BIOPSY, LIVER, LAPAROSCOPIC;  Surgeon: Thierry Victor MD;  Location: [facility] OR;  Service: General;  Laterality: N/A;    LAPAROSCOPIC CHOLECYSTECTOMY N/A 10/25/2021    Procedure: CHOLECYSTECTOMY, LAPAROSCOPIC- diagnostic lap - hepatic cysts;  Surgeon: Thierry Victor MD;  Location: [facility] OR;  Service: General;  Laterality: N/A;    LAPAROSCOPIC SLEEVE GASTRECTOMY N/A 5/11/2021    Procedure: GASTRECTOMY, SLEEVE, LAPAROSCOPIC;  Surgeon: Thierry Victor MD;  Location: [facility] OR 2ND FLR;  Service: General;  Laterality: N/A;    LASIK Bilateral     ulner nerve transposition      left       Fhx:  Family History   Problem Relation Name Age of Onset    Ovarian cancer Mother      Breast cancer Neg Hx      Colon cancer Neg Hx      Collagen disease Neg Hx      Glaucoma Neg Hx      Melanoma Neg Hx      Psoriasis Neg Hx      Lupus Neg Hx      Eczema Neg Hx      Amblyopia Neg Hx      Blindness Neg Hx      Cataracts Neg Hx      Macular degeneration Neg Hx      Retinal detachment Neg Hx      Strabismus Neg Hx         Shx:   Social History[2]        Labs:   CMP  Sodium   Date Value Ref Range Status   04/14/2025 139 136 - 145 mmol/L Final     Potassium   Date Value Ref Range Status   04/14/2025 4.0 3.5 - 5.1 mmol/L Final     Chloride   Date Value Ref Range Status   04/14/2025 105 95 - 110 mmol/L Final     CO2   Date Value Ref Range Status   04/14/2025 27 23 - 29 mmol/L Final     Glucose   Date Value Ref Range Status   04/14/2025 83 70 - 110 mg/dL Final     BUN   Date Value Ref Range Status   06/20/2025 14 8 - 23 mg/dL Final     Creatinine   Date Value Ref Range Status   06/20/2025 0.8 0.5 - 1.4 mg/dL Final   05/06/2013 0.7 0.5 - 1.4 mg/dL Final     Calcium   Date Value Ref Range Status   04/14/2025 9.2 8.7 - 10.5 mg/dL Final   05/06/2013 9.1 8.7 - 10.5 mg/dL Final     Protein Total    Date Value Ref Range Status   2025 6.4 6.0 - 8.4 gm/dL Final     Albumin   Date Value Ref Range Status   2025 3.9 3.5 - 5.2 g/dL Final     Bilirubin Total   Date Value Ref Range Status   2025 0.8 0.1 - 1.0 mg/dL Final     Comment:     For infants and newborns, interpretation of results should be based   on gestational age, weight and in agreement with clinical   observations.    Premature Infant recommended reference ranges:   0-24 hours:  <8.0 mg/dL   24-48 hours: <12.0 mg/dL   3-5 days:    <15.0 mg/dL   6-29 days:   <15.0 mg/dL     Alkaline Phosphatase   Date Value Ref Range Status   2013 70 55 - 135 U/L Final     ALP   Date Value Ref Range Status   2025 100 40 - 150 unit/L Final     AST   Date Value Ref Range Status   2025 11 11 - 45 unit/L Final   2013 11 10 - 40 U/L Final     ALT   Date Value Ref Range Status   2025 8 (L) 10 - 44 unit/L Final     Anion Gap   Date Value Ref Range Status   2025 7 (L) 8 - 16 mmol/L Final   2013 11 5 - 15 meq/L Final     eGFR   Date Value Ref Range Status   2025 >60 >60 mL/min/1.73/m2 Final   2024 >60.0 >60 mL/min/1.73 m^2 Final     Lab Results   Component Value Date    WBC 4.07 2025    HGB 13.3 2025    HCT 41.8 2025    MCV 93 2025     2025       Lab Results   Component Value Date    HOVVAAQP54 202 (L) 2025     Lab Results   Component Value Date    TSH 0.710 2025         Imagin2025 MRI orbits (report): FINDINGS:  The orbital globes are intact with no abnormal signal or enhancement demonstrated.  The optic nerves are intact.  There are no intraconal abnormalities.  The extraocular muscles are within normal limits.  No periorbital soft tissue abnormality is observed.     The visualized portions of the brain are within normal limits.  There is no abnormal bone marrow signal in the visualized facial bones and calvarium.     Impression:     Unremarkable  "MRI of the orbits.         Other testin2025  Carotid (report): FINDINGS:  Real-time, duplex and color Doppler interrogation are performed.  Mild scattered atheromatous changes are present within the carotid arteries.  There are no findings to suggest hemodynamically significant carotid arterial stenosis on either side.  Antegrade flow is observed within the vertebral arteries bilaterally.     Impression:     No evidence of hemodynamically significant carotid arterial stenosis.       Note: I have independently reviewed any/all imaging/labs/tests and agree with the report (s) as documented.  Any discrepancies will be as noted/demarcated by free text.  SAM WOLFE 2025                     ROS:   Review Of Systems (questions asked, positive or additions in BOLD)  Gen: Weight change, fatigue/malaise, pyrexia   HEENT: Tinnitus, headache,  blurred vision, eye pain, diplopia, photophobia,  nose bleeds, congestion, sore throat, jaw pain, scalp pain, neck stiffness   Card: Palpitations, CP   Pulm: SOB, cough   Vas: Easy bruising, easy bleeding   GI: N/V/D/C, incontinence, hematemesis, hematochezia    : incontinence, hematuria   Endocrine: Temp intolerance, polyuria, polydipsia   M/S: Neck pain, myalgia, back pain, joint pain, falls    Neuro: PER HPI   PSY: Memory loss, confusion, depression, anxiety, trouble in sleep, hallucinations          EXAM:   /79 (BP Location: Right arm, Patient Position: Sitting)   Pulse 81   Temp 98 °F (36.7 °C) (Temporal)   Resp 17   Ht 5' 11" (1.803 m)   Wt 134.1 kg (295 lb 12 oz)   LMP 2013   BMI 41.25 kg/m²    GEN:  appears in discomfort (but not distress), lights dimmed in room   HEENT: NC/AT, Frontalis was NTTP, temporalis was NTTP, vertex NTTP,  nares patent, dentition appropriate, nasal piercing, neck supple, trachea midline, Occiput and trapezius NTTP, LEFT periorbital eye twitching observed     EXTREM:   no edema present.    NEURO:  Mental Status:  Awake, " "alert and appropriately oriented to time, place, and person.  Normal attention and concentration.  Speech is fluent and appropriate language function (I.e., comprehension). Hand wringing behaviour present.     Cranial Nerves:     Pupils are equal and reactive to light.  Extraocular movements are intact and without nystagmus.  Visual fields are full to confrontation testing. Colour vision change found (LEFT eye colours "slightly darker" than right),   Facial movement is symmetric.  Facial sensation is intact.  Hearing is normal. Uvula in midline. FROM of neck in all (6) directions, shoulder shrug symmetrical. Tongue in midline without fasiculation.     Motor:  RUE:appropriate against gravity and medium force as tested 5/5              LUE: appropriate against gravity and medium force as tested 5/5              RLE:appropriate against gravity and medium force as tested 5/5              LLE: appropriate against gravity and medium force as tested 5/5  No resting Tremor/pronator drift not apparent.   Slight kinetic tremor (R>L UE)     finger extension strength was symmetrical     Sensory:  RUE  intact light touch, proprioception, and temperature  LUE intact light touch, proprioception, and temperature    RLE intact light touch  LLE intact light touch      DTR's:                                            R              L  biceps 2+ 2+         brachioradialis 2+ 2+   Knee jerk 2+ 2+        Coordination:  FTN-WNL.       Gait and Stance: Normal manner of stance and gait function testing.     This document has been electronically signed by Mr. Stephan Meléndez MPA, PA-C on 6/23/2025, I have personally typed this message using the EMR.       Dr Wesly MD  was available during today's visit.     Personal Protective Equipment:    Personal Protective Equipment was used during this encounter including:   non latex gloves.          CC: Lianet Hansen NP                 [1]   Current Outpatient Medications   Medication Sig " Dispense Refill    ALPRAZolam (XANAX) 0.25 MG tablet Take 1 tablet (0.25 mg total) by mouth daily as needed for Anxiety. 60 tablet 1    buPROPion (WELLBUTRIN XL) 300 MG 24 hr tablet Take 1 tablet (300 mg total) by mouth every morning. 30 tablet 1    dextroamphetamine-amphetamine 5 mg Tab Take 1 tablet (5 mg) by mouth once daily. Take at lunchtime. 30 tablet 0    EScitalopram oxalate (LEXAPRO) 20 MG tablet Take 1 tablet (20 mg total) by mouth every morning. 30 tablet 1    guanFACINE (TENEX) 1 MG Tab Take 1 tablet (1 mg total) by mouth nightly. 30 tablet 1    ibuprofen (ADVIL,MOTRIN) 800 MG tablet Take 1 tablet (800 mg total) by mouth 3 (three) times daily. 60 tablet 6    lisdexamfetamine (VYVANSE) 50 MG capsule Take 1 capsule (50 mg total) by mouth every morning. 30 capsule 0    loteprednol (LOTEMAX) 0.5 % ophthalmic suspension I drop in the left eye three times a day  x 7 days, then every am as needed for inflammation 10 mL 1    pantoprazole (PROTONIX) 20 MG tablet Take 1 tablet (20 mg total) by mouth once daily. 90 tablet 0    rosuvastatin (CRESTOR) 10 MG tablet Take 1 tablet (10 mg total) by mouth once daily. 90 tablet 3    tiZANidine (ZANAFLEX) 4 MG tablet TAKE 1 TABLET BY MOUTH THREE TIMES A DAY AS NEEDED FOR 10 DAYS 90 tablet 1     No current facility-administered medications for this visit.   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 [employer]        Lives with youngest son -      Social Drivers of Health     Financial Resource Strain: Medium Risk (2024)    Overall Financial Resource Strain  (CARDIA)     Difficulty of Paying Living Expenses: Somewhat hard   Food Insecurity: No Food Insecurity (6/28/2024)    Hunger Vital Sign     Worried About Running Out of Food in the Last Year: Never true     Ran Out of Food in the Last Year: Never true   Transportation Needs: No Transportation Needs (6/28/2024)    TRANSPORTATION NEEDS     Transportation : No   Physical Activity: Inactive (6/28/2024)    Exercise Vital Sign     Days of Exercise per Week: 0 days     Minutes of Exercise per Session: 0 min   Stress: Stress Concern Present (6/28/2024)    [employer] - Occupational Stress Questionnaire     Feeling of Stress : Very much   Housing Stability: Unknown (6/28/2024)    Housing Stability Vital Sign     Unable to Pay for Housing in the Last Year: No     Homeless in the Last Year: No

## 2025-06-25 ENCOUNTER — PATIENT MESSAGE (OUTPATIENT)
Dept: PSYCHIATRY | Facility: CLINIC | Age: 65
End: 2025-06-25
Payer: COMMERCIAL

## 2025-06-25 NOTE — TELEPHONE ENCOUNTER
Messaged client about today's No Show appt and advise if she would like to reschedule to reach out to the clinic.

## 2025-06-26 ENCOUNTER — LAB VISIT (OUTPATIENT)
Dept: LAB | Facility: HOSPITAL | Age: 65
End: 2025-06-26
Attending: PHYSICIAN ASSISTANT
Payer: COMMERCIAL

## 2025-06-26 DIAGNOSIS — G43.111 INTRACTABLE MIGRAINE WITH AURA WITH STATUS MIGRAINOSUS: ICD-10-CM

## 2025-06-26 DIAGNOSIS — R51.9 NEW ONSET HEADACHE: ICD-10-CM

## 2025-06-26 DIAGNOSIS — H54.7 UNSPECIFIED VISUAL LOSS: ICD-10-CM

## 2025-06-26 LAB
C-REACTIVE PROTEIN (SMH): 0.2 MG/DL
CREAT SERPL-MCNC: 1.3 MG/DL (ref 0.5–1.4)
ERYTHROCYTE [SEDIMENTATION RATE] IN BLOOD: 7 MM/HR (ref 0–20)
GFR SERPLBLD CREATININE-BSD FMLA CKD-EPI: 47 ML/MIN/1.73/M2
VIT B12 SERPL-MCNC: 545 PG/ML (ref 210–950)

## 2025-06-26 PROCEDURE — 82565 ASSAY OF CREATININE: CPT

## 2025-06-26 PROCEDURE — 86140 C-REACTIVE PROTEIN: CPT

## 2025-06-26 PROCEDURE — 85651 RBC SED RATE NONAUTOMATED: CPT

## 2025-06-26 PROCEDURE — 82607 VITAMIN B-12: CPT

## 2025-06-26 PROCEDURE — 36415 COLL VENOUS BLD VENIPUNCTURE: CPT

## 2025-06-27 ENCOUNTER — RESULTS FOLLOW-UP (OUTPATIENT)
Dept: NEUROLOGY | Facility: CLINIC | Age: 65
End: 2025-06-27

## 2025-06-27 ENCOUNTER — HOSPITAL ENCOUNTER (OUTPATIENT)
Dept: RADIOLOGY | Facility: HOSPITAL | Age: 65
Discharge: HOME | End: 2025-06-27
Attending: PHYSICIAN ASSISTANT
Payer: COMMERCIAL

## 2025-06-27 DIAGNOSIS — H54.7 UNSPECIFIED VISUAL LOSS: ICD-10-CM

## 2025-06-27 DIAGNOSIS — R51.9 NEW ONSET HEADACHE: ICD-10-CM

## 2025-06-27 DIAGNOSIS — G43.111 INTRACTABLE MIGRAINE WITH AURA WITH STATUS MIGRAINOSUS: ICD-10-CM

## 2025-06-27 PROCEDURE — 25500020 PHARM REV CODE 255: Mod: PO | Performed by: PHYSICIAN ASSISTANT

## 2025-06-27 PROCEDURE — 70498 CT ANGIOGRAPHY NECK: CPT | Mod: 26,,, | Performed by: RADIOLOGY

## 2025-06-27 PROCEDURE — 70498 CT ANGIOGRAPHY NECK: CPT | Mod: TC,PO

## 2025-06-27 PROCEDURE — 70496 CT ANGIOGRAPHY HEAD: CPT | Mod: 26,,, | Performed by: RADIOLOGY

## 2025-06-27 RX ADMIN — IOHEXOL 100 ML: 350 INJECTION, SOLUTION INTRAVENOUS at 04:06

## 2025-07-08 ENCOUNTER — OFFICE VISIT (OUTPATIENT)
Dept: PSYCHIATRY | Facility: CLINIC | Age: 65
End: 2025-07-08
Payer: COMMERCIAL

## 2025-07-08 VITALS
HEIGHT: 71 IN | BODY MASS INDEX: 41.25 KG/M2 | HEART RATE: 76 BPM | DIASTOLIC BLOOD PRESSURE: 83 MMHG | SYSTOLIC BLOOD PRESSURE: 126 MMHG

## 2025-07-08 DIAGNOSIS — I10 ESSENTIAL HYPERTENSION: ICD-10-CM

## 2025-07-08 DIAGNOSIS — Z56.9 PROBLEMS AT WORK: ICD-10-CM

## 2025-07-08 DIAGNOSIS — F33.0 MILD EPISODE OF RECURRENT MAJOR DEPRESSIVE DISORDER: Primary | ICD-10-CM

## 2025-07-08 DIAGNOSIS — Z63.9 RELATIONSHIP DYSFUNCTION: ICD-10-CM

## 2025-07-08 DIAGNOSIS — F41.0 GENERALIZED ANXIETY DISORDER WITH PANIC ATTACKS: ICD-10-CM

## 2025-07-08 DIAGNOSIS — F41.1 GENERALIZED ANXIETY DISORDER WITH PANIC ATTACKS: ICD-10-CM

## 2025-07-08 DIAGNOSIS — F90.0 ADHD (ATTENTION DEFICIT HYPERACTIVITY DISORDER), INATTENTIVE TYPE: ICD-10-CM

## 2025-07-08 PROCEDURE — 96136 PSYCL/NRPSYC TST PHY/QHP 1ST: CPT | Mod: 59,S$GLB,, | Performed by: STUDENT IN AN ORGANIZED HEALTH CARE EDUCATION/TRAINING PROGRAM

## 2025-07-08 PROCEDURE — 3079F DIAST BP 80-89 MM HG: CPT | Mod: CPTII,S$GLB,, | Performed by: STUDENT IN AN ORGANIZED HEALTH CARE EDUCATION/TRAINING PROGRAM

## 2025-07-08 PROCEDURE — G2211 COMPLEX E/M VISIT ADD ON: HCPCS | Mod: S$GLB,,, | Performed by: STUDENT IN AN ORGANIZED HEALTH CARE EDUCATION/TRAINING PROGRAM

## 2025-07-08 PROCEDURE — 1160F RVW MEDS BY RX/DR IN RCRD: CPT | Mod: CPTII,S$GLB,, | Performed by: STUDENT IN AN ORGANIZED HEALTH CARE EDUCATION/TRAINING PROGRAM

## 2025-07-08 PROCEDURE — 3074F SYST BP LT 130 MM HG: CPT | Mod: CPTII,S$GLB,, | Performed by: STUDENT IN AN ORGANIZED HEALTH CARE EDUCATION/TRAINING PROGRAM

## 2025-07-08 PROCEDURE — 1159F MED LIST DOCD IN RCRD: CPT | Mod: CPTII,S$GLB,, | Performed by: STUDENT IN AN ORGANIZED HEALTH CARE EDUCATION/TRAINING PROGRAM

## 2025-07-08 PROCEDURE — 99214 OFFICE O/P EST MOD 30 MIN: CPT | Mod: S$GLB,,, | Performed by: STUDENT IN AN ORGANIZED HEALTH CARE EDUCATION/TRAINING PROGRAM

## 2025-07-08 PROCEDURE — 99999 PR PBB SHADOW E&M-EST. PATIENT-LVL III: CPT | Mod: PBBFAC,,, | Performed by: STUDENT IN AN ORGANIZED HEALTH CARE EDUCATION/TRAINING PROGRAM

## 2025-07-08 PROCEDURE — 3061F NEG MICROALBUMINURIA REV: CPT | Mod: CPTII,S$GLB,, | Performed by: STUDENT IN AN ORGANIZED HEALTH CARE EDUCATION/TRAINING PROGRAM

## 2025-07-08 PROCEDURE — 3008F BODY MASS INDEX DOCD: CPT | Mod: CPTII,S$GLB,, | Performed by: STUDENT IN AN ORGANIZED HEALTH CARE EDUCATION/TRAINING PROGRAM

## 2025-07-08 PROCEDURE — 3066F NEPHROPATHY DOC TX: CPT | Mod: CPTII,S$GLB,, | Performed by: STUDENT IN AN ORGANIZED HEALTH CARE EDUCATION/TRAINING PROGRAM

## 2025-07-08 RX ORDER — ALPRAZOLAM 0.25 MG/1
0.25 TABLET ORAL DAILY PRN
Qty: 60 TABLET | Refills: 1 | Status: SHIPPED | OUTPATIENT
Start: 2025-07-08

## 2025-07-08 RX ORDER — BUPROPION HYDROCHLORIDE 300 MG/1
300 TABLET ORAL EVERY MORNING
Qty: 30 TABLET | Refills: 1 | Status: SHIPPED | OUTPATIENT
Start: 2025-07-08 | End: 2025-09-06

## 2025-07-08 RX ORDER — DEXTROAMPHETAMINE SACCHARATE, AMPHETAMINE ASPARTATE, DEXTROAMPHETAMINE SULFATE AND AMPHETAMINE SULFATE 1.25; 1.25; 1.25; 1.25 MG/1; MG/1; MG/1; MG/1
5 TABLET ORAL DAILY
Qty: 30 TABLET | Refills: 0 | Status: SHIPPED | OUTPATIENT
Start: 2025-07-08 | End: 2025-08-07

## 2025-07-08 RX ORDER — GUANFACINE 1 MG/1
1 TABLET ORAL NIGHTLY
Qty: 30 TABLET | Refills: 1 | Status: SHIPPED | OUTPATIENT
Start: 2025-07-08 | End: 2025-09-06

## 2025-07-08 RX ORDER — ESCITALOPRAM OXALATE 20 MG/1
20 TABLET ORAL EVERY MORNING
Qty: 30 TABLET | Refills: 1 | Status: SHIPPED | OUTPATIENT
Start: 2025-07-08 | End: 2025-09-06

## 2025-07-08 RX ORDER — LISDEXAMFETAMINE DIMESYLATE 50 MG/1
50 CAPSULE ORAL EVERY MORNING
Qty: 30 CAPSULE | Refills: 0 | Status: SHIPPED | OUTPATIENT
Start: 2025-07-08

## 2025-07-08 SDOH — SOCIAL DETERMINANTS OF HEALTH (SDOH): UNSPECIFIED PROBLEMS RELATED TO EMPLOYMENT: Z56.9

## 2025-07-08 SDOH — SOCIAL DETERMINANTS OF HEALTH (SDOH): PROBLEM RELATED TO PRIMARY SUPPORT GROUP, UNSPECIFIED: Z63.9

## 2025-07-08 NOTE — PROGRESS NOTES
"    Outpatient Psychiatry Followup Visit - IN PERSON  7/8/2025  Assessment & Plan    Impression     ICD-10-CM ICD-9-CM   1. Mild episode of recurrent major depressive disorder  F33.0 296.31   2. Generalized anxiety disorder with panic attacks  F41.1 300.02    F41.0 300.01   3. ADHD (attention deficit hyperactivity disorder), inattentive type  F90.0 314.00   4. Essential hypertension  I10 401.9   5. Problems at work  Z56.9 V62.29   6. Relationship dysfunction  Z63.9 V62.81   7. BMI 40.0-44.9, adult  Z68.41 V85.41      Plan of Care & Medication Management    Chart was reviewed. The risks and benefits of medication were discussed with pt. The treatment plan and followup plan were reviewed with pt. Pt understands to contact clinic if symptoms worsen. Pt understands to call 911 or go to nearest ER for suicidal ideation, intent or plan. Unless otherwise specified, pt did NOT display signs of nor endorse symptoms of overt psychosis or acute mood disorder requiring hospitalization during the encounter; pt denied violent thoughts or suicidal or homicidal ideation, intent, or plan.   RX History ADDERALL XR/IR (2014, for about 4 years, took with antidepressant and slept well), ATARAX/VISTARIL, CELEXA (dc in 2014, does not remember response), LEXAPRO, PROZAC (2016, caused dysphoria, "made me darker"), REMERON (appetite), TRAZODONE (2016, caused nightmares), VYVANSE, WELLBUTRIN (initiated by primary care in 2014 and again in [id number]), XANAX (JUL2023 taking 0.25mg daily prn 1d/w), ZOLOFT (does not remember effect, took for one month)    Current RX [id number]: JOSE 2/6, BI 47/100  Considering discontinuing REMERON when depression is in remission  Discontinue ATARAX/VISTARIL  Adjustments:  7/8/2025: discontinue (NOT taking)  44BLH4556: Start 10mg TID prn anxiety  Prior to evaluation pt had been taking WELLBUTRIN 300mg daily and XANAX 0.25mg daily prn  Continue ADDERALL IR  Adjustments:  8/5/2024: Start 5mg daily around " lunchtime  Continue GUANFACINE  Pt was provided [employer] educational material 1/10/2025  Adjustments:  1/10/2025: Start 1mg HS  Continue LEXAPRO  Target dose range 10-20mg/d  Adjustments:  [id number]: Adjust to 20mg daily in the morning  37GWB4269: Increase to 20mg NIGHTLY   48WWJ8866: Adjust to 10mg NIGHTLY   70TLA4669: Start 10mg daily  Prior to evaluation pt had been taking WELLBUTRIN 300mg daily and XANAX 0.25mg daily prn  Continue VYVANSE  Pt was provided [employer] educational material 2/22/2024.  Adjustments:  3/11/2025: Increase to 50mg qam  66CZQ0072: Increase to 40mg daily in the morning  3/11/2025 ASRS 4/1 (18-17)  4/22/2024 ASRS 2/1 (10-12)  70IAD2583: Start 30mg daily in the morning  3/22/2024 ASRS 2/1 (12-15)  Prior to [id number] pt was NOT taking a stimulant  1/11/2024 ASRS 3/2 (15-22)  Continue WELLBUTRIN XL  Target dose range 150-300mg/d  Adjustments:  95INV7783: Continue 300mg daily  Prior to evaluation pt had been taking WELLBUTRIN 300mg daily  Continue XANAX  Adjustments:  01SYJ3007: Continue 0.25mg daily prn  Prior to evaluation pt had been taking XANAX 0.25mg daily prn and reported taking 1d/w on average      Other []CONTRACT 7/8/2025?  [x] REVIEWED 7/8/2025?  []   RETURN L: STANDARD PROTOCOL: RETURN IN 4 WEEKS (ONE MONTH)       Subjective    Available documentation has been reviewed, and pertinent elements of the chart have been incorporated into this note where appropriate. Last Epic encounter with writer was on 6/10/2025   Domonique Carmona, a 64 y.o. female, presenting for followup visit. This visit was done in person, IN CLINIC.      Work stress, handling conflicts and stress well  [facility] with [facility] mgmt at [facility]    BP wnl    Headaches - saw Neurology  Olfactory hallucinations resolved    NOT taking ATARAX  Seems to be adherent to psychopharmacotherapy otherwise    Mood is stable  Anxiety is controlled    Continue treatment as planned        Objective    Vitals:    07/08/25 1134   BP: 126/83  "  Pulse: 76   Height: 5' 11" (1.803 m)     (Current body mass index is 41.25 kg/m².)    MSE/PE  1. Appearance: Dress is informal but appropriate. Motor activity normal.  2. Discourse: Clear speech with normal rate and volume. Associations intact. Orderly.  3. Emotional Expression: Somewhat anxious.  4. Perception and Thinking: No hallucinations. No suicidality, no homicidality, delusions, or paranoia.  5. Sensorium: Grossly intact. Able to focus for interview.  6. Memory and Fund of Knowledge: Intact for content of interview.  7. Insight and Judgment: Intact.       Measurement-Based Care (MBC):     Routine Instruments   PROMIS-ANXIETY Interpretation: 12 (4a raw score): T-SCORE 63.4; MODERATE using 55-60-70 cutoffs.   PROMIS-DEPRESSION Interpretation: 13 (4a raw score): T-SCORE 63.9; MODERATE using 55-60-70 cutoffs.   WHO-5: 14 raw: 56%   Additional Instruments   MBC ANCHOR : about the same         Auto-populated chart data omitted from this note for brevity.      Billing Documentation:     Method IN PERSON visit at the clinic, established   E/M [address]: FOLLOW UP VISIT, Rx mgmt, "Multiple STABLE chronic illnesses"   Additional [id number], with modifer 59: administered and scored more than one psychological or neuropsychological tests (see MBC above) (16+ mins)  , without modifiers -24,-25,-53: COMPLEXITY: Visit today included increased complexity associated with the care of the episodic problem(s) (multiple psychiatric disorders - see above) addressed and managing the longitudinal care of the patient due to the serious and/or complex managed problem(s) (multiple psychiatric disorders - see above).              Vinny Charles DO  Department of Psychiatry, [facility]      "

## 2025-07-18 ENCOUNTER — OFFICE VISIT (OUTPATIENT)
Dept: NEUROLOGY | Facility: CLINIC | Age: 65
End: 2025-07-18
Payer: COMMERCIAL

## 2025-07-18 VITALS
RESPIRATION RATE: 17 BRPM | TEMPERATURE: 98 F | DIASTOLIC BLOOD PRESSURE: 80 MMHG | BODY MASS INDEX: 41.02 KG/M2 | HEIGHT: 71 IN | HEART RATE: 75 BPM | WEIGHT: 293 LBS | SYSTOLIC BLOOD PRESSURE: 129 MMHG

## 2025-07-18 DIAGNOSIS — G47.9 TROUBLE IN SLEEPING: ICD-10-CM

## 2025-07-18 DIAGNOSIS — G43.909 EPISODIC MIGRAINE: Primary | ICD-10-CM

## 2025-07-18 PROCEDURE — 99999 PR PBB SHADOW E&M-EST. PATIENT-LVL IV: CPT | Mod: PBBFAC,,, | Performed by: PHYSICIAN ASSISTANT

## 2025-07-18 RX ORDER — HYDROXYZINE PAMOATE 25 MG/1
CAPSULE ORAL
Qty: 5 CAPSULE | Refills: 0 | Status: SHIPPED | OUTPATIENT
Start: 2025-07-18

## 2025-07-18 RX ORDER — CELECOXIB 100 MG/1
CAPSULE ORAL
Qty: 10 CAPSULE | Refills: 0 | Status: SHIPPED | OUTPATIENT
Start: 2025-07-18

## 2025-07-18 NOTE — PROGRESS NOTES
[facility]  [address] [facility] [address]  [place] [address]  Phone:[phone]  Fax: [phone]  Follow up visit     Patient Name: Domonique Carmona  : 1960  MRN:  [id number]  Today: 2025   LOV: 2025  chief complaint: Headache    PCP: Lianet Hansen NP.       Assessment:   Domonique Carmona is a 64 y.o. right handed female    with a PMHx of (per chart review/available records): arthritis, anxiety/ADHD, low b12 and GERD whom presents solo in follow up for HA, phantosmia and vision changes. CTA head/neck completed with no occult findings. With treatment of HA, seems phantosmia and vision changes have improved, possibly the underlying cause (as originally postulated) was aura.  Lack of sleep could also be contributing.       Review:    ICD-10-CM ICD-9-CM   1. Episodic migraine  G43.909 346.90   2. Trouble in sleeping  G47.9 780.50           Plan:        For HA Prevention:  1 continue qulipta         For HA :  We can consider abortive in future     To break up Headaches:  Added vistaril, 1 pill Q2h before bed for 5 nights then off, no driving/will cause sedation    Refilled celebrex (another course for 5 days, take BID with food)     Has compazine, 1 pill TID for 2 days, any left over medicine 1 pill Q8h prn HA/nausea, may cause sedation/avoid driving     Other:  If signs of stroke (e.g., worst headache of life, sudden vision change, numbness/weakness one side of the body, facial droop, speech change or confusion), call 911.              All test results as well as any necessary instructions were reviewed and discussed with patient.    Review:  Orders Placed This Encounter    celecoxib (CELEBREX) 100 MG capsule    hydrOXYzine pamoate (VISTARIL) 25 MG Cap           Patient to return to PCP/other specialists for all other problems  Patient to continue on all medications as Rx'd   A detailed AVS was provided to the patient with patient readback   RTO-3-4  "months   The patient indicates understanding of these issues and agrees to the plan.    HPI:   Domonique Carmona is a 64 y.o.right handed, female with a PMHx of (per chart review/available records): arthritis, anxiety/ADHD, low b12 and GERD whom presents solo for HA, phantosmia and vision changes.      At LOV( 6/23/2025): CTA ordered (per radiologist: LICA supraclinoid plaque, diminutive RVA). GFR reduced. B12 improved/normal now.  Qulitpa, celebrex and compazine started.   Will get some lightening bolt spikes of discomfort in LEFT eye but few and far between   Phantosmia has improved   2-3 HD a week, was having daily HA   Left sided head pain persists, however less intense  HA last a few hours  Has a slight HA now   Not sleeping well   Never took the compazine  Celebrex did help, no side effects  Qulipta at night working well, couldn't tolerate during morning   "I am doing so much better!"     HA historically:   Start :6 weeks of left eye pain, 10 mins of vision loss and HA, saw optometrist and ENT, HA persisted and has had vision changes (area of LEFT eye vision loss/blurring) for multiple weeks       -pending appt with ophthalmologist       -had MRI orbit, was normal       -no HA history previously   History of trauma (no), History of CNS infection (no), History of Stroke (no)  Location:left hemicrania (back of head and radiating up/around   Severity: 1-7/10   24/7for past 6 weeks   How many HA types do you have (?):  Associated factors (bolded positive) WITH HA ( or migraine): blurred vision, Nausea, vomiting, photophobia, phonophobia, tinnitus, scalp pain, vision loss, diplopia, scintillations, eye pain, jaw pain, weakness?    Tried:OTC   Triggers (in bold): stress, lack of sleep, too much caffeine, too little caffeine, weather change, seasonal change, strong odours, bright lights, sunlight, food    Currently having a HA?:yes   Positives in bold: Hx of Kidney Stones, raynauds, HTN (though resolved),  " "asthma, GI bleed, osteoporosis, CAD/MI, CVA/TIA, DM    Imaging on file:2025  MRI Orbit , 2025 CTA head/neck   Therapies tried in past: (failures to be marked, if known---why did it fail?)  Zanaflex  Trazodone  Compazine  Zofran  Vyvanse  Vistaril  Gabapentin  Prozac  Lexapro  Celexa  Celebrex  Wellbutrin  Norvasc  Qulipta  Celebrex  compazine    As aside has described times whereby she "smelled smoke" but no one else could smell it.       Medication Reconciliation:   Current Medications[1]  Review of patient's allergies indicates:   Allergen Reactions    Demerol [meperidine] Hives       PMHx:  Past Medical History:   Diagnosis Date    Abnormal Pap smear 1986    Cryosurgery    Anxiety     Arthritis     GERD (gastroesophageal reflux disease)     Headache     Venous stasis dermatitis of left lower extremity 12/31/2018     Past Surgical History:   Procedure Laterality Date    BREAST BIOPSY      BREAST SURGERY      biopsy right side    COLONOSCOPY N/A 12/12/2022    Procedure: COLONOSCOPY;  Surgeon: Phillip Cat MD;  Location: [facility];  Service: Endoscopy;  Laterality: N/A;    DILATION AND CURETTAGE OF UTERUS      GYNECOLOGIC CRYOSURGERY  1986    LAPAROSCOPIC BIOPSY OF LIVER N/A 10/25/2021    Procedure: BIOPSY, LIVER, LAPAROSCOPIC;  Surgeon: Thierry Victor MD;  Location: [facility] OR;  Service: General;  Laterality: N/A;    LAPAROSCOPIC CHOLECYSTECTOMY N/A 10/25/2021    Procedure: CHOLECYSTECTOMY, LAPAROSCOPIC- diagnostic lap - hepatic cysts;  Surgeon: Thierry Victor MD;  Location: [facility] OR;  Service: General;  Laterality: N/A;    LAPAROSCOPIC SLEEVE GASTRECTOMY N/A 5/11/2021    Procedure: GASTRECTOMY, SLEEVE, LAPAROSCOPIC;  Surgeon: Thierry Victor MD;  Location: [address];  Service: General;  Laterality: N/A;    LASIK Bilateral     ulner nerve transposition      left       Fhx:  Family History   Problem Relation Name Age of Onset    Ovarian cancer Mother      Breast cancer Neg Hx      Colon cancer Neg " Hx      Collagen disease Neg Hx      Glaucoma Neg Hx      Melanoma Neg Hx      Psoriasis Neg Hx      Lupus Neg Hx      Eczema Neg Hx      Amblyopia Neg Hx      Blindness Neg Hx      Cataracts Neg Hx      Macular degeneration Neg Hx      Retinal detachment Neg Hx      Strabismus Neg Hx         Shx:   Social History[2]        Labs:   CMP  Sodium   Date Value Ref Range Status   04/14/2025 139 136 - 145 mmol/L Final     Potassium   Date Value Ref Range Status   04/14/2025 4.0 3.5 - 5.1 mmol/L Final     Chloride   Date Value Ref Range Status   04/14/2025 105 95 - 110 mmol/L Final     CO2   Date Value Ref Range Status   04/14/2025 27 23 - 29 mmol/L Final     Glucose   Date Value Ref Range Status   04/14/2025 83 70 - 110 mg/dL Final     BUN   Date Value Ref Range Status   06/20/2025 14 8 - 23 mg/dL Final     Creatinine   Date Value Ref Range Status   06/26/2025 1.3 0.5 - 1.4 mg/dL Final   05/06/2013 0.7 0.5 - 1.4 mg/dL Final     Calcium   Date Value Ref Range Status   04/14/2025 9.2 8.7 - 10.5 mg/dL Final   05/06/2013 9.1 8.7 - 10.5 mg/dL Final     Protein Total   Date Value Ref Range Status   04/14/2025 6.4 6.0 - 8.4 gm/dL Final     Albumin   Date Value Ref Range Status   04/14/2025 3.9 3.5 - 5.2 g/dL Final     Bilirubin Total   Date Value Ref Range Status   04/14/2025 0.8 0.1 - 1.0 mg/dL Final     Comment:     For infants and newborns, interpretation of results should be based   on gestational age, weight and in agreement with clinical   observations.    Premature Infant recommended reference ranges:   0-24 hours:  <8.0 mg/dL   24-48 hours: <12.0 mg/dL   3-5 days:    <15.0 mg/dL   6-29 days:   <15.0 mg/dL     Alkaline Phosphatase   Date Value Ref Range Status   05/06/2013 70 55 - 135 U/L Final     ALP   Date Value Ref Range Status   04/14/2025 100 40 - 150 unit/L Final     AST   Date Value Ref Range Status   04/14/2025 11 11 - 45 unit/L Final   05/06/2013 11 10 - 40 U/L Final     ALT   Date Value Ref Range Status    2025 8 (L) 10 - 44 unit/L Final     Anion Gap   Date Value Ref Range Status   2025 7 (L) 8 - 16 mmol/L Final   2013 11 5 - 15 meq/L Final     eGFR   Date Value Ref Range Status   2025 47 (L) >60 mL/min/1.73/m2 Final   2024 >60.0 >60 mL/min/1.73 m^2 Final     Lab Results   Component Value Date    WBC 4.07 2025    HGB 13.3 2025    HCT 41.8 2025    MCV 93 2025     2025       Lab Results   Component Value Date    PBMYZDVX01 545 2025     Lab Results   Component Value Date    TSH 0.710 2025         Imagin2025 MRI orbits (report): FINDINGS:  The orbital globes are intact with no abnormal signal or enhancement demonstrated.  The optic nerves are intact.  There are no intraconal abnormalities.  The extraocular muscles are within normal limits.  No periorbital soft tissue abnormality is observed.     The visualized portions of the brain are within normal limits.  There is no abnormal bone marrow signal in the visualized facial bones and calvarium.     Impression:     Unremarkable MRI of the orbits.     2025 CTA head/neck (report): Focal calcified plaque affects supraclinoid left ICA without stenosis.  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  Other major intracranial arteries are widely patent with no additional atherosclerotic plaque, stenosis, or intraluminal filling defect.  Negative for dissection or aneurysm.     Opacified dural venous sinuses appear unremarkable.     NONVASCULAR FINDINGS:     No midline shift.  No abnormal intra-axial or extra-axial enhancement.     CTA NECK:     VASCULAR FINDINGS:     Conventional 3 branch vessel aortic arch anatomy.     Proximal left carotid artery is partially obscured at the thoracic inlet due to beam hardening artifact from inflowing venous contrast.  Left common carotid artery elsewhere is otherwise patent.  Left internal  and external carotid arteries are patent, with no atherosclerotic plaque or stenosis.  Similarly, left proximal vertebral artery is partially obscured limiting its evaluation.  Remaining left vertebral artery is otherwise patent.     Right brachiocephalic, common carotid, internal carotid, and external carotid artery and branches are patent.  No atherosclerotic plaque or stenosis.  Right vertebral artery is diminutive in caliber, likely on a congenital basis, but remains patent.  Superior V4 segment, superior to the PICA origin, also appears diminutive in caliber.     NONVASCULAR FINDINGS:     Cervical soft tissues are unremarkable.  Visualized lung apices are clear.  Mild disc degeneration occurs at C5-C6.     Impression:     1. Mild focal calcified plaque involving intracranial supraclinoid segment of left ICA without narrowing.  2. 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  3. No other abnormality of the major intracranial arteries or cervical carotid or vertebral arteries.       Other testin2025  Carotid (report): FINDINGS:  Real-time, duplex and color Doppler interrogation are performed.  Mild scattered atheromatous changes are present within the carotid arteries.  There are no findings to suggest hemodynamically significant carotid arterial stenosis on either side.  Antegrade flow is observed within the vertebral arteries bilaterally.     Impression:     No evidence of hemodynamically significant carotid arterial stenosis.       Note: I have independently reviewed any/all imaging/labs/tests and agree with the report (s) as documented.  Any discrepancies will be as noted/demarcated by free text.  SAM WOLFE 2025                     ROS:   Review Of Systems (questions asked, positive or additions in BOLD)  Gen: Weight change, fatigue/malaise, pyrexia   HEENT: Tinnitus, headache,  blurred vision, eye pain, diplopia,  "photophobia,  nose bleeds, congestion, sore throat, jaw pain, scalp pain, neck stiffness   Card: Palpitations, CP   Pulm: SOB, cough   Vas: Easy bruising, easy bleeding   GI: N/V/D/C, incontinence, hematemesis, hematochezia    : incontinence, hematuria   Endocrine: Temp intolerance, polyuria, polydipsia   M/S: Neck pain, myalgia, back pain, joint pain, falls    Neuro: PER HPI   PSY: Memory loss, confusion, depression, anxiety, trouble in sleep, hallucinations          EXAM:   /80 (BP Location: Right arm, Patient Position: Sitting)   Pulse 75   Temp 98.2 °F (36.8 °C) (Temporal)   Resp 17   Ht 5' 11" (1.803 m)   Wt 134.2 kg (295 lb 13.7 oz)   LMP 05/27/2013   BMI 41.26 kg/m²    GEN:  NAD  HEENT: NC/AT, Frontalis was NTTP, temporalis was NTTP, vertex NTTP,  nares patent, dentition appropriate, nasal piercing, neck supple, trachea midline, Occiput and trapezius NTTP     EXTREM:   no edema present.    NEURO:  Mental Status:  Awake, alert and appropriately oriented to time, place, and person.  Normal attention and concentration.  Speech is fluent and appropriate language function (I.e., comprehension). Hand wringing behaviour present.     Cranial Nerves:     Pupils are equal and reactive to light.  Extraocular movements are intact and without nystagmus.  Visual fields are full to confrontation testing.    Facial movement is symmetric.  Facial sensation is intact.  Hearing is normal. Uvula in midline. FROM of neck in all (6) directions, shoulder shrug symmetrical. Tongue in midline without fasiculation.     Motor:  RUE:appropriate against gravity and medium force as tested 5/5              LUE: appropriate against gravity and medium force as tested 5/5              RLE:appropriate against gravity and medium force as tested 5/5              LLE: appropriate against gravity and medium force as tested 5/5  No resting Tremor/pronator drift not apparent.   No tremor at today's visit     Sensory:  RUE  intact " light touch  LUE intact light touch     RLE intact light touch  LLE intact light touch      DTR's:                                            R              L                  Knee jerk 2+ 2+           Gait and Stance: Normal manner of stance and gait function testing.     This document has been electronically signed by Sonu Stephan BALDERRAMASonu Meléndez MPA, PA-C on 7/18/2025, I have personally typed this message using the EMR.       Dr Wesly MD  was available during today's visit.     Personal Protective Equipment:    Personal Protective Equipment was used during this encounter including:   non latex gloves.          CC: Lianet Hansen NP                   [1]   Current Outpatient Medications   Medication Sig Dispense Refill    ALPRAZolam (XANAX) 0.25 MG tablet Take 1 tablet (0.25 mg total) by mouth daily as needed for Anxiety. 60 tablet 1    atogepant (QULIPTA) 60 mg Tab Take 1 tablet (60 mg total) by mouth once daily. 30 tablet 6    buPROPion (WELLBUTRIN XL) 300 MG 24 hr tablet Take 1 tablet (300 mg total) by mouth every morning. 30 tablet 1    celecoxib (CELEBREX) 100 MG capsule 1 pill every 12 hours with food, take until completion 10 capsule 0    dextroamphetamine-amphetamine 5 mg Tab Take 1 tablet (5 mg) by mouth once daily. Take at lunchtime. 30 tablet 0    EScitalopram oxalate (LEXAPRO) 20 MG tablet Take 1 tablet (20 mg total) by mouth every morning. 30 tablet 1    guanFACINE (TENEX) 1 MG Tab Take 1 tablet (1 mg total) by mouth nightly. 30 tablet 1    ibuprofen (ADVIL,MOTRIN) 800 MG tablet Take 1 tablet (800 mg total) by mouth 3 (three) times daily. 60 tablet 6    lisdexamfetamine (VYVANSE) 50 MG capsule Take 1 capsule (50 mg total) by mouth every morning. 30 capsule 0    loteprednol (LOTEMAX) 0.5 % ophthalmic suspension I drop in the left eye three times a day  x 7 days, then every am as needed for inflammation 10 mL 1    pantoprazole (PROTONIX) 20 MG tablet Take 1 tablet (20 mg total) by mouth once daily. 90 tablet  0    prochlorperazine (COMPAZINE) 10 MG tablet Take 1 tablet (10 mg total) by mouth 3 (three) times daily as needed (nausea/headaches). 20 tablet 1    rosuvastatin (CRESTOR) 10 MG tablet Take 1 tablet (10 mg total) by mouth once daily. 90 tablet 3    tiZANidine (ZANAFLEX) 4 MG tablet TAKE 1 TABLET BY MOUTH THREE TIMES A DAY AS NEEDED FOR 10 DAYS 90 tablet 1     No current facility-administered medications for this visit.   [2]   Social History  Socioeconomic History    Marital status: Single   Tobacco Use    Smoking status: Former     Current packs/day: 0.00     Average packs/day: 0.5 packs/day for 20.0 years (10.0 ttl pk-yrs)     Types: Cigarettes     Start date: 1985     Quit date: 2005     Years since quittin.0    Smokeless tobacco: Never   Substance and Sexual Activity    Alcohol use: No    Drug use: No   Social History Narrative    Works in [employer] at [employer]        Lives with youngest son -      Social Drivers of Health     Financial Resource Strain: Medium Risk (2025)    Overall Financial Resource Strain (CARDIA)     Difficulty of Paying Living Expenses: Somewhat hard   Food Insecurity: No Food Insecurity (2025)    Hunger Vital Sign     Worried About Running Out of Food in the Last Year: Never true     Ran Out of Food in the Last Year: Never true   Transportation Needs: Unknown (2025)    PRAPARE - Transportation     Lack of Transportation (Medical): No   Physical Activity: Inactive (2025)    Exercise Vital Sign     Days of Exercise per Week: 0 days     Minutes of Exercise per Session: 0 min   Stress: Stress Concern Present (2025)    [employer] [place] [employer] - Occupational Stress Questionnaire     Feeling of Stress : To some extent   Housing Stability: High Risk (2025)    Housing Stability Vital Sign     Unable to Pay for Housing in the Last Year: Yes     Number of Times Moved in the Last Year: 0     Homeless in the Last Year: No

## 2025-07-22 ENCOUNTER — OFFICE VISIT (OUTPATIENT)
Dept: PSYCHIATRY | Facility: CLINIC | Age: 65
End: 2025-07-22
Payer: COMMERCIAL

## 2025-07-22 DIAGNOSIS — F41.0 GENERALIZED ANXIETY DISORDER WITH PANIC ATTACKS: ICD-10-CM

## 2025-07-22 DIAGNOSIS — F41.1 GENERALIZED ANXIETY DISORDER WITH PANIC ATTACKS: ICD-10-CM

## 2025-07-22 DIAGNOSIS — F33.0 MILD EPISODE OF RECURRENT MAJOR DEPRESSIVE DISORDER: Primary | ICD-10-CM

## 2025-07-22 PROCEDURE — 3061F NEG MICROALBUMINURIA REV: CPT | Mod: CPTII,S$GLB,, | Performed by: SOCIAL WORKER

## 2025-07-22 PROCEDURE — 90834 PSYTX W PT 45 MINUTES: CPT | Mod: S$GLB,,, | Performed by: SOCIAL WORKER

## 2025-07-22 PROCEDURE — 3066F NEPHROPATHY DOC TX: CPT | Mod: CPTII,S$GLB,, | Performed by: SOCIAL WORKER

## 2025-07-22 NOTE — PROGRESS NOTES
Individual Psychotherapy (PhD/LCSW)    7/22/2025    Site:  [place]        Therapeutic Intervention: Met with patient.  Outpatient - Supportive psychotherapy 45 min - CPT Code 76661 and Outpatient - Interactive psychotherapy 45 min - CPT code 12417    Chief complaint/reason for encounter: depression and anxiety   Medical history:   Past Medical History:   Diagnosis Date    Abnormal Pap smear 1986    Cryosurgery    Anxiety     Arthritis     GERD (gastroesophageal reflux disease)     Headache     Venous stasis dermatitis of left lower extremity 12/31/2018       Medications:  Current Medications[1]    Family history of psychiatric illness:   Family History   Problem Relation Name Age of Onset    Ovarian cancer Mother      Breast cancer Neg Hx      Colon cancer Neg Hx      Collagen disease Neg Hx      Glaucoma Neg Hx      Melanoma Neg Hx      Psoriasis Neg Hx      Lupus Neg Hx      Eczema Neg Hx      Amblyopia Neg Hx      Blindness Neg Hx      Cataracts Neg Hx      Macular degeneration Neg Hx      Retinal detachment Neg Hx      Strabismus Neg Hx         Social history (marriage, employment, etc.): Social History[2]    Interval history and content of current session: Yeni presents on time for follow up with good eye contact, direct speech, mood is stable, noticeably less anxious, thought processes intact, behavior is most cooperative and speaks to present status.  Continues to function well, despite considerable stressors, manages well.  Will see in follow up.     Treatment plan:  Target symptoms: recurrent depression, anxiety   Why chosen therapy is appropriate versus another modality: relevant to diagnosis, patient responds to this modality, evidence based practice  Outcome monitoring methods: self-report, observation  Therapeutic intervention type: supportive psychotherapy    Risk parameters:  Patient reports no suicidal ideation  Patient reports no homicidal ideation  Patient reports no self-injurious  behavior  Patient reports no violent behavior    Verbal deficits: None    Patient's response to intervention:  The patient's response to intervention is accepting.    Progress toward goals and other mental status changes:  The patient's progress toward goals is excellent.    Diagnosis:   1. Mild episode of recurrent major depressive disorder    2. Generalized anxiety disorder with panic attacks        Plan:  individual psychotherapy    Return to clinic: as scheduled    Length of Service (minutes): 38         [1]   Current Outpatient Medications:     ALPRAZolam (XANAX) 0.25 MG tablet, Take 1 tablet (0.25 mg total) by mouth daily as needed for Anxiety., Disp: 60 tablet, Rfl: 1    atogepant (QULIPTA) 60 mg Tab, Take 1 tablet (60 mg total) by mouth once daily., Disp: 30 tablet, Rfl: 6    buPROPion (WELLBUTRIN XL) 300 MG 24 hr tablet, Take 1 tablet (300 mg total) by mouth every morning., Disp: 30 tablet, Rfl: 1    celecoxib (CELEBREX) 100 MG capsule, Take 1 capsule by mouth every 12 hours with food until completion., Disp: 10 capsule, Rfl: 0    dextroamphetamine-amphetamine 5 mg Tab, Take 1 tablet (5 mg) by mouth once daily. Take at lunchtime., Disp: 30 tablet, Rfl: 0    EScitalopram oxalate (LEXAPRO) 20 MG tablet, Take 1 tablet (20 mg total) by mouth every morning., Disp: 30 tablet, Rfl: 1    guanFACINE (TENEX) 1 MG Tab, Take 1 tablet (1 mg total) by mouth nightly., Disp: 30 tablet, Rfl: 1    hydrOXYzine pamoate (VISTARIL) 25 MG Cap, Take 1 capsule by mouth daily 2 hours before bedtime until completion., Disp: 5 capsule, Rfl: 0    ibuprofen (ADVIL,MOTRIN) 800 MG tablet, Take 1 tablet (800 mg total) by mouth 3 (three) times daily., Disp: 60 tablet, Rfl: 6    lisdexamfetamine (VYVANSE) 50 MG capsule, Take 1 capsule (50 mg total) by mouth every morning., Disp: 30 capsule, Rfl: 0    loteprednol (LOTEMAX) 0.5 % ophthalmic suspension, I drop in the left eye three times a day  x 7 days, then every am as needed for  inflammation, Disp: 10 mL, Rfl: 1    pantoprazole (PROTONIX) 20 MG tablet, Take 1 tablet (20 mg total) by mouth once daily., Disp: 90 tablet, Rfl: 0    prochlorperazine (COMPAZINE) 10 MG tablet, Take 1 tablet (10 mg total) by mouth 3 (three) times daily as needed (nausea/headaches)., Disp: 20 tablet, Rfl: 1    rosuvastatin (CRESTOR) 10 MG tablet, Take 1 tablet (10 mg total) by mouth once daily., Disp: 90 tablet, Rfl: 3    tiZANidine (ZANAFLEX) 4 MG tablet, TAKE 1 TABLET BY MOUTH THREE TIMES A DAY AS NEEDED FOR 10 DAYS, Disp: 90 tablet, Rfl: 1  [2]   Social History  Tobacco Use    Smoking status: Former     Current packs/day: 0.00     Average packs/day: 0.5 packs/day for 20.0 years (10.0 ttl pk-yrs)     Types: Cigarettes     Start date: 1985     Quit date: 2005     Years since quittin.1    Smokeless tobacco: Never   Substance Use Topics    Alcohol use: No    Drug use: No

## 2025-08-06 ENCOUNTER — OFFICE VISIT (OUTPATIENT)
Dept: PSYCHIATRY | Facility: CLINIC | Age: 65
End: 2025-08-06
Payer: COMMERCIAL

## 2025-08-06 VITALS
HEIGHT: 71 IN | DIASTOLIC BLOOD PRESSURE: 81 MMHG | BODY MASS INDEX: 41.02 KG/M2 | SYSTOLIC BLOOD PRESSURE: 129 MMHG | WEIGHT: 293 LBS | HEART RATE: 89 BPM

## 2025-08-06 DIAGNOSIS — Z63.9 RELATIONSHIP DYSFUNCTION: ICD-10-CM

## 2025-08-06 DIAGNOSIS — F90.0 ADHD (ATTENTION DEFICIT HYPERACTIVITY DISORDER), INATTENTIVE TYPE: Primary | ICD-10-CM

## 2025-08-06 DIAGNOSIS — F41.1 GENERALIZED ANXIETY DISORDER WITH PANIC ATTACKS: ICD-10-CM

## 2025-08-06 DIAGNOSIS — Z56.9 PROBLEMS AT WORK: ICD-10-CM

## 2025-08-06 DIAGNOSIS — F33.1 MODERATE EPISODE OF RECURRENT MAJOR DEPRESSIVE DISORDER: ICD-10-CM

## 2025-08-06 DIAGNOSIS — I10 ESSENTIAL HYPERTENSION: ICD-10-CM

## 2025-08-06 DIAGNOSIS — F41.0 GENERALIZED ANXIETY DISORDER WITH PANIC ATTACKS: ICD-10-CM

## 2025-08-06 PROCEDURE — 3061F NEG MICROALBUMINURIA REV: CPT | Mod: CPTII,S$GLB,, | Performed by: STUDENT IN AN ORGANIZED HEALTH CARE EDUCATION/TRAINING PROGRAM

## 2025-08-06 PROCEDURE — 3079F DIAST BP 80-89 MM HG: CPT | Mod: CPTII,S$GLB,, | Performed by: STUDENT IN AN ORGANIZED HEALTH CARE EDUCATION/TRAINING PROGRAM

## 2025-08-06 PROCEDURE — 99215 OFFICE O/P EST HI 40 MIN: CPT | Mod: S$GLB,,, | Performed by: STUDENT IN AN ORGANIZED HEALTH CARE EDUCATION/TRAINING PROGRAM

## 2025-08-06 PROCEDURE — 1160F RVW MEDS BY RX/DR IN RCRD: CPT | Mod: CPTII,S$GLB,, | Performed by: STUDENT IN AN ORGANIZED HEALTH CARE EDUCATION/TRAINING PROGRAM

## 2025-08-06 PROCEDURE — G2211 COMPLEX E/M VISIT ADD ON: HCPCS | Mod: S$GLB,,, | Performed by: STUDENT IN AN ORGANIZED HEALTH CARE EDUCATION/TRAINING PROGRAM

## 2025-08-06 PROCEDURE — 96136 PSYCL/NRPSYC TST PHY/QHP 1ST: CPT | Mod: 59,S$GLB,, | Performed by: STUDENT IN AN ORGANIZED HEALTH CARE EDUCATION/TRAINING PROGRAM

## 2025-08-06 PROCEDURE — 3008F BODY MASS INDEX DOCD: CPT | Mod: CPTII,S$GLB,, | Performed by: STUDENT IN AN ORGANIZED HEALTH CARE EDUCATION/TRAINING PROGRAM

## 2025-08-06 PROCEDURE — 3074F SYST BP LT 130 MM HG: CPT | Mod: CPTII,S$GLB,, | Performed by: STUDENT IN AN ORGANIZED HEALTH CARE EDUCATION/TRAINING PROGRAM

## 2025-08-06 PROCEDURE — 1159F MED LIST DOCD IN RCRD: CPT | Mod: CPTII,S$GLB,, | Performed by: STUDENT IN AN ORGANIZED HEALTH CARE EDUCATION/TRAINING PROGRAM

## 2025-08-06 PROCEDURE — 99999 PR PBB SHADOW E&M-EST. PATIENT-LVL III: CPT | Mod: PBBFAC,,, | Performed by: STUDENT IN AN ORGANIZED HEALTH CARE EDUCATION/TRAINING PROGRAM

## 2025-08-06 PROCEDURE — 3066F NEPHROPATHY DOC TX: CPT | Mod: CPTII,S$GLB,, | Performed by: STUDENT IN AN ORGANIZED HEALTH CARE EDUCATION/TRAINING PROGRAM

## 2025-08-06 RX ORDER — DEXTROAMPHETAMINE SACCHARATE, AMPHETAMINE ASPARTATE, DEXTROAMPHETAMINE SULFATE AND AMPHETAMINE SULFATE 1.25; 1.25; 1.25; 1.25 MG/1; MG/1; MG/1; MG/1
5 TABLET ORAL DAILY
Qty: 30 TABLET | Refills: 0 | Status: SHIPPED | OUTPATIENT
Start: 2025-08-06 | End: 2025-09-05

## 2025-08-06 RX ORDER — ESCITALOPRAM OXALATE 20 MG/1
20 TABLET ORAL EVERY MORNING
Qty: 30 TABLET | Refills: 1 | Status: SHIPPED | OUTPATIENT
Start: 2025-08-06 | End: 2025-10-05

## 2025-08-06 RX ORDER — GUANFACINE 1 MG/1
1 TABLET ORAL NIGHTLY
Qty: 30 TABLET | Refills: 1 | Status: SHIPPED | OUTPATIENT
Start: 2025-08-06 | End: 2025-10-05

## 2025-08-06 RX ORDER — ALPRAZOLAM 0.25 MG/1
0.25 TABLET ORAL DAILY PRN
Qty: 60 TABLET | Refills: 1 | Status: SHIPPED | OUTPATIENT
Start: 2025-08-06

## 2025-08-06 RX ORDER — BUPROPION HYDROCHLORIDE 300 MG/1
300 TABLET ORAL EVERY MORNING
Qty: 30 TABLET | Refills: 1 | Status: SHIPPED | OUTPATIENT
Start: 2025-08-06 | End: 2025-10-05

## 2025-08-06 RX ORDER — LISDEXAMFETAMINE DIMESYLATE 60 MG/1
60 CAPSULE ORAL EVERY MORNING
Qty: 30 CAPSULE | Refills: 0 | Status: SHIPPED | OUTPATIENT
Start: 2025-08-06

## 2025-08-06 SDOH — SOCIAL DETERMINANTS OF HEALTH (SDOH): UNSPECIFIED PROBLEMS RELATED TO EMPLOYMENT: Z56.9

## 2025-08-06 SDOH — SOCIAL DETERMINANTS OF HEALTH (SDOH): PROBLEM RELATED TO PRIMARY SUPPORT GROUP, UNSPECIFIED: Z63.9

## 2025-08-13 PROBLEM — F33.0 MILD EPISODE OF RECURRENT MAJOR DEPRESSIVE DISORDER: Status: RESOLVED | Noted: 2024-04-30 | Resolved: 2025-08-13

## 2025-08-21 ENCOUNTER — OFFICE VISIT (OUTPATIENT)
Dept: PSYCHIATRY | Facility: CLINIC | Age: 65
End: 2025-08-21
Payer: COMMERCIAL

## 2025-08-21 DIAGNOSIS — F90.0 ADHD (ATTENTION DEFICIT HYPERACTIVITY DISORDER), INATTENTIVE TYPE: Primary | ICD-10-CM

## 2025-08-21 DIAGNOSIS — F41.0 GENERALIZED ANXIETY DISORDER WITH PANIC ATTACKS: ICD-10-CM

## 2025-08-21 DIAGNOSIS — F41.1 GENERALIZED ANXIETY DISORDER WITH PANIC ATTACKS: ICD-10-CM

## 2025-08-21 DIAGNOSIS — F33.1 MODERATE EPISODE OF RECURRENT MAJOR DEPRESSIVE DISORDER: ICD-10-CM

## 2025-08-21 PROCEDURE — 3061F NEG MICROALBUMINURIA REV: CPT | Mod: CPTII,S$GLB,, | Performed by: SOCIAL WORKER

## 2025-08-21 PROCEDURE — 3066F NEPHROPATHY DOC TX: CPT | Mod: CPTII,S$GLB,, | Performed by: SOCIAL WORKER

## 2025-08-21 PROCEDURE — 90837 PSYTX W PT 60 MINUTES: CPT | Mod: S$GLB,,, | Performed by: SOCIAL WORKER

## (undated) DEVICE — KIT PROCEDURE STER INLET CLOSU

## (undated) DEVICE — SET TUBE PNEUMOCLEAR SE HI FLO

## (undated) DEVICE — TROCAR KII BLLN 12MM 10CM

## (undated) DEVICE — APPLICATOR ARISTA FLEX XL

## (undated) DEVICE — STAPLER ECHELON FLEX 60MM 44CM

## (undated) DEVICE — DISSECTOR CURVED 5DCD

## (undated) DEVICE — APPLICATOR CHLORAPREP ORN 26ML

## (undated) DEVICE — LINER SUCTION 3000CC

## (undated) DEVICE — CLOSURE SKIN STERI STRIP 1/2X4

## (undated) DEVICE — BAG TISS RETRV MONARCH 10MM

## (undated) DEVICE — APPLIER CLIP ENDO LIGAMAX 5MM

## (undated) DEVICE — POWDER ARISTA AH 3G

## (undated) DEVICE — TROCAR KII FIOS 12MM X 100MM

## (undated) DEVICE — SLEEVE SCD EXPRESS KNEE MEDIUM

## (undated) DEVICE — SUT MONOCRYL 4-0 SH UND MON

## (undated) DEVICE — TUBING HF INSUFFLATION W/ FLTR

## (undated) DEVICE — GLOVE SURG ULTRA TOUCH 7.5

## (undated) DEVICE — BLADE SURG CARBON STEEL SZ11

## (undated) DEVICE — TROCAR ENDOPATH XCEL 12MM 10CM

## (undated) DEVICE — SOL CLEARIFY VISUALIZATION LAP

## (undated) DEVICE — TOWEL OR DISP STRL BLUE 4/PK

## (undated) DEVICE — NDL HYPO REG 25G X 1 1/2

## (undated) DEVICE — SCISSOR 5MMX35CM DIRECT DRIVE

## (undated) DEVICE — SYR 30CC LUER LOCK

## (undated) DEVICE — SEE MEDLINE ITEM 157117

## (undated) DEVICE — SUT 0 18IN COATED VICRYL V

## (undated) DEVICE — PACK CUSTOM ENDO CHOLO SLI

## (undated) DEVICE — RELOAD ECHELON FLEX BLU 60MM

## (undated) DEVICE — NDL SPINAL 18GX3.5 SPINOCAN

## (undated) DEVICE — SEALER LIGASURE MARYLAND 37CM

## (undated) DEVICE — TROCAR ENDOPATH XCEL 12X100MM

## (undated) DEVICE — DRAPE CORETEMP FLD WRM 56X62IN

## (undated) DEVICE — IRRIGATOR SUCTION W/TIP

## (undated) DEVICE — TROCAR KII FIOS 5MM X 100MM

## (undated) DEVICE — SOL IRR NACL .9% 3000ML

## (undated) DEVICE — ELECTRODE REM PLYHSV RETURN 9

## (undated) DEVICE — ADHESIVE DERMABOND ADVANCED

## (undated) DEVICE — SUT 0 VICRYL / UR6 (J603)

## (undated) DEVICE — STRAP OR TABLE 5IN X 72IN

## (undated) DEVICE — TRAY MINOR GEN SURG

## (undated) DEVICE — RELOAD ECHELON FLEX GRN 60MM

## (undated) DEVICE — SUT MCRYL PLUS 4-0 PS2 27IN

## (undated) DEVICE — CANNULA ENDOPATH XCEL 5X100MM

## (undated) DEVICE — APPLIER CLIP EPIX UNIV 5X34

## (undated) DEVICE — Device

## (undated) DEVICE — TROCAR ENDOPATH XCEL 5X100MM